# Patient Record
Sex: MALE | Race: WHITE | NOT HISPANIC OR LATINO | Employment: OTHER | URBAN - METROPOLITAN AREA
[De-identification: names, ages, dates, MRNs, and addresses within clinical notes are randomized per-mention and may not be internally consistent; named-entity substitution may affect disease eponyms.]

---

## 2023-11-30 ENCOUNTER — HOSPITAL ENCOUNTER (EMERGENCY)
Facility: HOSPITAL | Age: 63
Discharge: HOME | End: 2023-11-30
Payer: COMMERCIAL

## 2023-11-30 ENCOUNTER — APPOINTMENT (OUTPATIENT)
Dept: RADIOLOGY | Facility: HOSPITAL | Age: 63
End: 2023-11-30
Payer: COMMERCIAL

## 2023-11-30 VITALS
DIASTOLIC BLOOD PRESSURE: 81 MMHG | BODY MASS INDEX: 30.06 KG/M2 | WEIGHT: 210 LBS | HEIGHT: 70 IN | TEMPERATURE: 97.9 F | OXYGEN SATURATION: 95 % | HEART RATE: 88 BPM | SYSTOLIC BLOOD PRESSURE: 154 MMHG | RESPIRATION RATE: 16 BRPM

## 2023-11-30 DIAGNOSIS — S09.90XA CLOSED HEAD INJURY, INITIAL ENCOUNTER: ICD-10-CM

## 2023-11-30 DIAGNOSIS — T14.8XXA HEMATOMA: ICD-10-CM
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
## 2023-11-30 PROCEDURE — 72125 CT NECK SPINE W/O DYE: CPT | Performed by: RADIOLOGY

## 2023-11-30 PROCEDURE — 76377 3D RENDER W/INTRP POSTPROCES: CPT

## 2023-11-30 PROCEDURE — 70450 CT HEAD/BRAIN W/O DYE: CPT | Performed by: RADIOLOGY

## 2023-11-30 PROCEDURE — 70450 CT HEAD/BRAIN W/O DYE: CPT

## 2023-11-30 PROCEDURE — 70486 CT MAXILLOFACIAL W/O DYE: CPT | Performed by: RADIOLOGY

## 2023-11-30 PROCEDURE — 99285 EMERGENCY DEPT VISIT HI MDM: CPT

## 2023-11-30 PROCEDURE — 70486 CT MAXILLOFACIAL W/O DYE: CPT

## 2023-11-30 PROCEDURE — 76377 3D RENDER W/INTRP POSTPROCES: CPT | Performed by: RADIOLOGY

## 2023-11-30 PROCEDURE — 72125 CT NECK SPINE W/O DYE: CPT

## 2023-11-30 ASSESSMENT — PAIN SCALES - GENERAL: PAINLEVEL_OUTOF10: 10 - WORST POSSIBLE PAIN

## 2023-11-30 ASSESSMENT — LIFESTYLE VARIABLES
HAVE YOU EVER FELT YOU SHOULD CUT DOWN ON YOUR DRINKING: NO
EVER FELT BAD OR GUILTY ABOUT YOUR DRINKING: NO
REASON UNABLE TO ASSESS: NO
HAVE PEOPLE ANNOYED YOU BY CRITICIZING YOUR DRINKING: NO
EVER HAD A DRINK FIRST THING IN THE MORNING TO STEADY YOUR NERVES TO GET RID OF A HANGOVER: NO

## 2023-11-30 ASSESSMENT — COLUMBIA-SUICIDE SEVERITY RATING SCALE - C-SSRS
1. IN THE PAST MONTH, HAVE YOU WISHED YOU WERE DEAD OR WISHED YOU COULD GO TO SLEEP AND NOT WAKE UP?: NO
6. HAVE YOU EVER DONE ANYTHING, STARTED TO DO ANYTHING, OR PREPARED TO DO ANYTHING TO END YOUR LIFE?: NO
2. HAVE YOU ACTUALLY HAD ANY THOUGHTS OF KILLING YOURSELF?: NO

## 2023-11-30 ASSESSMENT — PAIN - FUNCTIONAL ASSESSMENT: PAIN_FUNCTIONAL_ASSESSMENT: 0-10

## 2023-11-30 ASSESSMENT — PAIN DESCRIPTION - LOCATION: LOCATION: HEAD

## 2023-11-30 ASSESSMENT — PAIN DESCRIPTION - PAIN TYPE: TYPE: ACUTE PAIN

## 2023-11-30 NOTE — ED PROVIDER NOTES
HPI   Chief Complaint   Patient presents with    Fall     Patient fell down while walking today, patient states he fell because he didn't have his cane. Patient has visible swelling to the left eye. Patient denies thinners.          History provided by:  Patient   used: No         63-year-old male presents with an accident a fall and has a goose egg above his left eyebrow today.  He is not sure if he lost consciousness.  Denies neck pain.  He fell because he did not have his cane and tripped.  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  He does not take blood thinners.  Denies BUE/BLE swelling, temp or sensation changes. denies dizziness, headache, vision changes, weakness, n/v, abd or back pain, SOB or CP    ROS negative unless otherwise stated in HPI                   [place] Coma Scale Score: 15                  Patient History   No past medical history on file.  No past surgical history on file.  No family history on file.  Social History     Tobacco Use    Smoking status: Not on file    Smokeless tobacco: Not on file   Substance Use Topics    Alcohol use: Not on file    Drug use: Not on file       Physical Exam   ED Triage Vitals [11/30/23 1304]   Temp Heart Rate Resp BP   36.6 °C (97.9 °F) 88 16 154/81      SpO2 Temp Source Heart Rate Source Patient Position   95 % Temporal -- --      BP Location FiO2 (%)     -- --       Physical Exam    General: alert, no distress, well nourished, talking in full and complete sentences  Skin: dry, intact, no rashes, no ecchymosis, no crepitus, no open wounds  Head: Hematoma above the left eyebrow, normocephalic  Eyes: EOMI, PERRLA, normal conjunctiva, no nystagmus, no raccoon eyes  Ears: Tms clear and intact, external ear normal, no discharge, no hemotympanum, negative walker sign  Throat: oropharynx clear, tonsils not enlarged, uvula midline, no stridor, no hot potato voice  Nose: nares patent  Mouth: mucous membranes moist  Neck: supple,  FROM without pain  CV: +S1/S1  Respiratory: non labored breathing, lungs CTA, no wheezing, no retractions  Abd: soft, NT, normal BS, no peritoneal signs  Spine: NT, no vertebral tenderness, no step offs  Extremities: FROM X4, strength +5/5, pulses intact, capillary refill intact  Neuro: A&Ox3  Psych: cooperative, appropriate mood    ED Course & MDM   Diagnoses as of 11/30/23 1541   Fall, initial encounter   Closed head injury, initial encounter   Hematoma   Labs Reviewed - No data to display   CT maxillofacial bones wo IV contrast   Final Result   Scalp soft tissue swelling overlies the left frontal calvarium and   extends over the medial left orbit. There is no sign of acute   fracture.        Postoperative changes in the maxillary sinuses along the medial walls   with bilateral turbinectomies.        Lobular mucoperiosteal thickening of the paranasal sinuses.        Bowing of the nasal septum to the right.        Postoperative changes are again noted in the right orbit.        Signed by: Be Mckeon 11/30/2023 2:18 PM   Dictation workstation:   [id number]      CT head wo IV contrast   Final Result   Mild age-related atrophy and small-vessel ischemic changes of the   cerebral white matter.        No CT evidence of acute intracranial hemorrhage or mass effect.        Large scalp hematoma overlies the left frontal calvarium and extends   over the medial left orbit. There is no underlying calvarial fracture.        Mucoperiosteal thickening of the paranasal sinuses.        Signed by: Be Mckeon 11/30/2023 2:15 PM   Dictation workstation:   [id number]      CT cervical spine wo IV contrast   Final Result   Mild multilevel cervical spondylosis with mild dextro convexity of   the cervical spine is straightening of cervical lordosis.        Mild multilevel bulging disc with hypertrophic facet arthrosis. There   is mild central canal narrowing at multiple levels as described   above. There is multilevel  bilateral facet arthrosis which is   greatest at C4-5 on the left and C3-4 on the right.        No sign of acute fracture or subluxation.        Signed by: Be Mckeon 11/30/2023 2:27 PM   Dictation workstation:   [id number]      CT 3D reconstruction    (Results Pending)         Medical Decision Making  No acute findings on final read of head, maxillofacial and cervical spine CTs.  Patient follow-up with family doctor.  Afebrile, not tachycardic, not tachypneic, not hypoxic, tolerating PO, non toxic appearing and ambulating at baseline at discharge. Hemodynamically intact. All questions answered. Patient in agreement with treatment.      Procedure  Procedures     Virgen Cohen PA-C  11/30/23 8678

## 2023-11-30 NOTE — ED TRIAGE NOTES
63-year-old male presents with an accidental fall today after he had an accidental fall because he tripped and hit his head on a steel pipe 1.5 hours ago.  He is not sure if he lost consciousness.  He does not take blood thinners.  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  He has a hematoma to his eyebrow area.  He denies neck pain.  Alert and oriented and talking in full and complete sentences.

## 2023-12-11 ENCOUNTER — APPOINTMENT (OUTPATIENT)
Dept: RADIOLOGY | Facility: HOSPITAL | Age: 63
End: 2023-12-11
Payer: COMMERCIAL

## 2023-12-11 ENCOUNTER — HOSPITAL ENCOUNTER (EMERGENCY)
Facility: HOSPITAL | Age: 63
Discharge: HOME | End: 2023-12-11
Attending: STUDENT IN AN ORGANIZED HEALTH CARE EDUCATION/TRAINING PROGRAM
Payer: COMMERCIAL

## 2023-12-11 VITALS
BODY MASS INDEX: 30.21 KG/M2 | TEMPERATURE: 98.1 F | HEART RATE: 73 BPM | DIASTOLIC BLOOD PRESSURE: 71 MMHG | HEIGHT: 70 IN | WEIGHT: 211 LBS | OXYGEN SATURATION: 97 % | RESPIRATION RATE: 18 BRPM | SYSTOLIC BLOOD PRESSURE: 118 MMHG

## 2023-12-11 DIAGNOSIS — R07.9 CHEST PAIN, UNSPECIFIED TYPE: Primary | ICD-10-CM

## 2023-12-11 DIAGNOSIS — I15.9 SECONDARY HYPERTENSION: ICD-10-CM

## 2023-12-11 LAB
ALBUMIN SERPL BCP-MCNC: 4.1 G/DL (ref 3.4–5)
ALP SERPL-CCNC: 80 U/L (ref 33–136)
ALT SERPL W P-5'-P-CCNC: 15 U/L (ref 10–52)
ANION GAP SERPL CALC-SCNC: 10 MMOL/L (ref 10–20)
APTT PPP: 29 SECONDS (ref 27–38)
AST SERPL W P-5'-P-CCNC: 16 U/L (ref 9–39)
BASOPHILS # BLD AUTO: 0.05 X10*3/UL (ref 0–0.1)
BASOPHILS NFR BLD AUTO: 1 %
BILIRUB SERPL-MCNC: 0.6 MG/DL (ref 0–1.2)
BNP SERPL-MCNC: 16 PG/ML (ref 0–99)
BUN SERPL-MCNC: 18 MG/DL (ref 6–23)
CALCIUM SERPL-MCNC: 9 MG/DL (ref 8.6–10.3)
CARDIAC TROPONIN I PNL SERPL HS: 4 NG/L (ref 0–20)
CARDIAC TROPONIN I PNL SERPL HS: 4 NG/L (ref 0–20)
CHLORIDE SERPL-SCNC: 106 MMOL/L (ref 98–107)
CO2 SERPL-SCNC: 28 MMOL/L (ref 21–32)
CREAT SERPL-MCNC: 0.79 MG/DL (ref 0.5–1.3)
EOSINOPHIL # BLD AUTO: 0.46 X10*3/UL (ref 0–0.7)
EOSINOPHIL NFR BLD AUTO: 9.1 %
ERYTHROCYTE [DISTWIDTH] IN BLOOD BY AUTOMATED COUNT: 12.8 % (ref 11.5–14.5)
GFR SERPL CREATININE-BSD FRML MDRD: >90 ML/MIN/1.73M*2
GLUCOSE SERPL-MCNC: 111 MG/DL (ref 74–99)
HCT VFR BLD AUTO: 43 % (ref 41–52)
HGB BLD-MCNC: 14.3 G/DL (ref 13.5–17.5)
IMM GRANULOCYTES # BLD AUTO: 0 X10*3/UL (ref 0–0.7)
IMM GRANULOCYTES NFR BLD AUTO: 0 % (ref 0–0.9)
INR PPP: 1 (ref 0.9–1.1)
LYMPHOCYTES # BLD AUTO: 2.31 X10*3/UL (ref 1.2–4.8)
LYMPHOCYTES NFR BLD AUTO: 45.8 %
MAGNESIUM SERPL-MCNC: 2 MG/DL (ref 1.6–2.4)
MCH RBC QN AUTO: 32.4 PG (ref 26–34)
MCHC RBC AUTO-ENTMCNC: 33.3 G/DL (ref 32–36)
MCV RBC AUTO: 98 FL (ref 80–100)
MONOCYTES # BLD AUTO: 0.74 X10*3/UL (ref 0.1–1)
MONOCYTES NFR BLD AUTO: 14.7 %
NEUTROPHILS # BLD AUTO: 1.48 X10*3/UL (ref 1.2–7.7)
NEUTROPHILS NFR BLD AUTO: 29.4 %
NRBC BLD-RTO: 0 /100 WBCS (ref 0–0)
PLATELET # BLD AUTO: 194 X10*3/UL (ref 150–450)
POTASSIUM SERPL-SCNC: 3.9 MMOL/L (ref 3.5–5.3)
PROT SERPL-MCNC: 6.5 G/DL (ref 6.4–8.2)
PROTHROMBIN TIME: 11 SECONDS (ref 9.8–12.8)
RBC # BLD AUTO: 4.41 X10*6/UL (ref 4.5–5.9)
SARS-COV-2 RNA RESP QL NAA+PROBE: NOT DETECTED
SODIUM SERPL-SCNC: 140 MMOL/L (ref 136–145)
WBC # BLD AUTO: 5 X10*3/UL (ref 4.4–11.3)

## 2023-12-11 PROCEDURE — 2500000001 HC RX 250 WO HCPCS SELF ADMINISTERED DRUGS (ALT 637 FOR MEDICARE OP)

## 2023-12-11 PROCEDURE — 83880 ASSAY OF NATRIURETIC PEPTIDE: CPT

## 2023-12-11 PROCEDURE — 36415 COLL VENOUS BLD VENIPUNCTURE: CPT

## 2023-12-11 PROCEDURE — 85610 PROTHROMBIN TIME: CPT

## 2023-12-11 PROCEDURE — 85025 COMPLETE CBC W/AUTO DIFF WBC: CPT

## 2023-12-11 PROCEDURE — 71045 X-RAY EXAM CHEST 1 VIEW: CPT | Mod: FOREIGN READ | Performed by: RADIOLOGY

## 2023-12-11 PROCEDURE — 99283 EMERGENCY DEPT VISIT LOW MDM: CPT | Mod: 25

## 2023-12-11 PROCEDURE — 84484 ASSAY OF TROPONIN QUANT: CPT

## 2023-12-11 PROCEDURE — 71045 X-RAY EXAM CHEST 1 VIEW: CPT | Mod: FY

## 2023-12-11 PROCEDURE — 83735 ASSAY OF MAGNESIUM: CPT

## 2023-12-11 PROCEDURE — 80053 COMPREHEN METABOLIC PANEL: CPT

## 2023-12-11 PROCEDURE — 87635 SARS-COV-2 COVID-19 AMP PRB: CPT

## 2023-12-11 PROCEDURE — 2500000004 HC RX 250 GENERAL PHARMACY W/ HCPCS (ALT 636 FOR OP/ED)

## 2023-12-11 PROCEDURE — 96360 HYDRATION IV INFUSION INIT: CPT

## 2023-12-11 RX ORDER — NITROGLYCERIN 0.4 MG/1
0.4 TABLET SUBLINGUAL ONCE
Status: COMPLETED | OUTPATIENT
Start: 2023-12-11 | End: 2023-12-11

## 2023-12-11 RX ORDER — NAPROXEN SODIUM 220 MG/1
324 TABLET, FILM COATED ORAL ONCE
Status: COMPLETED | OUTPATIENT
Start: 2023-12-11 | End: 2023-12-11

## 2023-12-11 RX ADMIN — NITROGLYCERIN 0.4 MG: 0.4 TABLET SUBLINGUAL at 21:01

## 2023-12-11 RX ADMIN — ASPIRIN 324 MG: 81 TABLET, CHEWABLE ORAL at 21:00

## 2023-12-11 RX ADMIN — SODIUM CHLORIDE 500 ML: 9 INJECTION, SOLUTION INTRAVENOUS at 21:13

## 2023-12-11 ASSESSMENT — PAIN DESCRIPTION - LOCATION
LOCATION: CHEST

## 2023-12-11 ASSESSMENT — LIFESTYLE VARIABLES
HAVE YOU EVER FELT YOU SHOULD CUT DOWN ON YOUR DRINKING: NO
EVER HAD A DRINK FIRST THING IN THE MORNING TO STEADY YOUR NERVES TO GET RID OF A HANGOVER: NO
EVER FELT BAD OR GUILTY ABOUT YOUR DRINKING: NO
REASON UNABLE TO ASSESS: NO
HAVE PEOPLE ANNOYED YOU BY CRITICIZING YOUR DRINKING: NO

## 2023-12-11 ASSESSMENT — PAIN DESCRIPTION - PROGRESSION
CLINICAL_PROGRESSION: NOT CHANGED
CLINICAL_PROGRESSION: GRADUALLY IMPROVING

## 2023-12-11 ASSESSMENT — PAIN DESCRIPTION - FREQUENCY
FREQUENCY: CONSTANT/CONTINUOUS
FREQUENCY: CONSTANT/CONTINUOUS

## 2023-12-11 ASSESSMENT — PAIN DESCRIPTION - DESCRIPTORS
DESCRIPTORS: PRESSURE;SHARP
DESCRIPTORS: SHARP;PRESSURE

## 2023-12-11 ASSESSMENT — COLUMBIA-SUICIDE SEVERITY RATING SCALE - C-SSRS
2. HAVE YOU ACTUALLY HAD ANY THOUGHTS OF KILLING YOURSELF?: NO
6. HAVE YOU EVER DONE ANYTHING, STARTED TO DO ANYTHING, OR PREPARED TO DO ANYTHING TO END YOUR LIFE?: NO
1. IN THE PAST MONTH, HAVE YOU WISHED YOU WERE DEAD OR WISHED YOU COULD GO TO SLEEP AND NOT WAKE UP?: NO

## 2023-12-11 ASSESSMENT — PAIN DESCRIPTION - PAIN TYPE
TYPE: ACUTE PAIN
TYPE: ACUTE PAIN

## 2023-12-11 ASSESSMENT — PAIN SCALES - GENERAL
PAINLEVEL_OUTOF10: 9
PAINLEVEL_OUTOF10: 3
PAINLEVEL_OUTOF10: 7
PAINLEVEL_OUTOF10: 9

## 2023-12-11 ASSESSMENT — PAIN - FUNCTIONAL ASSESSMENT
PAIN_FUNCTIONAL_ASSESSMENT: 0-10

## 2023-12-11 ASSESSMENT — PAIN DESCRIPTION - ONSET
ONSET: GRADUAL
ONSET: GRADUAL

## 2023-12-12 ENCOUNTER — HOSPITAL ENCOUNTER (OUTPATIENT)
Dept: CARDIOLOGY | Facility: HOSPITAL | Age: 63
Discharge: HOME | End: 2023-12-12
Payer: COMMERCIAL

## 2023-12-12 PROCEDURE — 93005 ELECTROCARDIOGRAM TRACING: CPT

## 2023-12-12 NOTE — ED PROVIDER NOTES
"HPI   Chief Complaint   Patient presents with    Hypertension       History provided by: Patient    Limitations to history: None    CC: Chest pain, hypertension    HPI: 63-year-old male with a history of epilepsy presents the emergency department to be evaluated for chest pain.  He says the chest pain started out 2 hours prior to arrival.  He characterized the pain as \"pressure \"and denies any tearing or shredding pain in his chest or back or radiation to his stomach.  States that is generally seen in his chest.  Denies pain radiation or anything makes pain better or worse including taking a big breath.  He denies taking anything for his pain prior to arrival.  He denies cough and hemoptysis.  Denies feeling short of breath.  He denies history of ACS, not sure when his last stress test was but he is never required stent placement or bypass.  Denies history of heart failure, has chronic swelling in his legs but is not on Lasix.  He says that the swelling in his legs is similar to previous and he wears compression stockings.  He denies history of blood clots and denies recent plane flights or surgeries.  Denies history of cancer.  Denies calf pain.  He denies recent illnesses or exposure to sick contacts.  States that he noticed his blood pressure was elevated when his pain started.  He does not believe he takes anything for his blood pressure.  He denies headache or vision changes.  Denies extremity weakness, numbness, or tingling.  He does have a history of COPD but has not required any breathing treatments over the last few months since he quit smoking due to problems with his left eye that he does follow-up with a specialist for.  Denies all other systemic symptoms.    ROS: Negative unless mentioned in HPI    Social Hx: Former smoker.  Denies alcohol and drug use.    Medical Hx: Medical history significant for epilepsy.  Allergy to gabapentin, Lyrica, green tea, Norco, and prednisone.  Immunizations are " up-to-date.    Surgical HX: Denies    Physical exam:    Constitutional: Patient is well-nourished and well-developed.  Sitting comfortably in the room and in no distress.  Oriented to person, place, time, and situation.    HEENT: Head is normocephalic, atraumatic. Patient's airway is patent.  Tympanic membranes are clear bilaterally.  Nasal mucosa clear.  Mouth with normal mucosa.  Throat is not erythematous and there are no oropharyngeal exudates, uvula is midline.  No obvious facial deformities.    Eyes: Clear bilaterally.  Pupils are equal round and reactive to light and accommodation.  Extraocular movements intact.      Cardiac: Regular rate, regular rhythm.  Heart sounds S1, S2.  No murmurs, rubs, or gallops.  PMI nondisplaced.  No JVD.    Respiratory: Regular respiratory rate and effort.  Breath sounds are clear and equal bilaterally, no adventitious lung sounds.  Patient is speaking in full sentences and is in no apparent respiratory distress. No use of accessory muscles.      Gastrointestinal: Abdomen is soft, nondistended, and nontender.  There are no obvious deformities.  No rebound tenderness or guarding.  Bowel sounds are normal active.    Genitourinary: No CVA or flank tenderness.    Musculoskeletal: No reproducible tenderness.  No peripheral edema or erythema.  No calf tenderness.  Negative Homans' sign.  No obvious skin or bony deformities.  Patient has equal range of motion in all extremities and no strength deficiencies.  No muscle or joint tenderness. No back or neck tenderness.  Capillary refill less than 3 seconds.  Strong peripheral pulses.  No sensory deficits.    Neurological: Patient is alert and oriented.  No focal deficits.  5/5 strength in all extremities.  Cranial nerves II through XII intact. GCS15.     Skin: Skin is normal color for race and is warm, dry, and intact.  No evidence of trauma.  No lesions, rashes, bruising, jaundice, or masses.    Psych: Appropriate mood and affect.  No  apparent risk to self or others.    Heme/lymph: No adenopathy, lymphadenopathy, or splenomegaly    Physical exam is otherwise negative unless stated above or in history of present illness.                          No data recorded                Patient History   No past medical history on file.  No past surgical history on file.  No family history on file.  Social History     Tobacco Use    Smoking status: Not on file    Smokeless tobacco: Not on file   Substance Use Topics    Alcohol use: Not on file    Drug use: Not on file       Physical Exam   ED Triage Vitals [12/11/23 2031]   Temp Heart Rate Resp BP   36.7 °C (98.1 °F) 85 20 174/83      SpO2 Temp Source Heart Rate Source Patient Position   95 % Temporal -- --      BP Location FiO2 (%)     Right arm --       Physical Exam    ED Course & MDM   Diagnoses as of 12/11/23 2236   Chest pain, unspecified type   Secondary hypertension     Patient updated on plan for lab testing, IV insertion, radiology imaging, and medications to be administered while in the ER (if indicated). Patient updated on expected wait times for testing and results. Patient provided my name and told to ask any staff member for questions or concerns if they should arise. Electronic medical record reviewed.     MDM    Upon initial assessment, patient was healthy non-toxic appearing and in no apparent distress.     Patient presented to the emergency department with the chief complaint of chest pressure with hypertension.breath sounds are clear and equal bilaterally, 95% on room air.  No muffled heart sounds, JVD, murmur.  No peripheral edema or erythema would suggest a DVT or right-sided heart failure.  On arrival to the emergency department, vital signs were within normal limits  We will give the patient oral aspirin and sublingual nitro for his chest pressure which should help with his blood pressure as well.  Will get basic blood work, EKG and troponin, chest x-ray, coagulation screen, BMP,  magnesium.    I have a low suspicion for PE or AAA based on the patient's history and physical exam.  Is not hypoxic or tachycardic.  He is no history of blood clots in his history and physical exam not consistent with blood clot at this time.  Wells criteria 0.  I staffed this patient with my attending, agreeable plan of care.    Patient's EKG was performed at 2044 and interpreted by me.  Normal sinus rhythm 80 bpm.  Normal axis.  There is no ST elevation or depression.  No prolonged QT.    Upon reevaluation, patient states his pain is greatly improved.  His blood pressure is also now within normal limits.  I updated him on his results.  His original repeat troponin within normal notes, heart score is 2.  He tested negative for COVID and the flu.  BNP is 16.  Magnesium is 2.  CMP shows hyperglycemia 111 thought signs of DKA.  CBC shows no leukocytosis or anemia.  Chest x-ray feels no cardiomegaly, pulmonary edema, pneumothorax, pneumonia, pleural effusion.  States he is feeling much better and he like to go home.  I do believe this is reasonable given his heart score as well as his reassuring lab and imaging results.  I will [facility], I do believe he would benefit from a cardiac consultation and potential stress test given that he has not had any similar test in several years.  I also recommend that he follow-up with his primary care provider to discuss his blood pressure.  Recommend that he take his blood pressure at home and keep a documented log of this.  Discussed supportive treatment.  All questions and concerns addressed.  Reasons to return to ER discussed.  Patient verbalized understanding and agreement with the treatment plan and they remained hemodynamically stable in the ER.      This note was dictated using a speech recognition program.  While an attempt was made at proof-reading to minimize errors, minor errors in transcription may be present    Medical Decision  Making      Procedure  Procedures     Clark Blair PA-C  12/11/23 0984

## 2024-03-28 ENCOUNTER — HOSPITAL ENCOUNTER (EMERGENCY)
Facility: HOSPITAL | Age: 64
Discharge: HOME | End: 2024-03-28
Payer: COMMERCIAL

## 2024-03-28 ENCOUNTER — APPOINTMENT (OUTPATIENT)
Dept: RADIOLOGY | Facility: HOSPITAL | Age: 64
End: 2024-03-28
Payer: COMMERCIAL

## 2024-03-28 VITALS
DIASTOLIC BLOOD PRESSURE: 65 MMHG | WEIGHT: 206 LBS | TEMPERATURE: 97.7 F | BODY MASS INDEX: 28.84 KG/M2 | OXYGEN SATURATION: 95 % | HEART RATE: 94 BPM | SYSTOLIC BLOOD PRESSURE: 140 MMHG | HEIGHT: 71 IN | RESPIRATION RATE: 18 BRPM

## 2024-03-28 DIAGNOSIS — S43.402A SPRAIN OF LEFT SHOULDER, UNSPECIFIED SHOULDER SPRAIN TYPE, INITIAL ENCOUNTER: ICD-10-CM
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
## 2024-03-28 PROCEDURE — 99284 EMERGENCY DEPT VISIT MOD MDM: CPT

## 2024-03-28 PROCEDURE — 73030 X-RAY EXAM OF SHOULDER: CPT | Mod: LEFT SIDE | Performed by: RADIOLOGY

## 2024-03-28 PROCEDURE — 73030 X-RAY EXAM OF SHOULDER: CPT | Mod: LT

## 2024-03-28 PROCEDURE — 73060 X-RAY EXAM OF HUMERUS: CPT | Mod: LEFT SIDE | Performed by: RADIOLOGY

## 2024-03-28 PROCEDURE — 73060 X-RAY EXAM OF HUMERUS: CPT | Mod: LT

## 2024-03-28 RX ORDER — LIDOCAINE 560 MG/1
1 PATCH PERCUTANEOUS; TOPICAL; TRANSDERMAL DAILY
Qty: 5 PATCH | Refills: 0 | Status: SHIPPED | OUTPATIENT
Start: 2024-03-28 | End: 2024-04-02

## 2024-03-28 RX ORDER — ACETAMINOPHEN 500 MG
500 TABLET ORAL EVERY 6 HOURS PRN
Qty: 28 TABLET | Refills: 0 | Status: SHIPPED | OUTPATIENT
Start: 2024-03-28 | End: 2024-04-04

## 2024-03-28 RX ORDER — ACETAMINOPHEN 325 MG/1
650 TABLET ORAL ONCE
Status: COMPLETED | OUTPATIENT
Start: 2024-03-28 | End: 2024-03-28

## 2024-03-28 RX ADMIN — ACETAMINOPHEN 650 MG: 325 TABLET ORAL at 15:19

## 2024-03-28 ASSESSMENT — PAIN - FUNCTIONAL ASSESSMENT
PAIN_FUNCTIONAL_ASSESSMENT: 0-10
PAIN_FUNCTIONAL_ASSESSMENT: 0-10

## 2024-03-28 ASSESSMENT — LIFESTYLE VARIABLES
EVER FELT BAD OR GUILTY ABOUT YOUR DRINKING: NO
HAVE PEOPLE ANNOYED YOU BY CRITICIZING YOUR DRINKING: NO
TOTAL SCORE: 0
HAVE YOU EVER FELT YOU SHOULD CUT DOWN ON YOUR DRINKING: NO
EVER HAD A DRINK FIRST THING IN THE MORNING TO STEADY YOUR NERVES TO GET RID OF A HANGOVER: NO

## 2024-03-28 ASSESSMENT — PAIN SCALES - GENERAL
PAINLEVEL_OUTOF10: 10 - WORST POSSIBLE PAIN
PAINLEVEL_OUTOF10: 10 - WORST POSSIBLE PAIN

## 2024-03-28 ASSESSMENT — COLUMBIA-SUICIDE SEVERITY RATING SCALE - C-SSRS
2. HAVE YOU ACTUALLY HAD ANY THOUGHTS OF KILLING YOURSELF?: NO
1. IN THE PAST MONTH, HAVE YOU WISHED YOU WERE DEAD OR WISHED YOU COULD GO TO SLEEP AND NOT WAKE UP?: NO
6. HAVE YOU EVER DONE ANYTHING, STARTED TO DO ANYTHING, OR PREPARED TO DO ANYTHING TO END YOUR LIFE?: NO

## 2024-03-28 ASSESSMENT — PAIN DESCRIPTION - PROGRESSION: CLINICAL_PROGRESSION: NOT CHANGED

## 2024-03-28 NOTE — ED PROVIDER NOTES
HPI   Chief Complaint   Patient presents with    Fall     Pt fell  today injured left shoulder       History provided by: Patient    Limitations to history: None    CC: Fall    HPI: 63-year-old male presents emergency department to be evaluated for fall and left shoulder injury.  Patient states that this occurred earlier this morning.  Patient is completely blind in his right eye and says that he has about 50% vision in his left eye so he primarily walks around with a cane.  States that he tripped over something in his home causing him to fall on his outstretched left arm.  He complains of pain in the top of his shoulder and down in his humerus.  He says that the pain is not bad when he is not moving his arm but he is having difficulty lifting his arm over his head.  Denies any numbness, tingling, weakness.  He denies hitting his head or losing consciousness.  Denies use of anticoagulants or history of intracranial hemorrhage.  Denies headache, neck pain, nausea vomiting, lightheadedness or dizziness.  He denies weakness and fatigue and denies falling frequently or syncopal episodes.  Denies chest pain and difficulty breathing.  He did take a muscle relaxer which did help somewhat with the pain.  Denies pain or injury elsewhere.  Denies all other systemic symptoms.    ROS: Negative unless mentioned in HPI    Social Hx: Denies tobacco, alcohol or drug use    Physical exam:    Constitutional: Patient is well-nourished and well-developed.  Sitting comfortably in the room and in no distress.  Oriented to person, place, time, and situation.    HEENT: Head is normocephalic, atraumatic. Patient's airway is patent.  Tympanic membranes are clear bilaterally.  Nasal mucosa clear.  Mouth with normal mucosa.  Throat is not erythematous and there are no oropharyngeal exudates, uvula is midline.  No obvious facial deformities.    Eyes: Clear bilaterally.  Pupils are equal round and reactive to light and accommodation.   Extraocular movements intact.      Cardiac: Regular rate, regular rhythm.  Heart sounds S1, S2.  No murmurs, rubs, or gallops.  PMI nondisplaced.  No JVD.    Respiratory: Regular respiratory rate and effort.  Breath sounds are clear and equal bilaterally, no adventitious lung sounds.  Patient is speaking in full sentences and is in no apparent respiratory distress. No use of accessory muscles.      Gastrointestinal: Abdomen is soft, nondistended, and nontender.  There are no obvious deformities.  No rebound tenderness or guarding.  Bowel sounds are normal active.    Genitourinary: No CVA or flank tenderness.    Musculoskeletal: Muscular tenderness over the superior and anterior aspect of the left shoulder.  Does have some discomfort in the proximal humerus.  Patient has limited ability to flex and abduct the shoulder.  No obvious skin or bony deformities.  No midline spinal tenderness.Capillary refill less than 3 seconds.  Strong peripheral pulses.  No sensory deficits.    Neurological: Patient is alert and oriented.  No focal deficits.  5/5 strength in all extremities.  Cranial nerves II through XII intact. GCS15.     Skin: Skin is normal color for race and is warm, dry, and intact.  No evidence of trauma.  No lesions, rashes, bruising, jaundice, or masses.    Psych: Appropriate mood and affect.  No apparent risk to self or others.    Heme/lymph: No adenopathy, lymphadenopathy, or splenomegaly    Physical exam is otherwise negative unless stated above or in history of present illness.    Patient updated on plan for lab testing, IV insertion, radiology imaging, and medications to be administered while in the ER (if indicated). Patient updated on expected wait times for testing and results. Patient provided my name and told to ask any staff member for questions or concerns if they should arise. Electronic medical record reviewed.     MDM    Upon initial assessment, patient was healthy non-toxic appearing and in no  apparent distress.     Patient presented to the emergency department with the chief complaint left shoulder injury. Muscular tenderness over the superior and anterior aspect of the left shoulder.  Does have some discomfort in the proximal humerus.  Patient has limited ability to flex and abduct the shoulder.  No obvious skin or bony deformities.  No midline spinal tenderness.Capillary refill less than 3 seconds.  Strong peripheral pulses.  No sensory deficits.  Head is atraumatic, no neurological deficits.  No tenderness or obvious injury elsewhere.  Examination of his heart and lungs is unremarkable.  On arrival to the emergency department, vital signs were within normal limits    Will give the patient p.o. Tylenol for his discomfort and get an x-ray of his shoulder and humerus.    Patient's x-rays reveal degenerative changes but no fracture or subluxation.  No obvious AC joint separation.  Patient does believe he will be able to ambulate without difficulty even with a shoulder sling so I will provide him with 1 in the ER.  Will discharge with lidocaine patches and Tylenol and I recommended that he [facility] with [facility] due to my concern for an internal injury including rotator strain/tear.  Discussed rest, ice, compression, and elevation.  All questions and concerns addressed.  Reasons to return to ER discussed.  Patient verbalized understanding and agreement with the treatment plan and they remained hemodynamically stable in the ER.  Discussed passive stretching in order to prevent adhesive capsulitis.    This note was dictated using a speech recognition program.  While an attempt was made at proof-reading to minimize errors, minor errors in transcription may be present                          [place] Coma Scale Score: 15                     Patient History   History reviewed. No pertinent past medical history.  History reviewed. No pertinent surgical history.  No family history on  file.  Social History     Tobacco Use    Smoking status: Not on file    Smokeless tobacco: Not on file   Substance Use Topics    Alcohol use: Not on file    Drug use: Not on file       Physical Exam   ED Triage Vitals [03/28/24 1437]   Temperature Heart Rate Respirations BP   36.5 °C (97.7 °F) 94 18 140/65      Pulse Ox Temp src Heart Rate Source Patient Position   95 % -- Monitor --      BP Location FiO2 (%)     -- --       Physical Exam    ED Course & MDM   Diagnoses as of 03/28/24 1618   Fall, initial encounter   Sprain of left shoulder, unspecified shoulder sprain type, initial encounter       Medical Decision Making      Procedure  Procedures     Clark Blair PA-C  03/28/24 1622

## 2024-07-03 ENCOUNTER — APPOINTMENT (OUTPATIENT)
Dept: RADIOLOGY | Facility: HOSPITAL | Age: 64
DRG: 090 | End: 2024-07-03
Payer: COMMERCIAL

## 2024-07-03 ENCOUNTER — HOSPITAL ENCOUNTER (INPATIENT)
Facility: HOSPITAL | Age: 64
Discharge: NURSING, REHAB OR LONG-TERM CARE | DRG: 090 | End: 2024-07-03
Attending: STUDENT IN AN ORGANIZED HEALTH CARE EDUCATION/TRAINING PROGRAM | Admitting: STUDENT IN AN ORGANIZED HEALTH CARE EDUCATION/TRAINING PROGRAM
Payer: COMMERCIAL

## 2024-07-03 DIAGNOSIS — R55 SYNCOPE, UNSPECIFIED SYNCOPE TYPE: ICD-10-CM

## 2024-07-03 DIAGNOSIS — R55 SYNCOPE AND COLLAPSE: ICD-10-CM

## 2024-07-03 DIAGNOSIS — S09.90XA CLOSED HEAD INJURY, INITIAL ENCOUNTER: ICD-10-CM
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
## 2024-07-03 LAB
BASOPHILS # BLD AUTO: 0.03 X10*3/UL (ref 0–0.1)
BASOPHILS NFR BLD AUTO: 0.7 %
EOSINOPHIL # BLD AUTO: 0.1 X10*3/UL (ref 0–0.7)
EOSINOPHIL NFR BLD AUTO: 2.4 %
ERYTHROCYTE [DISTWIDTH] IN BLOOD BY AUTOMATED COUNT: 13.3 % (ref 11.5–14.5)
HCT VFR BLD AUTO: 44.4 % (ref 41–52)
HGB BLD-MCNC: 15.3 G/DL (ref 13.5–17.5)
IMM GRANULOCYTES # BLD AUTO: 0.01 X10*3/UL (ref 0–0.7)
IMM GRANULOCYTES NFR BLD AUTO: 0.2 % (ref 0–0.9)
LYMPHOCYTES # BLD AUTO: 1.82 X10*3/UL (ref 1.2–4.8)
LYMPHOCYTES NFR BLD AUTO: 43.1 %
MCH RBC QN AUTO: 33.6 PG (ref 26–34)
MCHC RBC AUTO-ENTMCNC: 34.5 G/DL (ref 32–36)
MCV RBC AUTO: 97 FL (ref 80–100)
MONOCYTES # BLD AUTO: 0.77 X10*3/UL (ref 0.1–1)
MONOCYTES NFR BLD AUTO: 18.2 %
NEUTROPHILS # BLD AUTO: 1.49 X10*3/UL (ref 1.2–7.7)
NEUTROPHILS NFR BLD AUTO: 35.4 %
NRBC BLD-RTO: 0 /100 WBCS (ref 0–0)
PLATELET # BLD AUTO: 221 X10*3/UL (ref 150–450)
RBC # BLD AUTO: 4.56 X10*6/UL (ref 4.5–5.9)
WBC # BLD AUTO: 4.2 X10*3/UL (ref 4.4–11.3)

## 2024-07-03 PROCEDURE — 96374 THER/PROPH/DIAG INJ IV PUSH: CPT

## 2024-07-03 PROCEDURE — 2500000004 HC RX 250 GENERAL PHARMACY W/ HCPCS (ALT 636 FOR OP/ED): Performed by: STUDENT IN AN ORGANIZED HEALTH CARE EDUCATION/TRAINING PROGRAM

## 2024-07-03 PROCEDURE — 85025 COMPLETE CBC W/AUTO DIFF WBC: CPT | Performed by: STUDENT IN AN ORGANIZED HEALTH CARE EDUCATION/TRAINING PROGRAM

## 2024-07-03 PROCEDURE — 73590 X-RAY EXAM OF LOWER LEG: CPT | Mod: RIGHT SIDE | Performed by: RADIOLOGY

## 2024-07-03 PROCEDURE — 99285 EMERGENCY DEPT VISIT HI MDM: CPT

## 2024-07-03 PROCEDURE — 36415 COLL VENOUS BLD VENIPUNCTURE: CPT | Performed by: STUDENT IN AN ORGANIZED HEALTH CARE EDUCATION/TRAINING PROGRAM

## 2024-07-03 PROCEDURE — 73590 X-RAY EXAM OF LOWER LEG: CPT | Mod: RT

## 2024-07-03 PROCEDURE — 83735 ASSAY OF MAGNESIUM: CPT | Performed by: STUDENT IN AN ORGANIZED HEALTH CARE EDUCATION/TRAINING PROGRAM

## 2024-07-03 PROCEDURE — 71045 X-RAY EXAM CHEST 1 VIEW: CPT

## 2024-07-03 PROCEDURE — 80053 COMPREHEN METABOLIC PANEL: CPT | Performed by: STUDENT IN AN ORGANIZED HEALTH CARE EDUCATION/TRAINING PROGRAM

## 2024-07-03 PROCEDURE — 73610 X-RAY EXAM OF ANKLE: CPT | Mod: RT

## 2024-07-03 PROCEDURE — 71045 X-RAY EXAM CHEST 1 VIEW: CPT | Performed by: RADIOLOGY

## 2024-07-03 PROCEDURE — 84484 ASSAY OF TROPONIN QUANT: CPT | Performed by: STUDENT IN AN ORGANIZED HEALTH CARE EDUCATION/TRAINING PROGRAM

## 2024-07-03 PROCEDURE — 73610 X-RAY EXAM OF ANKLE: CPT | Mod: RIGHT SIDE | Performed by: RADIOLOGY

## 2024-07-03 RX ORDER — FENTANYL CITRATE 50 UG/ML
50 INJECTION, SOLUTION INTRAMUSCULAR; INTRAVENOUS ONCE
Status: COMPLETED | OUTPATIENT
Start: 2024-07-03 | End: 2024-07-03

## 2024-07-03 ASSESSMENT — PAIN SCALES - GENERAL: PAINLEVEL_OUTOF10: 10 - WORST POSSIBLE PAIN

## 2024-07-03 ASSESSMENT — PAIN - FUNCTIONAL ASSESSMENT: PAIN_FUNCTIONAL_ASSESSMENT: 0-10

## 2024-07-03 ASSESSMENT — LIFESTYLE VARIABLES
HAVE YOU EVER FELT YOU SHOULD CUT DOWN ON YOUR DRINKING: NO
TOTAL SCORE: 0
EVER HAD A DRINK FIRST THING IN THE MORNING TO STEADY YOUR NERVES TO GET RID OF A HANGOVER: NO
HAVE PEOPLE ANNOYED YOU BY CRITICIZING YOUR DRINKING: NO
EVER FELT BAD OR GUILTY ABOUT YOUR DRINKING: NO

## 2024-07-03 ASSESSMENT — PAIN DESCRIPTION - LOCATION: LOCATION: LEG

## 2024-07-03 ASSESSMENT — PAIN DESCRIPTION - DESCRIPTORS: DESCRIPTORS: DISCOMFORT;PRESSURE;SHOOTING

## 2024-07-03 ASSESSMENT — PAIN DESCRIPTION - ORIENTATION: ORIENTATION: RIGHT

## 2024-07-03 ASSESSMENT — COLUMBIA-SUICIDE SEVERITY RATING SCALE - C-SSRS
6. HAVE YOU EVER DONE ANYTHING, STARTED TO DO ANYTHING, OR PREPARED TO DO ANYTHING TO END YOUR LIFE?: NO
2. HAVE YOU ACTUALLY HAD ANY THOUGHTS OF KILLING YOURSELF?: NO
1. IN THE PAST MONTH, HAVE YOU WISHED YOU WERE DEAD OR WISHED YOU COULD GO TO SLEEP AND NOT WAKE UP?: NO

## 2024-07-04 ENCOUNTER — APPOINTMENT (OUTPATIENT)
Dept: RADIOLOGY | Facility: HOSPITAL | Age: 64
DRG: 090 | End: 2024-07-04
Payer: COMMERCIAL

## 2024-07-04 ENCOUNTER — APPOINTMENT (OUTPATIENT)
Dept: CARDIOLOGY | Facility: HOSPITAL | Age: 64
DRG: 090 | End: 2024-07-04
Payer: COMMERCIAL

## 2024-07-04 PROBLEM — R55 SYNCOPE AND COLLAPSE: Status: ACTIVE | Noted: 2024-07-04

## 2024-07-04 LAB
ALBUMIN SERPL BCP-MCNC: 4.3 G/DL (ref 3.4–5)
ALP SERPL-CCNC: 50 U/L (ref 33–136)
ALT SERPL W P-5'-P-CCNC: 19 U/L (ref 10–52)
ANION GAP SERPL CALC-SCNC: 13 MMOL/L (ref 10–20)
APPEARANCE UR: CLEAR
AST SERPL W P-5'-P-CCNC: 17 U/L (ref 9–39)
ATRIAL RATE: 80 BPM
ATRIAL RATE: 81 BPM
BILIRUB SERPL-MCNC: 1.1 MG/DL (ref 0–1.2)
BILIRUB UR STRIP.AUTO-MCNC: NEGATIVE MG/DL
BUN SERPL-MCNC: 17 MG/DL (ref 6–23)
CALCIUM SERPL-MCNC: 8.9 MG/DL (ref 8.6–10.3)
CARDIAC TROPONIN I PNL SERPL HS: 4 NG/L (ref 0–20)
CARDIAC TROPONIN I PNL SERPL HS: 5 NG/L (ref 0–20)
CHLORIDE SERPL-SCNC: 107 MMOL/L (ref 98–107)
CO2 SERPL-SCNC: 25 MMOL/L (ref 21–32)
COLOR UR: YELLOW
CREAT SERPL-MCNC: 0.89 MG/DL (ref 0.5–1.3)
EGFRCR SERPLBLD CKD-EPI 2021: >90 ML/MIN/1.73M*2
EST. AVERAGE GLUCOSE BLD GHB EST-MCNC: 111 MG/DL
GLUCOSE BLD MANUAL STRIP-MCNC: 131 MG/DL (ref 74–99)
GLUCOSE BLD MANUAL STRIP-MCNC: 150 MG/DL (ref 74–99)
GLUCOSE BLD MANUAL STRIP-MCNC: 96 MG/DL (ref 74–99)
GLUCOSE SERPL-MCNC: 98 MG/DL (ref 74–99)
GLUCOSE UR STRIP.AUTO-MCNC: NORMAL MG/DL
HBA1C MFR BLD: 5.5 %
HOLD SPECIMEN: NORMAL
HYALINE CASTS #/AREA URNS AUTO: ABNORMAL /LPF
KETONES UR STRIP.AUTO-MCNC: ABNORMAL MG/DL
LEUKOCYTE ESTERASE UR QL STRIP.AUTO: NEGATIVE
MAGNESIUM SERPL-MCNC: 2.05 MG/DL (ref 1.6–2.4)
MUCOUS THREADS #/AREA URNS AUTO: ABNORMAL /LPF
NITRITE UR QL STRIP.AUTO: NEGATIVE
P AXIS: 70 DEGREES
P AXIS: 83 DEGREES
P OFFSET: 173 MS
P OFFSET: 178 MS
P ONSET: 124 MS
P ONSET: 129 MS
PH UR STRIP.AUTO: 5.5 [PH]
POTASSIUM SERPL-SCNC: 3.8 MMOL/L (ref 3.5–5.3)
PR INTERVAL: 184 MS
PR INTERVAL: 192 MS
PROT SERPL-MCNC: 6.5 G/DL (ref 6.4–8.2)
PROT UR STRIP.AUTO-MCNC: ABNORMAL MG/DL
Q ONSET: 220 MS
Q ONSET: 221 MS
QRS COUNT: 13 BEATS
QRS COUNT: 14 BEATS
QRS DURATION: 100 MS
QRS DURATION: 112 MS
QT INTERVAL: 394 MS
QT INTERVAL: 398 MS
QTC CALCULATION(BAZETT): 454 MS
QTC CALCULATION(BAZETT): 462 MS
QTC FREDERICIA: 434 MS
QTC FREDERICIA: 439 MS
R AXIS: 90 DEGREES
R AXIS: 90 DEGREES
RBC # UR STRIP.AUTO: NEGATIVE /UL
RBC #/AREA URNS AUTO: ABNORMAL /HPF
SODIUM SERPL-SCNC: 141 MMOL/L (ref 136–145)
SP GR UR STRIP.AUTO: >1.05
T AXIS: 63 DEGREES
T AXIS: 64 DEGREES
T OFFSET: 417 MS
T OFFSET: 420 MS
TSH SERPL-ACNC: 1.02 MIU/L (ref 0.44–3.98)
UROBILINOGEN UR STRIP.AUTO-MCNC: NORMAL MG/DL
VENTRICULAR RATE: 80 BPM
VENTRICULAR RATE: 81 BPM
VIT B12 SERPL-MCNC: 599 PG/ML (ref 211–911)
WBC #/AREA URNS AUTO: ABNORMAL /HPF

## 2024-07-04 PROCEDURE — 36415 COLL VENOUS BLD VENIPUNCTURE: CPT | Performed by: STUDENT IN AN ORGANIZED HEALTH CARE EDUCATION/TRAINING PROGRAM

## 2024-07-04 PROCEDURE — 99222 1ST HOSP IP/OBS MODERATE 55: CPT | Performed by: STUDENT IN AN ORGANIZED HEALTH CARE EDUCATION/TRAINING PROGRAM

## 2024-07-04 PROCEDURE — 80175 DRUG SCREEN QUAN LAMOTRIGINE: CPT | Mod: ELYLAB | Performed by: STUDENT IN AN ORGANIZED HEALTH CARE EDUCATION/TRAINING PROGRAM

## 2024-07-04 PROCEDURE — 74177 CT ABD & PELVIS W/CONTRAST: CPT

## 2024-07-04 PROCEDURE — G0378 HOSPITAL OBSERVATION PER HR: HCPCS

## 2024-07-04 PROCEDURE — 2500000002 HC RX 250 W HCPCS SELF ADMINISTERED DRUGS (ALT 637 FOR MEDICARE OP, ALT 636 FOR OP/ED): Performed by: STUDENT IN AN ORGANIZED HEALTH CARE EDUCATION/TRAINING PROGRAM

## 2024-07-04 PROCEDURE — 76377 3D RENDER W/INTRP POSTPROCES: CPT

## 2024-07-04 PROCEDURE — 72125 CT NECK SPINE W/O DYE: CPT

## 2024-07-04 PROCEDURE — 84484 ASSAY OF TROPONIN QUANT: CPT | Performed by: STUDENT IN AN ORGANIZED HEALTH CARE EDUCATION/TRAINING PROGRAM

## 2024-07-04 PROCEDURE — 2500000004 HC RX 250 GENERAL PHARMACY W/ HCPCS (ALT 636 FOR OP/ED): Performed by: INTERNAL MEDICINE

## 2024-07-04 PROCEDURE — 93005 ELECTROCARDIOGRAM TRACING: CPT

## 2024-07-04 PROCEDURE — 99222 1ST HOSP IP/OBS MODERATE 55: CPT | Performed by: INTERNAL MEDICINE

## 2024-07-04 PROCEDURE — 81001 URINALYSIS AUTO W/SCOPE: CPT | Performed by: STUDENT IN AN ORGANIZED HEALTH CARE EDUCATION/TRAINING PROGRAM

## 2024-07-04 PROCEDURE — 2550000001 HC RX 255 CONTRASTS: Performed by: STUDENT IN AN ORGANIZED HEALTH CARE EDUCATION/TRAINING PROGRAM

## 2024-07-04 PROCEDURE — 74177 CT ABD & PELVIS W/CONTRAST: CPT | Performed by: RADIOLOGY

## 2024-07-04 PROCEDURE — 2500000001 HC RX 250 WO HCPCS SELF ADMINISTERED DRUGS (ALT 637 FOR MEDICARE OP): Performed by: INTERNAL MEDICINE

## 2024-07-04 PROCEDURE — 70450 CT HEAD/BRAIN W/O DYE: CPT | Performed by: RADIOLOGY

## 2024-07-04 PROCEDURE — 70486 CT MAXILLOFACIAL W/O DYE: CPT | Performed by: RADIOLOGY

## 2024-07-04 PROCEDURE — 71260 CT THORAX DX C+: CPT | Performed by: RADIOLOGY

## 2024-07-04 PROCEDURE — 84443 ASSAY THYROID STIM HORMONE: CPT | Performed by: STUDENT IN AN ORGANIZED HEALTH CARE EDUCATION/TRAINING PROGRAM

## 2024-07-04 PROCEDURE — 82947 ASSAY GLUCOSE BLOOD QUANT: CPT

## 2024-07-04 PROCEDURE — 99223 1ST HOSP IP/OBS HIGH 75: CPT | Performed by: INTERNAL MEDICINE

## 2024-07-04 PROCEDURE — 70450 CT HEAD/BRAIN W/O DYE: CPT

## 2024-07-04 PROCEDURE — 72125 CT NECK SPINE W/O DYE: CPT | Performed by: RADIOLOGY

## 2024-07-04 PROCEDURE — 2500000005 HC RX 250 GENERAL PHARMACY W/O HCPCS: Performed by: STUDENT IN AN ORGANIZED HEALTH CARE EDUCATION/TRAINING PROGRAM

## 2024-07-04 PROCEDURE — 82607 VITAMIN B-12: CPT | Performed by: STUDENT IN AN ORGANIZED HEALTH CARE EDUCATION/TRAINING PROGRAM

## 2024-07-04 PROCEDURE — 36415 COLL VENOUS BLD VENIPUNCTURE: CPT | Performed by: INTERNAL MEDICINE

## 2024-07-04 PROCEDURE — 83036 HEMOGLOBIN GLYCOSYLATED A1C: CPT | Mod: ELYLAB | Performed by: INTERNAL MEDICINE

## 2024-07-04 PROCEDURE — 2500000001 HC RX 250 WO HCPCS SELF ADMINISTERED DRUGS (ALT 637 FOR MEDICARE OP): Performed by: STUDENT IN AN ORGANIZED HEALTH CARE EDUCATION/TRAINING PROGRAM

## 2024-07-04 PROCEDURE — 70486 CT MAXILLOFACIAL W/O DYE: CPT

## 2024-07-04 PROCEDURE — 76377 3D RENDER W/INTRP POSTPROCES: CPT | Performed by: RADIOLOGY

## 2024-07-04 RX ORDER — CHOLECALCIFEROL (VITAMIN D3) 25 MCG
2000 TABLET ORAL
Status: DISCONTINUED | OUTPATIENT
Start: 2024-07-05 | End: 2024-07-08 | Stop reason: HOSPADM

## 2024-07-04 RX ORDER — LAMOTRIGINE 100 MG/1
400 TABLET ORAL EVERY MORNING
Status: DISCONTINUED | OUTPATIENT
Start: 2024-07-04 | End: 2024-07-08 | Stop reason: HOSPADM

## 2024-07-04 RX ORDER — TOCILIZUMAB 180 MG/ML
162 INJECTION, SOLUTION SUBCUTANEOUS
COMMUNITY
Start: 2024-04-22

## 2024-07-04 RX ORDER — LAMOTRIGINE 100 MG/1
600 TABLET ORAL EVERY EVENING
Status: DISCONTINUED | OUTPATIENT
Start: 2024-07-04 | End: 2024-07-08 | Stop reason: HOSPADM

## 2024-07-04 RX ORDER — TIOTROPIUM BROMIDE AND OLODATEROL 3.124; 2.736 UG/1; UG/1
2 SPRAY, METERED RESPIRATORY (INHALATION) DAILY
COMMUNITY

## 2024-07-04 RX ORDER — DORZOLAMIDE HYDROCHLORIDE AND TIMOLOL MALEATE 20; 5 MG/ML; MG/ML
1 SOLUTION/ DROPS OPHTHALMIC 2 TIMES DAILY
Status: DISCONTINUED | OUTPATIENT
Start: 2024-07-04 | End: 2024-07-08 | Stop reason: HOSPADM

## 2024-07-04 RX ORDER — ACETAMINOPHEN 160 MG/5ML
650 SOLUTION ORAL EVERY 4 HOURS PRN
Status: DISCONTINUED | OUTPATIENT
Start: 2024-07-04 | End: 2024-07-08 | Stop reason: HOSPADM

## 2024-07-04 RX ORDER — GUAIFENESIN/DEXTROMETHORPHAN 100-10MG/5
5 SYRUP ORAL EVERY 4 HOURS PRN
Status: DISCONTINUED | OUTPATIENT
Start: 2024-07-04 | End: 2024-07-08 | Stop reason: HOSPADM

## 2024-07-04 RX ORDER — ROSUVASTATIN CALCIUM 10 MG/1
5 TABLET, COATED ORAL NIGHTLY
Status: DISCONTINUED | OUTPATIENT
Start: 2024-07-04 | End: 2024-07-08 | Stop reason: HOSPADM

## 2024-07-04 RX ORDER — ALUMINUM HYDROXIDE, MAGNESIUM HYDROXIDE, AND SIMETHICONE 1200; 120; 1200 MG/30ML; MG/30ML; MG/30ML
30 SUSPENSION ORAL EVERY 6 HOURS PRN
Status: DISCONTINUED | OUTPATIENT
Start: 2024-07-04 | End: 2024-07-08 | Stop reason: HOSPADM

## 2024-07-04 RX ORDER — POTASSIUM CHLORIDE 750 MG/1
20 TABLET, EXTENDED RELEASE ORAL DAILY
COMMUNITY

## 2024-07-04 RX ORDER — FORMOTEROL FUMARATE DIHYDRATE 20 UG/2ML
20 SOLUTION RESPIRATORY (INHALATION)
Status: DISCONTINUED | OUTPATIENT
Start: 2024-07-04 | End: 2024-07-05

## 2024-07-04 RX ORDER — DOCUSATE SODIUM 100 MG/1
100 CAPSULE, LIQUID FILLED ORAL 2 TIMES DAILY
Status: DISCONTINUED | OUTPATIENT
Start: 2024-07-04 | End: 2024-07-08 | Stop reason: HOSPADM

## 2024-07-04 RX ORDER — LAMOTRIGINE 200 MG/1
3 TABLET ORAL EVERY EVENING
COMMUNITY

## 2024-07-04 RX ORDER — GUAIFENESIN 600 MG/1
600 TABLET, EXTENDED RELEASE ORAL EVERY 12 HOURS PRN
Status: DISCONTINUED | OUTPATIENT
Start: 2024-07-04 | End: 2024-07-08 | Stop reason: HOSPADM

## 2024-07-04 RX ORDER — METFORMIN HYDROCHLORIDE 500 MG/1
1 TABLET ORAL
COMMUNITY
Start: 2023-10-02

## 2024-07-04 RX ORDER — GLYCOPYRROLATE AND FORMOTEROL FUMARATE 9; 4.8 UG/1; UG/1
2 AEROSOL, METERED RESPIRATORY (INHALATION) 2 TIMES DAILY
COMMUNITY
Start: 2024-04-26

## 2024-07-04 RX ORDER — LATANOPROST 50 UG/ML
1 SOLUTION/ DROPS OPHTHALMIC DAILY
COMMUNITY

## 2024-07-04 RX ORDER — LAMOTRIGINE 200 MG/1
2 TABLET ORAL EVERY MORNING
COMMUNITY

## 2024-07-04 RX ORDER — ONDANSETRON 4 MG/1
4 TABLET, FILM COATED ORAL EVERY 8 HOURS PRN
Status: DISCONTINUED | OUTPATIENT
Start: 2024-07-04 | End: 2024-07-08 | Stop reason: HOSPADM

## 2024-07-04 RX ORDER — ALBUTEROL SULFATE 0.83 MG/ML
2.5 SOLUTION RESPIRATORY (INHALATION) 2 TIMES DAILY PRN
COMMUNITY
Start: 2024-01-22

## 2024-07-04 RX ORDER — DEXTROSE 50 % IN WATER (D50W) INTRAVENOUS SYRINGE
25
Status: DISCONTINUED | OUTPATIENT
Start: 2024-07-04 | End: 2024-07-08 | Stop reason: HOSPADM

## 2024-07-04 RX ORDER — ONDANSETRON HYDROCHLORIDE 2 MG/ML
4 INJECTION, SOLUTION INTRAVENOUS EVERY 8 HOURS PRN
Status: DISCONTINUED | OUTPATIENT
Start: 2024-07-04 | End: 2024-07-08 | Stop reason: HOSPADM

## 2024-07-04 RX ORDER — BRIMONIDINE TARTRATE 1.5 MG/ML
1 SOLUTION/ DROPS OPHTHALMIC 3 TIMES DAILY
Status: DISCONTINUED | OUTPATIENT
Start: 2024-07-04 | End: 2024-07-04

## 2024-07-04 RX ORDER — PRAMIPEXOLE DIHYDROCHLORIDE 0.25 MG/1
0.12 TABLET ORAL NIGHTLY
Status: DISCONTINUED | OUTPATIENT
Start: 2024-07-04 | End: 2024-07-08 | Stop reason: HOSPADM

## 2024-07-04 RX ORDER — LATANOPROST 50 UG/ML
1 SOLUTION/ DROPS OPHTHALMIC DAILY
Status: DISCONTINUED | OUTPATIENT
Start: 2024-07-04 | End: 2024-07-04

## 2024-07-04 RX ORDER — CALCIUM CARBONATE 200(500)MG
500 TABLET,CHEWABLE ORAL 4 TIMES DAILY PRN
Status: DISCONTINUED | OUTPATIENT
Start: 2024-07-04 | End: 2024-07-08 | Stop reason: HOSPADM

## 2024-07-04 RX ORDER — ROSUVASTATIN CALCIUM 5 MG/1
5 TABLET, COATED ORAL NIGHTLY
COMMUNITY
End: 2024-07-12 | Stop reason: SDUPTHER

## 2024-07-04 RX ORDER — PREDNISOLONE ACETATE 10 MG/ML
1 SUSPENSION/ DROPS OPHTHALMIC 2 TIMES DAILY
Status: DISCONTINUED | OUTPATIENT
Start: 2024-07-04 | End: 2024-07-08 | Stop reason: HOSPADM

## 2024-07-04 RX ORDER — DEXTROSE 50 % IN WATER (D50W) INTRAVENOUS SYRINGE
12.5
Status: DISCONTINUED | OUTPATIENT
Start: 2024-07-04 | End: 2024-07-08 | Stop reason: HOSPADM

## 2024-07-04 RX ORDER — INSULIN LISPRO 100 [IU]/ML
0-10 INJECTION, SOLUTION INTRAVENOUS; SUBCUTANEOUS
Status: DISCONTINUED | OUTPATIENT
Start: 2024-07-04 | End: 2024-07-08 | Stop reason: HOSPADM

## 2024-07-04 RX ORDER — CYCLOBENZAPRINE HCL 10 MG
10 TABLET ORAL 3 TIMES DAILY PRN
COMMUNITY

## 2024-07-04 RX ORDER — ALBUTEROL SULFATE 90 UG/1
2 AEROSOL, METERED RESPIRATORY (INHALATION) EVERY 6 HOURS PRN
COMMUNITY
Start: 2023-08-07

## 2024-07-04 RX ORDER — BRIMONIDINE TARTRATE 2 MG/ML
1 SOLUTION/ DROPS OPHTHALMIC 3 TIMES DAILY
COMMUNITY
Start: 2024-03-28

## 2024-07-04 RX ORDER — ACETAMINOPHEN 650 MG/1
650 SUPPOSITORY RECTAL EVERY 4 HOURS PRN
Status: DISCONTINUED | OUTPATIENT
Start: 2024-07-04 | End: 2024-07-08 | Stop reason: HOSPADM

## 2024-07-04 RX ORDER — ACETAMINOPHEN 325 MG/1
650 TABLET ORAL EVERY 4 HOURS PRN
Status: DISCONTINUED | OUTPATIENT
Start: 2024-07-04 | End: 2024-07-08 | Stop reason: HOSPADM

## 2024-07-04 RX ORDER — ALBUTEROL SULFATE 90 UG/1
2 AEROSOL, METERED RESPIRATORY (INHALATION) EVERY 6 HOURS PRN
Status: DISCONTINUED | OUTPATIENT
Start: 2024-07-04 | End: 2024-07-08 | Stop reason: HOSPADM

## 2024-07-04 RX ORDER — PRAMIPEXOLE DIHYDROCHLORIDE 0.12 MG/1
0.12 TABLET ORAL NIGHTLY
COMMUNITY

## 2024-07-04 RX ORDER — ASPIRIN 81 MG/1
81 TABLET ORAL
Status: DISCONTINUED | OUTPATIENT
Start: 2024-07-04 | End: 2024-07-08 | Stop reason: HOSPADM

## 2024-07-04 RX ORDER — SODIUM CHLORIDE 9 MG/ML
75 INJECTION, SOLUTION INTRAVENOUS CONTINUOUS
Status: DISCONTINUED | OUTPATIENT
Start: 2024-07-04 | End: 2024-07-08 | Stop reason: HOSPADM

## 2024-07-04 RX ORDER — ASPIRIN 81 MG/1
81 TABLET ORAL
COMMUNITY

## 2024-07-04 RX ORDER — BRIMONIDINE TARTRATE 2 MG/ML
1 SOLUTION/ DROPS OPHTHALMIC 3 TIMES DAILY
Status: DISCONTINUED | OUTPATIENT
Start: 2024-07-04 | End: 2024-07-08 | Stop reason: HOSPADM

## 2024-07-04 RX ORDER — LATANOPROST 50 UG/ML
1 SOLUTION/ DROPS OPHTHALMIC NIGHTLY
Status: DISCONTINUED | OUTPATIENT
Start: 2024-07-04 | End: 2024-07-08 | Stop reason: HOSPADM

## 2024-07-04 RX ORDER — ACETAMINOPHEN 500 MG
2000 TABLET ORAL
COMMUNITY

## 2024-07-04 RX ORDER — PREDNISOLONE ACETATE 10 MG/ML
1 SUSPENSION/ DROPS OPHTHALMIC 2 TIMES DAILY
COMMUNITY
Start: 2024-05-16

## 2024-07-04 RX ORDER — DORZOLAMIDE HYDROCHLORIDE AND TIMOLOL MALEATE 20; 5 MG/ML; MG/ML
1 SOLUTION/ DROPS OPHTHALMIC 2 TIMES DAILY
COMMUNITY

## 2024-07-04 ASSESSMENT — ENCOUNTER SYMPTOMS
CONSTITUTIONAL NEGATIVE: 1
ALLERGIC/IMMUNOLOGIC NEGATIVE: 1
MUSCULOSKELETAL NEGATIVE: 1
RESPIRATORY NEGATIVE: 1
HEMATOLOGIC/LYMPHATIC NEGATIVE: 1
ENDOCRINE NEGATIVE: 1
GASTROINTESTINAL NEGATIVE: 1
EYES NEGATIVE: 1
PSYCHIATRIC NEGATIVE: 1
NEUROLOGICAL NEGATIVE: 1

## 2024-07-04 ASSESSMENT — PAIN DESCRIPTION - ORIENTATION: ORIENTATION: RIGHT

## 2024-07-04 ASSESSMENT — PAIN DESCRIPTION - LOCATION: LOCATION: LEG

## 2024-07-04 ASSESSMENT — PAIN - FUNCTIONAL ASSESSMENT
PAIN_FUNCTIONAL_ASSESSMENT: 0-10
PAIN_FUNCTIONAL_ASSESSMENT: 0-10

## 2024-07-04 ASSESSMENT — PAIN SCALES - GENERAL
PAINLEVEL_OUTOF10: 6
PAINLEVEL_OUTOF10: 0 - NO PAIN

## 2024-07-04 ASSESSMENT — PAIN DESCRIPTION - DESCRIPTORS: DESCRIPTORS: DISCOMFORT;SHOOTING;THROBBING

## 2024-07-04 ASSESSMENT — PAIN DESCRIPTION - PROGRESSION: CLINICAL_PROGRESSION: GRADUALLY IMPROVING

## 2024-07-04 NOTE — H&P
History Of Present Illness  Be Luo is a 63 y.o. male with history of coronary artery disease, COPD, hypertension, and hyperlipidemia presenting with syncope.  He says he got up from a chair and all of a sudden experienced dizziness and started spinning.  He says he ended up on the floor.  He smashed face onto the floor and he says blood came out of his nose.  He believes he lost consciousness although he is not entirely clear whether the loss of consciousness preceded the fall or not.  He says his brother called EMS and both had to lift him up from the floor.  He denies having any chest pain, palpitations, shortness of breath.  He states he has a history of seizures but has been seizure-free for many years and he wonders if he just had a seizure.      Past Medical History  Diagnosis Date    Abnormal nuclear cardiac imaging test 12/12/2022    Chest heaviness 12/12/2022    COPD (chronic obstructive pulmonary disease) (Formerly [facility]) 2012     Smoker    Coronary artery disease involving native heart 11/12/2018    Depression      Diverticulitis 2012     Hospitalized 3/12 with f/u Dr. Rubio, Cscope 2008 polyp, and then repeat    Drug abuse (Formerly [facility])       States tried crack 9/18/17     Gout      HTN (hypertension)      Hyperlipidemia      Iritis of both eyes      Marijuana use      Migraine      ANN (obstructive sleep apnea)       ANN/CPAP since age 29's O2 nocturnal bleed-in    RA (rheumatoid arthritis) (Formerly [facility])       Dr. Richmond    Retinal detachment      Seizures (Formerly [facility])       Age 18, lamictal for past 5 yrs.    Seizures (Formerly [facility])      Strabismus, mechanical       Right eye     Traumatic brain injury (Formerly [facility])      Vitamin D deficiency        Past Surgical History  Procedure Laterality Date    ARTHRP KNE CONDYLE&PLATU MEDIAL&LAT COMPARTMENTS   right knee     Knee replacement, total    ARTHRP KNE CONDYLE&PLATU MEDIAL&LAT COMPARTMENTS Left      COLONOSCOPY FLX DX W/COLLJ SPEC WHEN PFRMD         Colonoscopy    EXCISION GANGLION WRIST  DORSAL/VOLAR PRIMARY        PALATOPHAYNGOPLASTY        PAST SURGICAL HISTORY OF         tumor from back and left leg     POST-CATARACT LASER SURGERY Left 2019     Yag Capsulotomy OS by Dr. Otero    REPAIR RETINAL DETACHMENT SCLERAL BUCKLING Right 10/2014     SB (Scleral Buckle) OD by Dr. Lopez    RPR RETINAL DTCHMNT INJECTION AIR/OTHER GAS   2012     Pneumatic Retinopexy RIGHT EYE - [facility]    STRABISMUS RECESSION/RESCJ 1 [facility]NTL [facility]         Strabismus surgery    VITRECTOMY MECHANICAL PARS PLANA Left 2017     25 gauge Pars plana vitrectomy, perfluron, endolaser, Air perfluron exchange, air fluid gas exchange 16% [id number] Left eye by Dr. Ruiz to treat complicated retinal detachment with giant retinal tear    XCAPSL CTRC RMVL INSJ IO LENS PROSTH W/O ECP        Cataract Extraction with PC IOL OU by ?Dr. Ann        Social History  Tobacco Use    Smoking status: Former       Packs/day: 2.00       Years: 50.00       Additional pack years: 0.00       Total pack years: 100.00       Types: Cigarettes       Start date:        Quit date: 2023       Years since quittin.5    Smokeless tobacco: Never    Tobacco comments:       Started smoking age 13. Average 2ppd. Quit in 2023 (updated 24)   Vaping Use    Vaping Use: Never used   Substance Use Topics    Alcohol use: Not Currently       Comment: 1 pint 2 times a week    Drug use: Yes       Frequency: 3.0 times per week       Types: Marijuana       Comment: Marijuana -- history of harder drug use SMOKES MARIJUANA EVERY DAY FOR THE PAST 8 MONTHS, states crack cocaine tried 16 approx and has not used sinced        Family History  Problem Relation Age of Onset    No Ocular Disease Father      Hypertension Father      Coronary Artery Disease Father           first stent early 40's    Detached Retina Mother      Hypertension Mother      other (carotid artery stenosis) Mother      Cancer Sister      Stroke Sister           TIA's     other (carotid artery stenosis) Sister           twin sister    other (carotid arter stenosis) Sister      Heart Brother           RHEUM FEVER        Allergies  Green tea, Gabapentin, Lyrica [pregabalin], Norco [hydrocodone-acetaminophen], Prednisone, Sulfasalazine, and Vioxx [rofecoxib]    Review of Systems   Constitutional: Negative.    HENT:          See HPI   Eyes: Negative.    Respiratory: Negative.     Cardiovascular:         See HPI   Gastrointestinal: Negative.    Endocrine: Negative.    Genitourinary: Negative.    Musculoskeletal: Negative.    Skin: Negative.    Allergic/Immunologic: Negative.    Neurological: Negative.    Hematological: Negative.    Psychiatric/Behavioral: Negative.          Physical Exam  Constitutional:       General: He is not in acute distress.     Appearance: He is not toxic-appearing or diaphoretic.   HENT:      Head:      Comments: Facial contusion and abrasions, left more than right     Mouth/Throat:      Mouth: Mucous membranes are moist.      Pharynx: Oropharynx is clear. No oropharyngeal exudate or posterior oropharyngeal erythema.   Eyes:      General:         Right eye: No discharge.         Left eye: No discharge.      Comments: Right eye blindness   Cardiovascular:      Rate and Rhythm: Normal rate and regular rhythm.      Heart sounds: No murmur heard.  Pulmonary:      Breath sounds: No wheezing, rhonchi or rales.   Musculoskeletal:      Cervical back: Neck supple.      Right lower leg: No edema.      Left lower leg: No edema.      Comments: LLE varicose veins   Lymphadenopathy:      Cervical: No cervical adenopathy.   Skin:     General: Skin is warm and dry.   Neurological:      General: No focal deficit present.      Mental Status: He is alert and oriented to person, place, and time.   Psychiatric:         Mood and Affect: Mood normal.         Behavior: Behavior normal.          Last Recorded Vitals  Blood pressure 138/83, pulse 80, temperature 36.9 °C (98.4 °F),  "temperature source Temporal, resp. rate 18, height 1.803 m (5' 11\"), weight 93.4 kg (206 lb), SpO2 94%.    Relevant Results   Latest Reference Range & Units 07/03/24 23:42 07/04/24 00:56   GLUCOSE 74 - 99 mg/dL 98    SODIUM 136 - 145 mmol/L 141    POTASSIUM 3.5 - 5.3 mmol/L 3.8    CHLORIDE 98 - 107 mmol/L 107    Bicarbonate 21 - 32 mmol/L 25    Anion Gap 10 - 20 mmol/L 13    Blood Urea Nitrogen 6 - 23 mg/dL 17    Creatinine 0.50 - 1.30 mg/dL 0.89    EGFR >60 mL/min/1.73m*2 >90    Calcium 8.6 - 10.3 mg/dL 8.9    Albumin 3.4 - 5.0 g/dL 4.3    Alkaline Phosphatase 33 - 136 U/L 50    ALT 10 - 52 U/L 19    AST 9 - 39 U/L 17    Bilirubin Total 0.0 - 1.2 mg/dL 1.1    Total Protein 6.4 - 8.2 g/dL 6.5    MAGNESIUM 1.60 - 2.40 mg/dL 2.05    Troponin I, High Sensitivity 0 - 20 ng/L 4 5   LEUKOCYTES (10*3/UL) IN BLOOD BY AUTOMATED COUNT, Indonesian 4.4 - 11.3 x10*3/uL 4.2 (L)    nRBC 0.0 - 0.0 /100 WBCs 0.0    ERYTHROCYTES (10*6/UL) IN BLOOD BY AUTOMATED COUNT, Indonesian 4.50 - 5.90 x10*6/uL 4.56    HEMOGLOBIN 13.5 - 17.5 g/dL 15.3    HEMATOCRIT 41.0 - 52.0 % 44.4    MCV 80 - 100 fL 97    MCH 26.0 - 34.0 pg 33.6    MCHC 32.0 - 36.0 g/dL 34.5    RED CELL DISTRIBUTION WIDTH 11.5 - 14.5 % 13.3    PLATELETS (10*3/UL) IN BLOOD AUTOMATED COUNT, Indonesian 150 - 450 x10*3/uL 221    NEUTROPHILS/100 LEUKOCYTES IN BLOOD BY AUTOMATED COUNT, Indonesian 40.0 - 80.0 % 35.4    Immature Granulocytes %, Automated 0.0 - 0.9 % 0.2    Lymphocytes % 13.0 - 44.0 % 43.1    Monocytes % 2.0 - 10.0 % 18.2    Eosinophils % 0.0 - 6.0 % 2.4    Basophils % 0.0 - 2.0 % 0.7    NEUTROPHILS (10*3/UL) IN BLOOD BY AUTOMATED COUNT, Indonesian 1.20 - 7.70 x10*3/uL 1.49    Immature Granulocytes Absolute, Automated 0.00 - 0.70 x10*3/uL 0.01    Lymphocytes Absolute 1.20 - 4.80 x10*3/uL 1.82    Monocytes Absolute 0.10 - 1.00 x10*3/uL 0.77    Eosinophils Absolute 0.00 - 0.70 x10*3/uL 0.10    Basophils Absolute 0.00 - 0.10 x10*3/uL 0.03    (L): Data is abnormally low    CT " HEAD/CERVICAL SPIN/CHEST/ABD/PELVIS:      IMPRESSION:  Left frontal scalp/supraorbital soft tissue swelling. No underlying  depressed calvarial fracture      No acute intracranial hemorrhage, mass effect or midline shift.      No acute facial bone fracture.      Cervical spondylosis without acute loss of vertebral body height or  traumatic malalignment.      No acute traumatic injury throughout the chest.      1 cm left lower lobe pulmonary nodule. Consider follow-up CT in 3  months, PET-CT or tissue sampling as per Fleischner criteria.      Sequela of granulomatous disease throughout the chest and abdomen.      No acute traumatic injury throughout the abdomen or pelvis.      Indeterminate 1.2 cm right adrenal nodule, which is stable compared  to prior imaging in 2017.      Colonic diverticula most pronounced in the sigmoid colon with mild  adjacent stranding. Findings may represent mild or early  diverticulitis.      Focal ectasia of the infrarenal abdominal aorta measures up to 3.8 cm  in maximum AP dimension, which is increased compared to prior imaging  2017 where it was 3.3 cm.     Assessment/Plan   Closed head injury with left frontal scalp/supraorbital hematoma  Ice/cold compresses  Neuro checks  Supportive care    Syncope  Telemetry  Check orthostatic vitals  Check 2D ECHO  Cardiology consulted.     Type II DM  Accu checks and will add ISS coverage  Check HgA1c      I spent 60 minutes in the professional and overall care of this patient.      Sadiq Stout MD

## 2024-07-04 NOTE — ED PROVIDER NOTES
HPI   Chief Complaint   Patient presents with    Fall     Fall today. -blood thinners, possibly blacked out. Hx of seizures. A+OX4. Face, neck, R leg pain.       Patient is a 63-year-old male presenting to the emergency department for complaints of a fall.  Patient states that he was getting up from his chair when he passed out falling on the ground hitting his face.  Patient does not take any blood thinners.  Patient states he has a history of seizures and is compliant with all of his medications.  No reported seizure activity.  No bowel or bladder incontinence.  No postictal period.  Patient is currently endorsing facial pain, neck pain, right lower leg pain.  Patient states that he had a normal day today but did not eat or drink much.  Patient denies any dizziness, lightheadedness, fevers, chills, vision changes, chest pain, difficulty breathing, productive cough, abdominal pain, nausea, vomiting, diarrhea.       History provided by:  Patient and medical records                      [place] Coma Scale Score: 15                     Patient History   History reviewed. No pertinent past medical history.  History reviewed. No pertinent surgical history.  No family history on file.  Social History     Tobacco Use    Smoking status: Unknown    Smokeless tobacco: Not on file   Substance Use Topics    Alcohol use: Defer    Drug use: Defer       Physical Exam   ED Triage Vitals   Temp Pulse Resp BP   -- -- -- --      SpO2 Temp src Heart Rate Source Patient Position   -- -- -- --      BP Location FiO2 (%)     -- --       Physical Exam  Vitals and nursing note reviewed.   Constitutional:       General: He is not in acute distress.     Appearance: Normal appearance. He is not ill-appearing, toxic-appearing or diaphoretic.   HENT:      Head: Normocephalic and atraumatic.      Nose: Nose normal.      Mouth/Throat:      Mouth: Mucous membranes are moist.      Pharynx: No oropharyngeal exudate or posterior oropharyngeal  erythema.   Eyes:      General: No scleral icterus.     Extraocular Movements: Extraocular movements intact.      Pupils: Pupils are equal, round, and reactive to light.   Cardiovascular:      Rate and Rhythm: Normal rate and regular rhythm.      Pulses: Normal pulses.      Heart sounds: Normal heart sounds. No murmur heard.     No friction rub. No gallop.   Pulmonary:      Effort: Pulmonary effort is normal. No respiratory distress.      Breath sounds: Normal breath sounds. No stridor. No wheezing, rhonchi or rales.   Chest:      Chest wall: No tenderness.   Abdominal:      General: Abdomen is flat. There is no distension.      Palpations: Abdomen is soft. There is no mass.      Tenderness: There is no abdominal tenderness. There is no guarding.      Hernia: No hernia is present.      Comments: Bruising noted to the bilateral flanks.   Musculoskeletal:         General: No swelling, tenderness, deformity or signs of injury. Normal range of motion.      Cervical back: Normal range of motion and neck supple. No rigidity.   Skin:     General: Skin is warm and dry.      Capillary Refill: Capillary refill takes less than 2 seconds.      Coloration: Skin is not jaundiced or pale.      Findings: No bruising, erythema, lesion or rash.   Neurological:      General: No focal deficit present.      Mental Status: He is alert and oriented to person, place, and time. Mental status is at baseline.   Psychiatric:         Mood and Affect: Mood normal.         Behavior: Behavior normal.         ED Course & MDM   Diagnoses as of 07/04/24 0541   Fall, initial encounter   Syncope, unspecified syncope type   Closed head injury, initial encounter       Medical Decision Making  Patient is 63-year-old male presenting to the emergency department for complaints of syncope and fall.  Patient is currently endorsing facial neck and right leg pain.  Patient denies any prodromal symptoms prior to getting up and passing out.  He states is never  happened to him before.  Patient does have nonactive bleeding bloody nose.  No nasal septal hematoma.  No hemotympanum.  Patient does have some bruising to his bilateral flanks and thus pan scan CTs were ordered to evaluate for traumatic injuries.  Cardiac evaluation was ordered due to the syncope.    Labs reviewed without concerning findings.  CT head negative for acute intracranial abnormalities.  CT C-spine negative for acute fractures or subluxations.  CT facial negative for traumatic injuries.  CT chest abdomen pelvis incidental findings of pulmonary nodule and right adrenal nodule but no traumatic injuries.  Patient does have some mild adjacent stranding to the sigmoid colon but the patient not having any abdominal pain nausea or vomiting.  Patient denies any diarrhea.  I did attempt to get the patient to ambulate and he was unable to stand due to dizziness and weakness and thus recommended admission.  Hospital service was contacted case discussed patient was accepted for admission.    Amount and/or Complexity of Data Reviewed  Labs: ordered. Decision-making details documented in ED Course.  Radiology: ordered. Decision-making details documented in ED Course.  ECG/medicine tests: independent interpretation performed.     Details: 0038 hrs.: Sinus rhythm with a ventricular rate of 80 bpm.  QRS interval 112 ms.  QTc 454 ms.  Normal axis.  No acute ischemic injury pattern appreciated.  0151 hrs.: Sinus rhythm with a ventricular rate of 81 bpm.  QRS interval 104 ms.  QTc 464 ms.  Rightward axis.  No acute ischemic injury pattern noted.  0231 hours: Sinus rhythm with a ventricular rate of 81 bpm.  QRS of 100 ms.  QTc 462 ms.  Rightward axis.  No acute ischemic injury pattern appreciated.      Labs Reviewed   CBC WITH AUTO DIFFERENTIAL - Abnormal       Result Value    WBC 4.2 (*)     nRBC 0.0      RBC 4.56      Hemoglobin 15.3      Hematocrit 44.4      MCV 97      MCH 33.6      MCHC 34.5      RDW 13.3      Platelets  221      Neutrophils % 35.4      Immature Granulocytes %, Automated 0.2      Lymphocytes % 43.1      Monocytes % 18.2      Eosinophils % 2.4      Basophils % 0.7      Neutrophils Absolute 1.49      Immature Granulocytes Absolute, Automated 0.01      Lymphocytes Absolute 1.82      Monocytes Absolute 0.77      Eosinophils Absolute 0.10      Basophils Absolute 0.03     COMPREHENSIVE METABOLIC PANEL - Normal    Glucose 98      Sodium 141      Potassium 3.8      Chloride 107      Bicarbonate 25      Anion Gap 13      Urea Nitrogen 17      Creatinine 0.89      eGFR >90      Calcium 8.9      Albumin 4.3      Alkaline Phosphatase 50      Total Protein 6.5      AST 17      Bilirubin, Total 1.1      ALT 19     MAGNESIUM - Normal    Magnesium 2.05     SERIAL TROPONIN-INITIAL - Normal    Troponin I, High Sensitivity 4      Narrative:     Less than 99th percentile of normal range cutoff-  Female and children under 18 years old <14 ng/L; Male <21 ng/L: Negative  Repeat testing should be performed if clinically indicated.     Female and children under 18 years old 14-50 ng/L; Male 21-50 ng/L:  Consistent with possible cardiac damage and possible increased clinical   risk. Serial measurements may help to assess extent of myocardial damage.     >50 ng/L: Consistent with cardiac damage, increased clinical risk and  myocardial infarction. Serial measurements may help assess extent of   myocardial damage.      NOTE: Children less than 1 year old may have higher baseline troponin   levels and results should be interpreted in conjunction with the overall   clinical context.     NOTE: Troponin I testing is performed using a different   testing methodology at [facility] than at other   [facility] hospitals. Direct result comparisons should only   be made within the same method.   SERIAL TROPONIN, 1 HOUR - Normal    Troponin I, High Sensitivity 5      Narrative:     Less than 99th percentile of normal range cutoff-  Female and  children under 18 years old <14 ng/L; Male <21 ng/L: Negative  Repeat testing should be performed if clinically indicated.     Female and children under 18 years old 14-50 ng/L; Male 21-50 ng/L:  Consistent with possible cardiac damage and possible increased clinical   risk. Serial measurements may help to assess extent of myocardial damage.     >50 ng/L: Consistent with cardiac damage, increased clinical risk and  myocardial infarction. Serial measurements may help assess extent of   myocardial damage.      NOTE: Children less than 1 year old may have higher baseline troponin   levels and results should be interpreted in conjunction with the overall   clinical context.     NOTE: Troponin I testing is performed using a different   testing methodology at [facility] than at other   [facility]. Direct result comparisons should only   be made within the same method.   TROPONIN SERIES- (INITIAL, 1 HR)    Narrative:     The following orders were created for panel order Troponin I Series, High Sensitivity (0, 1 HR).  Procedure                               Abnormality         Status                     ---------                               -----------         ------                     Troponin I, High Sensiti...[[id number]]  Normal              Final result               Troponin, High Sensitivi...[[id number]]  Normal              Final result                 Please view results for these tests on the individual orders.   URINALYSIS WITH REFLEX CULTURE AND MICROSCOPIC    Narrative:     The following orders were created for panel order Urinalysis with Reflex Culture and Microscopic.  Procedure                               Abnormality         Status                     ---------                               -----------         ------                     Urinalysis with Reflex C...[[id number]]                                                 Extra Urine Gray Tube[[id number]]                                                          Please view results for these tests on the individual orders.   URINALYSIS WITH REFLEX CULTURE AND MICROSCOPIC   EXTRA URINE GRAY TUBE     CT head wo IV contrast   Final Result   Left frontal scalp/supraorbital soft tissue swelling. No underlying   depressed calvarial fracture        No acute intracranial hemorrhage, mass effect or midline shift.        No acute facial bone fracture.        Cervical spondylosis without acute loss of vertebral body height or   traumatic malalignment.        No acute traumatic injury throughout the chest.        1 cm left lower lobe pulmonary nodule. Consider follow-up CT in 3   months, PET-CT or tissue sampling as per Fleischner criteria.        Sequela of granulomatous disease throughout the chest and abdomen.        No acute traumatic injury throughout the abdomen or pelvis.        Indeterminate 1.2 cm right adrenal nodule, which is stable compared   to prior imaging in 2017.        Colonic diverticula most pronounced in the sigmoid colon with mild   adjacent stranding. Findings may represent mild or early   diverticulitis.        Focal ectasia of the infrarenal abdominal aorta measures up to 3.8 cm   in maximum AP dimension, which is increased compared to prior imaging   2017 where it was 3.3 cm.        MACRO:   Critical Finding:  See findings. Notification was initiated on   7/4/2024 at 1:18 am by  Evan Finkelstein.  (**-YCF-**) Instructions:        Signed by: Evan Finkelstein 7/4/2024 1:25 AM   Dictation workstation:   [id number]      CT maxillofacial bones wo IV contrast   Final Result   Left frontal scalp/supraorbital soft tissue swelling. No underlying   depressed calvarial fracture        No acute intracranial hemorrhage, mass effect or midline shift.        No acute facial bone fracture.        Cervical spondylosis without acute loss of vertebral body height or   traumatic malalignment.        No acute traumatic injury throughout the chest.         1 cm left lower lobe pulmonary nodule. Consider follow-up CT in 3   months, PET-CT or tissue sampling as per Fleischner criteria.        Sequela of granulomatous disease throughout the chest and abdomen.        No acute traumatic injury throughout the abdomen or pelvis.        Indeterminate 1.2 cm right adrenal nodule, which is stable compared   to prior imaging in 2017.        Colonic diverticula most pronounced in the sigmoid colon with mild   adjacent stranding. Findings may represent mild or early   diverticulitis.        Focal ectasia of the infrarenal abdominal aorta measures up to 3.8 cm   in maximum AP dimension, which is increased compared to prior imaging   2017 where it was 3.3 cm.        MACRO:   Critical Finding:  See findings. Notification was initiated on   7/4/2024 at 1:18 am by  Evan Finkelstein.  (**-YCF-**) Instructions:        Signed by: Evan Finkelstein 7/4/2024 1:25 AM   Dictation workstation:   [id number]      CT cervical spine wo IV contrast   Final Result   Left frontal scalp/supraorbital soft tissue swelling. No underlying   depressed calvarial fracture        No acute intracranial hemorrhage, mass effect or midline shift.        No acute facial bone fracture.        Cervical spondylosis without acute loss of vertebral body height or   traumatic malalignment.        No acute traumatic injury throughout the chest.        1 cm left lower lobe pulmonary nodule. Consider follow-up CT in 3   months, PET-CT or tissue sampling as per Fleischner criteria.        Sequela of granulomatous disease throughout the chest and abdomen.        No acute traumatic injury throughout the abdomen or pelvis.        Indeterminate 1.2 cm right adrenal nodule, which is stable compared   to prior imaging in 2017.        Colonic diverticula most pronounced in the sigmoid colon with mild   adjacent stranding. Findings may represent mild or early   diverticulitis.        Focal ectasia of the infrarenal abdominal  aorta measures up to 3.8 cm   in maximum AP dimension, which is increased compared to prior imaging   2017 where it was 3.3 cm.        MACRO:   Critical Finding:  See findings. Notification was initiated on   7/4/2024 at 1:18 am by  Evan Finkelstein.  (**-YCF-**) Instructions:        Signed by: Evan Finkelstein 7/4/2024 1:25 AM   Dictation workstation:   [id number]      CT 3D reconstruction   Final Result   Left frontal scalp/supraorbital soft tissue swelling. No underlying   depressed calvarial fracture        No acute intracranial hemorrhage, mass effect or midline shift.        No acute facial bone fracture.        Cervical spondylosis without acute loss of vertebral body height or   traumatic malalignment.        No acute traumatic injury throughout the chest.        1 cm left lower lobe pulmonary nodule. Consider follow-up CT in 3   months, PET-CT or tissue sampling as per Fleischner criteria.        Sequela of granulomatous disease throughout the chest and abdomen.        No acute traumatic injury throughout the abdomen or pelvis.        Indeterminate 1.2 cm right adrenal nodule, which is stable compared   to prior imaging in 2017.        Colonic diverticula most pronounced in the sigmoid colon with mild   adjacent stranding. Findings may represent mild or early   diverticulitis.        Focal ectasia of the infrarenal abdominal aorta measures up to 3.8 cm   in maximum AP dimension, which is increased compared to prior imaging   2017 where it was 3.3 cm.        MACRO:   Critical Finding:  See findings. Notification was initiated on   7/4/2024 at 1:18 am by  Evan Finkelstein.  (**-YCF-**) Instructions:        Signed by: Evan Finkelstein 7/4/2024 1:25 AM   Dictation workstation:   [id number]      CT chest abdomen pelvis w IV contrast   Final Result   Left frontal scalp/supraorbital soft tissue swelling. No underlying   depressed calvarial fracture        No acute intracranial hemorrhage, mass effect or  midline shift.        No acute facial bone fracture.        Cervical spondylosis without acute loss of vertebral body height or   traumatic malalignment.        No acute traumatic injury throughout the chest.        1 cm left lower lobe pulmonary nodule. Consider follow-up CT in 3   months, PET-CT or tissue sampling as per Fleischner criteria.        Sequela of granulomatous disease throughout the chest and abdomen.        No acute traumatic injury throughout the abdomen or pelvis.        Indeterminate 1.2 cm right adrenal nodule, which is stable compared   to prior imaging in 2017.        Colonic diverticula most pronounced in the sigmoid colon with mild   adjacent stranding. Findings may represent mild or early   diverticulitis.        Focal ectasia of the infrarenal abdominal aorta measures up to 3.8 cm   in maximum AP dimension, which is increased compared to prior imaging   2017 where it was 3.3 cm.        MACRO:   Critical Finding:  See findings. Notification was initiated on   7/4/2024 at 1:18 am by  Evan Finkelstein.  (**-YCF-**) Instructions:        Signed by: Evan Finkelstein 7/4/2024 1:25 AM   Dictation workstation:   [id number]      XR chest 1 view   Final Result   1. No acute cardiopulmonary abnormality.             Signed by: Xavi Glover 7/4/2024 12:23 AM   Dictation workstation:   [id number]      XR tibia fibula right 2 views   Final Result   1. No acute osseous abnormality identified.                  Signed by: Xavi Glover 7/4/2024 12:25 AM   Dictation workstation:   [id number]      XR ankle right 3+ views   Final Result   1. No acute osseous abnormality identified.                  Signed by: Xavi Glover 7/4/2024 12:25 AM   Dictation workstation:   [id number]            Procedure  Procedures     Tru Goldsmith,   07/04/24 0539       Tru Goldsmith DO  07/04/24 0541

## 2024-07-04 NOTE — CONSULTS
Consults    History Of Present Illness  Be Luo is a 63 y.o. male presenting with syncope and dizziness in the setting of a history of epilepsy on lamotrigine.    Be has a remote history of seizures.  He has been on lamotrigine 400 mg in the morning and 600 mg at night.  His last seizure was about 30 years ago.  He recalls having sudden onset aura followed by loss of consciousness.    He also has a history of right monocular vision loss since childhood.  About 10 months ago he lost 30% of the vision in his left eye and was declared legally blind.  At that time he has had significant instability and repeated falls with head trauma.      Yesterday he went to stand up and was spinning around the room in circles until he fell onto the left side of his head hitting his forehead and the left side of his face.  He denies experiencing a typical aura.  He denies any loss of consciousness.  He is concerned that this represented a breakthrough seizure.    Since then he has had left-sided head pain and a sensation of dizziness and throbbing pain on the left side of his head when he would sit or stand.    He has reported this gait instability and recurrent falls for about 10 months where he has trouble identifying uneven surfaces especially due to his vision loss.  Each time he is hit at the side of his head and has had headaches afterwards.  He lives alone.  He has trouble walking in his neighborhood because he has to walk with an assistive device for his vision.    He has a no known history of hypertension, hyperlipidemia or diabetes.  He does have a family history of neuropathy in 2 of his sisters and his mother.  He is a former smoker 1.5 packs/day and quit 10 months ago when he lost vision in his left eye.  He does not drink alcohol or use illicit drugs.    Past Medical History  Past Medical History:   Diagnosis Date    Hypertension      Surgical History  History reviewed. No pertinent surgical  history.  Social History  Social History     Tobacco Use    Smoking status: Former     Current packs/day: 0.00     Types: Cigarettes     Quit date: 2023     Years since quittin.8   Substance Use Topics    Alcohol use: Not Currently    Drug use: Defer     Types: Marijuana     Allergies  Green tea, Gabapentin, Lyrica [pregabalin], Norco [hydrocodone-acetaminophen], Prednisone, Sulfasalazine, and Vioxx [rofecoxib]  Medications Prior to Admission   Medication Sig Dispense Refill Last Dose    Actemra ACTPen 162 mg/0.9 mL Inject 0.9 mL (162 mg) under the skin every 7 days.   Unknown at UNKNOWN    albuterol 2.5 mg /3 mL (0.083 %) nebulizer solution Inhale 3 mL (2.5 mg) 2 times a day as needed.   2024 at PM    albuterol 90 mcg/actuation inhaler Inhale 2 puffs every 6 hours if needed.   2024 at PM    aspirin 81 mg EC tablet Take 1 tablet (81 mg) by mouth once daily.   2024 at AM    Bevespi Aerosphere 9-4.8 mcg HFA aerosol inhaler Inhale 2 puffs twice a day.   2024 at AM    brimonidine (AlphaGAN P) 0.15 % ophthalmic solution Administer 1 drop into the left eye 3 times a day.   7/3/2024 at PM    cholecalciferol (Vitamin D-3) 50 mcg (2,000 unit) capsule Take 1 capsule (50 mcg) by mouth once daily with breakfast.   7/3/2024 at AM    cyclobenzaprine (Flexeril) 10 mg tablet Take 1 tablet (10 mg) by mouth 3 times a day as needed for muscle spasms.   7/3/2024 at PM    dorzolamide-timoloL (Cosopt) 22.3-6.8 mg/mL ophthalmic solution Administer 1 drop into the left eye 2 times a day.   7/3/2024 at AM    lamoTRIgine (LaMICtal) 200 mg tablet Take 3 tablets (600 mg) by mouth once daily in the evening. Take in addition to morning dose of 2 tablets for a total of 5 tablets daily   2024 at PM    latanoprost (Xalatan) 0.005 % ophthalmic solution Administer 1 drop into the left eye once daily.   7/3/2024 at AM    medical cannabis Take 1 each by mouth.   Past Week at UNKNOWN    metFORMIN (Glucophage) 500 mg tablet  Take 1 tablet (500 mg) by mouth once daily in the evening. Take with meals.   7/3/2024 at PM    prednisoLONE acetate (Pred-Forte) 1 % ophthalmic suspension Administer 1 drop into affected eye(s) twice a day.   7/3/2024 at AM    Stiolto Respimat 2.5-2.5 mcg/actuation mist inhaler Inhale 2 Inhalations once daily.   Past Week at PM    lamoTRIgine (LaMICtal) 200 mg tablet Take 2 tablets (400 mg) by mouth once daily in the morning. Take in addition to evening dose of 3 tablets for a total of 5 tablets daily   7/2/2024 at AM    multivitamin with minerals iron-free (multivitamin-iron-minerals-folic acid) Take 1 tablet by mouth once daily.   7/2/2024 at AM    potassium chloride CR 10 mEq ER tablet Take 2 tablets (20 mEq) by mouth once daily.   7/2/2024 at AM    pramipexole (Mirapex) 0.125 mg tablet Take 1 tablet (0.125 mg) by mouth once daily at bedtime.   7/2/2024 at PM    rosuvastatin (Crestor) 5 mg tablet Take 1 tablet (5 mg) by mouth once daily at bedtime.   7/2/2024 at PM       Review of Systems  Neurological Exam  Mental Status  Awake, alert and oriented to person, place and time. Speech is normal.    Cranial Nerves  CN III, IV, VI: Extraocular movements intact bilaterally.  CN V: Facial sensation is normal.  CN VII: Full and symmetric facial movement.  CN VIII: Hearing is normal.  CN IX, X: Palate elevates symmetrically  CN XI: Shoulder shrug strength is normal.  CN XII: Tongue midline without atrophy or fasciculations.  Slight disconjugate gaze, cannot track examiner due to his central vision loss but when asked to look to the left and look to the right his ocular versions are intact.    Motor   Strength is 5/5 throughout all four extremities.    Sensory  Light touch is normal in upper and lower extremities. Proprioception abnormality: Absent proprioception in the great toes.     Reflexes                                            Right                      Left  Patellar                                0              "            0  Right Plantar: mute  Left Plantar: mute    Coordination  Right: Finger-to-nose normal.Left: Finger-to-nose normal.    Gait    Assisted to a seated position for which he experienced throbbing headache and felt dizzy not lightheaded or room spinning, just dizzy and did not feel safe to stand and ambulate the patient by myself.    Physical Exam  Eyes:      Extraocular Movements: Extraocular movements intact.   Neurological:      Motor: Motor strength is normal.     Deep Tendon Reflexes:      Reflex Scores:       Patellar reflexes are 0 on the right side and 0 on the left side.  Psychiatric:         Speech: Speech normal.       Last Recorded Vitals  Blood pressure 153/89, pulse 92, temperature 37 °C (98.6 °F), temperature source Temporal, resp. rate 18, height 1.78 m (5' 10.08\"), weight 92.3 kg (203 lb 7.8 oz), SpO2 92%.    Relevant Results                [place] Coma Scale  Best Eye Response: Spontaneous  Best Verbal Response: Oriented  Best Motor Response: Follows commands  [place] Com[place] Scale Score: 15                 CT head without contrast 7/4/2024 done at 012 5 AM showed no acute intracranial findings     Assessment/Plan   Principal Problem:    Syncope and collapse  Neuropathy-appears to have large fiber neuropathy on exam with impaired proprioception which is contributing to his recurrent falls.  This is confounded by his vision loss.  The vision loss plus the neuropathy significantly impacts his ability to have stable balance.  Recommend checking B12, SPEP, hemoglobin A1c, SSA SSB.  Consider additional workup as an outpatient    Concussion from left-sided head injury.  This is the most likely etiology of his dizziness.  There is no lightheadedness or room spinning just a vague sense of dizziness with head pressure when he would sit up corresponds well with his recent head injury    Remote history of seizures.  No concern for breakthrough seizure based on history or initial blood work.  Recommend " continuing lamotrigine 400 mg in the morning and 600 mg at night    PT evaluation.  May need rehab as he lives alone    Okay to discharge when medically ready.  Follow-up with neurology within 1 to 2 months  Ana Luisa Schmid MD

## 2024-07-04 NOTE — CARE PLAN
Patient admitted with fall/syncope he does have a history of seizures on Lamictal.  His orthostatics were negative but he got dizzy when he was stood up, will order an EEG, carotid Dopplers, echo has been ordered, patient is on telemetry EKG showed sinus rhythm, cardiology saw the patient.  IV fluids have been ordered as well which we will continue.  Neurology has been consulted we will also order PT OT.

## 2024-07-04 NOTE — CONSULTS
Inpatient consult to Cardiology  Consult performed by: Gaston Fonseca MD  Consult ordered by: Sadiq Stout MD  Reason for consult: syncope      Cardiology Consult Note      Date:   7/4/2024  Patient name:  Be Luo  Date of admission:  7/3/2024 11:24 PM  MRN:   [id number]  YOB: 1960  Time of Consult:  11:47 AM    Consulting Cardiologist: Dr. Gaston Fonseca      Primary Cardiologist:   None         Admission Diagnosis:     Syncope and collapse      History of Present Illness:      Be Luo is a 63 y.o.  male patient who is being at the request of Dr. Sadiq Stout for inpatient consultation of  syncope . He was admitted on 7/3/2024.  Previous [facility] and  records have been reviewed in detail.      He has a history of mild coronary disease, COPD, obstructive sleep apnea, hypertension, hyperlipidemia, and tobacco abuse.  He quit smoking tobacco in November 2023.  Cardiac catheterization by Dr. Elías Fischer at [facility] on December 12, 2022 showed 40% stenosis of the LAD, normal circumflex, and less than 40% stenosis of the RCA.  Estimated LV ejection fraction was 55%.      He presented to the emergency department last evening after he passed out.  The patient states he was getting up from a chair when he noted onset of lightheadedness.  He states the room was spinning.  He fell to the floor and hit the left side of his face and head.  Some family members observed the fall and noted that he was unconscious for a few minutes.  He has a history of seizures but no seizure activity was witnessed.  No incontinence.  Patient states he regained consciousness and noticed pain over the left face and left arm.  He denies any associated chest pain, shortness of breath, diaphoresis, nausea, or vomiting.  He denies any prior history of near syncope or syncope.  He did not note any palpitations.    In the emergency department he was noted to have stable vital signs.  Oxygen  saturation 97%.  CBC and CMP normal.  High-sensitivity troponin level 4 and 5.  CT scan of the brain was negative for any acute findings.  He was noted to have left frontal scalp soft tissue swelling.  There was suggestion of possible diverticulitis.  There was a 3.8 cm infrarenal abdominal aortic aneurysm.  Chest x-ray did not show any active disease.    He was admitted to telemetry.  Patient states that this morning he had recurrent lightheadedness and vertigo when he stood up at the bedside.  He had difficulty balancing himself.  Orthostatic blood pressure and pulse were negative.  He has remained in normal sinus rhythm without any significant bradycardia or tachyarrhythmias.      Allergies:     Allergies   Allergen Reactions    Green Tea Hives and Shortness of breath    Gabapentin Agitation    Lyrica [Pregabalin] GI Upset    Norco [Hydrocodone-Acetaminophen] Agitation    Prednisone Other    Sulfasalazine GI Upset    Vioxx [Rofecoxib] Other     Retains water/swelling         Past Medical History:     Past Medical History:   Diagnosis Date    Hypertension        Past Surgical History:     History reviewed. No pertinent surgical history.      Family History:     No family history on file.      Social History:     Social History     Tobacco Use    Smoking status: Former     Current packs/day: 0.00     Types: Cigarettes     Quit date: 2023     Years since quittin.8   Substance Use Topics    Alcohol use: Not Currently    Drug use: Defer     Types: Marijuana       CURRENT HOSPITAL MEDICATIONS    docusate sodium, 100 mg, oral, BID  insulin lispro, 0-10 Units, subcutaneous, TID      sodium chloride 0.9%, 75 mL/hr, Last Rate: 75 mL/hr (24 1005)      Current Outpatient Medications   Medication Instructions    Actemra ACTPen 162 mg, subcutaneous, Every 7 days    albuterol 90 mcg/actuation inhaler 2 puffs, inhalation, Every 6 hours PRN    albuterol 2.5 mg, inhalation, 2 times daily PRN    aspirin 81 mg, oral,  "Daily RT    Bevespi Aerosphere 9-4.8 mcg HFA aerosol inhaler 2 puffs, inhalation, 2 times daily    brimonidine (AlphaGAN P) 0.15 % ophthalmic solution 1 drop, Left Eye, 3 times daily    cholecalciferol (VITAMIN D-3) 2,000 Units, oral, Daily with breakfast    cyclobenzaprine (FLEXERIL) 10 mg, oral, 3 times daily PRN    dorzolamide-timoloL (Cosopt) 22.3-6.8 mg/mL ophthalmic solution 1 drop, Left Eye, 2 times daily    lamoTRIgine (LaMICtal) 200 mg tablet 2 tablets, oral, Every morning, Take in addition to evening dose of 3 tablets for a total of 5 tablets daily    lamoTRIgine (LaMICtal) 200 mg tablet 3 tablets, oral, Every evening, Take in addition to morning dose of 2 tablets for a total of 5 tablets daily    latanoprost (Xalatan) 0.005 % ophthalmic solution 1 drop, Left Eye, Daily    medical cannabis 1 each, oral    metFORMIN (Glucophage) 500 mg tablet 1 tablet, oral, Daily with evening meal    multivitamin with minerals iron-free (multivitamin-iron-minerals-folic acid) 1 tablet, oral, Daily RT    potassium chloride CR 10 mEq ER tablet 20 mEq, oral, Daily    pramipexole (MIRAPEX) 0.125 mg, oral, Nightly    prednisoLONE acetate (Pred-Forte) 1 % ophthalmic suspension 1 drop, ophthalmic (eye), 2 times daily    rosuvastatin (CRESTOR) 5 mg, oral, Nightly    Stiolto Respimat 2.5-2.5 mcg/actuation mist inhaler 2 Inhalations, inhalation, Daily        Review of Systems:      12 point review of systems was obtained in detail and is negative other than that detailed above.      Vital Signs:     Vitals:    07/04/24 0741 07/04/24 0817 07/04/24 0821 07/04/24 0839   BP:  154/79 153/89    BP Location:  Right arm     Patient Position:  Lying Sitting    Pulse: 78 90 92    Resp: 17 18     Temp:  37 °C (98.6 °F)     TempSrc:  Temporal     SpO2: 95% 92%     Weight:   92.3 kg (203 lb 7.8 oz)    Height:    1.78 m (5' 10.08\")       Intake/Output Summary (Last 24 hours) at 7/4/2024 1147  Last data filed at 7/4/2024 0900  Gross per 24 hour "   Intake 240 ml   Output --   Net 240 ml       Wt Readings from Last 4 Encounters:   07/04/24 92.3 kg (203 lb 7.8 oz)   03/28/24 93.4 kg (206 lb)   12/11/23 95.7 kg (211 lb)   11/30/23 95.3 kg (210 lb)       Physical Examination:     GENERAL APPEARANCE: Well developed, well nourished, in no acute distress.  CHEST: Symmetric and non-tender.  INTEGUMENT: Skin warm and dry, without gross excoriationis or lesions.  HEENT: No gross abnormalities of conjunctiva, teeth, gums, oral mucosa.  Contusions and abrasions noted over the left side of his head.  NECK: Supple, no JVD, no bruit. Thyroid not palpable. Carotid upstrokes normal.  NEURO/PSHCY: Alert and oriented x3; appropriate behavior and responses and responses, grossly normal cerebellar function with normal balance and coordination  LUNGS: Clear to auscultation bilaterally; normal respiratory effort.  HEART: Rate and rhythm regular with no evident murmur; no gallop appreciated. There are no rubs, clicks or heaves. PMI nondisplaced.  ABDOMEN: Soft, nontender, no palpable hepatosplenomegaly, no mases, no bruits. Abdominal aorta not noted to be enlarged.  MUSCULOSKELETAL: Ambulatory with normal tandem gait.  EXTREMITIES: Warm with good color, no clubbing or cyanois. There is no edema noted.  Left lower extremity varicosities noted.  PERIPHERAL VASCULAR: Pulses present and equally palpable; 2+ throughout. No femoral bruits.      Lab:     CBC:   Lab Results   Component Value Date    WBC 4.2 (L) 07/03/2024    RBC 4.56 07/03/2024    HGB 15.3 07/03/2024    HCT 44.4 07/03/2024     07/03/2024        CMP:    Lab Results   Component Value Date     07/03/2024    K 3.8 07/03/2024     07/03/2024    CO2 25 07/03/2024    BUN 17 07/03/2024    CREATININE 0.89 07/03/2024    GLUCOSE 98 07/03/2024    CALCIUM 8.9 07/03/2024       BMP:  Lab Results   Component Value Date     07/03/2024    K 3.8 07/03/2024     07/03/2024    CO2 25 07/03/2024    BUN 17 07/03/2024     CREATININE 0.89 07/03/2024       CBC:  Lab Results   Component Value Date    WBC 4.2 (L) 07/03/2024    RBC 4.56 07/03/2024    HGB 15.3 07/03/2024    HCT 44.4 07/03/2024    MCV 97 07/03/2024    MCH 33.6 07/03/2024    MCHC 34.5 07/03/2024    RDW 13.3 07/03/2024     07/03/2024       Hepatic Function Panel:    Lab Results   Component Value Date    ALKPHOS 50 07/03/2024    ALT 19 07/03/2024    AST 17 07/03/2024    PROT 6.5 07/03/2024    BILITOT 1.1 07/03/2024       Magnesium:    Lab Results   Component Value Date    MG 2.05 07/03/2024       Cardiac Enzymes:    Lab Results   Component Value Date    TROPHS 5 07/04/2024    TROPHS 4 07/03/2024    TROPHS 4 12/11/2023       BNP:   Lab Results   Component Value Date    BNP 16 12/11/2023       HgBA1c:    Lab Results   Component Value Date    HGBA1C 5.4 09/17/2023       Diagnostic Studies:     ECG 12 lead  Result Date: 7/4/2024    Normal sinus rhythm Rightward axis Borderline ECG When compared with ECG of 11-DEC-2023 20:44, No significant change was found See ED provider note for full interpretation and clinical correlation      Radiology:     CT head wo IV contrast   Final Result   Left frontal scalp/supraorbital soft tissue swelling. No underlying   depressed calvarial fracture        No acute intracranial hemorrhage, mass effect or midline shift.        No acute facial bone fracture.        Cervical spondylosis without acute loss of vertebral body height or   traumatic malalignment.        No acute traumatic injury throughout the chest.        1 cm left lower lobe pulmonary nodule. Consider follow-up CT in 3   months, PET-CT or tissue sampling as per Fleischner criteria.        Sequela of granulomatous disease throughout the chest and abdomen.        No acute traumatic injury throughout the abdomen or pelvis.        Indeterminate 1.2 cm right adrenal nodule, which is stable compared   to prior imaging in 2017.        Colonic diverticula most pronounced in the sigmoid  colon with mild   adjacent stranding. Findings may represent mild or early   diverticulitis.        Focal ectasia of the infrarenal abdominal aorta measures up to 3.8 cm   in maximum AP dimension, which is increased compared to prior imaging   2017 where it was 3.3 cm.        MACRO:   Critical Finding:  See findings. Notification was initiated on   7/4/2024 at 1:18 am by  Evan Finkelstein.  (**-YCF-**) Instructions:        Signed by: Evan Finkelstein 7/4/2024 1:25 AM   Dictation workstation:   [id number]      CT maxillofacial bones wo IV contrast   Final Result   Left frontal scalp/supraorbital soft tissue swelling. No underlying   depressed calvarial fracture        No acute intracranial hemorrhage, mass effect or midline shift.        No acute facial bone fracture.        Cervical spondylosis without acute loss of vertebral body height or   traumatic malalignment.        No acute traumatic injury throughout the chest.        1 cm left lower lobe pulmonary nodule. Consider follow-up CT in 3   months, PET-CT or tissue sampling as per Fleischner criteria.        Sequela of granulomatous disease throughout the chest and abdomen.        No acute traumatic injury throughout the abdomen or pelvis.        Indeterminate 1.2 cm right adrenal nodule, which is stable compared   to prior imaging in 2017.        Colonic diverticula most pronounced in the sigmoid colon with mild   adjacent stranding. Findings may represent mild or early   diverticulitis.        Focal ectasia of the infrarenal abdominal aorta measures up to 3.8 cm   in maximum AP dimension, which is increased compared to prior imaging   2017 where it was 3.3 cm.        MACRO:   Critical Finding:  See findings. Notification was initiated on   7/4/2024 at 1:18 am by  Evan Finkelstein.  (**-YCF-**) Instructions:        Signed by: Evan Finkelstein 7/4/2024 1:25 AM   Dictation workstation:   [id number]      CT cervical spine wo IV contrast   Final Result   Left  frontal scalp/supraorbital soft tissue swelling. No underlying   depressed calvarial fracture        No acute intracranial hemorrhage, mass effect or midline shift.        No acute facial bone fracture.        Cervical spondylosis without acute loss of vertebral body height or   traumatic malalignment.        No acute traumatic injury throughout the chest.        1 cm left lower lobe pulmonary nodule. Consider follow-up CT in 3   months, PET-CT or tissue sampling as per Fleischner criteria.        Sequela of granulomatous disease throughout the chest and abdomen.        No acute traumatic injury throughout the abdomen or pelvis.        Indeterminate 1.2 cm right adrenal nodule, which is stable compared   to prior imaging in 2017.        Colonic diverticula most pronounced in the sigmoid colon with mild   adjacent stranding. Findings may represent mild or early   diverticulitis.        Focal ectasia of the infrarenal abdominal aorta measures up to 3.8 cm   in maximum AP dimension, which is increased compared to prior imaging   2017 where it was 3.3 cm.        MACRO:   Critical Finding:  See findings. Notification was initiated on   7/4/2024 at 1:18 am by  Evan Finkelstein.  (**-YCF-**) Instructions:        Signed by: Evan Finkelstein 7/4/2024 1:25 AM   Dictation workstation:   [id number]      CT 3D reconstruction   Final Result   Left frontal scalp/supraorbital soft tissue swelling. No underlying   depressed calvarial fracture        No acute intracranial hemorrhage, mass effect or midline shift.        No acute facial bone fracture.        Cervical spondylosis without acute loss of vertebral body height or   traumatic malalignment.        No acute traumatic injury throughout the chest.        1 cm left lower lobe pulmonary nodule. Consider follow-up CT in 3   months, PET-CT or tissue sampling as per Fleischner criteria.        Sequela of granulomatous disease throughout the chest and abdomen.        No acute  traumatic injury throughout the abdomen or pelvis.        Indeterminate 1.2 cm right adrenal nodule, which is stable compared   to prior imaging in 2017.        Colonic diverticula most pronounced in the sigmoid colon with mild   adjacent stranding. Findings may represent mild or early   diverticulitis.        Focal ectasia of the infrarenal abdominal aorta measures up to 3.8 cm   in maximum AP dimension, which is increased compared to prior imaging   2017 where it was 3.3 cm.        MACRO:   Critical Finding:  See findings. Notification was initiated on   7/4/2024 at 1:18 am by  Evan Finkelstein.  (**-YCF-**) Instructions:        Signed by: Evan Finkelstein 7/4/2024 1:25 AM   Dictation workstation:   [id number]      CT chest abdomen pelvis w IV contrast   Final Result   Left frontal scalp/supraorbital soft tissue swelling. No underlying   depressed calvarial fracture        No acute intracranial hemorrhage, mass effect or midline shift.        No acute facial bone fracture.        Cervical spondylosis without acute loss of vertebral body height or   traumatic malalignment.        No acute traumatic injury throughout the chest.        1 cm left lower lobe pulmonary nodule. Consider follow-up CT in 3   months, PET-CT or tissue sampling as per Fleischner criteria.        Sequela of granulomatous disease throughout the chest and abdomen.        No acute traumatic injury throughout the abdomen or pelvis.        Indeterminate 1.2 cm right adrenal nodule, which is stable compared   to prior imaging in 2017.        Colonic diverticula most pronounced in the sigmoid colon with mild   adjacent stranding. Findings may represent mild or early   diverticulitis.        Focal ectasia of the infrarenal abdominal aorta measures up to 3.8 cm   in maximum AP dimension, which is increased compared to prior imaging   2017 where it was 3.3 cm.        MACRO:   Critical Finding:  See findings. Notification was initiated on   7/4/2024 at  1:18 am by  Evan Finkelstein.  (**-YCF-**) Instructions:        Signed by: Evan Finkelstein 7/4/2024 1:25 AM   Dictation workstation:   [id number]      XR chest 1 view   Final Result   1. No acute cardiopulmonary abnormality.             Signed by: Xavi Glover 7/4/2024 12:23 AM   Dictation workstation:   [id number]      XR tibia fibula right 2 views   Final Result   1. No acute osseous abnormality identified.                  Signed by: Xavi Glover 7/4/2024 12:25 AM   Dictation workstation:   [id number]      XR ankle right 3+ views   Final Result   1. No acute osseous abnormality identified.                  Signed by: Xavi Glover 7/4/2024 12:25 AM   Dictation workstation:   [id number]      Transthoracic Echo (TTE) Complete    (Results Pending)       Problem List:     Patient Active Problem List   Diagnosis    Syncope and collapse       Assessment:     Problem List Items Addressed This Visit             ICD-10-CM    * (Principal) Syncope and collapse R55    Relevant Orders    Transthoracic Echo (TTE) Complete     Other Visit Diagnoses         Codes    Fall, initial encounter    -  Primary W19.XXXA    Syncope, unspecified syncope type     R55    Closed head injury, initial encounter     S09.90XA          Patient reports that he initially stood up and felt lightheaded and noted the room seem to be spinning.  He subsequently fell to the floor and hit his head.  He briefly was noted to be unconscious.  Suspect vertigo leading to a fall and and possible concussion after he hit his head.    Orthostatic blood pressure and pulse are normal.  Remains in normal sinus rhythm without any active cardiac dysrhythmias.  EKG shows normal sinus rhythm with rightward axis.  No significant ST changes.  High-sensitivity troponin levels are negative.  He does not have any anginal or CHF symptoms.  Echocardiogram is currently pending and will be reviewed.  No other new cardiac recommendations.      Thank you for the  opportunity to participate in the care of your patient.  Please do not hesitate to call if you have any questions.    Electronically signed by Gaston Fonseca MD, on 7/4/2024 at 11:47 AM

## 2024-07-05 ENCOUNTER — APPOINTMENT (OUTPATIENT)
Dept: CARDIOLOGY | Facility: HOSPITAL | Age: 64
DRG: 090 | End: 2024-07-05
Payer: COMMERCIAL

## 2024-07-05 ENCOUNTER — APPOINTMENT (OUTPATIENT)
Dept: RADIOLOGY | Facility: HOSPITAL | Age: 64
DRG: 090 | End: 2024-07-05
Payer: COMMERCIAL
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
## 2024-07-05 LAB
ANION GAP SERPL CALC-SCNC: 10 MMOL/L (ref 10–20)
AORTIC VALVE MEAN GRADIENT: 4 MMHG
AORTIC VALVE PEAK VELOCITY: 1.25 M/S
ATRIAL RATE: 81 BPM
AV PEAK GRADIENT: 6.3 MMHG
AVA (PEAK VEL): 2.46 CM2
AVA (VTI): 2.47 CM2
BUN SERPL-MCNC: 13 MG/DL (ref 6–23)
CALCIUM SERPL-MCNC: 8.3 MG/DL (ref 8.6–10.3)
CHLORIDE SERPL-SCNC: 109 MMOL/L (ref 98–107)
CO2 SERPL-SCNC: 27 MMOL/L (ref 21–32)
CREAT SERPL-MCNC: 0.76 MG/DL (ref 0.5–1.3)
EGFRCR SERPLBLD CKD-EPI 2021: >90 ML/MIN/1.73M*2
EJECTION FRACTION APICAL 4 CHAMBER: 48
EJECTION FRACTION: 48 %
ERYTHROCYTE [DISTWIDTH] IN BLOOD BY AUTOMATED COUNT: 13.4 % (ref 11.5–14.5)
GLUCOSE BLD MANUAL STRIP-MCNC: 107 MG/DL (ref 74–99)
GLUCOSE BLD MANUAL STRIP-MCNC: 112 MG/DL (ref 74–99)
GLUCOSE BLD MANUAL STRIP-MCNC: 133 MG/DL (ref 74–99)
GLUCOSE BLD MANUAL STRIP-MCNC: 96 MG/DL (ref 74–99)
GLUCOSE SERPL-MCNC: 89 MG/DL (ref 74–99)
HCT VFR BLD AUTO: 42.7 % (ref 41–52)
HGB BLD-MCNC: 14.4 G/DL (ref 13.5–17.5)
HOLD SPECIMEN: NORMAL
LAMOTRIGINE SERPL-MCNC: 15.4 UG/ML (ref 2.5–15)
LEFT ATRIUM VOLUME AREA LENGTH INDEX BSA: 16.9 ML/M2
LEFT VENTRICLE INTERNAL DIMENSION DIASTOLE: 5.19 CM (ref 3.5–6)
LEFT VENTRICULAR OUTFLOW TRACT DIAMETER: 2.02 CM
LV EJECTION FRACTION BIPLANE: 45 %
MCH RBC QN AUTO: 33.2 PG (ref 26–34)
MCHC RBC AUTO-ENTMCNC: 33.7 G/DL (ref 32–36)
MCV RBC AUTO: 98 FL (ref 80–100)
MITRAL VALVE E/A RATIO: 0.56
NRBC BLD-RTO: 0 /100 WBCS (ref 0–0)
P AXIS: 78 DEGREES
P OFFSET: 159 MS
P ONSET: 114 MS
PLATELET # BLD AUTO: 215 X10*3/UL (ref 150–450)
POTASSIUM SERPL-SCNC: 4 MMOL/L (ref 3.5–5.3)
PR INTERVAL: 180 MS
Q ONSET: 204 MS
QRS COUNT: 14 BEATS
QRS DURATION: 104 MS
QT INTERVAL: 400 MS
QTC CALCULATION(BAZETT): 464 MS
QTC FREDERICIA: 442 MS
R AXIS: 92 DEGREES
RBC # BLD AUTO: 4.34 X10*6/UL (ref 4.5–5.9)
RIGHT VENTRICLE FREE WALL PEAK S': 13.5 CM/S
RIGHT VENTRICLE PEAK SYSTOLIC PRESSURE: 12.1 MMHG
SODIUM SERPL-SCNC: 142 MMOL/L (ref 136–145)
T AXIS: 64 DEGREES
T OFFSET: 404 MS
TRICUSPID ANNULAR PLANE SYSTOLIC EXCURSION: 2.2 CM
VENTRICULAR RATE: 81 BPM
WBC # BLD AUTO: 4.3 X10*3/UL (ref 4.4–11.3)

## 2024-07-05 PROCEDURE — 93880 EXTRACRANIAL BILAT STUDY: CPT

## 2024-07-05 PROCEDURE — 2500000004 HC RX 250 GENERAL PHARMACY W/ HCPCS (ALT 636 FOR OP/ED): Performed by: INTERNAL MEDICINE

## 2024-07-05 PROCEDURE — 99236 HOSP IP/OBS SAME DATE HI 85: CPT | Performed by: INTERNAL MEDICINE

## 2024-07-05 PROCEDURE — 36415 COLL VENOUS BLD VENIPUNCTURE: CPT | Performed by: INTERNAL MEDICINE

## 2024-07-05 PROCEDURE — 85027 COMPLETE CBC AUTOMATED: CPT | Performed by: INTERNAL MEDICINE

## 2024-07-05 PROCEDURE — 97165 OT EVAL LOW COMPLEX 30 MIN: CPT | Mod: GO

## 2024-07-05 PROCEDURE — 2500000002 HC RX 250 W HCPCS SELF ADMINISTERED DRUGS (ALT 637 FOR MEDICARE OP, ALT 636 FOR OP/ED): Performed by: STUDENT IN AN ORGANIZED HEALTH CARE EDUCATION/TRAINING PROGRAM

## 2024-07-05 PROCEDURE — 2500000001 HC RX 250 WO HCPCS SELF ADMINISTERED DRUGS (ALT 637 FOR MEDICARE OP): Performed by: STUDENT IN AN ORGANIZED HEALTH CARE EDUCATION/TRAINING PROGRAM

## 2024-07-05 PROCEDURE — 1200000002 HC GENERAL ROOM WITH TELEMETRY DAILY

## 2024-07-05 PROCEDURE — 97161 PT EVAL LOW COMPLEX 20 MIN: CPT | Mod: GP | Performed by: PHYSICAL THERAPIST

## 2024-07-05 PROCEDURE — 80048 BASIC METABOLIC PNL TOTAL CA: CPT | Performed by: INTERNAL MEDICINE

## 2024-07-05 PROCEDURE — 99232 SBSQ HOSP IP/OBS MODERATE 35: CPT | Performed by: STUDENT IN AN ORGANIZED HEALTH CARE EDUCATION/TRAINING PROGRAM

## 2024-07-05 PROCEDURE — 82947 ASSAY GLUCOSE BLOOD QUANT: CPT

## 2024-07-05 PROCEDURE — 93306 TTE W/DOPPLER COMPLETE: CPT | Performed by: INTERNAL MEDICINE

## 2024-07-05 PROCEDURE — 2500000001 HC RX 250 WO HCPCS SELF ADMINISTERED DRUGS (ALT 637 FOR MEDICARE OP): Performed by: INTERNAL MEDICINE

## 2024-07-05 PROCEDURE — 93306 TTE W/DOPPLER COMPLETE: CPT

## 2024-07-05 PROCEDURE — 93880 EXTRACRANIAL BILAT STUDY: CPT | Performed by: RADIOLOGY

## 2024-07-05 RX ORDER — FORMOTEROL FUMARATE DIHYDRATE 20 UG/2ML
20 SOLUTION RESPIRATORY (INHALATION) EVERY 12 HOURS PRN
Status: DISCONTINUED | OUTPATIENT
Start: 2024-07-05 | End: 2024-07-08 | Stop reason: HOSPADM

## 2024-07-05 ASSESSMENT — COGNITIVE AND FUNCTIONAL STATUS - GENERAL
HELP NEEDED FOR BATHING: A LOT
WALKING IN HOSPITAL ROOM: TOTAL
DRESSING REGULAR UPPER BODY CLOTHING: A LITTLE
DRESSING REGULAR UPPER BODY CLOTHING: A LITTLE
STANDING UP FROM CHAIR USING ARMS: A LITTLE
MOVING TO AND FROM BED TO CHAIR: A LITTLE
STANDING UP FROM CHAIR USING ARMS: A LOT
MOVING FROM LYING ON BACK TO SITTING ON SIDE OF FLAT BED WITH BEDRAILS: A LITTLE
STANDING UP FROM CHAIR USING ARMS: A LITTLE
MOVING FROM LYING ON BACK TO SITTING ON SIDE OF FLAT BED WITH BEDRAILS: A LITTLE
DAILY ACTIVITIY SCORE: 20
PERSONAL GROOMING: A LITTLE
MOBILITY SCORE: 14
DRESSING REGULAR LOWER BODY CLOTHING: A LITTLE
CLIMB 3 TO 5 STEPS WITH RAILING: TOTAL
MOVING TO AND FROM BED TO CHAIR: A LITTLE
TURNING FROM BACK TO SIDE WHILE IN FLAT BAD: A LITTLE
HELP NEEDED FOR BATHING: A LOT
TOILETING: A LOT
HELP NEEDED FOR BATHING: A LITTLE
DAILY ACTIVITIY SCORE: 16
CLIMB 3 TO 5 STEPS WITH RAILING: TOTAL
PERSONAL GROOMING: A LITTLE
WALKING IN HOSPITAL ROOM: TOTAL
DRESSING REGULAR LOWER BODY CLOTHING: A LOT
TURNING FROM BACK TO SIDE WHILE IN FLAT BAD: A LITTLE
WALKING IN HOSPITAL ROOM: A LOT
TOILETING: A LOT
DRESSING REGULAR LOWER BODY CLOTHING: A LOT
DAILY ACTIVITIY SCORE: 16
MOVING FROM LYING ON BACK TO SITTING ON SIDE OF FLAT BED WITH BEDRAILS: A LITTLE
MOBILITY SCORE: 14
TURNING FROM BACK TO SIDE WHILE IN FLAT BAD: A LITTLE
MOVING TO AND FROM BED TO CHAIR: A LOT
CLIMB 3 TO 5 STEPS WITH RAILING: TOTAL
TOILETING: A LITTLE
DRESSING REGULAR UPPER BODY CLOTHING: A LITTLE
MOBILITY SCORE: 13

## 2024-07-05 ASSESSMENT — PAIN SCALES - GENERAL
PAINLEVEL_OUTOF10: 9
PAINLEVEL_OUTOF10: 9
PAINLEVEL_OUTOF10: 0 - NO PAIN
PAINLEVEL_OUTOF10: 9
PAINLEVEL_OUTOF10: 0 - NO PAIN
PAINLEVEL_OUTOF10: 10 - WORST POSSIBLE PAIN

## 2024-07-05 ASSESSMENT — ACTIVITIES OF DAILY LIVING (ADL)
LACK_OF_TRANSPORTATION: YES
BATHING_ASSISTANCE: MODERATE

## 2024-07-05 ASSESSMENT — PAIN DESCRIPTION - LOCATION
LOCATION: ABDOMEN
LOCATION: HEAD

## 2024-07-05 ASSESSMENT — PAIN - FUNCTIONAL ASSESSMENT
PAIN_FUNCTIONAL_ASSESSMENT: 0-10

## 2024-07-05 ASSESSMENT — PAIN SCALES - PAIN ASSESSMENT IN ADVANCED DEMENTIA (PAINAD): TOTALSCORE: MEDICATION (SEE MAR)

## 2024-07-05 ASSESSMENT — PAIN DESCRIPTION - DESCRIPTORS: DESCRIPTORS: SHARP;STABBING

## 2024-07-05 NOTE — PROGRESS NOTES
Carol thanks  [facility] Progress Note           Rounding Cardiologist:  Dr. Gaston Fonseca  Primary Cardiologist:  None     Date:  7/5/2024  Patient:  Be Luo  YOB: 1960  MRN:  [id number]   Admit Date:  7/3/2024      SUBJECTIVE:    Be Luo was seen and examined today at bedside.     States he had difficult getting up to the bathroom.  Complains of headache.    Very unsteady gait.  Neurology recommendations noted.     He denies any chest pain or shortness of breath.       Consult HPI 7/4/2024:  Be Luo is a 63 y.o.  male patient who is being at the request of Dr. Sadiq Stout for inpatient consultation of  syncope . He was admitted on 7/3/2024.  Previous [facility] and  records have been reviewed in detail.       He has a history of mild coronary disease, COPD, obstructive sleep apnea, hypertension, hyperlipidemia, and tobacco abuse.  He quit smoking tobacco in November 2023.  Cardiac catheterization by Dr. Elías Fischer at [facility] on December 12, 2022 showed 40% stenosis of the LAD, normal circumflex, and less than 40% stenosis of the RCA.  Estimated LV ejection fraction was 55%.       He presented to the emergency department last evening after he passed out.  The patient states he was getting up from a chair when he noted onset of lightheadedness.  He states the room was spinning.  He fell to the floor and hit the left side of his face and head.  Some family members observed the fall and noted that he was unconscious for a few minutes.  He has a history of seizures but no seizure activity was witnessed.  No incontinence.  Patient states he regained consciousness and noticed pain over the left face and left arm.  He denies any associated chest pain, shortness of breath, diaphoresis, nausea, or vomiting.  He denies any prior history of near syncope or syncope.  He did not note any palpitations.     In the emergency department he was noted to have stable vital signs.   Oxygen saturation 97%.  CBC and CMP normal.  High-sensitivity troponin level 4 and 5.  CT scan of the brain was negative for any acute findings.  He was noted to have left frontal scalp soft tissue swelling.  There was suggestion of possible diverticulitis.  There was a 3.8 cm infrarenal abdominal aortic aneurysm.  Chest x-ray did not show any active disease.     He was admitted to telemetry.  Patient states that this morning he had recurrent lightheadedness and vertigo when he stood up at the bedside.  He had difficulty balancing himself.  Orthostatic blood pressure and pulse were negative.  He has remained in normal sinus rhythm without any significant bradycardia or tachyarrhythmias.    VITALS:     Vitals:    07/04/24 1440 07/04/24 1929 07/04/24 2355 07/05/24 0700   BP: 126/61 (!) 121/91 114/69 120/73   BP Location: Right arm Left arm  Right arm   Patient Position: Lying Lying  Lying   Pulse: 88 91 87 81   Resp: 17   17   Temp: 36.5 °C (97.7 °F) 36.9 °C (98.4 °F) 36.7 °C (98.1 °F) 36.1 °C (97 °F)   TempSrc: Temporal Temporal  Temporal   SpO2: 94% 94% 93% 92%   Weight:       Height:           Intake/Output Summary (Last 24 hours) at 7/5/2024 1056  Last data filed at 7/5/2024 0800  Gross per 24 hour   Intake 2533.75 ml   Output 1270 ml   Net 1263.75 ml       Wt Readings from Last 4 Encounters:   07/04/24 92.3 kg (203 lb 7.8 oz)   03/28/24 93.4 kg (206 lb)   12/11/23 95.7 kg (211 lb)   11/30/23 95.3 kg (210 lb)       CURRENT HOSPITAL MEDICATIONS:   aspirin, 81 mg, oral, Daily  brimonidine, 1 drop, Left Eye, TID  cholecalciferol, 2,000 Units, oral, Daily with breakfast  docusate sodium, 100 mg, oral, BID  dorzolamide-timoloL, 1 drop, Left Eye, BID  insulin lispro, 0-10 Units, subcutaneous, TID  lamoTRIgine, 400 mg, oral, q AM  lamoTRIgine, 600 mg, oral, q PM  latanoprost, 1 drop, Left Eye, Nightly  pramipexole, 0.125 mg, oral, Nightly  prednisoLONE acetate, 1 drop, Left Eye, BID  rosuvastatin, 5 mg, oral,  Nightly  [START ON 7/6/2024] tiotropium, 2 puff, inhalation, Daily      sodium chloride 0.9%, 75 mL/hr, Last Rate: 75 mL/hr (07/05/24 0232)      Current Outpatient Medications   Medication Instructions    Actemra ACTPen 162 mg, subcutaneous, Every 7 days    albuterol 90 mcg/actuation inhaler 2 puffs, inhalation, Every 6 hours PRN    albuterol 2.5 mg, inhalation, 2 times daily PRN    aspirin 81 mg, oral, Daily RT    Bevespi Aerosphere 9-4.8 mcg HFA aerosol inhaler 2 puffs, inhalation, 2 times daily    brimonidine (AlphaGAN) 0.2 % ophthalmic solution 1 drop, Left Eye, 3 times daily    cholecalciferol (VITAMIN D-3) 2,000 Units, oral, Daily with breakfast    cyclobenzaprine (FLEXERIL) 10 mg, oral, 3 times daily PRN    dorzolamide-timoloL (Cosopt) 22.3-6.8 mg/mL ophthalmic solution 1 drop, Left Eye, 2 times daily    lamoTRIgine (LaMICtal) 200 mg tablet 2 tablets, oral, Every morning, Take in addition to evening dose of 3 tablets for a total of 5 tablets daily    lamoTRIgine (LaMICtal) 200 mg tablet 3 tablets, oral, Every evening, Take in addition to morning dose of 2 tablets for a total of 5 tablets daily    latanoprost (Xalatan) 0.005 % ophthalmic solution 1 drop, Left Eye, Daily    medical cannabis 1 each, oral    metFORMIN (Glucophage) 500 mg tablet 1 tablet, oral, Daily with evening meal    multivitamin with minerals iron-free (multivitamin-iron-minerals-folic acid) 1 tablet, oral, Daily RT    potassium chloride CR 10 mEq ER tablet 20 mEq, oral, Daily    pramipexole (MIRAPEX) 0.125 mg, oral, Nightly    prednisoLONE acetate (Pred-Forte) 1 % ophthalmic suspension 1 drop, ophthalmic (eye), 2 times daily    rosuvastatin (CRESTOR) 5 mg, oral, Nightly    Stiolto Respimat 2.5-2.5 mcg/actuation mist inhaler 2 Inhalations, inhalation, Daily        PHYSICAL EXAMINATION:   GENERAL:  Well developed, well nourished, in no acute distress.  CHEST:  Symmetric and nontender.  NEURO/PSYCH:  Alert and oriented times three with  approppriate behavior and responses.  NECK:  Supple, no JVD, no bruit.  LUNGS:  Clear to auscultation bilaterally, normal respiratory effort.  HEART:  Rate and rhythm regular with no evident murmur, no gallop appreciated.        There are no rubs, clicks or heaves.  EXTREMITIES:  Warm with good color, no clubbing or cyanosis.  There is no edema noted.  PERIPHERAL VASCULAR:  Pulses present and equally palpable; 2+ throughout.      LAB DATA:     CBC:   Results from last 7 days   Lab Units 07/05/24  0356   WBC AUTO x10*3/uL 4.3*   RBC AUTO x10*6/uL 4.34*   HEMOGLOBIN g/dL 14.4   HEMATOCRIT % 42.7   PLATELETS AUTO x10*3/uL 215        CMP:    Results from last 7 days   Lab Units 07/05/24  0356   SODIUM mmol/L 142   POTASSIUM mmol/L 4.0   CHLORIDE mmol/L 109*   CO2 mmol/L 27   BUN mg/dL 13   CREATININE mg/dL 0.76   GLUCOSE mg/dL 89   CALCIUM mg/dL 8.3*       Cardiac Enzymes:    Lab Results   Component Value Date    TROPHS 5 07/04/2024    TROPHS 4 07/03/2024    TROPHS 4 12/11/2023       Magnesium:    Lab Results   Component Value Date    MG 2.05 07/03/2024       TSH:    Lab Results   Component Value Date    TSH 1.02 07/04/2024       BNP:   Lab Results   Component Value Date    BNP 16 12/11/2023        HgBA1c:    Lab Results   Component Value Date    HGBA1C 5.5 07/04/2024       CBC:  Lab Results   Component Value Date    WBC 4.3 (L) 07/05/2024    WBC 4.2 (L) 07/03/2024    RBC 4.34 (L) 07/05/2024    RBC 4.56 07/03/2024    HGB 14.4 07/05/2024    HGB 15.3 07/03/2024    HCT 42.7 07/05/2024    HCT 44.4 07/03/2024    MCV 98 07/05/2024    MCV 97 07/03/2024    MCH 33.2 07/05/2024    MCH 33.6 07/03/2024    MCHC 33.7 07/05/2024    MCHC 34.5 07/03/2024    RDW 13.4 07/05/2024    RDW 13.3 07/03/2024     07/05/2024     07/03/2024       BMP:  Lab Results   Component Value Date     07/05/2024     07/03/2024    K 4.0 07/05/2024    K 3.8 07/03/2024     (H) 07/05/2024     07/03/2024    CO2 27 07/05/2024     CO2 25 07/03/2024    BUN 13 07/05/2024    BUN 17 07/03/2024    CREATININE 0.76 07/05/2024    CREATININE 0.89 07/03/2024       Hepatic Function Panel:    Lab Results   Component Value Date    ALKPHOS 50 07/03/2024    ALT 19 07/03/2024    AST 17 07/03/2024    PROT 6.5 07/03/2024    BILITOT 1.1 07/03/2024       DIAGNOSTIC TESTING:     ECG 12 lead    Result Date: 7/5/2024  Normal sinus rhythm Rightward axis Borderline ECG When compared with ECG of 04-JUL-2024 00:38, (unconfirmed) No significant change was found See ED provider note for full interpretation and clinical correlation    ECG 12 lead    Result Date: 7/4/2024  Normal sinus rhythm Rightward axis Borderline ECG When compared with ECG of 11-DEC-2023 20:44, No significant change was found See ED provider note for full interpretation and clinical correlation    ECG 12 lead    Result Date: 7/4/2024  Normal sinus rhythm Rightward axis Borderline ECG When compared with ECG of 04-JUL-2024 01:51, (unconfirmed) No significant change was found See ED provider note for full interpretation and clinical correlation    ECHO CONCLUSIONS 7/5/2024:   1. The left ventricular systolic function is mildly decreased, with a visually estimated ejection fraction of 45-50%.   2. There is global hypokinesis of the left ventricle with minor regional variations.   3. Spectral Doppler shows an impaired relaxation pattern of left ventricular diastolic filling.   4. There is normal right ventricular global systolic function.   5. Left atrial volume index 17.5 mL/m2.      Saline bubble contrast study is negative.   6. Trivial mitral regurgitation.   7. Trivial tricuspid regurgitation with estimated RVSP 12 mmHg.   8. No previous available for comparison.       RADIOLOGY:     Carotid duplex bilateral   Final Result   Utilizing the peak systolic velocities, the estimated degree of   stenosis within the internal carotid arteries is less than 50%   bilaterally.        MACRO:   None.              Signed by: Jose Gamboa 7/5/2024 10:46 AM   Dictation workstation:   [id number]      Transthoracic Echo (TTE) Complete         CT head wo IV contrast   Final Result   Left frontal scalp/supraorbital soft tissue swelling. No underlying   depressed calvarial fracture        No acute intracranial hemorrhage, mass effect or midline shift.        No acute facial bone fracture.        Cervical spondylosis without acute loss of vertebral body height or   traumatic malalignment.        No acute traumatic injury throughout the chest.        1 cm left lower lobe pulmonary nodule. Consider follow-up CT in 3   months, PET-CT or tissue sampling as per Fleischner criteria.        Sequela of granulomatous disease throughout the chest and abdomen.        No acute traumatic injury throughout the abdomen or pelvis.        Indeterminate 1.2 cm right adrenal nodule, which is stable compared   to prior imaging in 2017.        Colonic diverticula most pronounced in the sigmoid colon with mild   adjacent stranding. Findings may represent mild or early   diverticulitis.        Focal ectasia of the infrarenal abdominal aorta measures up to 3.8 cm   in maximum AP dimension, which is increased compared to prior imaging   2017 where it was 3.3 cm.        MACRO:   Critical Finding:  See findings. Notification was initiated on   7/4/2024 at 1:18 am by  Evan Finkelstein.  (**-YCF-**) Instructions:        Signed by: Evan Finkelstein 7/4/2024 1:25 AM   Dictation workstation:   [id number]      CT maxillofacial bones wo IV contrast   Final Result   Left frontal scalp/supraorbital soft tissue swelling. No underlying   depressed calvarial fracture        No acute intracranial hemorrhage, mass effect or midline shift.        No acute facial bone fracture.        Cervical spondylosis without acute loss of vertebral body height or   traumatic malalignment.        No acute traumatic injury throughout the chest.        1 cm left lower lobe pulmonary  nodule. Consider follow-up CT in 3   months, PET-CT or tissue sampling as per Fleischner criteria.        Sequela of granulomatous disease throughout the chest and abdomen.        No acute traumatic injury throughout the abdomen or pelvis.        Indeterminate 1.2 cm right adrenal nodule, which is stable compared   to prior imaging in 2017.        Colonic diverticula most pronounced in the sigmoid colon with mild   adjacent stranding. Findings may represent mild or early   diverticulitis.        Focal ectasia of the infrarenal abdominal aorta measures up to 3.8 cm   in maximum AP dimension, which is increased compared to prior imaging   2017 where it was 3.3 cm.        MACRO:   Critical Finding:  See findings. Notification was initiated on   7/4/2024 at 1:18 am by  Evan Finkelstein.  (**-YCF-**) Instructions:        Signed by: Evan Finkelstein 7/4/2024 1:25 AM   Dictation workstation:   [id number]      CT cervical spine wo IV contrast   Final Result   Left frontal scalp/supraorbital soft tissue swelling. No underlying   depressed calvarial fracture        No acute intracranial hemorrhage, mass effect or midline shift.        No acute facial bone fracture.        Cervical spondylosis without acute loss of vertebral body height or   traumatic malalignment.        No acute traumatic injury throughout the chest.        1 cm left lower lobe pulmonary nodule. Consider follow-up CT in 3   months, PET-CT or tissue sampling as per Fleischner criteria.        Sequela of granulomatous disease throughout the chest and abdomen.        No acute traumatic injury throughout the abdomen or pelvis.        Indeterminate 1.2 cm right adrenal nodule, which is stable compared   to prior imaging in 2017.        Colonic diverticula most pronounced in the sigmoid colon with mild   adjacent stranding. Findings may represent mild or early   diverticulitis.        Focal ectasia of the infrarenal abdominal aorta measures up to 3.8 cm   in  maximum AP dimension, which is increased compared to prior imaging   2017 where it was 3.3 cm.        MACRO:   Critical Finding:  See findings. Notification was initiated on   7/4/2024 at 1:18 am by  Evan Finkelstein.  (**-YCF-**) Instructions:        Signed by: Evan Finkelstein 7/4/2024 1:25 AM   Dictation workstation:   [id number]      CT 3D reconstruction   Final Result   Left frontal scalp/supraorbital soft tissue swelling. No underlying   depressed calvarial fracture        No acute intracranial hemorrhage, mass effect or midline shift.        No acute facial bone fracture.        Cervical spondylosis without acute loss of vertebral body height or   traumatic malalignment.        No acute traumatic injury throughout the chest.        1 cm left lower lobe pulmonary nodule. Consider follow-up CT in 3   months, PET-CT or tissue sampling as per Fleischner criteria.        Sequela of granulomatous disease throughout the chest and abdomen.        No acute traumatic injury throughout the abdomen or pelvis.        Indeterminate 1.2 cm right adrenal nodule, which is stable compared   to prior imaging in 2017.        Colonic diverticula most pronounced in the sigmoid colon with mild   adjacent stranding. Findings may represent mild or early   diverticulitis.        Focal ectasia of the infrarenal abdominal aorta measures up to 3.8 cm   in maximum AP dimension, which is increased compared to prior imaging   2017 where it was 3.3 cm.        MACRO:   Critical Finding:  See findings. Notification was initiated on   7/4/2024 at 1:18 am by  Evan Finkelstein.  (**-YCF-**) Instructions:        Signed by: Evan Finkelstein 7/4/2024 1:25 AM   Dictation workstation:   [id number]      CT chest abdomen pelvis w IV contrast   Final Result   Left frontal scalp/supraorbital soft tissue swelling. No underlying   depressed calvarial fracture        No acute intracranial hemorrhage, mass effect or midline shift.        No acute facial  bone fracture.        Cervical spondylosis without acute loss of vertebral body height or   traumatic malalignment.        No acute traumatic injury throughout the chest.        1 cm left lower lobe pulmonary nodule. Consider follow-up CT in 3   months, PET-CT or tissue sampling as per Fleischner criteria.        Sequela of granulomatous disease throughout the chest and abdomen.        No acute traumatic injury throughout the abdomen or pelvis.        Indeterminate 1.2 cm right adrenal nodule, which is stable compared   to prior imaging in 2017.        Colonic diverticula most pronounced in the sigmoid colon with mild   adjacent stranding. Findings may represent mild or early   diverticulitis.        Focal ectasia of the infrarenal abdominal aorta measures up to 3.8 cm   in maximum AP dimension, which is increased compared to prior imaging   2017 where it was 3.3 cm.        MACRO:   Critical Finding:  See findings. Notification was initiated on   7/4/2024 at 1:18 am by  Evan Finkelstein.  (**-YCF-**) Instructions:        Signed by: Evan Finkelstein 7/4/2024 1:25 AM   Dictation workstation:   [id number]      XR chest 1 view   Final Result   1. No acute cardiopulmonary abnormality.             Signed by: Xavi Glover 7/4/2024 12:23 AM   Dictation workstation:   [id number]      XR tibia fibula right 2 views   Final Result   1. No acute osseous abnormality identified.                  Signed by: Xavi Glover 7/4/2024 12:25 AM   Dictation workstation:   [id number]      XR ankle right 3+ views   Final Result   1. No acute osseous abnormality identified.                  Signed by: Xavi Glover 7/4/2024 12:25 AM   Dictation workstation:   [id number]          PROBLEM LIST     Patient Active Problem List   Diagnosis    Syncope and collapse    Fall, initial encounter       ASSESSMENT:      Fall in the setting of severe vertigo.  Orthostatic BP and pulse normal.  No dysrhythmias.  Concussion secondary to left sided  head injury.    Neuropathy.  Remote history of seizures.   History of mild ASHD noted on cath December 2022.  LVEF at that time was 55%.  COPD/ANN.  Hypertension and hyperlipidemia.  Prior tobacco abuse.   Mild LV dysfunction with LVEF 45-50%.  Suspect non-ischemic cardiomyopathy.    PLAN:     Okay from a cardiac standpoint to discharge home.  He will be scheduled for an outpatient Lexiscan myocardial perfusion study as well as 14-day Zio patch monitor.    Follow-up once those studies are completed.    Please do not hesitate to call with questions.  Electronically signed by Gaston Fonseca MD, on 7/5/2024 at 10:56 AM

## 2024-07-05 NOTE — PROGRESS NOTES
Physical Therapy    Physical Therapy Evaluation    Patient Name: Be Luo  MRN: [id number]  Today's Date: 7/5/2024   Time Calculation  Start Time: 0849  Stop Time: 0901  Time Calculation (min): 12 min  801/801-A    Assessment/Plan   PT Assessment  PT Assessment Results: Decreased strength, Decreased endurance, Impaired balance, Decreased mobility, Impaired judgement, Pain, Impaired vision  Rehab Prognosis: Good  Evaluation/Treatment Tolerance: Patient limited by fatigue, Patient limited by pain  Medical Staff Made Aware: Yes  Strengths: Living arrangement secure, Housing layout  Barriers to Participation: Comorbidities  End of Session Communication: Bedside nurse  Assessment Comment: Pt. is weak and deconditioned. Pt required A for transfers and minimal amb at this time. Pt. has multiple falls at home and is a high fall risk. Pt. also limits his actiivty due to pain. Recommend continued therapy at a moderate intenisty level.  End of Session Patient Position: Bed, 3 rail up, Alarm off, not on at start of session  IP OR SWING BED PT PLAN  Inpatient or Swing Bed: Inpatient  PT Plan  Treatment/Interventions: Bed mobility, Transfer training, Gait training, Balance training, Strengthening, Endurance training, Therapeutic exercise  PT Plan: Ongoing PT  PT Frequency: 3 times per week  PT Discharge Recommendations: Moderate intensity level of continued care  Equipment Recommended upon Discharge:  (TBD)  PT Recommended Transfer Status: Assist x1, Assistive device  Physical Therapy eval completed per MD requisition. P.T. recommendations as outlined above. Recommend D/C from acute care when medically appropriate as deemed by medical staff.    Subjective       General Visit Information:  General  Reason for Referral: impaired mobility  Referred By: Dr Mace (PT/OT 7/4)  Past Medical History Relevant to Rehab: includes: COPD, CPAP, ANN, HTN, HLD, CAD, DM, R TKA, RA, migraines, depression, diverticulitis, gout,  marijuana, TBI, s/o seizures, retinal detachment, L LE tumor removed, cardiac cath, impaired vision R eye since childhood  Family/Caregiver Present: No  Prior to Session Communication: Bedside nurse  Patient Position Received: Bed, 4 rail up, Alarm off, not on at start of session  Preferred Learning Style: auditory, verbal  General Comment: Pt. is a 64yo who presented to [facility] ED on 7/3/2024 following a fall, (+) LOC, hit face, (+) nose bleed.    Imaging from 7/3/2024:   Head CT (-) acute intracranial findings, (+) L frontal scalp soft tissue swelling, (-) fracture    Facial CT (-) fractures   C-spine CT (-) fracture, (+) spondylosis    Chest/Abdominal/Pelvic CT (+)LLL nodule, (+) granulomatosis disease, (-) fractures, (+) diverticulitis, (+) infrarenal abdominal aorta ectasia    CXR (-) acute findings   R tib/fib X-ray (-) fracture   R ankle (-) fracture    Dx: Fall, syncope, CHI    Home Living:  Home Living  Home Living Comments: Pt. lives alone in a 1st floor apt with 3 ZAFAR with HR. Tub shower without  a seat or grab bars.    Prior Level of Function:  Prior Function Per Pt/Caregiver Report  Prior Function Comments: Pt. amb withhis vision cane PTA. Pt was I with ADLs and IADLs PTA. Pt. reported between 10-15 falls in last 3 months. Pt. does not drive and takes the bus.    Precautions:  Precautions  Hearing/Visual Limitations: Blind in R eye  Medical Precautions:  (Activity order: No restrictions, orthostatics)  Precautions Comment: Per EMR: High fall risk         Objective     Pain:  Pain Assessment  Pain Assessment: 0-10  0-10 (Numeric) Pain Score: 9  Pain Type: Acute pain  Pain Location: Head  Pain Orientation: Right  Pain Interventions: Repositioned    Cognition:  Cognition  Overall Cognitive Status: Within Functional Limits    General Assessments:  General Observation  General Observation: Tele, Pt. can be self-limiting   Activity Tolerance  Endurance: Decreased tolerance for upright activites                  Dynamic Sitting Balance  Dynamic Sitting-Comments: Good static and dynamic sitting balance  Dynamic Standing Balance  Dynamic Standing-Comments: Fair static and dynamic standing balance    Functional Assessments:     Bed Mobility  Bed Mobility: Yes  Bed Mobility 1  Bed Mobility 1: Supine to sitting  Level of Assistance 1: Contact guard  Bed Mobility Comments 1: CGA for safety. Increased time and effort with excessive use of bedrail. Pt. refused to have bottom siderail put down and insisted on sitting on EOB in narrow area between bottom siderail and foot board.  Bed Mobility 2  Bed Mobility  2: Sitting to supine  Level of Assistance 2: Contact guard  Bed Mobility Comments 2: CGA for safety  Transfers  Transfer: Yes  Transfer 1  Technique 1: Sit to stand  Transfer Device 1:  (FWW)  Transfer Level of Assistance 1: Minimum assistance  Trials/Comments 1: A for lifting, transferring weight over feet, steadying. VC's for hand placement  Transfers 2  Technique 2: Stand to sit  Transfer Device 2:  (FWW)  Transfer Level of Assistance 2: Minimum assistance  Trials/Comments 2: A to control descent. VC's for hand placement.  Ambulation/Gait Training  Ambulation/Gait Training Performed: Yes  Ambulation/Gait Training 1  Surface 1: Level tile  Device 1: Rolling walker  Assistance 1: Minimum assistance  Quality of Gait 1: Narrow base of support (slow, unsteady, very guarded sidesteps to HOB.)  Comments/Distance (ft) 1: 5' sidestepping (Pt. refused to amb farther due to head pain); A for balance, safety, maneuvering FWW  Stairs  Stairs: No       Extremity/Trunk Assessments:        RLE   RLE :  (AROM WFL, strength 4-/5)  LLE   LLE :  (AROM WFL, strength 4-/5)    Outcome Measures:     [facility] Basic Mobility  Turning from your back to your side while in a flat bed without using bedrails: A little  Moving from lying on your back to sitting on the side of a flat bed without using bedrails: A little  Moving to and from bed to chair  (including a wheelchair): A little  Standing up from a chair using your arms (e.g. wheelchair or bedside chair): A little  To walk in hospital room: Total  Climbing 3-5 steps with railing: Total  Basic Mobility - Total Score: 14                                        Goals:  Encounter Problems       Encounter Problems (Active)       PT Problem       Pt. will transfer supine/sit with MOD I (Progressing)       Start:  07/05/24    Expected End:  07/19/24            Pt. will transfer sit/stand with FWW with CGA (Progressing)       Start:  07/05/24    Expected End:  07/19/24            Pt.will ambulate 40' with FWW with CGA (Progressing)       Start:  07/05/24    Expected End:  07/19/24            Pt. will amb up/down 3 steps with HR and cane with MIN A x 1 (Not Progressing)       Start:  07/05/24    Expected End:  07/19/24            Pt. will perform 2 x 15 B LE AROM exercises  (Not Progressing)       Start:  07/05/24    Expected End:  07/19/24                 Education Documentation  Mobility Training, taught by Ihsan Yu, PT at 7/5/2024 12:26 PM.  Learner: Patient  Readiness: Acceptance  Method: Explanation  Response: Verbalizes Understanding, Needs Reinforcement  Comment: Role of PT, transfers, amb, safety, PT POC

## 2024-07-05 NOTE — PROGRESS NOTES
"Be Luo is a 63 y.o. male on day 0 of admission presenting with Syncope and collapse.    Subjective   Patient seen and examined.  Continues to complain of headache and is still getting dizzy with getting up he describes dizziness as lightheadedness.  No fevers, chills or difficulty breathing.       Objective     Physical Exam  Constitutional:       General: He is not in acute distress.     Appearance: He is not toxic-appearing or diaphoretic.   HENT:      Head:      Comments: Facial contusion and abrasions, left more than right     Mouth/Throat:      Mouth: Mucous membranes are moist.      Pharynx: Oropharynx is clear. No oropharyngeal exudate or posterior oropharyngeal erythema.   Eyes:      General:         Right eye: No discharge.         Left eye: No discharge.      Comments: Right eye blindness   Cardiovascular:      Rate and Rhythm: Normal rate and regular rhythm.      Heart sounds: No murmur heard.  Pulmonary:      Breath sounds: No wheezing, rhonchi or rales.   Musculoskeletal:      Cervical back: Neck supple.      Right lower leg: No edema.      Left lower leg: No edema.      Comments: LLE varicose veins   Lymphadenopathy:      Cervical: No cervical adenopathy.   Skin:     General: Skin is warm and dry.   Neurological:      General: No focal deficit present.   Last Recorded Vitals  Blood pressure 120/73, pulse 81, temperature 36.1 °C (97 °F), temperature source Temporal, resp. rate 17, height 1.78 m (5' 10.08\"), weight 92.3 kg (203 lb 7.8 oz), SpO2 92%.  Intake/Output last 3 Shifts:  I/O last 3 completed shifts:  In: 2533.8 (27.5 mL/kg) [P.O.:1300; I.V.:1233.8 (13.4 mL/kg)]  Out: 1270 (13.8 mL/kg) [Urine:1270 (0.4 mL/kg/hr)]  Weight: 92.3 kg     Relevant Results           This patient currently has cardiac telemetry ordered; if you would like to modify or discontinue the telemetry order, click here to go to the orders activity to modify/discontinue the order.                 Assessment/Plan "   Principal Problem:    Syncope and collapse  Active Problems:    Fall, initial encounter    63-year-old male with past medical history of legal blindness, recurrent falls admitted with fall/syncope, he also has a history of seizures on Lamictal    Carotid Dopplers showed less than 50% stenosis on both sides  Echo reviewed as well no critical findings  Will need outpatient monitor which will be arranged by cardiology  Patient was seen by neurology and cleared for discharge, did not order any imaging and did not feel like he required EEG  His current symptoms are most likely secondary to concussion  Patient does report that he was dizzy even before the fall and has a history of recurrent falls this is nothing new  Workup including TSH B12 and hemoglobin A1c were within normal limits  Patient is absolutely refusing to go to rehab he understands the risk of falls and says that he has been falling and this is nothing new  States he is still significantly dizzy when he gets up I will keep him for 1 more day  Continue PT OT  Does have evidence of neuropathy  Continue IV fluids for another 24 hours  No signs of fluid overload will reassess dizziness tomorrow  Home meds resumed including eyedrops and Lamictal  Continue insulin sliding scale  He is on aspirin and statin and respiratory inhalers as well  DVT prophylaxis           Colin Mace MD

## 2024-07-05 NOTE — PROGRESS NOTES
Pt. Requesting assist with DPOA-HC.  Met with pt., assisted in completion of DPOA-HC, naming his sister neena as poa.  Original given to pt., copy placed on chart.    Update:  per tali-andrew pt. Will need snf, has spoke to pt. Who is agreeable and requesting 1. [facility] 2. [facility]  [facility].  Pt. With [employer] ins., will require ins. Precert.    Update:  [facility] has accepted pt.  Hospital ins. Team to obtain precert.

## 2024-07-05 NOTE — CARE PLAN
The patient's goals for the shift include      The clinical goals for the shift include patient will have decreased syncope throughout shift    Problem: Skin  Goal: Decreased wound size/increased tissue granulation at next dressing change  Outcome: Progressing  Flowsheets (Taken 7/5/2024 0234)  Decreased wound size/increased tissue granulation at next dressing change: Promote sleep for wound healing  Goal: Participates in plan/prevention/treatment measures  Outcome: Progressing  Flowsheets (Taken 7/5/2024 0234)  Participates in plan/prevention/treatment measures: Elevate heels  Goal: Prevent/manage excess moisture  Flowsheets (Taken 7/5/2024 0234)  Prevent/manage excess moisture: Moisturize dry skin  Goal: Prevent/minimize sheer/friction injuries  Flowsheets (Taken 7/5/2024 0234)  Prevent/minimize sheer/friction injuries:   Increase activity/out of bed for meals   HOB 30 degrees or less  Goal: Promote/optimize nutrition  Flowsheets (Taken 7/5/2024 0234)  Promote/optimize nutrition: Monitor/record intake including meals  Goal: Promote skin healing  Flowsheets (Taken 7/5/2024 0234)  Promote skin healing: Assess skin/pad under line(s)/device(s)

## 2024-07-05 NOTE — PROGRESS NOTES
Occupational Therapy    Evaluation    Patient Name: Be Luo  MRN: [id number]  Today's Date: 7/5/2024  Time Calculation  Start Time: 0850  Stop Time: 0900  Time Calculation (min): 10 min        Assessment:  OT Assessment: decreased balance, endurance and safety with transfers ,mobility, and ADLs  Barriers to Discharge: Decreased caregiver support  Evaluation/Treatment Tolerance: Patient limited by pain  End of Session Communication: Bedside nurse  End of Session Patient Position: Bed, 3 rail up, Alarm off, not on at start of session  OT Assessment Results: Decreased ADL status, Decreased endurance  Barriers to Discharge: Decreased caregiver support  Evaluation/Treatment Tolerance: Patient limited by pain  Plan:  Treatment Interventions: ADL retraining, Functional transfer training, Endurance training  OT Frequency: 2 times per week  OT Discharge Recommendations: Moderate intensity level of continued care  OT - OK to Discharge: Yes (when medically stable/cleared)      Subjective   Current Problem:  1. Fall, initial encounter        2. Syncope, unspecified syncope type  Carotid duplex bilateral    Carotid duplex bilateral      3. Closed head injury, initial encounter        4. Syncope and collapse  Transthoracic Echo (TTE) Complete    Transthoracic Echo (TTE) Complete        General:  General  Reason for Referral: ADL impairment  Referred By: Dr Mace (PT/OT 7/4)  Past Medical History Relevant to Rehab: includes: COPD, CPAP, ANN, HTN, HLD, CAD, DM, R TKA, RA, migraines, depression, diverticulitis, gout, marijuana, TBI, s/o seizures, retinal detachment, L LE tumor removed, cardiac cath, impaired vision R eye since childhood  Family/Caregiver Present: No  Prior to Session Communication: Bedside nurse  Patient Position Received: Bed, 4 rail up, Alarm off, not on at start of session  General Comment: Pt. is a 64yo who presented to [facility] ED on 7/3/2024 following a fall, (+) LOC, hit face, (+) nose bleed.  Imaging  from 7/3/2024: Head CT (-) acute intracranial findings, (+) L frontal scalp soft tissue swelling, (-) fracture  Facial CT (-) fracture C-spine CT (-) fracture, (+) spondylosis  Chest/Abdominal/Pelvic CT (+)LLL nodule, (+) granulomatosis disease, (-) fractures, (+) diverticulitis, (+) infrarenal abdominal aorta ectasia  CXR (-) acute findings  R tib/fib X-ray (-) fracture R ankle (-) fracture  Dx: Fall, syncope, CHI  Precautions:  Hearing/Visual Limitations: Blind in R eye since childhood  Medical Precautions: Fall precautions    Pain:  Pain Assessment  Pain Assessment: 0-10  0-10 (Numeric) Pain Score: 9  Pain Type: Acute pain  Pain Location: Head  Pain Orientation: Right  Pain Descriptors: Sharp, Stabbing    Objective   Cognition:  Overall Cognitive Status: Within Functional Limits    Home Living:  Home Living Comments: Pt. lives alone in apartment, 3 ZAFAR with HRs. single story apartment. tub shower, no DME.  Prior Function:  Prior Function Comments: Pt. uses handicapped cane due to R eye blindness. Independent ADLs, IADLS. 10-15 falls due to LOB. Does not drive; takes the "[employer] bus.    ADL:  Eating Assistance: Independent  Grooming Assistance: Stand by  Bathing Assistance: Moderate  UE Dressing Assistance: Stand by  LE Dressing Assistance: Moderate  Toileting Assistance with Device: Moderate  Activity Tolerance:  Endurance: Decreased tolerance for upright activites  Bed Mobility/Transfers: Bed Mobility  Bed Mobility: Yes  Bed Mobility 1  Bed Mobility 1: Supine to sitting  Level of Assistance 1: Contact guard  Bed Mobility Comments 1: CGA for safety; pt. requests side rail up to be able to use with UEs to pull himself upright.  Bed Mobility 2  Bed Mobility  2: Sitting to supine  Level of Assistance 2: Contact guard    Transfers  Transfer: Yes  Transfer 1  Technique 1: Sit to stand  Transfer Device 1: Walker  Transfer Level of Assistance 1: Minimum assistance  Trials/Comments 1: Assist to lift, steady, weight shift  and balance    Functional Mobility:  Functional Mobility  Functional Mobility Performed:  (side stepping with WW; Min A for safety and steadying)    Standing Balance:  Dynamic Standing Balance  Dynamic Standing-Comments: Fair     Strength:  Strength Comments: Laurie 5/5 MMT    Hand Function:  Gross Grasp: Functional  Extremities: RUE   RUE : Within Functional Limits and LUE   LUE: Within Functional Limits    Outcome Measures:[facility] Daily Activity  Putting on and taking off regular lower body clothing: A lot  Bathing (including washing, rinsing, drying): A lot  Putting on and taking off regular upper body clothing: A little  Toileting, which includes using toilet, bedpan or urinal: A lot  Taking care of personal grooming such as brushing teeth: A little  Eating Meals: None  Daily Activity - Total Score: 16      Education Documentation  ADL Training, taught by Anay Reyna OT at 7/5/2024 12:35 PM.  Learner: Patient  Readiness: Acceptance  Method: Explanation  Response: Needs Reinforcement      IP EDUCATION:  Education  Individual(s) Educated: Patient  Education Provided: Fall precautons, Risk and benefits of OT discussed with patient or other, POC discussed and agreed upon    Goals:  Encounter Problems       Encounter Problems (Active)       OT Goals       Mod I for all functional transfers   (Progressing)       Start:  07/05/24    Expected End:  07/19/24            Mod I for LB dressing  (Progressing)       Start:  07/05/24    Expected End:  07/19/24            Fair + dyn standing balance during ADLs/functional activities  (Progressing)       Start:  07/05/24    Expected End:  07/19/24            Mod I for toileting tasks and clothing mgmt  (Progressing)       Start:  07/05/24    Expected End:  07/19/24

## 2024-07-05 NOTE — PROGRESS NOTES
Pt. Requesting assist with DPOA-HC.  Met with pt., assisted in completion of DPOA-HC, naming his sister enena as poa.  Original given to pt., copy placed on chart.    Update:  [facility] Will need [facility], has spoke to pt. Who is agreeable and requesting [facility]. [facility] 2. [facility]  [facility].  Pt. With [employer] gutier[facility] ins., will require ins. Precert.    Update:  [facility] has accepted pt.  Hospital ins. Team to obtain precert.    Update:  per hospital ins. Team facility is to obtain this precert.

## 2024-07-06 LAB
GLUCOSE BLD MANUAL STRIP-MCNC: 112 MG/DL (ref 74–99)
GLUCOSE BLD MANUAL STRIP-MCNC: 131 MG/DL (ref 74–99)
GLUCOSE BLD MANUAL STRIP-MCNC: 142 MG/DL (ref 74–99)
GLUCOSE BLD MANUAL STRIP-MCNC: 92 MG/DL (ref 74–99)

## 2024-07-06 PROCEDURE — 99232 SBSQ HOSP IP/OBS MODERATE 35: CPT | Performed by: STUDENT IN AN ORGANIZED HEALTH CARE EDUCATION/TRAINING PROGRAM

## 2024-07-06 PROCEDURE — 2500000002 HC RX 250 W HCPCS SELF ADMINISTERED DRUGS (ALT 637 FOR MEDICARE OP, ALT 636 FOR OP/ED): Performed by: STUDENT IN AN ORGANIZED HEALTH CARE EDUCATION/TRAINING PROGRAM

## 2024-07-06 PROCEDURE — 1200000002 HC GENERAL ROOM WITH TELEMETRY DAILY

## 2024-07-06 PROCEDURE — 2500000001 HC RX 250 WO HCPCS SELF ADMINISTERED DRUGS (ALT 637 FOR MEDICARE OP): Performed by: STUDENT IN AN ORGANIZED HEALTH CARE EDUCATION/TRAINING PROGRAM

## 2024-07-06 PROCEDURE — 2500000001 HC RX 250 WO HCPCS SELF ADMINISTERED DRUGS (ALT 637 FOR MEDICARE OP): Performed by: INTERNAL MEDICINE

## 2024-07-06 PROCEDURE — 82947 ASSAY GLUCOSE BLOOD QUANT: CPT

## 2024-07-06 PROCEDURE — 2500000004 HC RX 250 GENERAL PHARMACY W/ HCPCS (ALT 636 FOR OP/ED): Performed by: INTERNAL MEDICINE

## 2024-07-06 ASSESSMENT — COGNITIVE AND FUNCTIONAL STATUS - GENERAL
MOVING FROM LYING ON BACK TO SITTING ON SIDE OF FLAT BED WITH BEDRAILS: A LITTLE
DRESSING REGULAR UPPER BODY CLOTHING: A LOT
WALKING IN HOSPITAL ROOM: TOTAL
MOBILITY SCORE: 13
TOILETING: A LOT
DRESSING REGULAR LOWER BODY CLOTHING: A LOT
PERSONAL GROOMING: A LITTLE
CLIMB 3 TO 5 STEPS WITH RAILING: TOTAL
DAILY ACTIVITIY SCORE: 15
TURNING FROM BACK TO SIDE WHILE IN FLAT BAD: A LITTLE
MOVING TO AND FROM BED TO CHAIR: A LITTLE
HELP NEEDED FOR BATHING: A LOT
STANDING UP FROM CHAIR USING ARMS: A LOT

## 2024-07-06 ASSESSMENT — PAIN SCALES - GENERAL: PAINLEVEL_OUTOF10: 0 - NO PAIN

## 2024-07-06 NOTE — DISCHARGE SUMMARY
Discharge Diagnosis  Syncope and collapse    Issues Requiring Follow-Up      Discharge Meds     Your medication list        ASK your doctor about these medications        Instructions Last Dose Given Next Dose Due   Actemra ACTPen 162 mg/0.9 mL  Generic drug: tocilizumab           albuterol 90 mcg/actuation inhaler           albuterol 2.5 mg /3 mL (0.083 %) nebulizer solution           aspirin 81 mg EC tablet           Bevespi Aerosphere 9-4.8 mcg HFA aerosol inhaler  Generic drug: glycopyrrolate-formoteroL           brimonidine 0.2 % ophthalmic solution  Commonly known as: AlphaGAN           cholecalciferol 50 mcg (2,000 unit) capsule  Commonly known as: Vitamin D-3           cyclobenzaprine 10 mg tablet  Commonly known as: Flexeril           dorzolamide-timoloL 22.3-6.8 mg/mL ophthalmic solution  Commonly known as: Cosopt           lamoTRIgine 200 mg tablet  Commonly known as: LaMICtal           lamoTRIgine 200 mg tablet  Commonly known as: LaMICtal           latanoprost 0.005 % ophthalmic solution  Commonly known as: Xalatan           medical cannabis           metFORMIN 500 mg tablet  Commonly known as: Glucophage           multivitamin with minerals iron-free  Commonly known as: Centrum Silver           potassium chloride CR 10 mEq ER tablet  Commonly known as: Klor-Con           pramipexole 0.125 mg tablet  Commonly known as: Mirapex           prednisoLONE acetate 1 % ophthalmic suspension  Commonly known as: Pred-Forte           rosuvastatin 5 mg tablet  Commonly known as: Crestor           Stiolto Respimat 2.5-2.5 mcg/actuation mist inhaler  Generic drug: tiotropium-olodateroL                    Test Results Pending At Discharge  Pending Labs       No current pending labs.            Hospital Course   63-year-old male with past medical history of legal blindness, recurrent falls admitted with fall, syncope, also has a history of seizures on Lamictal.  Workup including carotid Dopplers and echo were  unremarkable.  No acute arrhythmias on telemetry.  Patient was seen by neurology and cardiology and was cleared for discharge.  Some of her symptoms might be related to concussion, reports improvement in headache and dizziness.  Blood workup including TSH B12 and hemoglobin A1c were within normal limits.  Cardiology will arrange for outpatient cardiac monitoring.  Plan is to send him to acute rehab patient is agreeable.  Medically cleared.    Pertinent Physical Exam At Time of Discharge  Physical Exam  HENT:      Head:      Comments: Facial contusion and abrasions, left more than right     Mouth/Throat:      Mouth: Mucous membranes are moist.      Pharynx: Oropharynx is clear. No oropharyngeal exudate or posterior oropharyngeal erythema.   Eyes:      General:         Right eye: No discharge.         Left eye: No discharge.      Comments: Right eye blindness   Cardiovascular:      Rate and Rhythm: Normal rate and regular rhythm.      Heart sounds: No murmur heard.  Pulmonary:      Breath sounds: No wheezing, rhonchi or rales.   Musculoskeletal:      Cervical back: Neck supple.      Right lower leg: No edema.      Left lower leg: No edema.      Comments: LLE varicose veins   Lymphadenopathy:      Cervical: No cervical adenopathy.   Skin:     General: Skin is warm and dry.   Outpatient Follow-Up  No future appointments.      Colin Mace MD

## 2024-07-06 NOTE — PROGRESS NOTES
07/06/24 1219   Discharge Planning   Home or Post Acute Services Post acute facilities (Rehab/SNF/etc)   Type of Post Acute Facility Services Rehab;Skilled nursing   Patient expects to be discharged to: [facility]   Does the patient need discharge transport arranged? Yes   RoundTrip coordination needed? Yes     [facility] can accepted pending insurance precert which was started by facility on 7/5 and is currently pending.

## 2024-07-07 LAB
GLUCOSE BLD MANUAL STRIP-MCNC: 103 MG/DL (ref 74–99)
GLUCOSE BLD MANUAL STRIP-MCNC: 108 MG/DL (ref 74–99)
GLUCOSE BLD MANUAL STRIP-MCNC: 118 MG/DL (ref 74–99)
GLUCOSE BLD MANUAL STRIP-MCNC: 96 MG/DL (ref 74–99)

## 2024-07-07 PROCEDURE — 82947 ASSAY GLUCOSE BLOOD QUANT: CPT

## 2024-07-07 PROCEDURE — 1200000002 HC GENERAL ROOM WITH TELEMETRY DAILY

## 2024-07-07 PROCEDURE — 2500000001 HC RX 250 WO HCPCS SELF ADMINISTERED DRUGS (ALT 637 FOR MEDICARE OP): Performed by: STUDENT IN AN ORGANIZED HEALTH CARE EDUCATION/TRAINING PROGRAM

## 2024-07-07 PROCEDURE — 2500000001 HC RX 250 WO HCPCS SELF ADMINISTERED DRUGS (ALT 637 FOR MEDICARE OP): Performed by: INTERNAL MEDICINE

## 2024-07-07 PROCEDURE — 2500000002 HC RX 250 W HCPCS SELF ADMINISTERED DRUGS (ALT 637 FOR MEDICARE OP, ALT 636 FOR OP/ED): Performed by: STUDENT IN AN ORGANIZED HEALTH CARE EDUCATION/TRAINING PROGRAM

## 2024-07-07 PROCEDURE — 99231 SBSQ HOSP IP/OBS SF/LOW 25: CPT | Performed by: STUDENT IN AN ORGANIZED HEALTH CARE EDUCATION/TRAINING PROGRAM

## 2024-07-07 ASSESSMENT — COGNITIVE AND FUNCTIONAL STATUS - GENERAL
STANDING UP FROM CHAIR USING ARMS: A LOT
MOBILITY SCORE: 13
WALKING IN HOSPITAL ROOM: TOTAL
CLIMB 3 TO 5 STEPS WITH RAILING: TOTAL
DRESSING REGULAR LOWER BODY CLOTHING: A LOT
TOILETING: A LOT
TURNING FROM BACK TO SIDE WHILE IN FLAT BAD: A LITTLE
MOVING FROM LYING ON BACK TO SITTING ON SIDE OF FLAT BED WITH BEDRAILS: A LITTLE
HELP NEEDED FOR BATHING: A LOT
DAILY ACTIVITIY SCORE: 15
MOVING TO AND FROM BED TO CHAIR: A LITTLE
DRESSING REGULAR UPPER BODY CLOTHING: A LOT
PERSONAL GROOMING: A LITTLE

## 2024-07-07 ASSESSMENT — PAIN SCALES - GENERAL: PAINLEVEL_OUTOF10: 0 - NO PAIN

## 2024-07-07 ASSESSMENT — PAIN - FUNCTIONAL ASSESSMENT: PAIN_FUNCTIONAL_ASSESSMENT: 0-10

## 2024-07-07 NOTE — CARE PLAN
The patient's goals for the shift include      The clinical goals for the shift include patient will remain free from falls

## 2024-07-07 NOTE — PROGRESS NOTES
"Be Luo is a 63 y.o. male on day 2 of admission presenting with Syncope and collapse.    Subjective   Patient seen and examined continues to complain of headache and dizziness after sitting for.  No fevers or chills, no nausea or vomiting.  No trouble breathing       Objective     Physical Exam  ENT:      Head:      Comments: Facial contusion and abrasions, left more than right     Mouth/Throat:      Mouth: Mucous membranes are moist.      Pharynx: Oropharynx is clear. No oropharyngeal exudate or posterior oropharyngeal erythema.   Eyes:      General:         Right eye: No discharge.         Left eye: No discharge.      Comments: Right eye blindness   Cardiovascular:      Rate and Rhythm: Normal rate and regular rhythm.      Heart sounds: No murmur heard.  Pulmonary:      Breath sounds: No wheezing, rhonchi or rales.   Musculoskeletal:      Cervical back: Neck supple.      Right lower leg: No edema.      Left lower leg: No edema.      Comments: LLE varicose veins   Lymphadenopathy:      Cervical: No cervical adenopathy.   Last Recorded Vitals  Blood pressure 134/73, pulse 87, temperature 36.8 °C (98.2 °F), resp. rate 18, height 1.78 m (5' 10.08\"), weight 92.3 kg (203 lb 7.8 oz), SpO2 91%.  Intake/Output last 3 Shifts:  I/O last 3 completed shifts:  In: 100 (1.1 mL/kg) [P.O.:100]  Out: 945 (10.2 mL/kg) [Urine:945 (0.3 mL/kg/hr)]  Weight: 92.3 kg     Relevant Results           This patient currently has cardiac telemetry ordered; if you would like to modify or discontinue the telemetry order, click here to go to the orders activity to modify/discontinue the order.                 Assessment/Plan   Principal Problem:    Syncope and collapse  Active Problems:    Fall, initial encounter    63-year-old male with past medical history of blindness, recurrent falls admitted with fall, syncope    Patient did have a history of seizures.  Neurology did not believe he had a seizure episode or needed EEG  Workup has " been largely normal  Some of her symptoms are most likely secondary to postconcussion syndrome  Patient has had recurrent falls in the past and has issues with dizziness  He is medically cleared for discharge  Will be discharged to rehab facility once we have precertification  Continue current medical management as long as he is in the hospital  DVT prophylaxis               Colin Mace MD

## 2024-07-08 ENCOUNTER — TELEPHONE (OUTPATIENT)
Dept: CARDIOLOGY | Facility: CLINIC | Age: 64
End: 2024-07-08
Payer: COMMERCIAL

## 2024-07-08 VITALS
RESPIRATION RATE: 17 BRPM | HEIGHT: 70 IN | OXYGEN SATURATION: 94 % | HEART RATE: 99 BPM | SYSTOLIC BLOOD PRESSURE: 122 MMHG | BODY MASS INDEX: 29.13 KG/M2 | TEMPERATURE: 97.9 F | DIASTOLIC BLOOD PRESSURE: 80 MMHG | WEIGHT: 203.48 LBS

## 2024-07-08 VITALS
OXYGEN SATURATION: 92 % | TEMPERATURE: 98.8 F | BODY MASS INDEX: 29.13 KG/M2 | WEIGHT: 203.48 LBS | DIASTOLIC BLOOD PRESSURE: 81 MMHG | HEIGHT: 70 IN | HEART RATE: 90 BPM | RESPIRATION RATE: 18 BRPM | SYSTOLIC BLOOD PRESSURE: 118 MMHG

## 2024-07-08 DIAGNOSIS — R93.1 ABNORMAL ECHOCARDIOGRAM: ICD-10-CM

## 2024-07-08 DIAGNOSIS — I42.9 CARDIOMYOPATHY, UNSPECIFIED TYPE (MULTI): ICD-10-CM

## 2024-07-08 DIAGNOSIS — R07.2 PRECORDIAL PAIN: ICD-10-CM

## 2024-07-08 DIAGNOSIS — R55 SYNCOPE AND COLLAPSE: ICD-10-CM

## 2024-07-08 LAB
GLUCOSE BLD MANUAL STRIP-MCNC: 107 MG/DL (ref 74–99)
GLUCOSE BLD MANUAL STRIP-MCNC: 117 MG/DL (ref 74–99)
GLUCOSE BLD MANUAL STRIP-MCNC: 97 MG/DL (ref 74–99)

## 2024-07-08 PROCEDURE — 2500000001 HC RX 250 WO HCPCS SELF ADMINISTERED DRUGS (ALT 637 FOR MEDICARE OP): Performed by: INTERNAL MEDICINE

## 2024-07-08 PROCEDURE — 82947 ASSAY GLUCOSE BLOOD QUANT: CPT

## 2024-07-08 PROCEDURE — 2500000001 HC RX 250 WO HCPCS SELF ADMINISTERED DRUGS (ALT 637 FOR MEDICARE OP): Performed by: STUDENT IN AN ORGANIZED HEALTH CARE EDUCATION/TRAINING PROGRAM

## 2024-07-08 PROCEDURE — 97530 THERAPEUTIC ACTIVITIES: CPT | Mod: GO

## 2024-07-08 PROCEDURE — 97530 THERAPEUTIC ACTIVITIES: CPT | Mod: GP,CQ

## 2024-07-08 PROCEDURE — 2500000005 HC RX 250 GENERAL PHARMACY W/O HCPCS: Performed by: INTERNAL MEDICINE

## 2024-07-08 PROCEDURE — 99231 SBSQ HOSP IP/OBS SF/LOW 25: CPT | Performed by: STUDENT IN AN ORGANIZED HEALTH CARE EDUCATION/TRAINING PROGRAM

## 2024-07-08 ASSESSMENT — COGNITIVE AND FUNCTIONAL STATUS - GENERAL
TOILETING: A LITTLE
STANDING UP FROM CHAIR USING ARMS: A LITTLE
CLIMB 3 TO 5 STEPS WITH RAILING: TOTAL
HELP NEEDED FOR BATHING: A LITTLE
STANDING UP FROM CHAIR USING ARMS: A LITTLE
PERSONAL GROOMING: A LITTLE
TURNING FROM BACK TO SIDE WHILE IN FLAT BAD: A LITTLE
DAILY ACTIVITIY SCORE: 24
WALKING IN HOSPITAL ROOM: A LITTLE
DAILY ACTIVITIY SCORE: 20
MOVING TO AND FROM BED TO CHAIR: A LITTLE
MOVING FROM LYING ON BACK TO SITTING ON SIDE OF FLAT BED WITH BEDRAILS: A LITTLE
TURNING FROM BACK TO SIDE WHILE IN FLAT BAD: A LITTLE
CLIMB 3 TO 5 STEPS WITH RAILING: A LITTLE
MOBILITY SCORE: 16
MOVING TO AND FROM BED TO CHAIR: A LITTLE
MOBILITY SCORE: 19
DRESSING REGULAR LOWER BODY CLOTHING: A LITTLE
WALKING IN HOSPITAL ROOM: A LITTLE

## 2024-07-08 ASSESSMENT — PAIN SCALES - GENERAL
PAINLEVEL_OUTOF10: 8
PAINLEVEL_OUTOF10: 0 - NO PAIN
PAINLEVEL_OUTOF10: 4
PAINLEVEL_OUTOF10: 8
PAINLEVEL_OUTOF10: 0 - NO PAIN

## 2024-07-08 ASSESSMENT — PAIN - FUNCTIONAL ASSESSMENT
PAIN_FUNCTIONAL_ASSESSMENT: 0-10

## 2024-07-08 ASSESSMENT — PAIN DESCRIPTION - LOCATION: LOCATION: HEAD

## 2024-07-08 NOTE — PROGRESS NOTES
07/08/24 0850   Discharge Planning   Home or Post Acute Services Post acute facilities (Rehab/SNF/etc)   Type of Post Acute Facility Services Rehab;Skilled nursing   Patient expects to be discharged to: [facility]   Does the patient need discharge transport arranged? Yes   RoundTrip coordination needed? Yes   Has discharge transport been arranged? No     Per [facility], precert is still pending at current time. CT team will continue to monitor case progression for DC planning.

## 2024-07-08 NOTE — PROGRESS NOTES
"Be Luo is a 63 y.o. male on day 3 of admission presenting with Syncope and collapse.    Subjective   Patient seen and examined reports improvement in dizziness and headache.  Has been walking to the bathroom.  No chest pain, no shortness of breath.  Objective     Physical Exam  ENT:      Head:      Comments: Facial contusion and abrasions, left more than right     Mouth/Throat:      Mouth: Mucous membranes are moist.      Pharynx: Oropharynx is clear. No oropharyngeal exudate or posterior oropharyngeal erythema.   Eyes:      General:         Right eye: No discharge.         Left eye: No discharge.      Comments: Right eye blindness   Cardiovascular:      Rate and Rhythm: Normal rate and regular rhythm.      Heart sounds: No murmur heard.  Pulmonary:      Breath sounds: No wheezing, rhonchi or rales.   Musculoskeletal:      Cervical back: Neck supple.      Right lower leg: No edema.      Left lower leg: No edema.      Comments: LLE varicose veins   Lymphadenopathy:      Cervical: No cervical adenopathy.   Last Recorded Vitals  Blood pressure 124/75, pulse 99, temperature 36.4 °C (97.5 °F), temperature source Temporal, resp. rate 17, height 1.78 m (5' 10.08\"), weight 92.3 kg (203 lb 7.8 oz), SpO2 94%.  Intake/Output last 3 Shifts:  I/O last 3 completed shifts:  In: 320 (3.5 mL/kg) [P.O.:320]  Out: 600 (6.5 mL/kg) [Urine:600 (0.2 mL/kg/hr)]  Weight: 92.3 kg     Relevant Results           This patient currently has cardiac telemetry ordered; if you would like to modify or discontinue the telemetry order, click here to go to the orders activity to modify/discontinue the order.                 Assessment/Plan   Principal Problem:    Syncope and collapse  Active Problems:    Fall, initial encounter    63-year-old male with past medical history of blindness, recurrent falls admitted with fall, syncope    Patient did have a history of seizures.  Neurology did not believe he had a seizure episode or needed " EEG  Workup has been largely normal  Some of her symptoms are most likely secondary to postconcussion syndrome  Patient has had recurrent falls in the past and has issues with dizziness  He is medically cleared for discharge  Will be discharged to rehab facility once we have precertification  Continue current medical management as long as he is in the hospital  DVT prophylaxis               Colin Mace MD

## 2024-07-08 NOTE — PROGRESS NOTES
Pt. Now states he wants to go to [facility] and not [facility].  Explained ins. Precert process and that [facility] has already submitted for precert.  He states he has had several family members there and prefers [facility], he is agreeable to [facility] if they are unable to accept.  Referral sent to [facility].    Update:  [facility] able to accept.  Pt/ot evals now in and requesting they start ins. Precert.  [facility] notified to pull their precert request.     Update:  per [facility] they have received ins. Precert.  Pt. Will discharge this date to [facility] at 5:30 via ambulance.  Spoke with pt. Who is aware and in agreement to transfer.

## 2024-07-08 NOTE — PROGRESS NOTES
Occupational Therapy    OT Treatment    Patient Name: Be Luo  MRN: [id number]  Today's Date: 7/8/2024  Time Calculation  Start Time: 0942  Stop Time: 1000  Time Calculation (min): 18 min        Assessment:  OT Assessment: Pt. progressing in transfers and functional mobility in comparison to OT evaluatoin date. Pt. demonstrates SBA for transfers and mobility around room for ADL particpaiton. Pt. requires safety cues and SBA with walker use to avoid LOB and to scan enviornment.  Prognosis: Good  Evaluation/Treatment Tolerance: Patient tolerated treatment well  Medical Staff Made Aware: Yes  End of Session Communication: Bedside nurse  End of Session Patient Position: Bed, 3 rail up, Alarm off, not on at start of session  OT Assessment Results: Decreased ADL status, Decreased endurance  Prognosis: Good  Evaluation/Treatment Tolerance: Patient tolerated treatment well  Medical Staff Made Aware: Yes  Plan:  Treatment Interventions: ADL retraining, Functional transfer training, Endurance training  OT Frequency: 2 times per week  OT Discharge Recommendations: Moderate intensity level of continued care  OT - OK to Discharge: Yes      Subjective   Previous Visit Info:  OT Last Visit  OT Received On: 07/08/24  General:  General  Reason for Referral: ADl impairment  Referred By: Dr Mace (PT/OT 7/4)  Past Medical History Relevant to Rehab: includes: COPD, CPAP, ANN, HTN, HLD, CAD, DM, R TKA, RA, migraines, depression, diverticulitis, gout, marijuana, TBI, s/o seizures, retinal detachment, L LE tumor removed, cardiac cath, impaired vision R eye since childhood  Family/Caregiver Present: No  Prior to Session Communication: Bedside nurse  Patient Position Received:  (seated EOB with RN)  General Comment: Pt. admitted d/t fall and syncope. Pt. is blind in R eye. States he is feeling much better today, but is still not 100%. As session continued, pt. then begins to c/o of light headedness, upset stomach. Rn notified at  end of session that pt. was nauseas.  Precautions:  Medical Precautions: Fall precautions  Vital Signs:  Vital Signs  BP: (!) 156/91  Pain:  Pain Assessment  Pain Assessment: 0-10  0-10 (Numeric) Pain Score: 0 - No pain    Objective    Cognition:  Cognition  Overall Cognitive Status: Within Functional Limits  Orientation Level: Oriented X4    Activities of Daily Living: LE Dressing  LE Dressing:  (pt. able to don B socks while seated in recliner; uses crossover method. No difficulties reaching b feet to don or doff socks. SBA in stance for clohting mgmt/pants over hips.)    Bed Mobility/Transfers: Bed Mobility 1  Bed Mobility 1: Sitting to supine  Level of Assistance 1: Independent    Transfer 1  Technique 1: Sit to stand, Stand to sit  Transfer Device 1: Walker (grab bar)  Trials/Comments 1: SBA from EOB and recliner. VErbal cues for hand placement and technique with walker for safety.    Toilet Transfers  Toilet Transfers Comments: Addressed toilet transfer pt. completed with SBA and WW use. VC to use grab bar when standing/sitting to avoid pulling up or down on walker. Good safety awareness noted. SBA overall for safety    Functional Mobility:  Functional Mobility  Functional Mobility Performed:  (room distances to simulate ADL performance/participaiton. Pt. use of WW; SBA for safety and VCs to turn head to compensate for R eye blindess. pt. able to navigate walker throughout room and bathroom with no LOB)    Standing Balance:  Dynamic Standing Balance  Dynamic Standing-Comments: Fair to Fair + with WW. Pt. wihtout LOB during session, but reports that he tends to lean to the right or backwards which causes his falls at home.      Outcome Measures:[facility] Daily Activity  Putting on and taking off regular lower body clothing: A little  Bathing (including washing, rinsing, drying): A little  Putting on and taking off regular upper body clothing: None  Toileting, which includes using toilet, bedpan or urinal: A  little  Taking care of personal grooming such as brushing teeth: A little  Eating Meals: None  Daily Activity - Total Score: 20      Education Documentation  ADL Training, taught by Anay Reyna OT at 7/8/2024 11:42 AM.  Learner: Patient  Readiness: Acceptance  Method: Explanation  Response: Verbalizes Understanding      IP EDUCATION:  Education  Individual(s) Educated: Patient  Education Comment: visual scanning, walker safety, technique for safe transfers    Goals:  Encounter Problems       Encounter Problems (Active)       OT Goals       Mod I for all functional transfers   (Progressing)       Start:  07/05/24    Expected End:  07/19/24            Mod I for LB dressing  (Progressing)       Start:  07/05/24    Expected End:  07/19/24            Fair + dyn standing balance during ADLs/functional activities  (Progressing)       Start:  07/05/24    Expected End:  07/19/24            Mod I for toileting tasks and clothing mgmt  (Progressing)       Start:  07/05/24    Expected End:  07/19/24

## 2024-07-08 NOTE — TELEPHONE ENCOUNTER
----- Message from Aracelis Saunders sent at 7/8/2024  8:20 AM EDT -----  Please pend order for MPL & 14 day Zio and send back to scheduling so patient can be scheduled. Thank you!  ----- Message -----  From: Gaston Fonseca MD  Sent: 7/6/2024  10:31 AM EDT  To: Do Alexis[facility] Card1 Clerical    Patient was seen in consultation this week at [facility] for evaluation of cardiac status in the setting of syncope.  He was noted to have an abnormal echo with underlying cardiomyopathy.  I would like to have him scheduled for a Lexiscan myocardial perfusion study in the near future.  He is unable to ambulate on a treadmill secondary to unsteady gait.  I would also like to have him scheduled for a 14-day Zio patch monitor for evaluation of syncope.  Follow-up with me once the studies are completed.  Thanks.

## 2024-07-08 NOTE — TELEPHONE ENCOUNTER
Pended orders and sent to provider for signature.     Please call to schedule testing and follow up with Dr. Raymundo MD

## 2024-07-08 NOTE — PROGRESS NOTES
"Physical Therapy    Physical Therapy Treatment    Patient Name: Be Luo  MRN: [id number]  Today's Date: 7/8/2024  Time Calculation  Start Time: 1033  Stop Time: 1101  Time Calculation (min): 28 min     801/801-A    Assessment/Plan   End of Session Communication: Bedside nurse      End of Session Patient Position:  (Patient reclined in chair after treatment.  He reported that his head was worse in reclined position, but declined to have his legs lowered. RN notified. No chair alarm in (nursing aware). Call light and tray in reach)    PT Plan  Inpatient/Swing Bed or Outpatient: Inpatient  Treatment/Interventions: Bed mobility, Transfer training, Gait training, Balance training, Strengthening, Endurance training, Therapeutic exercise  PT Plan: Ongoing PT  PT Frequency: 3 times per week  PT Discharge Recommendations: Moderate intensity level of continued care  Equipment Recommended upon Discharge:  (TBD) PT Recommended Transfer Status: Assist x1, Assistive device    General Visit Information:   PT  Visit  PT Received On: 07/08/24    General  Prior to Session Communication:  (Cleared by RN for PT)  Patient Position Received:  (Patient presents in bed, agreeable to PT.  He reports almost passing out earlier today when going into the bathroom.)    General Comment:  (Patient admitted with fall, syncope and CHI)    General Observations:   General Observation:  (tele)    Subjective \"The neurologist told me I have a small concussion\"    Precautions:  Precautions  Hearing/Visual Limitations:  (blind in right eye; limited vision left eye)  Medical Precautions: Fall precautions    Vital Signs:  Vital Signs  BP:  (supine 156/86 mmHg   sit 145/89 mmHg   stand 150/86 mmHg)      Pain:  Pain Assessment  Pain Assessment: 0-10  0-10 (Numeric) Pain Score: 8  Pain Location:  (bilat LE and head)  Pain Interventions:  (previously medicated for pain per nursing)    Cognition:  Cognition  Overall Cognitive Status:  (pleasant and " "cooperative)  Insight: Severe (Patient reports he's had over 200 falls this year but he continues to \"stand on the porch rail\" in order to put up lights)      Balance:   Static Standing Balance  Static Standing-Level of Assistance:  (Fair static stand with ww support)    Dynamic Standing Balance  Dynamic Standing-Balance:  (Fair- dynamic stand with ww)    Activity Tolerance:  Activity Tolerance  Endurance:  (Patient's activity limited by dizziness/neausa)      Therapeutic Exercise  Therapeutic Exercise Performed:  (seated LE ther-ex: AP, LAQ, seated marching, hip add (pillow squeeze) x15)        Bed Mobility  Bed Mobility:  (supine-->sit: SBAx1  HOB raised 35 degrees)    Ambulation/Gait Training  Ambulation/Gait Training Performed:  (Patient amb 20' with ww and min x1. He reports dizziness/nausea/pressure in belly with amb. NBOS, heavy reliance on ww for support. Decreased step height bilat. One short standing rest break needed.)    Transfers  Transfer:  (sit-->stand: CGAx1 Two stands performed (EOB; chair).  Verbal and tactile cuing given for hand placement. Dizziness reported with standing that did not resolve with increased time.  stand-->sit: CGA x1 with instructions for hand placement)          Outcome Measures:  [facility] Basic Mobility  Turning from your back to your side while in a flat bed without using bedrails: A little  Moving from lying on your back to sitting on the side of a flat bed without using bedrails: A little  Moving to and from bed to chair (including a wheelchair): A little  Standing up from a chair using your arms (e.g. wheelchair or bedside chair): A little  To walk in hospital room: A little  Climbing 3-5 steps with railing: Total  Basic Mobility - Total Score: 16    Education Documentation  Mobility Training, taught by Ida Lyons PTA at 7/8/2024 11:28 AM.  Learner: Patient  Readiness: Acceptance  Method: Explanation, Demonstration  Response: Needs Reinforcement    Education " Comments  Individual(s) Educated: Patient  Education Provided:  (safety reinforced throughout treatment)    Encounter Problems       Encounter Problems (Active)       PT Problem       Pt. will transfer supine/sit with MOD I (Progressing)       Start:  07/05/24    Expected End:  07/19/24            Pt. will transfer sit/stand with FWW with CGA (Progressing)       Start:  07/05/24    Expected End:  07/19/24            Pt.will ambulate 40' with FWW with CGA (Progressing)       Start:  07/05/24    Expected End:  07/19/24            Pt. will amb up/down 3 steps with HR and cane with MIN A x 1 (Not Progressing)       Start:  07/05/24    Expected End:  07/19/24            Pt. will perform 2 x 15 B LE AROM exercises  (Progressing)       Start:  07/05/24    Expected End:  07/19/24

## 2024-07-09 ENCOUNTER — NURSING HOME VISIT (OUTPATIENT)
Dept: POST ACUTE CARE | Facility: EXTERNAL LOCATION | Age: 64
End: 2024-07-09
Payer: COMMERCIAL

## 2024-07-09 DIAGNOSIS — G40.909 SEIZURE DISORDER (MULTI): ICD-10-CM

## 2024-07-09 DIAGNOSIS — R55 SYNCOPE AND COLLAPSE: Primary | ICD-10-CM

## 2024-07-09 DIAGNOSIS — S06.0X0D CONCUSSION WITHOUT LOSS OF CONSCIOUSNESS, SUBSEQUENT ENCOUNTER: ICD-10-CM

## 2024-07-09 DIAGNOSIS — R53.81 PHYSICAL DECONDITIONING: ICD-10-CM

## 2024-07-09 DIAGNOSIS — J44.9 CHRONIC OBSTRUCTIVE PULMONARY DISEASE, UNSPECIFIED COPD TYPE (MULTI): ICD-10-CM

## 2024-07-09 DIAGNOSIS — S09.90XD CLOSED HEAD INJURY, SUBSEQUENT ENCOUNTER: ICD-10-CM
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
## 2024-07-09 PROCEDURE — 99310 SBSQ NF CARE HIGH MDM 45: CPT | Performed by: NURSE PRACTITIONER

## 2024-07-09 NOTE — LETTER
"Patient: Be Luo  : 1960    Encounter Date: 2024    Name: Be Luo  YOB: 1960    INITIAL NURSE PRACTITIONER FOLLOW UP VISIT: SNF, Syncope and Collapse    HPI   The patient is a 63 yr old male who was brought into the ER on 7/3/24 for c/o fall. Pt was getting up from chair when he passed out and fell of floor hitting his face. He has a h/o seizures and compliant with meds. No reported seizure activity. The pt was hospitalized for closed head injury w/left frontal scalp and supraorbital hematoma. Neuro followed and did not feel this was breakthrough seizure based on history or blood work. Cardiology followed and Echo done mild LV dysfunction 45 - 50%. Previous Echo LVEF 55%. Pt to f/up for Lexiscan myocardial perfusion and 14 day Zio Patch.   The patient is awake in his room. He appears comfortable and in no acute distress. He expresses concern that he was not given an EEG and felt he should of had his seizures checked. I explained that per Neurology consultation that they did not feel that was necessary and felt his poor eyesight to be a hindrance.   The patient has a PMH of CAD, COPD, HTN, HLD, Seizures, Retinal detachment, strabismus mechanical right eye, right eye blindness, RA, ANN, CPAP, Gout, h/o drug abuse, daily marijuana use, depression, diverticulitis, and migraine.   Nursing asked for dose for \"gummies\" as report to staff said pt takes CBD gummies twice a day. The patient states he does not take gummies but rather smokes marijuana daily. The facility does not allow marijuana to be smoked.   Patient denies any fever, chills, headache, vertigo, chest pain, palpitation, shortness of breath, cough, wheezes, abdominal pain, nausea/vomiting, urinary symptoms or constipation/diarrhea.     REVIEW OF SYSTEMS:  Review of systems are negative except where noted in HPI.    /79   Pulse 93   Temp 36.1 °C (97 °F)   Resp 16   Ht 1.778 m (5' 10\")   Wt 90.9 kg (200 lb " 6.4 oz)   SpO2 96% Comment: RA  BMI 28.75 kg/m²      Physical Exam  Constitutional:       Appearance: Normal appearance. He is normal weight.   HENT:      Head: Normocephalic.      Right Ear: External ear normal.      Left Ear: External ear normal.      Nose: Nose normal.      Mouth/Throat:      Mouth: Mucous membranes are moist.   Eyes:      Extraocular Movements: Extraocular movements intact.      Conjunctiva/sclera: Conjunctivae normal.      Pupils: Pupils are equal, round, and reactive to light.      Comments: Ecchymosis to left eye and right eye   Cardiovascular:      Rate and Rhythm: Normal rate and regular rhythm.      Pulses: Normal pulses.      Heart sounds: Normal heart sounds.   Pulmonary:      Effort: Pulmonary effort is normal.      Breath sounds: Normal breath sounds.   Abdominal:      General: Bowel sounds are normal. There is no distension.      Palpations: Abdomen is soft.      Tenderness: There is no abdominal tenderness.   Genitourinary:     Comments: Not examined  Musculoskeletal:         General: Normal range of motion.      Cervical back: Normal range of motion and neck supple.      Comments: Generalized weakness   Skin:     General: Skin is warm and dry.      Capillary Refill: Capillary refill takes less than 2 seconds.   Neurological:      General: No focal deficit present.      Mental Status: He is alert and oriented to person, place, and time. Mental status is at baseline.   Psychiatric:         Mood and Affect: Mood normal.         Behavior: Behavior normal.         Thought Content: Thought content normal.         Judgment: Judgment normal.          ADVANCED CARE PLANNING: Discussion done with the patient and family if available regarding code status, diagnosis, and the prognosis of the patient.     CODE STATUS: Full code    DISCHARGE PLANNING:  Patient will be discharge home when goals are met.   Discharge planner to communicate ongoing updates regarding discharge plan.     RECENT  HOSPITALIZATION DATES:   7/4 - 7/8/24  All laboratories, diagnostic tests, progress notes, consultation notes, and discharge notes reviewed from recent hospitalization.     Admission on 07/03/2024, Discharged on 07/08/2024   Component Date Value Ref Range Status   • WBC 07/03/2024 4.2 (L)  4.4 - 11.3 x10*3/uL Final   • nRBC 07/03/2024 0.0  0.0 - 0.0 /100 WBCs Final   • RBC 07/03/2024 4.56  4.50 - 5.90 x10*6/uL Final   • Hemoglobin 07/03/2024 15.3  13.5 - 17.5 g/dL Final   • Hematocrit 07/03/2024 44.4  41.0 - 52.0 % Final   • MCV 07/03/2024 97  80 - 100 fL Final   • MCH 07/03/2024 33.6  26.0 - 34.0 pg Final   • MCHC 07/03/2024 34.5  32.0 - 36.0 g/dL Final   • RDW 07/03/2024 13.3  11.5 - 14.5 % Final   • Platelets 07/03/2024 221  150 - 450 x10*3/uL Final   • Neutrophils % 07/03/2024 35.4  40.0 - 80.0 % Final   • Immature Granulocytes %, Automated 07/03/2024 0.2  0.0 - 0.9 % Final    Immature Granulocyte Count (IG) includes promyelocytes, myelocytes and metamyelocytes but does not include bands. Percent differential counts (%) should be interpreted in the context of the absolute cell counts (cells/UL).   • Lymphocytes % 07/03/2024 43.1  13.0 - 44.0 % Final   • Monocytes % 07/03/2024 18.2  2.0 - 10.0 % Final   • Eosinophils % 07/03/2024 2.4  0.0 - 6.0 % Final   • Basophils % 07/03/2024 0.7  0.0 - 2.0 % Final   • Neutrophils Absolute 07/03/2024 1.49  1.20 - 7.70 x10*3/uL Final    Percent differential counts (%) should be interpreted in the context of the absolute cell counts (cells/uL).   • Immature Granulocytes Absolute, Au* 07/03/2024 0.01  0.00 - 0.70 x10*3/uL Final   • Lymphocytes Absolute 07/03/2024 1.82  1.20 - 4.80 x10*3/uL Final   • Monocytes Absolute 07/03/2024 0.77  0.10 - 1.00 x10*3/uL Final   • Eosinophils Absolute 07/03/2024 0.10  0.00 - 0.70 x10*3/uL Final   • Basophils Absolute 07/03/2024 0.03  0.00 - 0.10 x10*3/uL Final   • Glucose 07/03/2024 98  74 - 99 mg/dL Final   • Sodium 07/03/2024 141  136 - 145  mmol/L Final   • Potassium 07/03/2024 3.8  3.5 - 5.3 mmol/L Final   • Chloride 07/03/2024 107  98 - 107 mmol/L Final   • Bicarbonate 07/03/2024 25  21 - 32 mmol/L Final   • Anion Gap 07/03/2024 13  10 - 20 mmol/L Final   • Urea Nitrogen 07/03/2024 17  6 - 23 mg/dL Final   • Creatinine 07/03/2024 0.89  0.50 - 1.30 mg/dL Final   • eGFR 07/03/2024 >90  >60 mL/min/1.73m*2 Final    Calculations of estimated GFR are performed using the 2021 CKD-EPI Study Refit equation without the race variable for the IDMS-Traceable creatinine methods.  https://jasn.asnjournals.org/content/early/2021/09/22/ASN.3583542356   • Calcium 07/03/2024 8.9  8.6 - 10.3 mg/dL Final   • Albumin 07/03/2024 4.3  3.4 - 5.0 g/dL Final   • Alkaline Phosphatase 07/03/2024 50  33 - 136 U/L Final   • Total Protein 07/03/2024 6.5  6.4 - 8.2 g/dL Final   • AST 07/03/2024 17  9 - 39 U/L Final   • Bilirubin, Total 07/03/2024 1.1  0.0 - 1.2 mg/dL Final   • ALT 07/03/2024 19  10 - 52 U/L Final    Patients treated with Sulfasalazine may generate falsely decreased results for ALT.   • Magnesium 07/03/2024 2.05  1.60 - 2.40 mg/dL Final   • Ventricular Rate 07/04/2024 80  BPM Final   • Atrial Rate 07/04/2024 80  BPM Final   • W[place] Interval 07/04/2024 192  ms Final   • QRS Duration 07/04/2024 112  ms Final   • QT Interval 07/04/2024 394  ms Final   • QTC Calculation(Bazett) 07/04/2024 454  ms Final   • P Axis 07/04/2024 70  degrees Final   • R Axis 07/04/2024 90  degrees Final   • T Axis 07/04/2024 63  degrees Final   • QRS Count 07/04/2024 13  beats Final   • Q Onset 07/04/2024 220  ms Final   • P Onset 07/04/2024 124  ms Final   • P Offset 07/04/2024 173  ms Final   • T Offset 07/04/2024 417  ms Final   • QTC Fredericia 07/04/2024 434  ms Final   • Troponin I, High Sensitivity 07/03/2024 4  0 - 20 ng/L Final   • Color, Urine 07/04/2024 Yellow  Light-Yellow, Yellow, Dark-Yellow Final   • Appearance, Urine 07/04/2024 Clear  Clear Final   • Specific Gravity, Urine  07/04/2024 >1.050 (N)  1.005 - 1.035 Final    Specific gravity of >1.050 may be falsely elevated due to interferences with measurement. If clinically indicated, repeat testing with an alternative method is available by contacting the laboratory within 24 hours.   • pH, Urine 07/04/2024 5.5  5.0, 5.5, 6.0, 6.5, 7.0, 7.5, 8.0 Final   • Protein, Urine 07/04/2024 20 (TRACE)  NEGATIVE, 10 (TRACE), 20 (TRACE) mg/dL Final   • Glucose, Urine 07/04/2024 Normal  Normal mg/dL Final   • Blood, Urine 07/04/2024 NEGATIVE  NEGATIVE Final   • Ketones, Urine 07/04/2024 60 (2+) (A)  NEGATIVE mg/dL Final   • Bilirubin, Urine 07/04/2024 NEGATIVE  NEGATIVE Final   • Urobilinogen, Urine 07/04/2024 Normal  Normal mg/dL Final   • Nitrite, Urine 07/04/2024 NEGATIVE  NEGATIVE Final   • Leukocyte Esterase, Urine 07/04/2024 NEGATIVE  NEGATIVE Final   • Extra Tube 07/04/2024 Hold for add-ons.   Final    Auto resulted.   • Troponin I, High Sensitivity 07/04/2024 5  0 - 20 ng/L Final   • Ventricular Rate 07/04/2024 81  BPM Final   • Atrial Rate 07/04/2024 81  BPM Final   • HI Interval 07/04/2024 184  ms Final   • QRS Duration 07/04/2024 100  ms Final   • QT Interval 07/04/2024 398  ms Final   • QTC Calculation(Bazett) 07/04/2024 462  ms Final   • P Axis 07/04/2024 83  degrees Final   • R Axis 07/04/2024 90  degrees Final   • T Axis 07/04/2024 64  degrees Final   • QRS Count 07/04/2024 14  beats Final   • Q Onset 07/04/2024 221  ms Final   • P Onset 07/04/2024 129  ms Final   • P Offset 07/04/2024 178  ms Final   • T Offset 07/04/2024 420  ms Final   • QTC Fredericia 07/04/2024 439  ms Final   • Hemoglobin A1C 07/04/2024 5.5  see below % Final   • Estimated Average Glucose 07/04/2024 111  Not Established mg/dL Final   • AV mn grad 07/05/2024 4.0  mmHg Final   • AV pk margarita 07/05/2024 1.25  m/s Final   • LV Biplane EF 07/05/2024 45  % Final   • LVOT diam 07/05/2024 2.02  cm Final   • MV E/A ratio 07/05/2024 0.56   Final   • Tricuspid annular plane  systolic e* 07/05/2024 2.2  cm Final   • LA vol index A/L 07/05/2024 16.9  ml/m2 Final   • LV EF 07/05/2024 48  % Final   • RV free wall pk S' 07/05/2024 13.50  cm/s Final   • RVSP 07/05/2024 12.1  mmHg Final   • LVIDd 07/05/2024 5.19  cm Final   • Aortic Valve Area by Continuity of* 07/05/2024 2.46  cm2 Final   • AV pk grad 07/05/2024 6.3  mmHg Final   • Aortic Valve Area by Continuity of* 07/05/2024 2.47  cm2 Final   • LV A4C EF 07/05/2024 48.0   Final   • Extra Tube 07/04/2024 Hold for add-ons.   Final    Auto resulted.   • Extra Tube 07/04/2024 Hold for add-ons.   Final    Auto resulted.   • Extra Tube 07/04/2024 Hold for add-ons.   Final    Auto resulted.   • POCT Glucose 07/04/2024 131 (H)  74 - 99 mg/dL Final   • Lamotrigine  07/04/2024 15.4 (H)  2.5 - 15.0 ug/mL Final   • Ventricular Rate 07/04/2024 81  BPM Final   • Atrial Rate 07/04/2024 81  BPM Final   • WI Interval 07/04/2024 180  ms Final   • QRS Duration 07/04/2024 104  ms Final   • QT Interval 07/04/2024 400  ms Final   • QTC Calculation(Bazett) 07/04/2024 464  ms Final   • P Axis 07/04/2024 78  degrees Final   • R Axis 07/04/2024 92  degrees Final   • T Axis 07/04/2024 64  degrees Final   • QRS Count 07/04/2024 14  beats Final   • Q Onset 07/04/2024 204  ms Final   • P Onset 07/04/2024 114  ms Final   • P Offset 07/04/2024 159  ms Final   • T Offset 07/04/2024 404  ms Final   • QTC Fredericia 07/04/2024 442  ms Final   • WBC, Urine 07/04/2024 1-5  1-5, NONE /HPF Final   • RBC, Urine 07/04/2024 1-2  NONE, 1-2, 3-5 /HPF Final   • Mucus, Urine 07/04/2024 3+  Reference range not established. /LPF Final   • Hyaline Casts, Urine 07/04/2024 1+ (A)  NONE /LPF Final   • POCT Glucose 07/04/2024 96  74 - 99 mg/dL Final   • Thyroid Stimulating Hormone 07/04/2024 1.02  0.44 - 3.98 mIU/L Final   • Vitamin B12 07/04/2024 599  211 - 911 pg/mL Final   • Glucose 07/05/2024 89  74 - 99 mg/dL Final   • Sodium 07/05/2024 142  136 - 145 mmol/L Final   • Potassium  07/05/2024 4.0  3.5 - 5.3 mmol/L Final   • Chloride 07/05/2024 109 (H)  98 - 107 mmol/L Final   • Bicarbonate 07/05/2024 27  21 - 32 mmol/L Final   • Anion Gap 07/05/2024 10  10 - 20 mmol/L Final   • Urea Nitrogen 07/05/2024 13  6 - 23 mg/dL Final   • Creatinine 07/05/2024 0.76  0.50 - 1.30 mg/dL Final   • eGFR 07/05/2024 >90  >60 mL/min/1.73m*2 Final    Calculations of estimated GFR are performed using the 2021 CKD-EPI Study Refit equation without the race variable for the IDMS-Traceable creatinine methods.  https://jasn.asnjournals.org/content/early/2021/09/22/ASN.4652855024   • Calcium 07/05/2024 8.3 (L)  8.6 - 10.3 mg/dL Final   • WBC 07/05/2024 4.3 (L)  4.4 - 11.3 x10*3/uL Final   • nRBC 07/05/2024 0.0  0.0 - 0.0 /100 WBCs Final   • RBC 07/05/2024 4.34 (L)  4.50 - 5.90 x10*6/uL Final   • Hemoglobin 07/05/2024 14.4  13.5 - 17.5 g/dL Final   • Hematocrit 07/05/2024 42.7  41.0 - 52.0 % Final   • MCV 07/05/2024 98  80 - 100 fL Final   • MCH 07/05/2024 33.2  26.0 - 34.0 pg Final   • MCHC 07/05/2024 33.7  32.0 - 36.0 g/dL Final   • RDW 07/05/2024 13.4  11.5 - 14.5 % Final   • Platelets 07/05/2024 215  150 - 450 x10*3/uL Final   • POCT Glucose 07/04/2024 150 (H)  74 - 99 mg/dL Final   • POCT Glucose 07/05/2024 96  74 - 99 mg/dL Final   • POCT Glucose 07/05/2024 133 (H)  74 - 99 mg/dL Final   • POCT Glucose 07/05/2024 107 (H)  74 - 99 mg/dL Final   • POCT Glucose 07/05/2024 112 (H)  74 - 99 mg/dL Final   • POCT Glucose 07/06/2024 142 (H)  74 - 99 mg/dL Final   • POCT Glucose 07/06/2024 112 (H)  74 - 99 mg/dL Final   • POCT Glucose 07/06/2024 92  74 - 99 mg/dL Final   • POCT Glucose 07/06/2024 131 (H)  74 - 99 mg/dL Final   • POCT Glucose 07/07/2024 96  74 - 99 mg/dL Final   • POCT Glucose 07/07/2024 118 (H)  74 - 99 mg/dL Final    RN/MD NOTIFIED   • POCT Glucose 07/07/2024 103 (H)  74 - 99 mg/dL Final   • POCT Glucose 07/07/2024 108 (H)  74 - 99 mg/dL Final   • POCT Glucose 07/08/2024 97  74 - 99 mg/dL Final   • POCT  Glucose 07/08/2024 117 (H)  74 - 99 mg/dL Final   • POCT Glucose 07/08/2024 107 (H)  74 - 99 mg/dL Final       LABORATORIES OR DIAGNOSTIC TESTS DONE/REVIEWED IN FACILITY:  Pending 7/10/24    Allergies   Allergen Reactions   • Green Tea Hives and Shortness of breath   • Gabapentin Agitation   • Lyrica [Pregabalin] GI Upset   • Norco [Hydrocodone-Acetaminophen] Agitation   • Prednisone Other   • Sulfasalazine GI Upset   • Vioxx [Rofecoxib] Other     Retains water/swelling       MEDICATION LIST:  Actemra  Albuterol inhaler  Albuterol nebs  ASA  Bevespi 9-4.8 mcg  Brimonidine  Cholecalciferol  Flexeril  Cosopt  Lamotrigine   Latanoprost  Metformin  Medical cannabis  MTV  KCL  Pramipexole  Crestor  Stiolto    MEDICATIONS REVIEWED AT THE FACILITY:  Please see Nursing facility Medication EMR for full updated list.    Assessment/Plan   Problem List Items Addressed This Visit       Syncope and collapse - Primary     Other Visit Diagnoses       Fall, subsequent encounter        Seizure disorder (Multi)        Closed head injury, subsequent encounter        Concussion without loss of consciousness, subsequent encounter        Physical deconditioning        Chronic obstructive pulmonary disease, unspecified COPD type (Multi)                Skin Integrity:  Nursing to monitor skin integrity as patient is at risk for pressure injuries.  Turn and reposition Q 2 hours or more.  Air mattress and when up in chair cushion reducing device.  Dietician to evaluate and recommend.  Nutritional supplement to be implemented if needed.  Please monitor skin integrity and other pressure areas.    PLAN:   Recent nursing evaluation and notes were reviewed.   #Fall, syncope/collapse, closed head injury, concussion: Carotid dopplers showed less than 50% stenosis on both sides, Echo reviewed, pt to f/up cardiology. Neurology did not feel EEG was needed, Vit B12 599, TSH 1.02, A1c 5.5, UA negative.   #Seizures: cont current Lamotrigine. F/up Neuro at  [facility] outpatient. Lamotrigine level is 15.4  #COPD: monitor resp status, cont home inhalers and nebulizer. Pt is noncompliant with inhalers at home - stating he never does his inhalers only his nebulizer.   #Blindness: cont eye gtts  #Weakness and physical deconditioning: PT/OT/ST to maximize strength, function, and endurance all while maintaining safety.     Any decline or change in condition needs to be communicated with the physician or myself.    Discussion with nursing staff regarding ongoing care and management.  Communication regarding patients status, overall condition, changes with plan (medications or treatments), and any questions from family completed if present.  If family not available, would communicate in person or via phone if needed.   We will continue with the plan and medications noted above.    We will continue to follow the patient here at the facility.    *Please note that nursing facility, outside laboratory agency, and  [facility] do not interface.     Completion of the note was done through Dragon voice recognition technology and may include   unintended or grammatical errors which may not have been recognized when finalizing the note.     Time: I spent 45 minutes or greater with the patient. Greater than 50% of this time was spent in counseling and or coordination of care. The time includes prep time of reviewing vital signs, report from direct nursing staff and or therapists, hospital documentation, reviewing labs, radiographs, diagnostic tests and or consultations, time directly spent with the patient interviewing, examining, and education regarding diagnosis, treatments, and medications, as well as documentation in the electronic medical record, and reviewing the plan of care and any new orders with the patient, nursing staff and other staff directly related to the patients care.       BEN Akbar       Electronically Signed By: BEN Akbar   7/15/24  3:36 PM

## 2024-07-12 ENCOUNTER — NURSING HOME VISIT (OUTPATIENT)
Dept: POST ACUTE CARE | Facility: EXTERNAL LOCATION | Age: 64
End: 2024-07-12
Payer: COMMERCIAL

## 2024-07-12 DIAGNOSIS — J44.9 CHRONIC OBSTRUCTIVE PULMONARY DISEASE, UNSPECIFIED COPD TYPE (MULTI): ICD-10-CM
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
## 2024-07-12 DIAGNOSIS — R55 SYNCOPE AND COLLAPSE: Primary | ICD-10-CM

## 2024-07-12 DIAGNOSIS — G40.909 SEIZURE DISORDER (MULTI): ICD-10-CM

## 2024-07-12 LAB
ATRIAL RATE: 80 BPM
ATRIAL RATE: 81 BPM
ATRIAL RATE: 81 BPM
P AXIS: 70 DEGREES
P AXIS: 78 DEGREES
P AXIS: 83 DEGREES
P OFFSET: 159 MS
P OFFSET: 173 MS
P OFFSET: 178 MS
P ONSET: 114 MS
P ONSET: 124 MS
P ONSET: 129 MS
PR INTERVAL: 180 MS
PR INTERVAL: 184 MS
PR INTERVAL: 192 MS
Q ONSET: 204 MS
Q ONSET: 220 MS
Q ONSET: 221 MS
QRS COUNT: 13 BEATS
QRS COUNT: 14 BEATS
QRS COUNT: 14 BEATS
QRS DURATION: 100 MS
QRS DURATION: 104 MS
QRS DURATION: 112 MS
QT INTERVAL: 394 MS
QT INTERVAL: 398 MS
QT INTERVAL: 400 MS
QTC CALCULATION(BAZETT): 454 MS
QTC CALCULATION(BAZETT): 462 MS
QTC CALCULATION(BAZETT): 464 MS
QTC FREDERICIA: 434 MS
QTC FREDERICIA: 439 MS
QTC FREDERICIA: 442 MS
R AXIS: 90 DEGREES
R AXIS: 90 DEGREES
R AXIS: 92 DEGREES
T AXIS: 63 DEGREES
T AXIS: 64 DEGREES
T AXIS: 64 DEGREES
T OFFSET: 404 MS
T OFFSET: 417 MS
T OFFSET: 420 MS
VENTRICULAR RATE: 80 BPM
VENTRICULAR RATE: 81 BPM
VENTRICULAR RATE: 81 BPM

## 2024-07-12 PROCEDURE — 99316 NF DSCHRG MGMT 30 MIN+: CPT | Performed by: NURSE PRACTITIONER

## 2024-07-12 RX ORDER — ATORVASTATIN CALCIUM 10 MG/1
10 TABLET, FILM COATED ORAL DAILY
COMMUNITY

## 2024-07-12 NOTE — LETTER
Patient: Be Luo  : 1960    Encounter Date: 2024    Name: Be Luo  YOB: 1960    DISCHARGE VISIT: SNF, Syncope and Collapse     SUBJECTIVE:  The patient is up in his room. He is doing well and discharging home later today.   Patient denies any fever, chills, headache, vertigo, chest pain, palpitation, shortness of breath, cough, wheezes, abdominal pain, nausea/vomiting, urinary symptoms or constipation/diarrhea.   Discussion with Be Luo and family/friend representative if present regarding discharge - follow up with PCP (Dr. Martinez - Dr. Phillips is out of office) and other speciality physicians (Cardiology , Neurologist at [facility] (appt to be made), seen Dr Watson - Optometrist 24 as instructed or as scheduled, compliance with home medications, and safety within the home.   Nursing to review discharge instructions prior to discharging home.     REVIEW OF SYSTEMS:   All review of systems are negative unless otherwise stated above under subjective.    LABS REVIEWED AT FACILITY:  7/10/24  CMP-COMPREHENSIVE METABOLIC PNL NILE  GLUCOSE 87 mg/dL  GLUCOSE, FASTING 65-99 mg/dL  GLUCOSE, NON-FASTING  mg/dL  SODIUM 143 136-145 mEq/L  POTASSIUM 3.8 3.5-5.3 mEq/L  CHLORIDE 103  mEq/L  CARBON DIOXIDE (CO2) 29 21-33 mEq/L  BUN (UREA NITROGEN) 21 7-25 mg/dL  CREATININE 0.9 0.6-1.2 mg/dL  BUN/CREATININE RATIO 23~ H 6-22  GFR-AFRICAN AMERICAN 103 >60 mL/min/1.73 m2  GFR-NON-AFRICAN AMERICAN 85 >60 mL/min/1.73 m2   Stage of CKD eGFR (mL/min/1.73 square meters)   Stage 1 >/= 90 or > 90   Stage 2 60 - 89   Stage 3 30 - 59   Stage 4 15 - 29   Stage 5 </= 14 or < 15  ** GFR is reliable for adults 17 to 69 years with stable kidney   function.  CALCIUM 9.2 8.4-10.2 mg/dL  PROTEIN, TOTAL 6.2 6.0-8.3 g/dL  ALBUMIN 4.2 3.5-5.5 g/dL  A/G RATIO 2.1 1.0-2.3  ALKALINE PHOS 63  IU/L  AST (SGOT) 15 4-40 IU/L  ALT (SGPT) 18 4-55 IU/L  BILIRUBIN, TOTAL 0.9 0.2-1.2 mg/dL  CBC  W/O DIFF NILE  WBC 7.6 4.5-10.8 K/cmm  RBC 4.74 4.00-6.60 M/cmm  HEMOGLOBIN 15.9 14.0-18.0 g/dL  HEMATOCRIT 47.2 42.0-54.0 %  MCV 99.6 80.0-100.0 fL  MCH 33.5 26.0-35.0 pg  MCHC 33.7 31.0-36.5 g/dL  RDW 14.3 11.0-16.0 %  PLATELET 243 150-450 K/cmm  MPV 8.1 6.5-12.0 fL    Allergies   Allergen Reactions   • Green Tea Hives and Shortness of breath   • Gabapentin Agitation   • Lyrica [Pregabalin] GI Upset   • Norco [Hydrocodone-Acetaminophen] Agitation   • Prednisone Other   • Sulfasalazine GI Upset   • Vioxx [Rofecoxib] Other     Retains water/swelling       Medication list for home:  Actemra  Albuterol inhaler  Albuterol nebs  ASA  Bevespi 9-4.8 mcg  Brimonidine  Cholecalciferol  Flexeril  Cosopt  Lamotrigine   Latanoprost  Metformin  Medical cannabis  MTV  KCL  Pramipexole  Crestor  Stiolto    OARRS Report: If indicated, I have personally reviewed the OARRS report for Be Luo and I have considered the risks of abuse, dependence, addiction, and diversion.    Living will related issues reviewed-Code status: Full code    OBJECTIVE:  /89   Pulse 100   Temp 36.6 °C (97.9 °F)   Resp 18   Physical Exam  Constitutional:       Appearance: Normal appearance. He is normal weight.   HENT:      Head: Normocephalic.      Right Ear: External ear normal.      Left Ear: External ear normal.      Nose: Nose normal.      Mouth/Throat:      Mouth: Mucous membranes are moist.   Eyes:      Extraocular Movements: Extraocular movements intact.      Conjunctiva/sclera: Conjunctivae normal.      Pupils: Pupils are equal, round, and reactive to light.      Comments: Ecchymosis to left eye and right eye   Cardiovascular:      Rate and Rhythm: Normal rate and regular rhythm.      Pulses: Normal pulses.      Heart sounds: Normal heart sounds.   Pulmonary:      Effort: Pulmonary effort is normal.      Breath sounds: Normal breath sounds.   Abdominal:      General: Bowel sounds are normal. There is no distension.      Palpations:  Abdomen is soft.      Tenderness: There is no abdominal tenderness.   Genitourinary:     Comments: Not examined  Musculoskeletal:         General: Normal range of motion.      Cervical back: Normal range of motion and neck supple.      Comments: Generalized weakness   Skin:     General: Skin is warm and dry.      Capillary Refill: Capillary refill takes less than 2 seconds.   Neurological:      General: No focal deficit present.      Mental Status: He is alert and oriented to person, place, and time. Mental status is at baseline.   Psychiatric:         Mood and Affect: Mood normal.         Behavior: Behavior normal.         Thought Content: Thought content normal.         Judgment: Judgment normal.        Assessment/Plan  Problem List Items Addressed This Visit       Syncope and collapse - Primary    Fall    Chronic obstructive pulmonary disease (Multi)    Seizure disorder (Multi)       Skin integrity:  Nursing to monitor skin integrity as patient is at risk for pressure injuries.  Turn and reposition Q 2 hours or more.  Air mattress and when up in chair cushion reducing device.  Dietician to evaluate and recommend.  Nutritional supplement to be implemented if needed.  Please monitor skin integrity and other pressure areas.    PLAN:  Pt has been seen for discharge visit.  The patient has been here at facility for PT/OT/ST to maximize strength, function, endurance and safety.  Recent therapy notes reviewed.  The patient is participating in therapy.   eB Luo is making progress to the best of his/her ability.   Please see PT/OT/ST notes in the facility for detailed information regarding progression of patients progress.   Recent nursing evaluation and notes were reviewed.   Medication list reviewed here at the facility.   Overall, patient is stable despite his/her chronic conditions and ok for discharge home.   #Fall, syncope/collapse, closed head injury, concussion: Carotid dopplers showed less than 50%  stenosis on both sides, Echo reviewed, pt to f/up cardiology. Neurology did not feel EEG was needed, Vit B12 599, TSH 1.02, A1c 5.5, UA negative.   #Seizures: cont current Lamotrigine. F/up Neuro at [facility] outpatient. Lamotrigine level is 15.4  #COPD: monitor resp status, cont home inhalers and nebulizer. Pt is noncompliant with inhalers at home - stating he never does his inhalers only his nebulizer.   #Blindness: cont eye gtts    Any decline or change in condition needs to be communicated with the physician or myself.  Discussion with nursing staff regarding ongoing care, management, and discharge planning.   Communication regarding patients status, overall condition, changes with plan (medications or treatments), and any questions from family completed if present.  If family not available, would communicate in person or via phone if needed.   We will continue with the plan and medications noted above until discharged home.    We will continue to follow the patient here at the facility until discharged home.     Discharge planning:   Patient to be discharged home today  Patient to follow up with PCP in 1-2 weeks after discharge from facility.   Patient to follow up with any Speciality physicians as directed after discharge.  If desired and in agreement, [facility] to follow after discharge from the facility for continued PT/OT and Nursing.    *Please note that nursing facility, outside laboratory agency, and  [facility] do not interface.     Completion of the note was done through Dragon voice recognition technology and may include   unintended or grammatical errors which may not have been recognized when finalizing the note.     Time: I spent 31 minutes or greater with the patient reviewing discharge information which include compliance of medications, follow up appointments with PCP and or Speciality physicians. Greater than 50% of this time was spent in counseling and or coordination of care.       Nilsa Schumacher,  JEFF-CNP        Electronically Signed By: BEN Akbar   7/15/24  3:41 PM

## 2024-07-12 NOTE — PROGRESS NOTES
Name: Be Luo  YOB: 1960    DISCHARGE VISIT: SNF, Syncope and Collapse     SUBJECTIVE:  The patient is up in his room. He is doing well and discharging home later today.   Patient denies any fever, chills, headache, vertigo, chest pain, palpitation, shortness of breath, cough, wheezes, abdominal pain, nausea/vomiting, urinary symptoms or constipation/diarrhea.   Discussion with Be Luo and family/friend representative if present regarding discharge - follow up with PCP (Dr. Martinez - Dr. Phillips is out of office) and other speciality physicians (Cardiology 7/16, Neurologist at [facility] (appt to be made), seen Dr Watson - Optometrist 7/11/24 as instructed or as scheduled, compliance with home medications, and safety within the home.   Nursing to review discharge instructions prior to discharging home.     REVIEW OF SYSTEMS:   All review of systems are negative unless otherwise stated above under subjective.    LABS REVIEWED AT FACILITY:  7/10/24  CMP-COMPREHENSIVE METABOLIC PNL NILE  GLUCOSE 87 mg/dL  GLUCOSE, FASTING 65-99 mg/dL  GLUCOSE, NON-FASTING  mg/dL  SODIUM 143 136-145 mEq/L  POTASSIUM 3.8 3.5-5.3 mEq/L  CHLORIDE 103  mEq/L  CARBON DIOXIDE (CO2) 29 21-33 mEq/L  BUN (UREA NITROGEN) 21 7-25 mg/dL  CREATININE 0.9 0.6-1.2 mg/dL  BUN/CREATININE RATIO 23~ H 6-22  GFR-AFRICAN AMERICAN 103 >60 mL/min/1.73 m2  GFR-NON-AFRICAN AMERICAN 85 >60 mL/min/1.73 m2   Stage of CKD eGFR (mL/min/1.73 square meters)   Stage 1 >/= 90 or > 90   Stage 2 60 - 89   Stage 3 30 - 59   Stage 4 15 - 29   Stage 5 </= 14 or < 15  ** GFR is reliable for adults 17 to 69 years with stable kidney   function.  CALCIUM 9.2 8.4-10.2 mg/dL  PROTEIN, TOTAL 6.2 6.0-8.3 g/dL  ALBUMIN 4.2 3.5-5.5 g/dL  A/G RATIO 2.1 1.0-2.3  ALKALINE PHOS 63  IU/L  AST (SGOT) 15 4-40 IU/L  ALT (SGPT) 18 4-55 IU/L  BILIRUBIN, TOTAL 0.9 0.2-1.2 mg/dL  CBC W/O DIFF NILE  WBC 7.6 4.5-10.8 K/cmm  RBC 4.74 4.00-6.60  M/cmm  HEMOGLOBIN 15.9 14.0-18.0 g/dL  HEMATOCRIT 47.2 42.0-54.0 %  MCV 99.6 80.0-100.0 fL  MCH 33.5 26.0-35.0 pg  MCHC 33.7 31.0-36.5 g/dL  RDW 14.3 11.0-16.0 %  PLATELET 243 150-450 K/cmm  MPV 8.1 6.5-12.0 fL    Allergies   Allergen Reactions    Green Tea Hives and Shortness of breath    Gabapentin Agitation    Lyrica [Pregabalin] GI Upset    Norco [Hydrocodone-Acetaminophen] Agitation    Prednisone Other    Sulfasalazine GI Upset    Vioxx [Rofecoxib] Other     Retains water/swelling       Medication list for home:  Actemra  Albuterol inhaler  Albuterol nebs  ASA  Bevespi 9-4.8 mcg  Brimonidine  Cholecalciferol  Flexeril  Cosopt  Lamotrigine   Latanoprost  Metformin  Medical cannabis  MTV  KCL  Pramipexole  Crestor  Stiolto    OARRS Report: If indicated, I have personally reviewed the OARRS report for Be Luo and I have considered the risks of abuse, dependence, addiction, and diversion.    Living will related issues reviewed-Code status: Full code    OBJECTIVE:  /89   Pulse 100   Temp 36.6 °C (97.9 °F)   Resp 18   Physical Exam  Constitutional:       Appearance: Normal appearance. He is normal weight.   HENT:      Head: Normocephalic.      Right Ear: External ear normal.      Left Ear: External ear normal.      Nose: Nose normal.      Mouth/Throat:      Mouth: Mucous membranes are moist.   Eyes:      Extraocular Movements: Extraocular movements intact.      Conjunctiva/sclera: Conjunctivae normal.      Pupils: Pupils are equal, round, and reactive to light.      Comments: Ecchymosis to left eye and right eye   Cardiovascular:      Rate and Rhythm: Normal rate and regular rhythm.      Pulses: Normal pulses.      Heart sounds: Normal heart sounds.   Pulmonary:      Effort: Pulmonary effort is normal.      Breath sounds: Normal breath sounds.   Abdominal:      General: Bowel sounds are normal. There is no distension.      Palpations: Abdomen is soft.      Tenderness: There is no abdominal  tenderness.   Genitourinary:     Comments: Not examined  Musculoskeletal:         General: Normal range of motion.      Cervical back: Normal range of motion and neck supple.      Comments: Generalized weakness   Skin:     General: Skin is warm and dry.      Capillary Refill: Capillary refill takes less than 2 seconds.   Neurological:      General: No focal deficit present.      Mental Status: He is alert and oriented to person, place, and time. Mental status is at baseline.   Psychiatric:         Mood and Affect: Mood normal.         Behavior: Behavior normal.         Thought Content: Thought content normal.         Judgment: Judgment normal.        Assessment/Plan   Problem List Items Addressed This Visit       Syncope and collapse - Primary    Fall    Chronic obstructive pulmonary disease (Multi)    Seizure disorder (Multi)       Skin integrity:  Nursing to monitor skin integrity as patient is at risk for pressure injuries.  Turn and reposition Q 2 hours or more.  Air mattress and when up in chair cushion reducing device.  Dietician to evaluate and recommend.  Nutritional supplement to be implemented if needed.  Please monitor skin integrity and other pressure areas.    PLAN:  Pt has been seen for discharge visit.  The patient has been here at facility for PT/OT/ST to maximize strength, function, endurance and safety.  Recent therapy notes reviewed.  The patient is participating in therapy.   Be TIMMONS KristopherStephanie is making progress to the best of his/her ability.   Please see PT/OT/ST notes in the facility for detailed information regarding progression of patients progress.   Recent nursing evaluation and notes were reviewed.   Medication list reviewed here at the facility.   Overall, patient is stable despite his/her chronic conditions and ok for discharge home.   #Fall, syncope/collapse, closed head injury, concussion: Carotid dopplers showed less than 50% stenosis on both sides, Echo reviewed, pt to f/up  cardiology. Neurology did not feel EEG was needed, Vit B12 599, TSH 1.02, A1c 5.5, UA negative.   #Seizures: cont current Lamotrigine. F/up Neuro at [facility] outpatient. Lamotrigine level is 15.4  #COPD: monitor resp status, cont home inhalers and nebulizer. Pt is noncompliant with inhalers at home - stating he never does his inhalers only his nebulizer.   #Blindness: cont eye gtts    Any decline or change in condition needs to be communicated with the physician or myself.  Discussion with nursing staff regarding ongoing care, management, and discharge planning.   Communication regarding patients status, overall condition, changes with plan (medications or treatments), and any questions from family completed if present.  If family not available, would communicate in person or via phone if needed.   We will continue with the plan and medications noted above until discharged home.    We will continue to follow the patient here at the facility until discharged home.     Discharge planning:   Patient to be discharged home today  Patient to follow up with PCP in 1-2 weeks after discharge from facility.   Patient to follow up with any Speciality physicians as directed after discharge.  If desired and in agreement, [facility] to follow after discharge from the facility for continued PT/OT and Nursing.    *Please note that nursing facility, outside laboratory agency, and  [facility] do not interface.     Completion of the note was done through Dragon voice recognition technology and may include   unintended or grammatical errors which may not have been recognized when finalizing the note.     Time: I spent 31 minutes or greater with the patient reviewing discharge information which include compliance of medications, follow up appointments with PCP and or Speciality physicians. Greater than 50% of this time was spent in counseling and or coordination of care.       Nilsa Schumacher, APRN-CNP

## 2024-07-12 NOTE — PROGRESS NOTES
"Name: Be Luo  YOB: 1960    INITIAL NURSE PRACTITIONER FOLLOW UP VISIT: SNF, Syncope and Collapse    HPI   The patient is a 63 yr old male who was brought into the ER on 7/3/24 for c/o fall. Pt was getting up from chair when he passed out and fell of floor hitting his face. He has a h/o seizures and compliant with meds. No reported seizure activity. The pt was hospitalized for closed head injury w/left frontal scalp and supraorbital hematoma. Neuro followed and did not feel this was breakthrough seizure based on history or blood work. Cardiology followed and Echo done mild LV dysfunction 45 - 50%. Previous Echo LVEF 55%. Pt to f/up for Lexiscan myocardial perfusion and 14 day Zio Patch.   The patient is awake in his room. He appears comfortable and in no acute distress. He expresses concern that he was not given an EEG and felt he should of had his seizures checked. I explained that per Neurology consultation that they did not feel that was necessary and felt his poor eyesight to be a hindrance.   The patient has a PMH of CAD, COPD, HTN, HLD, Seizures, Retinal detachment, strabismus mechanical right eye, right eye blindness, RA, ANN, CPAP, Gout, h/o drug abuse, daily marijuana use, depression, diverticulitis, and migraine.   Nursing asked for dose for \"gummies\" as report to staff said pt takes CBD gummies twice a day. The patient states he does not take gummies but rather smokes marijuana daily. The facility does not allow marijuana to be smoked.   Patient denies any fever, chills, headache, vertigo, chest pain, palpitation, shortness of breath, cough, wheezes, abdominal pain, nausea/vomiting, urinary symptoms or constipation/diarrhea.     REVIEW OF SYSTEMS:  Review of systems are negative except where noted in HPI.    /79   Pulse 93   Temp 36.1 °C (97 °F)   Resp 16   Ht 1.778 m (5' 10\")   Wt 90.9 kg (200 lb 6.4 oz)   SpO2 96% Comment: RA  BMI 28.75 kg/m²      Physical " Exam  Constitutional:       Appearance: Normal appearance. He is normal weight.   HENT:      Head: Normocephalic.      Right Ear: External ear normal.      Left Ear: External ear normal.      Nose: Nose normal.      Mouth/Throat:      Mouth: Mucous membranes are moist.   Eyes:      Extraocular Movements: Extraocular movements intact.      Conjunctiva/sclera: Conjunctivae normal.      Pupils: Pupils are equal, round, and reactive to light.      Comments: Ecchymosis to left eye and right eye   Cardiovascular:      Rate and Rhythm: Normal rate and regular rhythm.      Pulses: Normal pulses.      Heart sounds: Normal heart sounds.   Pulmonary:      Effort: Pulmonary effort is normal.      Breath sounds: Normal breath sounds.   Abdominal:      General: Bowel sounds are normal. There is no distension.      Palpations: Abdomen is soft.      Tenderness: There is no abdominal tenderness.   Genitourinary:     Comments: Not examined  Musculoskeletal:         General: Normal range of motion.      Cervical back: Normal range of motion and neck supple.      Comments: Generalized weakness   Skin:     General: Skin is warm and dry.      Capillary Refill: Capillary refill takes less than 2 seconds.   Neurological:      General: No focal deficit present.      Mental Status: He is alert and oriented to person, place, and time. Mental status is at baseline.   Psychiatric:         Mood and Affect: Mood normal.         Behavior: Behavior normal.         Thought Content: Thought content normal.         Judgment: Judgment normal.          ADVANCED CARE PLANNING: Discussion done with the patient and family if available regarding code status, diagnosis, and the prognosis of the patient.     CODE STATUS: Full code    DISCHARGE PLANNING:  Patient will be discharge home when goals are met.   Discharge planner to communicate ongoing updates regarding discharge plan.     RECENT HOSPITALIZATION DATES:   7/4 - 7/8/24  All laboratories, diagnostic  tests, progress notes, consultation notes, and discharge notes reviewed from recent hospitalization.     Admission on 07/03/2024, Discharged on 07/08/2024   Component Date Value Ref Range Status    WBC 07/03/2024 4.2 (L)  4.4 - 11.3 x10*3/uL Final    nRBC 07/03/2024 0.0  0.0 - 0.0 /100 WBCs Final    RBC 07/03/2024 4.56  4.50 - 5.90 x10*6/uL Final    Hemoglobin 07/03/2024 15.3  13.5 - 17.5 g/dL Final    Hematocrit 07/03/2024 44.4  41.0 - 52.0 % Final    MCV 07/03/2024 97  80 - 100 fL Final    MCH 07/03/2024 33.6  26.0 - 34.0 pg Final    MCHC 07/03/2024 34.5  32.0 - 36.0 g/dL Final    RDW 07/03/2024 13.3  11.5 - 14.5 % Final    Platelets 07/03/2024 221  150 - 450 x10*3/uL Final    Neutrophils % 07/03/2024 35.4  40.0 - 80.0 % Final    Immature Granulocytes %, Automated 07/03/2024 0.2  0.0 - 0.9 % Final    Immature Granulocyte Count (IG) includes promyelocytes, myelocytes and metamyelocytes but does not include bands. Percent differential counts (%) should be interpreted in the context of the absolute cell counts (cells/UL).    Lymphocytes % 07/03/2024 43.1  13.0 - 44.0 % Final    Monocytes % 07/03/2024 18.2  2.0 - 10.0 % Final    Eosinophils % 07/03/2024 2.4  0.0 - 6.0 % Final    Basophils % 07/03/2024 0.7  0.0 - 2.0 % Final    Neutrophils Absolute 07/03/2024 1.49  1.20 - 7.70 x10*3/uL Final    Percent differential counts (%) should be interpreted in the context of the absolute cell counts (cells/uL).    Immature Granulocytes Absolute, Au* 07/03/2024 0.01  0.00 - 0.70 x10*3/uL Final    Lymphocytes Absolute 07/03/2024 1.82  1.20 - 4.80 x10*3/uL Final    Monocytes Absolute 07/03/2024 0.77  0.10 - 1.00 x10*3/uL Final    Eosinophils Absolute 07/03/2024 0.10  0.00 - 0.70 x10*3/uL Final    Basophils Absolute 07/03/2024 0.03  0.00 - 0.10 x10*3/uL Final    Glucose 07/03/2024 98  74 - 99 mg/dL Final    Sodium 07/03/2024 141  136 - 145 mmol/L Final    Potassium 07/03/2024 3.8  3.5 - 5.3 mmol/L Final    Chloride 07/03/2024 107   98 - 107 mmol/L Final    Bicarbonate 07/03/2024 25  21 - 32 mmol/L Final    Anion Gap 07/03/2024 13  10 - 20 mmol/L Final    Urea Nitrogen 07/03/2024 17  6 - 23 mg/dL Final    Creatinine 07/03/2024 0.89  0.50 - 1.30 mg/dL Final    eGFR 07/03/2024 >90  >60 mL/min/1.73m*2 Final    Calculations of estimated GFR are performed using the 2021 CKD-EPI Study Refit equation without the race variable for the IDMS-Traceable creatinine methods.  https://jasn.asnjournals.org/content/early/2021/09/22/ASN.5932980770    Calcium 07/03/2024 8.9  8.6 - 10.3 mg/dL Final    Albumin 07/03/2024 4.3  3.4 - 5.0 g/dL Final    Alkaline Phosphatase 07/03/2024 50  33 - 136 U/L Final    Total Protein 07/03/2024 6.5  6.4 - 8.2 g/dL Final    AST 07/03/2024 17  9 - 39 U/L Final    Bilirubin, Total 07/03/2024 1.1  0.0 - 1.2 mg/dL Final    ALT 07/03/2024 19  10 - 52 U/L Final    Patients treated with Sulfasalazine may generate falsely decreased results for ALT.    Magnesium 07/03/2024 2.05  1.60 - 2.40 mg/dL Final    Ventricular Rate 07/04/2024 80  BPM Final    Atrial Rate 07/04/2024 80  BPM Final    IA Interval 07/04/2024 192  ms Final    QRS Duration 07/04/2024 112  ms Final    QT Interval 07/04/2024 394  ms Final    QTC Calculation(Bazett) 07/04/2024 454  ms Final    P Axis 07/04/2024 70  degrees Final    R Axis 07/04/2024 90  degrees Final    T [place] 07/04/2024 63  degrees Final    QRS Count 07/04/2024 13  beats Final    Q Onset 07/04/2024 220  ms Final    P Onset 07/04/2024 124  ms Final    P Offset 07/04/2024 173  ms Final    T Offset 07/04/2024 417  ms Final    QTC Fredericia 07/04/2024 434  ms Final    Troponin I, High Sensitivity 07/03/2024 4  0 - 20 ng/L Final    Color, Urine 07/04/2024 Yellow  Light-Yellow, Yellow, Dark-Yellow Final    Appearance, Urine 07/04/2024 Clear  Clear Final    Specific Gravity, Urine 07/04/2024 >1.050 (N)  1.005 - 1.035 Final    Specific gravity of >1.050 may be falsely elevated due to interferences with  measurement. If clinically indicated, repeat testing with an alternative method is available by contacting the laboratory within 24 hours.    pH, Urine 07/04/2024 5.5  5.0, 5.5, 6.0, 6.5, 7.0, 7.5, 8.0 Final    Protein, Urine 07/04/2024 20 (TRACE)  NEGATIVE, 10 (TRACE), 20 (TRACE) mg/dL Final    Glucose, Urine 07/04/2024 Normal  Normal mg/dL Final    Blood, Urine 07/04/2024 NEGATIVE  NEGATIVE Final    Ketones, Urine 07/04/2024 60 (2+) (A)  NEGATIVE mg/dL Final    Bilirubin, Urine 07/04/2024 NEGATIVE  NEGATIVE Final    Urobilinogen, Urine 07/04/2024 Normal  Normal mg/dL Final    Nitrite, Urine 07/04/2024 NEGATIVE  NEGATIVE Final    Leukocyte Esterase, Urine 07/04/2024 NEGATIVE  NEGATIVE Final    Extra Tube 07/04/2024 Hold for add-ons.   Final    Auto resulted.    Troponin I, High Sensitivity 07/04/2024 5  0 - 20 ng/L Final    Ventricular Rate 07/04/2024 81  BPM Final    Atrial Rate 07/04/2024 81  BPM Final    CA Interval 07/04/2024 184  ms Final    QRS Duration 07/04/2024 100  ms Final    QT Interval 07/04/2024 398  ms Final    QTC Calculation(Bazett) 07/04/2024 462  ms Final    P Axis 07/04/2024 83  degrees Final    R Axis 07/04/2024 90  degrees Final    T [place] 07/04/2024 64  degrees Final    QRS Count 07/04/2024 14  beats Final    Q Onset 07/04/2024 221  ms Final    P Onset 07/04/2024 129  ms Final    P Offset 07/04/2024 178  ms Final    T Offset 07/04/2024 420  ms Final    QTC Fredericia 07/04/2024 439  ms Final    Hemoglobin A1C 07/04/2024 5.5  see below % Final    Estimated Average Glucose 07/04/2024 111  Not Established mg/dL Final    AV mn grad 07/05/2024 4.0  mmHg Final    AV pk margarita 07/05/2024 1.25  m/s Final    LV Biplane EF 07/05/2024 45  % Final    LVOT diam 07/05/2024 2.02  cm Final    MV E/A ratio 07/05/2024 0.56   Final    Tricuspid annular plane systolic e* 07/05/2024 2.2  cm Final    LA vol index A/L 07/05/2024 16.9  ml/m2 Final    LV EF 07/05/2024 48  % Final    RV free wall pk S' 07/05/2024 13.50   cm/s Final    RVSP 07/05/2024 12.1  mmHg Final    LVIDd 07/05/2024 5.19  cm Final    Aortic Valve Area by Continuity of* 07/05/2024 2.46  cm2 Final    AV pk grad 07/05/2024 6.3  mmHg Final    Aortic Valve Area by Continuity of* 07/05/2024 2.47  cm2 Final    LV A4C EF 07/05/2024 48.0   Final    Extra Tube 07/04/2024 Hold for add-ons.   Final    Auto resulted.    Extra Tube 07/04/2024 Hold for add-ons.   Final    Auto resulted.    Extra Tube 07/04/2024 Hold for add-ons.   Final    Auto resulted.    POCT Glucose 07/04/2024 131 (H)  74 - 99 mg/dL Final    Lamotrigine  07/04/2024 15.4 (H)  2.5 - 15.0 ug/mL Final    Ventricular Rate 07/04/2024 81  BPM Final    Atrial Rate 07/04/2024 81  BPM Final    [place] Interval 07/04/2024 180  ms Final    QRS Duration 07/04/2024 104  ms Final    QT Interval 07/04/2024 400  ms Final    QTC Calculation(Bazett) 07/04/2024 464  ms Final    P Axis 07/04/2024 78  degrees Final    R Axis 07/04/2024 92  degrees Final    T [place] 07/04/2024 64  degrees Final    QRS Count 07/04/2024 14  beats Final    Q Onset 07/04/2024 204  ms Final    P Onset 07/04/2024 114  ms Final    P Offset 07/04/2024 159  ms Final    T Offset 07/04/2024 404  ms Final    QTC Fredericia 07/04/2024 442  ms Final    WBC, Urine 07/04/2024 1-5  1-5, NONE /HPF Final    RBC, Urine 07/04/2024 1-2  NONE, 1-2, 3-5 /HPF Final    Mucus, Urine 07/04/2024 3+  Reference range not established. /LPF Final    Hyaline Casts, Urine 07/04/2024 1+ (A)  NONE /LPF Final    POCT Glucose 07/04/2024 96  74 - 99 mg/dL Final    Thyroid Stimulating Hormone 07/04/2024 1.02  0.44 - 3.98 mIU/L Final    Vitamin B12 07/04/2024 599  211 - 911 pg/mL Final    Glucose 07/05/2024 89  74 - 99 mg/dL Final    Sodium 07/05/2024 142  136 - 145 mmol/L Final    Potassium 07/05/2024 4.0  3.5 - 5.3 mmol/L Final    Chloride 07/05/2024 109 (H)  98 - 107 mmol/L Final    Bicarbonate 07/05/2024 27  21 - 32 mmol/L Final    Anion Gap 07/05/2024 10  10 - 20 mmol/L Final    Urea  Nitrogen 07/05/2024 13  6 - 23 mg/dL Final    Creatinine 07/05/2024 0.76  0.50 - 1.30 mg/dL Final    eGFR 07/05/2024 >90  >60 mL/min/1.73m*2 Final    Calculations of estimated GFR are performed using the 2021 CKD-EPI Study Refit equation without the race variable for the IDMS-Traceable creatinine methods.  https://jasn.asnjournals.org/content/early/2021/09/22/ASN.0081551741    Calcium 07/05/2024 8.3 (L)  8.6 - 10.3 mg/dL Final    WBC 07/05/2024 4.3 (L)  4.4 - 11.3 x10*3/uL Final    nRBC 07/05/2024 0.0  0.0 - 0.0 /100 WBCs Final    RBC 07/05/2024 4.34 (L)  4.50 - 5.90 x10*6/uL Final    Hemoglobin 07/05/2024 14.4  13.5 - 17.5 g/dL Final    Hematocrit 07/05/2024 42.7  41.0 - 52.0 % Final    MCV 07/05/2024 98  80 - 100 fL Final    MCH 07/05/2024 33.2  26.0 - 34.0 pg Final    MCHC 07/05/2024 33.7  32.0 - 36.0 g/dL Final    RDW 07/05/2024 13.4  11.5 - 14.5 % Final    Platelets 07/05/2024 215  150 - 450 x10*3/uL Final    POCT Glucose 07/04/2024 150 (H)  74 - 99 mg/dL Final    POCT Glucose 07/05/2024 96  74 - 99 mg/dL Final    POCT Glucose 07/05/2024 133 (H)  74 - 99 mg/dL Final    POCT Glucose 07/05/2024 107 (H)  74 - 99 mg/dL Final    POCT Glucose 07/05/2024 112 (H)  74 - 99 mg/dL Final    POCT Glucose 07/06/2024 142 (H)  74 - 99 mg/dL Final    POCT Glucose 07/06/2024 112 (H)  74 - 99 mg/dL Final    POCT Glucose 07/06/2024 92  74 - 99 mg/dL Final    POCT Glucose 07/06/2024 131 (H)  74 - 99 mg/dL Final    POCT Glucose 07/07/2024 96  74 - 99 mg/dL Final    POCT Glucose 07/07/2024 118 (H)  74 - 99 mg/dL Final    RN/MD NOTIFIED    POCT Glucose 07/07/2024 103 (H)  74 - 99 mg/dL Final    POCT Glucose 07/07/2024 108 (H)  74 - 99 mg/dL Final    POCT Glucose 07/08/2024 97  74 - 99 mg/dL Final    POCT Glucose 07/08/2024 117 (H)  74 - 99 mg/dL Final    POCT Glucose 07/08/2024 107 (H)  74 - 99 mg/dL Final       LABORATORIES OR DIAGNOSTIC TESTS DONE/REVIEWED IN FACILITY:  Pending 7/10/24    Allergies   Allergen Reactions    Green Tea  Hives and Shortness of breath    Gabapentin Agitation    Lyrica [Pregabalin] GI Upset    Norco [Hydrocodone-Acetaminophen] Agitation    Prednisone Other    Sulfasalazine GI Upset    Vioxx [Rofecoxib] Other     Retains water/swelling       MEDICATION LIST:  Actemra  Albuterol inhaler  Albuterol nebs  ASA  Bevespi 9-4.8 mcg  Brimonidine  Cholecalciferol  Flexeril  Cosopt  Lamotrigine   Latanoprost  Metformin  Medical cannabis  MTV  KCL  Pramipexole  Crestor  Stiolto    MEDICATIONS REVIEWED AT THE FACILITY:  Please see Nursing facility Medication EMR for full updated list.    Assessment/Plan    Problem List Items Addressed This Visit       Syncope and collapse - Primary     Other Visit Diagnoses       Fall, subsequent encounter        Seizure disorder (Multi)        Closed head injury, subsequent encounter        Concussion without loss of consciousness, subsequent encounter        Physical deconditioning        Chronic obstructive pulmonary disease, unspecified COPD type (Multi)                Skin Integrity:  Nursing to monitor skin integrity as patient is at risk for pressure injuries.  Turn and reposition Q 2 hours or more.  Air mattress and when up in chair cushion reducing device.  Dietician to evaluate and recommend.  Nutritional supplement to be implemented if needed.  Please monitor skin integrity and other pressure areas.    PLAN:   Recent nursing evaluation and notes were reviewed.   #Fall, syncope/collapse, closed head injury, concussion: Carotid dopplers showed less than 50% stenosis on both sides, Echo reviewed, pt to f/up cardiology. Neurology did not feel EEG was needed, Vit B12 599, TSH 1.02, A1c 5.5, UA negative.   #Seizures: cont current Lamotrigine. F/up Neuro at [facility] outpatient. Lamotrigine level is 15.4  #COPD: monitor resp status, cont home inhalers and nebulizer. Pt is noncompliant with inhalers at home - stating he never does his inhalers only his nebulizer.   #Blindness: cont eye  gtts  #Weakness and physical deconditioning: PT/OT/ST to maximize strength, function, and endurance all while maintaining safety.     Any decline or change in condition needs to be communicated with the physician or myself.    Discussion with nursing staff regarding ongoing care and management.  Communication regarding patients status, overall condition, changes with plan (medications or treatments), and any questions from family completed if present.  If family not available, would communicate in person or via phone if needed.   We will continue with the plan and medications noted above.    We will continue to follow the patient here at the facility.    *Please note that nursing facility, outside laboratory agency, and  [facility] do not interface.     Completion of the note was done through Dragon voice recognition technology and may include   unintended or grammatical errors which may not have been recognized when finalizing the note.     Time: I spent 45 minutes or greater with the patient. Greater than 50% of this time was spent in counseling and or coordination of care. The time includes prep time of reviewing vital signs, report from direct nursing staff and or therapists, hospital documentation, reviewing labs, radiographs, diagnostic tests and or consultations, time directly spent with the patient interviewing, examining, and education regarding diagnosis, treatments, and medications, as well as documentation in the electronic medical record, and reviewing the plan of care and any new orders with the patient, nursing staff and other staff directly related to the patients care.       Nilsa Schumacher, APRN-CNP

## 2024-07-15 VITALS
HEART RATE: 93 BPM | WEIGHT: 200.4 LBS | BODY MASS INDEX: 28.69 KG/M2 | TEMPERATURE: 97 F | RESPIRATION RATE: 16 BRPM | HEIGHT: 70 IN | DIASTOLIC BLOOD PRESSURE: 79 MMHG | OXYGEN SATURATION: 96 % | SYSTOLIC BLOOD PRESSURE: 124 MMHG

## 2024-07-15 VITALS
TEMPERATURE: 97.9 F | HEART RATE: 100 BPM | SYSTOLIC BLOOD PRESSURE: 146 MMHG | DIASTOLIC BLOOD PRESSURE: 89 MMHG | RESPIRATION RATE: 18 BRPM

## 2024-07-15 PROBLEM — G40.909 SEIZURE DISORDER (MULTI): Status: ACTIVE | Noted: 2024-07-15

## 2024-07-15 PROBLEM — J44.9 CHRONIC OBSTRUCTIVE PULMONARY DISEASE (MULTI): Status: ACTIVE | Noted: 2024-07-15

## 2024-07-16 ENCOUNTER — TELEPHONE (OUTPATIENT)
Dept: CARDIOLOGY | Facility: CLINIC | Age: 64
End: 2024-07-16

## 2024-07-16 ENCOUNTER — APPOINTMENT (OUTPATIENT)
Dept: CARDIOLOGY | Facility: CLINIC | Age: 64
End: 2024-07-16
Payer: COMMERCIAL

## 2024-07-16 NOTE — TELEPHONE ENCOUNTER
14 day zio patch [id number] is approved Auth# [id number] valid 7/9--10/9/24 per [facility] portal

## 2024-08-01 ENCOUNTER — APPOINTMENT (OUTPATIENT)
Dept: RADIOLOGY | Facility: HOSPITAL | Age: 64
End: 2024-08-01
Payer: COMMERCIAL

## 2024-08-01 ENCOUNTER — APPOINTMENT (OUTPATIENT)
Dept: CARDIOLOGY | Facility: HOSPITAL | Age: 64
End: 2024-08-01
Payer: COMMERCIAL

## 2024-08-13 ENCOUNTER — APPOINTMENT (OUTPATIENT)
Dept: CARDIOLOGY | Facility: CLINIC | Age: 64
End: 2024-08-13
Payer: COMMERCIAL

## 2024-08-13 ENCOUNTER — APPOINTMENT (OUTPATIENT)
Dept: RADIOLOGY | Facility: HOSPITAL | Age: 64
End: 2024-08-13
Payer: COMMERCIAL

## 2024-08-13 ENCOUNTER — TELEPHONE (OUTPATIENT)
Dept: NEUROLOGY | Facility: CLINIC | Age: 64
End: 2024-08-13

## 2024-08-13 ENCOUNTER — APPOINTMENT (OUTPATIENT)
Dept: CARDIOLOGY | Facility: HOSPITAL | Age: 64
End: 2024-08-13
Payer: COMMERCIAL

## 2024-08-13 NOTE — TELEPHONE ENCOUNTER
Pt was seen by you in the hospital on 7/4/24. He has a history of seizures and has been experiencing dizziness and feeling like the room is spinning. He is requesting an order for a EEG and would like to be seen within 1-2months here in our office. Do you recommend ordering the EEG? Also, can you take a look at your schedule to see where we can fit him in? Thank you!

## 2024-08-15 NOTE — PROGRESS NOTES
"Subjective   Be Luo is a 64 y.o.   [place] [facility] [place]mid  HPI  PMH of seizures, CAD, HTN, HLD, right eye blindness is here being seen s/p hospitalization for fall at home when he passed out and hit his head. Hx of epilepsy on Lamotrigine 400mg am and 600mg HS. He recalls having sudden onset aura followed by loss of consciousness. He was evaluated by Dr. Schmid in the hospital who felt he had neuropathy confounded by vision loss which impacts his balance and stability. B12 599 SPEP pending HgbA1c 5.5 SSA SSB-both pending. His dizziness continued in the setting of concussion syndrome from fall. It was not thought to be a breakthrough seizure at this time-no adjustments to AEDs were made. He was sent to acute rehab and has followed up with ophthalmology oupt.   Today, he is doing ok. Dizziness comes and goes. He endorses falling 100-200 times a year since BEFORE his eye sight went. He states it mostly happens with a sitting to standing position and he gets dizzy and he just \"drops\". He states Meclizine does not work. Denies LOC with falls. Denies weakness, numbness/tingling. He states sometimes the falls are mechanical where he trips over things due to his vision loss. No reports of shaking or seizure-like from bystanders. He also has a hx of seizure. He has seen a F Neurologist, but his PCP manages his seizure medications. His typical seizures present as: visual aura, LOC, whole body shaking, with postictal periods lasting up to 25 hours where he \"sleeps a lot\". He has not had a seizure in over 15+ years. He occasionally forgets to take his Lamictal, but will take an extra dose to \"make up for it\". His Lamictal level was supra therapeutic on hospital admission. He is insistent on getting an rEEG, although he does not believe it was a seizure. Pt. Is to have a full cardiac work up in September including holter monitor to further evaluate syncope.   Review of systems are negative unless otherwise " specified in HPI.   Objective   Neurological Exam  Mental Status  Awake, alert and oriented to person, place and time. Recent and remote memory are intact. Speech is normal. Language is fluent with no aphasia. Attention and concentration are normal.    Cranial Nerves  CN II: Left visual acuity: Counts fingers. Left normal visual field.  CN III, IV, VI: Extraocular movements intact bilaterally. No nystagmus.   Right pupil: 3 mm. Round. Reactive to light. Reactive to accommodation.   Left pupil: 3 mm. Round. Reactive to light. Reactive to accommodation.  CN V:  Right: Facial sensation is normal.  Left: Facial sensation is normal on the left.  CN VII:  Right: There is no facial weakness.  Left: There is no facial weakness.  CN VIII:  Right: Hearing is normal.  Left: Hearing is normal.  CN IX, X:  Right: Palate is normal.  Left: Palate is normal.  CN XI:  Right: Trapezius strength is normal.  Left: Trapezius strength is normal.  CN XII: Tongue midline without atrophy or fasciculations.    Motor  Normal muscle bulk throughout. Normal muscle tone. Strength is 5/5 throughout all four extremities.    Sensory  Light touch is normal in upper and lower extremities.     Reflexes  Deep tendon reflexes are 2+ and symmetric in all four extremities.    Coordination  Right: Finger-to-nose normal.Left: Finger-to-nose normal.    Gait  Casual gait is normal including stance, stride, and arm swing.    Physical Exam  HENT:      Right Ear: Hearing normal.      Left Ear: Hearing normal.   Eyes:      Extraocular Movements: EOM normal. No nystagmus.   Neurological:      Motor: Motor strength is normal.     Deep Tendon Reflexes: Reflexes are normal and symmetric.   Psychiatric:         Speech: Speech normal.         Behavior: Behavior is agitated.           Assessment/Plan   SPEP, Anti-SSA/SSB ordered and pending. rEEG ordered. Encouraged following up with cardiology for further syncope work up. He can follow up with me in 3 months.      Discussed taking seizure medication as prescribed and not missing doses. Patient aware to alert office of any new seizure activity. Discussed if patient has a breakthrough seizure, they should use their abortive treatment. They should not drive a car or operate heavy machinery for 6 months until they are seizure free and cleared by a provider.?

## 2024-08-19 ENCOUNTER — APPOINTMENT (OUTPATIENT)
Dept: NEUROLOGY | Facility: CLINIC | Age: 64
End: 2024-08-19
Payer: COMMERCIAL

## 2024-08-19 VITALS
BODY MASS INDEX: 31.09 KG/M2 | SYSTOLIC BLOOD PRESSURE: 122 MMHG | DIASTOLIC BLOOD PRESSURE: 78 MMHG | WEIGHT: 217.2 LBS | HEART RATE: 97 BPM | HEIGHT: 70 IN

## 2024-08-19 DIAGNOSIS — G62.9 NEUROPATHY: ICD-10-CM

## 2024-08-19 DIAGNOSIS — R55 SYNCOPE AND COLLAPSE: ICD-10-CM

## 2024-08-19 DIAGNOSIS — G40.909 SEIZURE DISORDER (MULTI): Primary | ICD-10-CM

## 2024-08-19 PROCEDURE — 1159F MED LIST DOCD IN RCRD: CPT

## 2024-08-19 PROCEDURE — 1157F ADVNC CARE PLAN IN RCRD: CPT

## 2024-08-19 PROCEDURE — 3008F BODY MASS INDEX DOCD: CPT

## 2024-08-19 PROCEDURE — 99215 OFFICE O/P EST HI 40 MIN: CPT

## 2024-08-19 RX ORDER — ACETAZOLAMIDE 250 MG/1
250 TABLET ORAL 2 TIMES DAILY
COMMUNITY
Start: 2024-08-13

## 2024-08-19 RX ORDER — ONDANSETRON 4 MG/1
4 TABLET, FILM COATED ORAL EVERY 8 HOURS PRN
COMMUNITY
Start: 2024-06-21

## 2024-08-19 ASSESSMENT — PATIENT HEALTH QUESTIONNAIRE - PHQ9
6. FEELING BAD ABOUT YOURSELF - OR THAT YOU ARE A FAILURE OR HAVE LET YOURSELF OR YOUR FAMILY DOWN: NEARLY EVERY DAY
10. IF YOU CHECKED OFF ANY PROBLEMS, HOW DIFFICULT HAVE THESE PROBLEMS MADE IT FOR YOU TO DO YOUR WORK, TAKE CARE OF THINGS AT HOME, OR GET ALONG WITH OTHER PEOPLE: VERY DIFFICULT
1. LITTLE INTEREST OR PLEASURE IN DOING THINGS: NOT AT ALL
4. FEELING TIRED OR HAVING LITTLE ENERGY: NEARLY EVERY DAY
8. MOVING OR SPEAKING SO SLOWLY THAT OTHER PEOPLE COULD HAVE NOTICED. OR THE OPPOSITE, BEING SO FIGETY OR RESTLESS THAT YOU HAVE BEEN MOVING AROUND A LOT MORE THAN USUAL: NOT AT ALL
3. TROUBLE FALLING OR STAYING ASLEEP: NEARLY EVERY DAY
2. FEELING DOWN, DEPRESSED OR HOPELESS: NEARLY EVERY DAY
SUM OF ALL RESPONSES TO PHQ QUESTIONS 1-9: 16
5. POOR APPETITE OR OVEREATING: NEARLY EVERY DAY
SUM OF ALL RESPONSES TO PHQ9 QUESTIONS 1 & 2: 3
7. TROUBLE CONCENTRATING ON THINGS, SUCH AS READING THE NEWSPAPER OR WATCHING TELEVISION: SEVERAL DAYS
9. THOUGHTS THAT YOU WOULD BE BETTER OFF DEAD, OR OF HURTING YOURSELF: NOT AT ALL

## 2024-08-19 ASSESSMENT — VISUAL ACUITY: METHOD_CF: 1

## 2024-08-22 ENCOUNTER — HOSPITAL ENCOUNTER (OUTPATIENT)
Dept: CARDIOLOGY | Facility: HOSPITAL | Age: 64
Discharge: HOME | End: 2024-08-22
Payer: COMMERCIAL

## 2024-08-22 ENCOUNTER — HOSPITAL ENCOUNTER (OUTPATIENT)
Dept: RADIOLOGY | Facility: HOSPITAL | Age: 64
Discharge: HOME | End: 2024-08-22
Payer: COMMERCIAL

## 2024-08-22 ENCOUNTER — APPOINTMENT (OUTPATIENT)
Dept: CARDIOLOGY | Facility: CLINIC | Age: 64
End: 2024-08-22
Payer: COMMERCIAL

## 2024-08-22 DIAGNOSIS — I42.9 CARDIOMYOPATHY, UNSPECIFIED TYPE (MULTI): ICD-10-CM

## 2024-08-22 DIAGNOSIS — R93.1 ABNORMAL ECHOCARDIOGRAM: ICD-10-CM

## 2024-08-22 DIAGNOSIS — R07.2 PRECORDIAL PAIN: ICD-10-CM

## 2024-08-22 DIAGNOSIS — R55 SYNCOPE AND COLLAPSE: ICD-10-CM

## 2024-08-22 PROCEDURE — 2500000004 HC RX 250 GENERAL PHARMACY W/ HCPCS (ALT 636 FOR OP/ED): Performed by: INTERNAL MEDICINE

## 2024-08-22 PROCEDURE — 3430000001 HC RX 343 DIAGNOSTIC RADIOPHARMACEUTICALS: Performed by: INTERNAL MEDICINE

## 2024-08-22 PROCEDURE — 93246 EXT ECG>7D<15D RECORDING: CPT | Performed by: INTERNAL MEDICINE

## 2024-08-22 PROCEDURE — A9502 TC99M TETROFOSMIN: HCPCS | Performed by: INTERNAL MEDICINE

## 2024-08-22 PROCEDURE — 78452 HT MUSCLE IMAGE SPECT MULT: CPT

## 2024-08-22 PROCEDURE — 93017 CV STRESS TEST TRACING ONLY: CPT

## 2024-08-22 PROCEDURE — 93248 EXT ECG>7D<15D REV&INTERPJ: CPT | Performed by: INTERNAL MEDICINE

## 2024-08-22 RX ORDER — REGADENOSON 0.08 MG/ML
0.4 INJECTION, SOLUTION INTRAVENOUS ONCE
Status: COMPLETED | OUTPATIENT
Start: 2024-08-22 | End: 2024-08-22

## 2024-08-22 ASSESSMENT — ENCOUNTER SYMPTOMS
DEPRESSION: 1
LOSS OF SENSATION IN FEET: 1
OCCASIONAL FEELINGS OF UNSTEADINESS: 1

## 2024-08-23 ENCOUNTER — HOSPITAL ENCOUNTER (OUTPATIENT)
Dept: NEUROLOGY | Facility: HOSPITAL | Age: 64
Discharge: HOME | End: 2024-08-23
Payer: COMMERCIAL

## 2024-08-23 DIAGNOSIS — G40.909 SEIZURE DISORDER (MULTI): ICD-10-CM

## 2024-08-23 PROCEDURE — 95819 EEG AWAKE AND ASLEEP: CPT

## 2024-08-26 ENCOUNTER — TELEPHONE (OUTPATIENT)
Dept: CARDIOLOGY | Facility: CLINIC | Age: 64
End: 2024-08-26
Payer: COMMERCIAL

## 2024-08-26 NOTE — TELEPHONE ENCOUNTER
Patient called back and I scheduled an appt for pt to come in for testing results. However he had to order a new zio patch through the company because his came off. Per RN Pau MARIE Stated she will call in the results for this patient concerning his zio patch.

## 2024-08-26 NOTE — TELEPHONE ENCOUNTER
----- Message from Gaston Fonseca sent at 8/23/2024  4:57 PM EDT -----  Patient was recently seen in the hospital.  He was scheduled for studies including an echocardiogram, stress test, and Zio patch monitor.  He was supposed to follow-up with me once the studies were completed.  I do not see a follow-up scheduled with me as of yet.  Please have him added on to be seen in the near future.  The stress test shows a mild abnormality and I would like to discuss this in detail with him.  Thanks.

## 2024-08-27 ENCOUNTER — TELEPHONE (OUTPATIENT)
Dept: CARDIOLOGY | Facility: CLINIC | Age: 64
End: 2024-08-27
Payer: COMMERCIAL

## 2024-08-27 NOTE — TELEPHONE ENCOUNTER
----- Message from Ashlie SLATER sent at 8/26/2024  9:37 AM EDT -----  I left patient a voicemail message to call the office back to schedule a follow up appointment with Dr. Fonseca after his ziopatch monitor results come back.  ----- Message -----  From: Ange Adair RN  Sent: 8/26/2024   8:51 AM EDT  To: Do Alexis[facility] Card1 Clerical    Please schedule patient to follow up after testing with Dr. Gaston Fonseca. Thank you.  ----- Message -----  From: Gaston Fonseca MD  Sent: 8/23/2024   4:57 PM EDT  To: Ange Adair RN    Patient was recently seen in the hospital.  He was scheduled for studies including an echocardiogram, stress test, and Zio patch monitor.  He was supposed to follow-up with me once the studies were completed.  I do not see a follow-up scheduled with me as of yet.  Please have him added on to be seen in the near future.  The stress test shows a mild abnormality and I would like to discuss this in detail with him.  Thanks.

## 2024-08-30 ENCOUNTER — HOSPITAL ENCOUNTER (EMERGENCY)
Facility: HOSPITAL | Age: 64
Discharge: HOME | End: 2024-08-30
Payer: COMMERCIAL

## 2024-08-30 ENCOUNTER — APPOINTMENT (OUTPATIENT)
Dept: RADIOLOGY | Facility: HOSPITAL | Age: 64
End: 2024-08-30
Payer: COMMERCIAL

## 2024-08-30 ENCOUNTER — APPOINTMENT (OUTPATIENT)
Dept: CARDIOLOGY | Facility: HOSPITAL | Age: 64
End: 2024-08-30
Payer: COMMERCIAL

## 2024-08-30 VITALS
BODY MASS INDEX: 28.92 KG/M2 | TEMPERATURE: 98.2 F | DIASTOLIC BLOOD PRESSURE: 79 MMHG | OXYGEN SATURATION: 98 % | RESPIRATION RATE: 18 BRPM | HEIGHT: 70 IN | WEIGHT: 202 LBS | SYSTOLIC BLOOD PRESSURE: 151 MMHG | HEART RATE: 74 BPM

## 2024-08-30 DIAGNOSIS — R42 DIZZINESS: ICD-10-CM

## 2024-08-30 DIAGNOSIS — R11.2 NAUSEA AND VOMITING, UNSPECIFIED VOMITING TYPE: Primary | ICD-10-CM

## 2024-08-30 LAB
ALBUMIN SERPL BCP-MCNC: 4.5 G/DL (ref 3.4–5)
ALP SERPL-CCNC: 66 U/L (ref 33–136)
ALT SERPL W P-5'-P-CCNC: 21 U/L (ref 10–52)
ANION GAP SERPL CALC-SCNC: 12 MMOL/L (ref 10–20)
APPEARANCE UR: CLEAR
AST SERPL W P-5'-P-CCNC: 18 U/L (ref 9–39)
ATRIAL RATE: 68 BPM
BASOPHILS # BLD AUTO: 0.04 X10*3/UL (ref 0–0.1)
BASOPHILS NFR BLD AUTO: 0.6 %
BILIRUB SERPL-MCNC: 1 MG/DL (ref 0–1.2)
BILIRUB UR STRIP.AUTO-MCNC: NEGATIVE MG/DL
BUN SERPL-MCNC: 12 MG/DL (ref 6–23)
CALCIUM SERPL-MCNC: 9.1 MG/DL (ref 8.6–10.3)
CARDIAC TROPONIN I PNL SERPL HS: 4 NG/L (ref 0–20)
CHLORIDE SERPL-SCNC: 109 MMOL/L (ref 98–107)
CO2 SERPL-SCNC: 22 MMOL/L (ref 21–32)
COLOR UR: YELLOW
CREAT SERPL-MCNC: 0.96 MG/DL (ref 0.5–1.3)
EGFRCR SERPLBLD CKD-EPI 2021: 88 ML/MIN/1.73M*2
EOSINOPHIL # BLD AUTO: 0.12 X10*3/UL (ref 0–0.7)
EOSINOPHIL NFR BLD AUTO: 1.8 %
ERYTHROCYTE [DISTWIDTH] IN BLOOD BY AUTOMATED COUNT: 12.9 % (ref 11.5–14.5)
GLUCOSE SERPL-MCNC: 109 MG/DL (ref 74–99)
GLUCOSE UR STRIP.AUTO-MCNC: NORMAL MG/DL
HCT VFR BLD AUTO: 45 % (ref 41–52)
HGB BLD-MCNC: 15.4 G/DL (ref 13.5–17.5)
IMM GRANULOCYTES # BLD AUTO: 0.03 X10*3/UL (ref 0–0.7)
IMM GRANULOCYTES NFR BLD AUTO: 0.5 % (ref 0–0.9)
KETONES UR STRIP.AUTO-MCNC: ABNORMAL MG/DL
LACTATE SERPL-SCNC: 0.8 MMOL/L (ref 0.4–2)
LEUKOCYTE ESTERASE UR QL STRIP.AUTO: NEGATIVE
LIPASE SERPL-CCNC: 12 U/L (ref 9–82)
LYMPHOCYTES # BLD AUTO: 1.19 X10*3/UL (ref 1.2–4.8)
LYMPHOCYTES NFR BLD AUTO: 17.9 %
MCH RBC QN AUTO: 33.5 PG (ref 26–34)
MCHC RBC AUTO-ENTMCNC: 34.2 G/DL (ref 32–36)
MCV RBC AUTO: 98 FL (ref 80–100)
MONOCYTES # BLD AUTO: 0.59 X10*3/UL (ref 0.1–1)
MONOCYTES NFR BLD AUTO: 8.9 %
MUCOUS THREADS #/AREA URNS AUTO: NORMAL /LPF
NEUTROPHILS # BLD AUTO: 4.68 X10*3/UL (ref 1.2–7.7)
NEUTROPHILS NFR BLD AUTO: 70.3 %
NITRITE UR QL STRIP.AUTO: NEGATIVE
NRBC BLD-RTO: 0 /100 WBCS (ref 0–0)
P AXIS: 56 DEGREES
P OFFSET: 194 MS
P ONSET: 129 MS
PH UR STRIP.AUTO: 5.5 [PH]
PLATELET # BLD AUTO: 194 X10*3/UL (ref 150–450)
POTASSIUM SERPL-SCNC: 3.3 MMOL/L (ref 3.5–5.3)
PR INTERVAL: 184 MS
PROT SERPL-MCNC: 6.6 G/DL (ref 6.4–8.2)
PROT UR STRIP.AUTO-MCNC: ABNORMAL MG/DL
Q ONSET: 221 MS
QRS COUNT: 11 BEATS
QRS DURATION: 110 MS
QT INTERVAL: 428 MS
QTC CALCULATION(BAZETT): 455 MS
QTC FREDERICIA: 446 MS
R AXIS: 82 DEGREES
RBC # BLD AUTO: 4.6 X10*6/UL (ref 4.5–5.9)
RBC # UR STRIP.AUTO: NEGATIVE /UL
RBC #/AREA URNS AUTO: NORMAL /HPF
SARS-COV-2 RNA RESP QL NAA+PROBE: NOT DETECTED
SODIUM SERPL-SCNC: 140 MMOL/L (ref 136–145)
SP GR UR STRIP.AUTO: >1.05
SQUAMOUS #/AREA URNS AUTO: NORMAL /HPF
T AXIS: 60 DEGREES
T OFFSET: 435 MS
UROBILINOGEN UR STRIP.AUTO-MCNC: NORMAL MG/DL
VENTRICULAR RATE: 68 BPM
WBC # BLD AUTO: 6.7 X10*3/UL (ref 4.4–11.3)
WBC #/AREA URNS AUTO: NORMAL /HPF

## 2024-08-30 PROCEDURE — 83690 ASSAY OF LIPASE: CPT | Performed by: REGISTERED NURSE

## 2024-08-30 PROCEDURE — 96374 THER/PROPH/DIAG INJ IV PUSH: CPT

## 2024-08-30 PROCEDURE — 96361 HYDRATE IV INFUSION ADD-ON: CPT

## 2024-08-30 PROCEDURE — 84484 ASSAY OF TROPONIN QUANT: CPT | Performed by: REGISTERED NURSE

## 2024-08-30 PROCEDURE — 93005 ELECTROCARDIOGRAM TRACING: CPT

## 2024-08-30 PROCEDURE — 83605 ASSAY OF LACTIC ACID: CPT | Performed by: REGISTERED NURSE

## 2024-08-30 PROCEDURE — 80053 COMPREHEN METABOLIC PANEL: CPT | Performed by: REGISTERED NURSE

## 2024-08-30 PROCEDURE — 96375 TX/PRO/DX INJ NEW DRUG ADDON: CPT

## 2024-08-30 PROCEDURE — 2500000004 HC RX 250 GENERAL PHARMACY W/ HCPCS (ALT 636 FOR OP/ED): Performed by: REGISTERED NURSE

## 2024-08-30 PROCEDURE — 74177 CT ABD & PELVIS W/CONTRAST: CPT | Performed by: RADIOLOGY

## 2024-08-30 PROCEDURE — 81001 URINALYSIS AUTO W/SCOPE: CPT | Performed by: REGISTERED NURSE

## 2024-08-30 PROCEDURE — 87635 SARS-COV-2 COVID-19 AMP PRB: CPT | Performed by: REGISTERED NURSE

## 2024-08-30 PROCEDURE — 2550000001 HC RX 255 CONTRASTS: Performed by: REGISTERED NURSE

## 2024-08-30 PROCEDURE — 85025 COMPLETE CBC W/AUTO DIFF WBC: CPT | Performed by: REGISTERED NURSE

## 2024-08-30 PROCEDURE — 99285 EMERGENCY DEPT VISIT HI MDM: CPT | Mod: 25

## 2024-08-30 PROCEDURE — 74177 CT ABD & PELVIS W/CONTRAST: CPT

## 2024-08-30 RX ORDER — MORPHINE SULFATE 4 MG/ML
4 INJECTION, SOLUTION INTRAMUSCULAR; INTRAVENOUS ONCE
Status: COMPLETED | OUTPATIENT
Start: 2024-08-30 | End: 2024-08-30

## 2024-08-30 RX ORDER — ONDANSETRON 4 MG/1
4 TABLET, ORALLY DISINTEGRATING ORAL ONCE
Status: DISCONTINUED | OUTPATIENT
Start: 2024-08-30 | End: 2024-08-30 | Stop reason: HOSPADM

## 2024-08-30 RX ORDER — ONDANSETRON HYDROCHLORIDE 2 MG/ML
4 INJECTION, SOLUTION INTRAVENOUS ONCE
Status: COMPLETED | OUTPATIENT
Start: 2024-08-30 | End: 2024-08-30

## 2024-08-30 RX ORDER — MECLIZINE HCL 12.5 MG 12.5 MG/1
12.5 TABLET ORAL 3 TIMES DAILY PRN
Qty: 10 TABLET | Refills: 0 | Status: SHIPPED | OUTPATIENT
Start: 2024-08-30 | End: 2024-09-09

## 2024-08-30 ASSESSMENT — LIFESTYLE VARIABLES
EVER FELT BAD OR GUILTY ABOUT YOUR DRINKING: NO
HAVE YOU EVER FELT YOU SHOULD CUT DOWN ON YOUR DRINKING: NO
EVER HAD A DRINK FIRST THING IN THE MORNING TO STEADY YOUR NERVES TO GET RID OF A HANGOVER: NO
HAVE PEOPLE ANNOYED YOU BY CRITICIZING YOUR DRINKING: NO
TOTAL SCORE: 0

## 2024-08-30 ASSESSMENT — PAIN DESCRIPTION - LOCATION
LOCATION_2: SHOULDER
LOCATION: SHOULDER
LOCATION: ABDOMEN
LOCATION: SHOULDER

## 2024-08-30 ASSESSMENT — PAIN SCALES - GENERAL
PAINLEVEL_OUTOF10: 7
PAINLEVEL_OUTOF10: 7
PAINLEVEL_OUTOF10: 8
PAINLEVEL_OUTOF10: 8

## 2024-08-30 ASSESSMENT — PAIN DESCRIPTION - PAIN TYPE
TYPE: ACUTE PAIN
TYPE: ACUTE PAIN

## 2024-08-30 ASSESSMENT — PAIN DESCRIPTION - ORIENTATION
ORIENTATION: RIGHT
ORIENTATION_2: LEFT
ORIENTATION: RIGHT

## 2024-08-30 ASSESSMENT — PAIN - FUNCTIONAL ASSESSMENT
PAIN_FUNCTIONAL_ASSESSMENT: 0-10
PAIN_FUNCTIONAL_ASSESSMENT: 0-10

## 2024-08-30 NOTE — ED PROVIDER NOTES
HPI   Chief Complaint   Patient presents with    Vomiting     Pt states 4 days ago that he started vomiting, he states he thinks he has a stomach bug.     64-year-old male presents emergency department today for evaluation of vomiting.  Patient tells me for 4 days he has been experiencing nausea and vomiting.  Patient tells me he started to have decreased urine and has not had bowel movements.  Patient denies any fever or chills.  Patient tells me he is concerned that he does have a stomach bug.  Patient tells me this morning prior to vomiting he became profusely sweaty.  Patient denies any chest pain shortness of breath.  Patient currently reports his abdominal pain is generalized and rates an 8/10 on the pain scale.  Patient has no other complaints on arrival to the ED.      History provided by:  Patient   used: No            Patient History   Past Medical History:   Diagnosis Date    Blind one eye     Hypertension      History reviewed. No pertinent surgical history.  No family history on file.  Social History     Tobacco Use    Smoking status: Former     Current packs/day: 0.00     Types: Cigarettes     Quit date: 2023     Years since quittin.9    Smokeless tobacco: Not on file   Vaping Use    Vaping status: Never Used   Substance Use Topics    Alcohol use: Not Currently    Drug use: Yes     Types: Marijuana       Physical Exam   ED Triage Vitals [24 1152]   Temperature Heart Rate Respirations BP   36.8 °C (98.2 °F) 75 16 141/70      Pulse Ox Temp Source Heart Rate Source Patient Position   97 % Temporal Monitor --      BP Location FiO2 (%)     Left arm --       Physical Exam  Vitals and nursing note reviewed.   Constitutional:       Appearance: Normal appearance.   HENT:      Head: Normocephalic and atraumatic.   Cardiovascular:      Rate and Rhythm: Normal rate and regular rhythm.      Pulses: Normal pulses.      Heart sounds: Normal heart sounds.   Pulmonary:      Effort:  Pulmonary effort is normal.      Breath sounds: Normal breath sounds.   Abdominal:      General: Abdomen is flat.      Palpations: Abdomen is soft.      Tenderness: There is generalized abdominal tenderness.   Musculoskeletal:         General: Normal range of motion.   Skin:     General: Skin is warm and dry.      Capillary Refill: Capillary refill takes less than 2 seconds.   Neurological:      General: No focal deficit present.      Mental Status: He is alert and oriented to person, place, and time.   Psychiatric:         Mood and Affect: Mood normal.         Behavior: Behavior normal.           ED Course & MDM   Diagnoses as of 08/30/24 1812   Nausea and vomiting, unspecified vomiting type   Dizziness                 No data recorded     [place] Coma Scale Score: 15 (08/30/24 1157 : Charity Tipton RN)                           Medical Decision Making    Patient seen examined emergency department; patient is healthy nontoxic in appearance and appearing acute distress.  Patient's abdomen is obese.  Patient is experience generalized pain with palpation patient does not localize pain anywhere.  Patient's lung sounds are clear bilaterally without any adventitious noise.  Heart regular rate and rhythm without any murmur appreciated.  Will treat patient's symptoms with IV fluids IV Zofran and IV morphine.  Will order CT of the abdomen pelvis to evaluate for obstruction, perforation or colitis contributing to patient's symptoms.  There are basic labs, lactate and a lipase given the patient has been vomiting.  Given patient's age and history we will order chest x-ray, EKG and troponin to rule out acute ACS as a contributing factor to patient's symptoms.      EKG at 12:24 with ventricular rate of 68, as interpreted by me, shows a normal rate and rhythm, normal axis, normal intervals. And normal ST and T wave pattern with no evidence of acute ischemia or other acute findings    I Did review patient's chart and he was seen  in the emergency department at [place] on 8/28/2024.  At that time patient was complaining of headache and dizziness as well as well as visual disturbances.  Patient had a full neuroexam there including a CT of the head as well as a CT angio of the head and neck.  I did review the results of patient's CT angio of head and neck as well as patient's head CT.  There was no evidence of acute intercranial process no intracranial extracranial arterial large vessel occlusion or stenosis.    Patient's high sensitive troponin is negative.  Lactate is 0.8.  Lipase is 12, COVID swab is negative.  CMP significant for hyponatremia and potassium of 3.3 remainder of patient's CMP is unremarkable, CBC is unremarkable.  CT of the abdomen pelvis does show diverticulosis but is without evidence of diverticulitis.  There is some distention of the urinary bladder noted by the radiologist.  Urinalysis is negative for leukocytes and nitrites    Upon reevaluation patient tells me he is feeling better but when he went to CAT scan he did become dizzy again.  I did orthostatic vitals while is in the room with the patient.  These were negative.  I did offer patient admission and he tells me he would like to think about it.    Patient called me into the room.  At this time he would rather be discharged home.  We discussed symptomatic care and the importance of avoiding dehydration. Discussed importance of regular and adequate fluid intake.  Is planning on prescribing patient Zofran although he tells me this does not work for him and he has a different prescription for nausea that was written by his PCP waiting at the pharmacy for him.  I will discharge patient home with prescription for Antivert to aid in his dizziness.  Advised to contact PCP for follow-up in 3 to 5 days if not improving. Recommended to return to the ED if any dizziness or concerns for dehydration, severe or worsening abdominal pain, blood in stool, fever, or any other new or  worsening symptoms.    Labs Reviewed   CBC WITH AUTO DIFFERENTIAL - Abnormal       Result Value    WBC 6.7      nRBC 0.0      RBC 4.60      Hemoglobin 15.4      Hematocrit 45.0      MCV 98      MCH 33.5      MCHC 34.2      RDW 12.9      Platelets 194      Neutrophils % 70.3      Immature Granulocytes %, Automated 0.5      Lymphocytes % 17.9      Monocytes % 8.9      Eosinophils % 1.8      Basophils % 0.6      Neutrophils Absolute 4.68      Immature Granulocytes Absolute, Automated 0.03      Lymphocytes Absolute 1.19 (*)     Monocytes Absolute 0.59      Eosinophils Absolute 0.12      Basophils Absolute 0.04     COMPREHENSIVE METABOLIC PANEL - Abnormal    Glucose 109 (*)     Sodium 140      Potassium 3.3 (*)     Chloride 109 (*)     Bicarbonate 22      Anion Gap 12      Urea Nitrogen 12      Creatinine 0.96      eGFR 88      Calcium 9.1      Albumin 4.5      Alkaline Phosphatase 66      Total Protein 6.6      AST 18      Bilirubin, Total 1.0      ALT 21     URINALYSIS WITH REFLEX CULTURE AND MICROSCOPIC - Abnormal    Color, Urine Yellow      Appearance, Urine Clear      Specific Gravity, Urine >1.050 (*)     pH, Urine 5.5      Protein, Urine 10 (TRACE)      Glucose, Urine Normal      Blood, Urine NEGATIVE      Ketones, Urine 20 (1+) (*)     Bilirubin, Urine NEGATIVE      Urobilinogen, Urine Normal      Nitrite, Urine NEGATIVE      Leukocyte Esterase, Urine NEGATIVE     LIPASE - Normal    Lipase 12      Narrative:     Venipuncture immediately after or during the administration of Metamizole may lead to falsely low results. Testing should be performed immediately prior to Metamizole dosing.   TROPONIN I, HIGH SENSITIVITY - Normal    Troponin I, High Sensitivity 4      Narrative:     Less than 99th percentile of normal range cutoff-  Female and children under 18 years old <14 ng/L; Male <21 ng/L: Negative  Repeat testing should be performed if clinically indicated.     Female and children under 18 years old 14-50 ng/L;  Male 21-50 ng/L:  Consistent with possible cardiac damage and possible increased clinical   risk. Serial measurements may help to assess extent of myocardial damage.     >50 ng/L: Consistent with cardiac damage, increased clinical risk and  myocardial infarction. Serial measurements may help assess extent of   myocardial damage.      NOTE: Children less than 1 year old may have higher baseline troponin   levels and results should be interpreted in conjunction with the overall   clinical context.     NOTE: Troponin I testing is performed using a different   testing methodology at [facility] than at other   [facility]. Direct result comparisons should only   be made within the same method.   LACTATE - Normal    Lactate 0.8      Narrative:     Venipuncture immediately after or during the administration of Metamizole may lead to falsely low results. Testing should be performed immediately  prior to Metamizole dosing.   SARS-COV-2 PCR - Normal    Coronavirus 2019, PCR Not Detected      Narrative:     This assay has received FDA Emergency Use Authorization (EUA) and is only authorized for the duration of time that circumstances exist to justify the authorization of the emergency use of in vitro diagnostic tests for the detection of SARS-CoV-2 virus and/or diagnosis of COVID-19 infection under section 564(b)(1) of the Act, 21 U.S.C. 360bbb-3(b)(1). This assay is an in vitro diagnostic nucleic acid amplification test for the qualitative detection of SARS-CoV-2 from nasopharyngeal specimens and has been validated for use at [facility]. Negative results do not preclude COVID-19 infections and should not be used as the sole basis for diagnosis, treatment, or other management decisions.     URINALYSIS WITH REFLEX CULTURE AND MICROSCOPIC    Narrative:     The following orders were created for panel order Urinalysis with Reflex Culture and Microscopic.  Procedure                                Abnormality         Status                     ---------                               -----------         ------                     Urinalysis with Reflex C...[[id number]]  Abnormal            Final result               Extra Urine Gray Tube[[id number]]                            In process                   Please view results for these tests on the individual orders.   EXTRA URINE GRAY TUBE   URINALYSIS MICROSCOPIC WITH REFLEX CULTURE    WBC, Urine 1-5      RBC, Urine 3-5      Squamous Epithelial Cells, Urine 1-9 (SPARSE)      Mucus, Urine 3+         CT abdomen pelvis w IV contrast   Final Result   Colonic diverticulosis without acute diverticulitis.        Normal appendix. No bowel obstruction.        Fusiform aneurysmal dilation of the infrarenal aorta measuring 4 cm   in diameter as well as mild aneurysmal dilation of the right common   iliac artery measuring 2.2 cm in diameter.        Moderately distended urinary bladder with diffuse intermediate   attenuation could represent residual excreted contrast material from   a prior exam in the appropriate clinical setting. Blood product and   debris not fully excluded.                  MACRO:   None.        Signed by: Claudio Griffith 8/30/2024 2:32 PM   Dictation workstation:   [id number]          Procedure  Procedures     Shanthi Neely, JEFF-GI  08/30/24 4418

## 2024-08-31 LAB — HOLD SPECIMEN: NORMAL

## 2024-09-05 ENCOUNTER — APPOINTMENT (OUTPATIENT)
Dept: CARDIOLOGY | Facility: CLINIC | Age: 64
End: 2024-09-05
Payer: COMMERCIAL

## 2024-09-10 ENCOUNTER — HOSPITAL ENCOUNTER (OUTPATIENT)
Facility: HOSPITAL | Age: 64
Setting detail: OUTPATIENT SURGERY
End: 2024-09-10
Attending: INTERNAL MEDICINE | Admitting: INTERNAL MEDICINE
Payer: COMMERCIAL

## 2024-09-10 ENCOUNTER — APPOINTMENT (OUTPATIENT)
Dept: CARDIOLOGY | Facility: CLINIC | Age: 64
End: 2024-09-10
Payer: COMMERCIAL

## 2024-09-10 ENCOUNTER — LAB (OUTPATIENT)
Dept: LAB | Facility: LAB | Age: 64
End: 2024-09-10
Payer: COMMERCIAL

## 2024-09-10 VITALS
SYSTOLIC BLOOD PRESSURE: 148 MMHG | BODY MASS INDEX: 29.06 KG/M2 | HEART RATE: 86 BPM | DIASTOLIC BLOOD PRESSURE: 80 MMHG | HEIGHT: 70 IN | WEIGHT: 203 LBS

## 2024-09-10 DIAGNOSIS — R55 SYNCOPE AND COLLAPSE: ICD-10-CM

## 2024-09-10 DIAGNOSIS — R94.39 ABNORMAL MYOCARDIAL PERFUSION STUDY: ICD-10-CM

## 2024-09-10 DIAGNOSIS — I25.5 CARDIOMYOPATHY, ISCHEMIC: Primary | ICD-10-CM

## 2024-09-10 DIAGNOSIS — G62.9 NEUROPATHY: ICD-10-CM

## 2024-09-10 DIAGNOSIS — I25.5 CARDIOMYOPATHY, ISCHEMIC: ICD-10-CM

## 2024-09-10 DIAGNOSIS — J44.9 CHRONIC OBSTRUCTIVE PULMONARY DISEASE, UNSPECIFIED COPD TYPE (MULTI): ICD-10-CM

## 2024-09-10 LAB
ANION GAP SERPL CALC-SCNC: 11 MMOL/L (ref 10–20)
BUN SERPL-MCNC: 12 MG/DL (ref 6–23)
CALCIUM SERPL-MCNC: 9.5 MG/DL (ref 8.6–10.3)
CHLORIDE SERPL-SCNC: 109 MMOL/L (ref 98–107)
CO2 SERPL-SCNC: 24 MMOL/L (ref 21–32)
CREAT SERPL-MCNC: 0.91 MG/DL (ref 0.5–1.3)
EGFRCR SERPLBLD CKD-EPI 2021: >90 ML/MIN/1.73M*2
ERYTHROCYTE [DISTWIDTH] IN BLOOD BY AUTOMATED COUNT: 12.4 % (ref 11.5–14.5)
GLUCOSE SERPL-MCNC: 102 MG/DL (ref 74–99)
HCT VFR BLD AUTO: 48.5 % (ref 41–52)
HGB BLD-MCNC: 15.8 G/DL (ref 13.5–17.5)
INR PPP: 1 (ref 0.9–1.1)
MCH RBC QN AUTO: 33.3 PG (ref 26–34)
MCHC RBC AUTO-ENTMCNC: 32.6 G/DL (ref 32–36)
MCV RBC AUTO: 102 FL (ref 80–100)
NRBC BLD-RTO: 0 /100 WBCS (ref 0–0)
PLATELET # BLD AUTO: 236 X10*3/UL (ref 150–450)
POTASSIUM SERPL-SCNC: 4.3 MMOL/L (ref 3.5–5.3)
PROTHROMBIN TIME: 11 SECONDS (ref 9.8–12.8)
RBC # BLD AUTO: 4.74 X10*6/UL (ref 4.5–5.9)
SODIUM SERPL-SCNC: 140 MMOL/L (ref 136–145)
WBC # BLD AUTO: 7.2 X10*3/UL (ref 4.4–11.3)

## 2024-09-10 PROCEDURE — 86334 IMMUNOFIX E-PHORESIS SERUM: CPT

## 2024-09-10 PROCEDURE — 99215 OFFICE O/P EST HI 40 MIN: CPT | Performed by: INTERNAL MEDICINE

## 2024-09-10 PROCEDURE — 85027 COMPLETE CBC AUTOMATED: CPT

## 2024-09-10 PROCEDURE — 86235 NUCLEAR ANTIGEN ANTIBODY: CPT

## 2024-09-10 PROCEDURE — 1159F MED LIST DOCD IN RCRD: CPT | Performed by: INTERNAL MEDICINE

## 2024-09-10 PROCEDURE — 36415 COLL VENOUS BLD VENIPUNCTURE: CPT

## 2024-09-10 PROCEDURE — 84155 ASSAY OF PROTEIN SERUM: CPT

## 2024-09-10 PROCEDURE — 86320 SERUM IMMUNOELECTROPHORESIS: CPT

## 2024-09-10 PROCEDURE — 84165 PROTEIN E-PHORESIS SERUM: CPT

## 2024-09-10 PROCEDURE — 3008F BODY MASS INDEX DOCD: CPT | Performed by: INTERNAL MEDICINE

## 2024-09-10 PROCEDURE — 80048 BASIC METABOLIC PNL TOTAL CA: CPT

## 2024-09-10 PROCEDURE — 1157F ADVNC CARE PLAN IN RCRD: CPT | Performed by: INTERNAL MEDICINE

## 2024-09-10 PROCEDURE — 85610 PROTHROMBIN TIME: CPT

## 2024-09-10 RX ORDER — VALSARTAN 40 MG/1
40 TABLET ORAL DAILY
Qty: 90 TABLET | Refills: 3 | Status: ON HOLD | OUTPATIENT
Start: 2024-09-10 | End: 2025-09-10

## 2024-09-10 NOTE — PATIENT INSTRUCTIONS
Continue same medications/treatment.  Patient educated on proper medication use.  Patient educated on risk factor modification.  Please bring any lab results from other providers/physicians to your next appointment.    Please bring all medicines, vitamins, and herbal supplements with you when you come to the office.    Prescriptions will not be filled unless you are compliant with your follow up appointments or have a follow up appointment scheduled as per instruction of your physician. Refills should be requested at the time of your visit.    Follow up in 8 weeks  Left heart cath to be done with Dr. Cori Sullivan M.D.    Labs to be drawn prior to cath    I, OTTO NERI RN, AM SCRIBING FOR AND IN THE PRESENCE OF DR. MARYBETH COATES M.D., FACC

## 2024-09-10 NOTE — PROGRESS NOTES
Patient:  Be Luo  YOB: 1960  MRN: [id number]       HPI:       Be Luo is a 64 y.o. male who returns today for cardiac follow-up.  He was seen in consultation at [facility] July for, 2024 after presenting with syncope.  He has a history of mild coronary disease, COPD, obstructive sleep apnea, hypertension, hyperlipidemia, and tobacco abuse.  He quit smoking tobacco in November 2023.  Cardiac catheterization by Dr. Elías Fischer at [facility] on December 12, 2022 showed 40% stenosis of the LAD, normal circumflex, and less than 40% stenosis of the RCA.  Estimated LV ejection fraction was 55%.       He presented to the emergency department on July 3, 2024 after he passed out.  The patient states he was getting up from a chair when he noted onset of lightheadedness.  He states the room was spinning.  He fell to the floor and hit the left side of his face and head.  Some family members observed the fall and noted that he was unconscious for a few minutes.  He has a history of seizures but no seizure activity was witnessed.  No incontinence.  Patient states he regained consciousness and noticed pain over the left face and left arm.  He denies any associated chest pain, shortness of breath, diaphoresis, nausea, or vomiting.  He denies any prior history of near syncope or syncope.  He did not note any palpitations.     In the emergency department he was noted to have stable vital signs.  Oxygen saturation 97%.  CBC and CMP normal.  High-sensitivity troponin level 4 and 5.  CT scan of the brain was negative for any acute findings.  He was noted to have left frontal scalp soft tissue swelling.  There was suggestion of possible diverticulitis.  There was a 3.8 cm infrarenal abdominal aortic aneurysm.  Chest x-ray did not show any active disease.  He was admitted to telemetry.  The following morning he had recurrent lightheadedness and vertigo when he stood up at the bedside.  He had difficulty  balancing himself.  Orthostatic blood pressure and pulse were negative.  He remained in normal sinus rhythm without any significant bradycardia or tachyarrhythmias.  Suspect vertigo leading to a fall and and possible concussion after he hit his head.  He remained in normal sinus rhythm without any active cardiac dysrhythmias.  EKG showed normal sinus rhythm with rightward axis.  No significant ST changes.  High-sensitivity troponin levels were negative.  He does not have any anginal or CHF symptoms.     A Lexiscan myocardial perfusion study August 22, 2024 showed  a small to moderate-sized area of inferior wall myocardial ischemia.  No evidence infarction.  Calculated LV ejection fraction 43%.  Carotid ultrasound July 5, 2024 showed less than 50% stenosis bilaterally.  Echocardiogram showed estimated LV ejection fraction 45 to 50%.  Bubble study was negative.  Trivial MR and TR.  A Holter monitor August 22, 2024 showed normal sinus rhythm with average heart rate 89 bpm, minimum heart rate 67 beats minute and maximal heart rate 142 beats per minute.  Occasional PACs and PVCs.  No significant pauses.     He had a recent syncope and has evidence of underlying ischemic cardiomyopathy.  Perfusion study shows some inferior myocardial ischemia.  We discussed various diagnostic and treatment options.  His preference is to proceed with cardiac catheterization to exclude progressive ischemic heart disease.  Angioplasty and stenting if deemed necessary.  Risks of benefits were discussed in detail.  He understands these risks and is agreeable to proceed.      Objective:     Vitals:    09/10/24 1038   BP: 168/90   Pulse: 86       Wt Readings from Last 4 Encounters:   09/10/24 92.1 kg (203 lb)   08/30/24 91.6 kg (202 lb)   08/19/24 98.5 kg (217 lb 3.2 oz)   07/09/24 90.9 kg (200 lb 6.4 oz)       Allergies:     Allergies   Allergen Reactions    Green Tea Hives and Shortness of breath    Gabapentin Agitation    Lyrica [Pregabalin]  GI Upset    Norco [Hydrocodone-Acetaminophen] Agitation    Prednisone Other    Sulfasalazine GI Upset    Vioxx [Rofecoxib] Other     Retains water/swelling        Medications:     Current Outpatient Medications   Medication Instructions    acetaZOLAMIDE (DIAMOX) 250 mg, oral, 2 times daily    Actemra ACTPen 162 mg, subcutaneous, Every 7 days    albuterol 90 mcg/actuation inhaler 2 puffs, inhalation, Every 6 hours PRN    albuterol 2.5 mg, inhalation, 2 times daily PRN    aspirin 81 mg, oral, Daily RT    atorvastatin (LIPITOR) 10 mg, oral, Daily    Bevespi Aerosphere 9-4.8 mcg HFA aerosol inhaler 2 puffs, inhalation, 2 times daily    brimonidine (AlphaGAN) 0.2 % ophthalmic solution 1 drop, Left Eye, 3 times daily    cholecalciferol (VITAMIN D-3) 2,000 Units, oral, Daily with breakfast    cyclobenzaprine (FLEXERIL) 10 mg, oral, 3 times daily PRN    dorzolamide-timoloL (Cosopt) 22.3-6.8 mg/mL ophthalmic solution 1 drop, Left Eye, 2 times daily    lamoTRIgine (LaMICtal) 200 mg tablet 2 tablets, oral, Every morning, Take in addition to evening dose of 3 tablets for a total of 5 tablets daily    lamoTRIgine (LaMICtal) 200 mg tablet 3 tablets, oral, Every evening, Take in addition to morning dose of 2 tablets for a total of 5 tablets daily    latanoprost (Xalatan) 0.005 % ophthalmic solution 1 drop, Left Eye, Daily    medical cannabis 1 each, oral    metFORMIN (Glucophage) 500 mg tablet 1 tablet, oral, Daily with evening meal    multivitamin with minerals iron-free (multivitamin-iron-minerals-folic acid) 1 tablet, oral, Daily RT    ondansetron (ZOFRAN) 4 mg, oral, Every 8 hours PRN    potassium chloride CR 10 mEq ER tablet 20 mEq, oral, Daily    pramipexole (MIRAPEX) 0.125 mg, oral, Nightly    prednisoLONE acetate (Pred-Forte) 1 % ophthalmic suspension 1 drop, ophthalmic (eye), 2 times daily    Stiolto Respimat 2.5-2.5 mcg/actuation mist inhaler 2 Inhalations, inhalation, Daily       Physical Examination:   GENERAL:  Well  developed, well nourished, in no acute distress.  CHEST:  Symmetric and nontender.  NEURO/PSYCH:  Alert and oriented times three with approppriate behavior and responses.  NECK:  Supple, no JVD, no bruit.  LUNGS:  Clear to auscultation bilaterally, normal respiratory effort.  HEART:  Rate and rhythm regular with no evident murmur, no gallop appreciated.        There are no rubs, clicks or heaves.  EXTREMITIES:  Warm with good color, no clubbing or cyanosis.  There is no edema noted.  PERIPHERAL VASCULAR:  Pulses present and equally palpable; 2+ throughout.      Lab:     CBC:   Lab Results   Component Value Date    WBC 6.7 08/30/2024    RBC 4.60 08/30/2024    HGB 15.4 08/30/2024    HCT 45.0 08/30/2024     08/30/2024        CMP:    Lab Results   Component Value Date     08/30/2024    K 3.3 (L) 08/30/2024     (H) 08/30/2024    CO2 22 08/30/2024    BUN 12 08/30/2024    CREATININE 0.96 08/30/2024    GLUCOSE 109 (H) 08/30/2024    CALCIUM 9.1 08/30/2024       BMP:  Lab Results   Component Value Date     08/30/2024     07/05/2024     07/03/2024    K 3.3 (L) 08/30/2024    K 4.0 07/05/2024    K 3.8 07/03/2024     (H) 08/30/2024     (H) 07/05/2024     07/03/2024    CO2 22 08/30/2024    CO2 27 07/05/2024    CO2 25 07/03/2024    BUN 12 08/30/2024    BUN 13 07/05/2024    BUN 17 07/03/2024    CREATININE 0.96 08/30/2024    CREATININE 0.76 07/05/2024    CREATININE 0.89 07/03/2024       CBC:  Lab Results   Component Value Date    WBC 6.7 08/30/2024    WBC 4.3 (L) 07/05/2024    WBC 4.2 (L) 07/03/2024    RBC 4.60 08/30/2024    RBC 4.34 (L) 07/05/2024    RBC 4.56 07/03/2024    HGB 15.4 08/30/2024    HGB 14.4 07/05/2024    HGB 15.3 07/03/2024    HCT 45.0 08/30/2024    HCT 42.7 07/05/2024    HCT 44.4 07/03/2024    MCV 98 08/30/2024    MCV 98 07/05/2024    MCV 97 07/03/2024    MCH 33.5 08/30/2024    MCH 33.2 07/05/2024    MCH 33.6 07/03/2024    MCHC 34.2 08/30/2024    MCHC 33.7  07/05/2024    MCHC 34.5 07/03/2024    RDW 12.9 08/30/2024    RDW 13.4 07/05/2024    RDW 13.3 07/03/2024     08/30/2024     07/05/2024     07/03/2024       Hepatic Function Panel:    Lab Results   Component Value Date    ALKPHOS 66 08/30/2024    ALT 21 08/30/2024    AST 18 08/30/2024    PROT 6.6 08/30/2024    BILITOT 1.0 08/30/2024       HgBA1c:    Lab Results   Component Value Date    HGBA1C 5.5 07/04/2024       Magnesium:    Lab Results   Component Value Date    MG 2.05 07/03/2024       TSH:    Lab Results   Component Value Date    TSH 1.02 07/04/2024       BNP:   Lab Results   Component Value Date    BNP 16 12/11/2023        PT/INR:    Lab Results   Component Value Date    PROTIME 11.0 12/11/2023    INR 1.0 12/11/2023       Cardiac Enzymes:    Lab Results   Component Value Date    TROPHS 4 08/30/2024    TROPHS 5 07/04/2024    TROPHS 4 07/03/2024         Diagnostic Studies:     ECG 12 Lead  Result Date: 8/30/2024    Normal sinus rhythm Normal ECG When compared with ECG of 04-JUL-2024 02:31, No significant change was found See ED provider note for full interpretation and clinical correlation Confirmed by Ania Reilly ([id number]) on 8/30/2024 1:48:52 PM      Problem List:     Patient Active Problem List   Diagnosis    Syncope and collapse    Fall    Chronic obstructive pulmonary disease (Multi)    Seizure disorder (Multi)       Asessment:     Problem List Items Addressed This Visit             ICD-10-CM    Syncope and collapse R55    Relevant Orders    Case Request Cath Lab: Left Heart Cath (Completed)    Basic Metabolic Panel (Completed)    Protime-INR (Completed)    CBC (Completed)    Chronic obstructive pulmonary disease (Multi) J44.9    Abnormal myocardial perfusion study R94.39    Relevant Orders    Case Request Cath Lab: Left Heart Cath (Completed)    Basic Metabolic Panel (Completed)    Protime-INR (Completed)    CBC (Completed)    Cardiomyopathy, ischemic - Primary I25.5    Relevant  Medications    valsartan (Diovan) 40 mg tablet    Other Relevant Orders    Case Request Cath Lab: Left Heart Cath (Completed)    Basic Metabolic Panel (Completed)    Protime-INR (Completed)    CBC (Completed)

## 2024-09-11 LAB
ENA SS-A AB SER IA-ACNC: <0.2 AI
ENA SS-B AB SER IA-ACNC: <0.2 AI
PROT SERPL-MCNC: 7 G/DL (ref 6.4–8.2)

## 2024-09-12 ENCOUNTER — TELEPHONE (OUTPATIENT)
Dept: CARDIOLOGY | Facility: CLINIC | Age: 64
End: 2024-09-12
Payer: COMMERCIAL

## 2024-09-12 NOTE — TELEPHONE ENCOUNTER
Patient called to adv that when he gets his heart cath that he can't get anything done in his legs due to history in his chart. And stated he can get it done in his arms. Wanted me to advise Dr. Sullivan

## 2024-09-13 NOTE — TELEPHONE ENCOUNTER
I reviewed chart. I do not see any prior history of PAD or previous interventions ect   Unclear of message below from .     Called patient- states NO, he has not had prior arterial work done in his legs that would prevent coronary angiography procedure. He does FALL OFTEN- states he falls 10+ times per week and is concerned if access is to be Right groin this would increase his falls.     I advised patient that I will forward to Dr. Cori Sullivan MD [facility] as FYI. Typically, he tries to use Right wrist access if appropriate.

## 2024-09-14 LAB
ALBUMIN: 4.7 G/DL (ref 3.4–5)
ALPHA 1 GLOBULIN: 0.2 G/DL (ref 0.2–0.6)
ALPHA 2 GLOBULIN: 0.6 G/DL (ref 0.4–1.1)
BETA GLOBULIN: 0.7 G/DL (ref 0.5–1.2)
GAMMA GLOBULIN: 0.8 G/DL (ref 0.5–1.4)
IMMUNOFIXATION COMMENT: NORMAL
PATH REVIEW - SERUM IMMUNOFIXATION: NORMAL
PATH REVIEW-SERUM PROTEIN ELECTROPHORESIS: NORMAL
PROTEIN ELECTROPHORESIS COMMENT: NORMAL

## 2024-09-15 ENCOUNTER — APPOINTMENT (OUTPATIENT)
Dept: CARDIOLOGY | Facility: HOSPITAL | Age: 64
End: 2024-09-15
Payer: COMMERCIAL

## 2024-09-15 ENCOUNTER — HOSPITAL ENCOUNTER (OUTPATIENT)
Facility: HOSPITAL | Age: 64
Setting detail: OBSERVATION
End: 2024-09-15
Attending: EMERGENCY MEDICINE | Admitting: STUDENT IN AN ORGANIZED HEALTH CARE EDUCATION/TRAINING PROGRAM
Payer: COMMERCIAL

## 2024-09-15 VITALS
WEIGHT: 210 LBS | RESPIRATION RATE: 19 BRPM | DIASTOLIC BLOOD PRESSURE: 60 MMHG | TEMPERATURE: 97 F | HEIGHT: 70 IN | OXYGEN SATURATION: 96 % | HEART RATE: 77 BPM | SYSTOLIC BLOOD PRESSURE: 118 MMHG | BODY MASS INDEX: 30.06 KG/M2

## 2024-09-15 DIAGNOSIS — I20.9 ANGINA PECTORIS, UNSPECIFIED: ICD-10-CM

## 2024-09-15 DIAGNOSIS — I25.5 CARDIOMYOPATHY, ISCHEMIC: ICD-10-CM

## 2024-09-15 DIAGNOSIS — R55 SYNCOPE AND COLLAPSE: Primary | ICD-10-CM

## 2024-09-15 LAB
ALBUMIN SERPL BCP-MCNC: 4.2 G/DL (ref 3.4–5)
ALP SERPL-CCNC: 64 U/L (ref 33–136)
ALT SERPL W P-5'-P-CCNC: 18 U/L (ref 10–52)
ANION GAP SERPL CALC-SCNC: 9 MMOL/L (ref 10–20)
APPEARANCE UR: ABNORMAL
AST SERPL W P-5'-P-CCNC: 15 U/L (ref 9–39)
ATRIAL RATE: 79 BPM
BASOPHILS # BLD AUTO: 0.04 X10*3/UL (ref 0–0.1)
BASOPHILS NFR BLD AUTO: 0.8 %
BILIRUB DIRECT SERPL-MCNC: 0.2 MG/DL (ref 0–0.3)
BILIRUB SERPL-MCNC: 0.8 MG/DL (ref 0–1.2)
BILIRUB UR STRIP.AUTO-MCNC: NEGATIVE MG/DL
BUN SERPL-MCNC: 10 MG/DL (ref 6–23)
CALCIUM SERPL-MCNC: 8.4 MG/DL (ref 8.6–10.3)
CARDIAC TROPONIN I PNL SERPL HS: 4 NG/L (ref 0–20)
CHLORIDE SERPL-SCNC: 112 MMOL/L (ref 98–107)
CO2 SERPL-SCNC: 25 MMOL/L (ref 21–32)
COLOR UR: ABNORMAL
CREAT SERPL-MCNC: 0.88 MG/DL (ref 0.5–1.3)
EGFRCR SERPLBLD CKD-EPI 2021: >90 ML/MIN/1.73M*2
EOSINOPHIL # BLD AUTO: 0.29 X10*3/UL (ref 0–0.7)
EOSINOPHIL NFR BLD AUTO: 5.5 %
ERYTHROCYTE [DISTWIDTH] IN BLOOD BY AUTOMATED COUNT: 12.1 % (ref 11.5–14.5)
GLUCOSE SERPL-MCNC: 96 MG/DL (ref 74–99)
GLUCOSE UR STRIP.AUTO-MCNC: NORMAL MG/DL
HCT VFR BLD AUTO: 44.8 % (ref 41–52)
HGB BLD-MCNC: 14.9 G/DL (ref 13.5–17.5)
IMM GRANULOCYTES # BLD AUTO: 0.01 X10*3/UL (ref 0–0.7)
IMM GRANULOCYTES NFR BLD AUTO: 0.2 % (ref 0–0.9)
KETONES UR STRIP.AUTO-MCNC: NEGATIVE MG/DL
LACTATE SERPL-SCNC: 1.1 MMOL/L (ref 0.4–2)
LEUKOCYTE ESTERASE UR QL STRIP.AUTO: NEGATIVE
LYMPHOCYTES # BLD AUTO: 1.25 X10*3/UL (ref 1.2–4.8)
LYMPHOCYTES NFR BLD AUTO: 23.5 %
MAGNESIUM SERPL-MCNC: 1.84 MG/DL (ref 1.6–2.4)
MCH RBC QN AUTO: 33.6 PG (ref 26–34)
MCHC RBC AUTO-ENTMCNC: 33.3 G/DL (ref 32–36)
MCV RBC AUTO: 101 FL (ref 80–100)
MONOCYTES # BLD AUTO: 0.48 X10*3/UL (ref 0.1–1)
MONOCYTES NFR BLD AUTO: 9 %
NEUTROPHILS # BLD AUTO: 3.25 X10*3/UL (ref 1.2–7.7)
NEUTROPHILS NFR BLD AUTO: 61 %
NITRITE UR QL STRIP.AUTO: NEGATIVE
NRBC BLD-RTO: 0 /100 WBCS (ref 0–0)
P AXIS: 84 DEGREES
P OFFSET: 165 MS
P ONSET: 107 MS
PH UR STRIP.AUTO: 7.5 [PH]
PLATELET # BLD AUTO: 192 X10*3/UL (ref 150–450)
POTASSIUM SERPL-SCNC: 3.6 MMOL/L (ref 3.5–5.3)
PR INTERVAL: 222 MS
PROT SERPL-MCNC: 6.3 G/DL (ref 6.4–8.2)
PROT UR STRIP.AUTO-MCNC: NEGATIVE MG/DL
Q ONSET: 218 MS
QRS COUNT: 13 BEATS
QRS DURATION: 104 MS
QT INTERVAL: 400 MS
QTC CALCULATION(BAZETT): 458 MS
QTC FREDERICIA: 438 MS
R AXIS: 88 DEGREES
RBC # BLD AUTO: 4.44 X10*6/UL (ref 4.5–5.9)
RBC # UR STRIP.AUTO: NEGATIVE /UL
SODIUM SERPL-SCNC: 142 MMOL/L (ref 136–145)
SP GR UR STRIP.AUTO: 1.01
T AXIS: 69 DEGREES
T OFFSET: 418 MS
UROBILINOGEN UR STRIP.AUTO-MCNC: NORMAL MG/DL
VENTRICULAR RATE: 79 BPM
WBC # BLD AUTO: 5.3 X10*3/UL (ref 4.4–11.3)

## 2024-09-15 PROCEDURE — 93005 ELECTROCARDIOGRAM TRACING: CPT

## 2024-09-15 PROCEDURE — 84484 ASSAY OF TROPONIN QUANT: CPT | Performed by: EMERGENCY MEDICINE

## 2024-09-15 PROCEDURE — G0378 HOSPITAL OBSERVATION PER HR: HCPCS

## 2024-09-15 PROCEDURE — 82248 BILIRUBIN DIRECT: CPT | Performed by: EMERGENCY MEDICINE

## 2024-09-15 PROCEDURE — 83735 ASSAY OF MAGNESIUM: CPT

## 2024-09-15 PROCEDURE — 99285 EMERGENCY DEPT VISIT HI MDM: CPT

## 2024-09-15 PROCEDURE — 81003 URINALYSIS AUTO W/O SCOPE: CPT | Performed by: EMERGENCY MEDICINE

## 2024-09-15 PROCEDURE — 96374 THER/PROPH/DIAG INJ IV PUSH: CPT

## 2024-09-15 PROCEDURE — 80053 COMPREHEN METABOLIC PANEL: CPT | Performed by: EMERGENCY MEDICINE

## 2024-09-15 PROCEDURE — 85025 COMPLETE CBC W/AUTO DIFF WBC: CPT | Performed by: EMERGENCY MEDICINE

## 2024-09-15 PROCEDURE — 99223 1ST HOSP IP/OBS HIGH 75: CPT

## 2024-09-15 PROCEDURE — 83605 ASSAY OF LACTIC ACID: CPT | Performed by: EMERGENCY MEDICINE

## 2024-09-15 PROCEDURE — 2500000004 HC RX 250 GENERAL PHARMACY W/ HCPCS (ALT 636 FOR OP/ED): Performed by: EMERGENCY MEDICINE

## 2024-09-15 PROCEDURE — 96361 HYDRATE IV INFUSION ADD-ON: CPT

## 2024-09-15 PROCEDURE — 36415 COLL VENOUS BLD VENIPUNCTURE: CPT | Performed by: EMERGENCY MEDICINE

## 2024-09-15 RX ORDER — ONDANSETRON HYDROCHLORIDE 2 MG/ML
4 INJECTION, SOLUTION INTRAVENOUS ONCE
Status: COMPLETED | OUTPATIENT
Start: 2024-09-15 | End: 2024-09-15

## 2024-09-15 RX ORDER — ONDANSETRON HYDROCHLORIDE 2 MG/ML
4 INJECTION, SOLUTION INTRAVENOUS EVERY 8 HOURS PRN
Status: DISCONTINUED | OUTPATIENT
Start: 2024-09-15 | End: 2024-09-16 | Stop reason: HOSPADM

## 2024-09-15 RX ORDER — ROSUVASTATIN CALCIUM 5 MG/1
5 TABLET, COATED ORAL DAILY
COMMUNITY

## 2024-09-15 RX ORDER — ACETAMINOPHEN 160 MG/5ML
650 SOLUTION ORAL EVERY 4 HOURS PRN
Status: DISCONTINUED | OUTPATIENT
Start: 2024-09-15 | End: 2024-09-16 | Stop reason: HOSPADM

## 2024-09-15 RX ORDER — ACETAMINOPHEN 650 MG/1
650 SUPPOSITORY RECTAL EVERY 4 HOURS PRN
Status: DISCONTINUED | OUTPATIENT
Start: 2024-09-15 | End: 2024-09-16 | Stop reason: HOSPADM

## 2024-09-15 RX ORDER — ACETAMINOPHEN 325 MG/1
650 TABLET ORAL EVERY 4 HOURS PRN
Status: DISCONTINUED | OUTPATIENT
Start: 2024-09-15 | End: 2024-09-16 | Stop reason: HOSPADM

## 2024-09-15 RX ORDER — ENOXAPARIN SODIUM 100 MG/ML
40 INJECTION SUBCUTANEOUS EVERY 24 HOURS
Status: DISCONTINUED | OUTPATIENT
Start: 2024-09-15 | End: 2024-09-16 | Stop reason: HOSPADM

## 2024-09-15 RX ORDER — POLYETHYLENE GLYCOL 3350 17 G/17G
17 POWDER, FOR SOLUTION ORAL DAILY
Status: DISCONTINUED | OUTPATIENT
Start: 2024-09-15 | End: 2024-09-16 | Stop reason: HOSPADM

## 2024-09-15 RX ORDER — ONDANSETRON 4 MG/1
4 TABLET, FILM COATED ORAL EVERY 8 HOURS PRN
Status: DISCONTINUED | OUTPATIENT
Start: 2024-09-15 | End: 2024-09-16

## 2024-09-15 RX ORDER — MONTELUKAST SODIUM 10 MG/1
10 TABLET ORAL NIGHTLY
COMMUNITY

## 2024-09-15 RX ADMIN — ONDANSETRON 4 MG: 2 INJECTION INTRAMUSCULAR; INTRAVENOUS at 10:42

## 2024-09-15 RX ADMIN — SODIUM CHLORIDE 1000 ML: 9 INJECTION, SOLUTION INTRAVENOUS at 10:42

## 2024-09-15 SDOH — SOCIAL STABILITY: SOCIAL INSECURITY: ABUSE: ADULT

## 2024-09-15 SDOH — SOCIAL STABILITY: SOCIAL INSECURITY: HAVE YOU HAD ANY THOUGHTS OF HARMING ANYONE ELSE?: NO

## 2024-09-15 SDOH — SOCIAL STABILITY: SOCIAL INSECURITY: ARE YOU OR HAVE YOU BEEN THREATENED OR ABUSED PHYSICALLY, EMOTIONALLY, OR SEXUALLY BY ANYONE?: NO

## 2024-09-15 SDOH — SOCIAL STABILITY: SOCIAL INSECURITY: ARE THERE ANY APPARENT SIGNS OF INJURIES/BEHAVIORS THAT COULD BE RELATED TO ABUSE/NEGLECT?: NO

## 2024-09-15 SDOH — SOCIAL STABILITY: SOCIAL INSECURITY: WERE YOU ABLE TO COMPLETE ALL THE BEHAVIORAL HEALTH SCREENINGS?: YES

## 2024-09-15 SDOH — SOCIAL STABILITY: SOCIAL INSECURITY: DO YOU FEEL ANYONE HAS EXPLOITED OR TAKEN ADVANTAGE OF YOU FINANCIALLY OR OF YOUR PERSONAL PROPERTY?: NO

## 2024-09-15 SDOH — SOCIAL STABILITY: SOCIAL INSECURITY: DO YOU FEEL UNSAFE GOING BACK TO THE PLACE WHERE YOU ARE LIVING?: NO

## 2024-09-15 SDOH — SOCIAL STABILITY: SOCIAL INSECURITY: HAVE YOU HAD THOUGHTS OF HARMING ANYONE ELSE?: NO

## 2024-09-15 SDOH — SOCIAL STABILITY: SOCIAL INSECURITY: DOES ANYONE TRY TO KEEP YOU FROM HAVING/CONTACTING OTHER FRIENDS OR DOING THINGS OUTSIDE YOUR HOME?: NO

## 2024-09-15 SDOH — SOCIAL STABILITY: SOCIAL INSECURITY: HAS ANYONE EVER THREATENED TO HURT YOUR FAMILY OR YOUR PETS?: NO

## 2024-09-15 ASSESSMENT — COGNITIVE AND FUNCTIONAL STATUS - GENERAL
PATIENT BASELINE BEDBOUND: NO
WALKING IN HOSPITAL ROOM: A LITTLE
CLIMB 3 TO 5 STEPS WITH RAILING: A LITTLE
DAILY ACTIVITIY SCORE: 24
MOBILITY SCORE: 22

## 2024-09-15 ASSESSMENT — ACTIVITIES OF DAILY LIVING (ADL)
BATHING: INDEPENDENT
PATIENT'S MEMORY ADEQUATE TO SAFELY COMPLETE DAILY ACTIVITIES?: YES
GROOMING: INDEPENDENT
HEARING - LEFT EAR: FUNCTIONAL
WALKS IN HOME: INDEPENDENT
LACK_OF_TRANSPORTATION: NO
DRESSING YOURSELF: INDEPENDENT
HEARING - RIGHT EAR: FUNCTIONAL
ADEQUATE_TO_COMPLETE_ADL: YES
TOILETING: INDEPENDENT
JUDGMENT_ADEQUATE_SAFELY_COMPLETE_DAILY_ACTIVITIES: YES
FEEDING YOURSELF: INDEPENDENT

## 2024-09-15 ASSESSMENT — LIFESTYLE VARIABLES
HAVE PEOPLE ANNOYED YOU BY CRITICIZING YOUR DRINKING: NO
HOW MANY STANDARD DRINKS CONTAINING ALCOHOL DO YOU HAVE ON A TYPICAL DAY: PATIENT DOES NOT DRINK
HOW OFTEN DO YOU HAVE 6 OR MORE DRINKS ON ONE OCCASION: NEVER
EVER FELT BAD OR GUILTY ABOUT YOUR DRINKING: NO
AUDIT-C TOTAL SCORE: 0
HAVE YOU EVER FELT YOU SHOULD CUT DOWN ON YOUR DRINKING: NO
AUDIT-C TOTAL SCORE: 0
TOTAL SCORE: 0
SKIP TO QUESTIONS 9-10: 1
EVER HAD A DRINK FIRST THING IN THE MORNING TO STEADY YOUR NERVES TO GET RID OF A HANGOVER: NO
HOW OFTEN DO YOU HAVE A DRINK CONTAINING ALCOHOL: NEVER

## 2024-09-15 ASSESSMENT — PAIN SCALES - GENERAL
PAINLEVEL_OUTOF10: 0 - NO PAIN
PAINLEVEL_OUTOF10: 0 - NO PAIN

## 2024-09-15 ASSESSMENT — PAIN - FUNCTIONAL ASSESSMENT: PAIN_FUNCTIONAL_ASSESSMENT: 0-10

## 2024-09-15 ASSESSMENT — PATIENT HEALTH QUESTIONNAIRE - PHQ9
1. LITTLE INTEREST OR PLEASURE IN DOING THINGS: SEVERAL DAYS
SUM OF ALL RESPONSES TO PHQ9 QUESTIONS 1 & 2: 2
2. FEELING DOWN, DEPRESSED OR HOPELESS: SEVERAL DAYS

## 2024-09-15 NOTE — H&P
History Of Present Illness  Be Luo is a 64 y.o. male who is legally blind with history of COPD, seizures, HTN and syncope who is presenting with fall and vomiting.  Patient lives alone states he sat up on the side of his bed vomiting and then fell out of his bed.  Patient did not hit his head.  Patient states he has been falling for 35 years but has worsened over the past 6 months.  Patient has been going through a full cardiac workup and has a scheduled cardiac catheterization on Wednesday with Dr. Sullivan.  Patient has history of tobacco abuse in which he quit smoking cigarettes November 2023.  Patient does smoke medical marijuana daily.  Patient denies shortness of breath or chest pain, remains on room air.  Patient denies abdominal pain, nausea, vomiting or diarrhea.  Patient denies being around anyone ill.  Denies fever or chills.  BMP and CBC unremarkable.  UA negative.  Patient will be admitted for further evaluation and treatment     Past Medical History  He has a past medical history of Blind one eye and Hypertension.    Surgical History  He has no past surgical history on file.     Social History  He reports that he quit smoking about 12 months ago. His smoking use included cigarettes. He does not have any smokeless tobacco history on file. He reports that he does not currently use alcohol. He reports current drug use. Drug: Marijuana.    Family History  No family history on file.     Allergies  Green tea, Gabapentin, Lyrica [pregabalin], Norco [hydrocodone-acetaminophen], Prednisone, Sulfasalazine, and Vioxx [rofecoxib]    Review of Systems   Review of systems: 10 system were reviewed and were negative except what was mentioned in history of present illness    Physical Exam  Constitutional:       Appearance: He is obese.   HENT:      Head: Normocephalic.      Mouth/Throat:      Mouth: Mucous membranes are moist.   Eyes:      Pupils: Pupils are equal, round, and reactive to light.    Cardiovascular:      Rate and Rhythm: Normal rate and regular rhythm.      Heart sounds: Normal heart sounds, S1 normal and S2 normal.   Pulmonary:      Effort: Pulmonary effort is normal.      Breath sounds: Wheezing present.      Comments: Expiratory wheeze  Abdominal:      General: Bowel sounds are normal.      Palpations: Abdomen is soft.   Musculoskeletal:         General: Normal range of motion.      Cervical back: Neck supple.   Skin:     General: Skin is warm.   Neurological:      Mental Status: He is alert and oriented to person, place, and time.   Psychiatric:         Mood and Affect: Mood normal.         Behavior: Behavior normal.          Last Recorded Vitals  /84 (Patient Position: Standing)   Pulse 73   Temp 36.6 °C (97.9 °F) (Temporal)   Resp 18   Wt 95.3 kg (210 lb)   SpO2 99%     Relevant Results  CBC:   Results from last 7 days   Lab Units 09/15/24  1027 09/10/24  1134   WBC AUTO x10*3/uL 5.3 7.2   RBC AUTO x10*6/uL 4.44* 4.74   HEMOGLOBIN g/dL 14.9 15.8   HEMATOCRIT % 44.8 48.5   MCV fL 101* 102*   MCH pg 33.6 33.3   MCHC g/dL 33.3 32.6   RDW % 12.1 12.4   PLATELETS AUTO x10*3/uL 192 236     CMP:    Results from last 7 days   Lab Units 09/15/24  1027 09/10/24  1134   SODIUM mmol/L 142 140   POTASSIUM mmol/L 3.6 4.3   CHLORIDE mmol/L 112* 109*   CO2 mmol/L 25 24   BUN mg/dL 10 12   CREATININE mg/dL 0.88 0.91   GLUCOSE mg/dL 96 102*   PROTEIN TOTAL g/dL 6.3* 7.0   CALCIUM mg/dL 8.4* 9.5   BILIRUBIN TOTAL mg/dL 0.8  --    ALK PHOS U/L 64  --    AST U/L 15  --    ALT U/L 18  --      BMP:    Results from last 7 days   Lab Units 09/15/24  1027 09/10/24  1134   SODIUM mmol/L 142 140   POTASSIUM mmol/L 3.6 4.3   CHLORIDE mmol/L 112* 109*   CO2 mmol/L 25 24   BUN mg/dL 10 12   CREATININE mg/dL 0.88 0.91   CALCIUM mg/dL 8.4* 9.5   GLUCOSE mg/dL 96 102*     Magnesium:    Troponin:    Results from last 7 days   Lab Units 09/15/24  1027   TROPHS ng/L 4     BNP:     Lipid Panel:    Imagining  ECG  12 lead  Sinus rhythm with 1st degree AV block  Otherwise normal ECG  When compared with ECG of 30-AUG-2024 12:24,  NV interval has increased  See ED provider note for full interpretation and clinical correlation  Confirmed by Ania Reilly ([id number]) on 9/15/2024 1:20:06 PM       Assessment/Plan      Syncope and collapse  Blind in right eye  Ischemic cardiomyopathy   COPD  ANN  HTN/HLD  Tobacco abuse  Marijuana use  Obesity    -EKG interpreted by ER sinus rhythm first degree AV block  -Echo 8/28 shows LVEF 45 to 50%  -Scheduled cardiac cath on Wednesday with Dr. Sullivan  -Darin August 22 showed small to moderate-sized inferior wall MI  -Carotid ultrasound July 5 showed less than 50% stenosis bilaterally  -Bubble study was negative  -Holter monitor showed NSR  -Resume home meds  -PT/OT evaluation  -TCC for discharge planning      DVTp: Lovenox subcu    PLAN: Home    JEFF Dennis-CNP    Plan of care was discussed extensively with patient.  Patient verbalized understanding through teach back method.  All question and concerns addressed upon examination.    Of note, this documentation is completed using the Dragon Dictation system (voice recognition software). There may be spelling and/or grammatical errors that were not corrected prior to final submission.

## 2024-09-15 NOTE — ED PROVIDER NOTES
HPI   Chief Complaint   Patient presents with    Syncope    Nausea    Vomiting    Fall       Patient is a 64-year-old male who is legally blind COPD history of seizures hypertension and syncope was last admitted in July for syncope and since then had a few through the emergency department for vomiting comes in today by EMS with chief complaint of vomiting and passing out, history patient states been passing out for a long time but getting worse in the last 3 months and he just passes out without warning not a spinning sensation and is not a seizure.              Patient History   Past Medical History:   Diagnosis Date    Blind one eye     Hypertension      No past surgical history on file.  No family history on file.  Social History     Tobacco Use    Smoking status: Former     Current packs/day: 0.00     Types: Cigarettes     Quit date: 2023     Years since quittin.0    Smokeless tobacco: Not on file   Vaping Use    Vaping status: Never Used   Substance Use Topics    Alcohol use: Not Currently    Drug use: Yes     Types: Marijuana       Physical Exam   ED Triage Vitals   Temp Pulse Resp BP   -- -- -- --      SpO2 Temp src Heart Rate Source Patient Position   -- -- -- --      BP Location FiO2 (%)     -- --       Physical Exam  Vitals and nursing note reviewed.   HENT:      Head: Normocephalic.   Cardiovascular:      Rate and Rhythm: Normal rate and regular rhythm.   Pulmonary:      Effort: Pulmonary effort is normal.      Breath sounds: Normal breath sounds.   Abdominal:      Palpations: Abdomen is soft.   Skin:     General: Skin is warm.   Neurological:      General: No focal deficit present.      Mental Status: He is alert and oriented to person, place, and time.           ED Course & MDM   Diagnoses as of 09/15/24 1330   Syncope and collapse                 No data recorded     [place] Coma Scale Score: 15 (09/15/24 1057 : Prince ARABELLA Stein RN)                           Medical Decision Making  EKG  interpreted by me shows normal sinus rhythm first-degree AV block normal QRS heart rate of 79 normal ST segments  My differential diagnosis  Arrhythmia acute electrolyte imbalance acute dehydration  Reviewed the patient's cardiology notes where he had an echo done heart catheterization done at [place] and then an outpatient stress test done that was unremarkable and now scheduled to get a heart catheterization September 18  I ordered cardiac workup on the patient including CBC chemistries cardiac labs did consider but did not order any CT scan of the brain because patient did not hit his head neurologically intact otherwise.    I reviewed old records and the last time he was seen for passing out was in July and he was admitted to the hospital, was seen by cardiology and at that time he was discharged to home for outpatient stress test.  Patient did have outpatient stress test that was abnormal and is scheduled to get outpatient heart catheterization.  At this time because of patient's persistent symptoms of syncope I talked to the hospitalist team and they agreed to admit the patient with cardiology consultation from with the discussion to have with them about the need to do a heart catheterization earlier and if that is negative patient may be candidate for a loop recorder are Holter monitor.  Labs Reviewed  CBC WITH AUTO DIFFERENTIAL - Abnormal     WBC                           5.3                    nRBC                          0.0                    RBC                           4.44 (*)               Hemoglobin                    14.9                   Hematocrit                    44.8                   MCV                           101 (*)                MCH                           33.6                   MCHC                          33.3                   RDW                           12.1                   Platelets                     192                    Neutrophils %                 61.0                    Immature Granulocytes %, Automated   0.2                    Lymphocytes %                 23.5                   Monocytes %                   9.0                    Eosinophils %                 5.5                    Basophils %                   0.8                    Neutrophils Absolute          3.25                   Immature Granulocytes Absolute, Au*   0.01                   Lymphocytes Absolute          1.25                   Monocytes Absolute            0.48                   Eosinophils Absolute          0.29                   Basophils Absolute            0.04                BASIC METABOLIC PANEL - Abnormal     Glucose                       96                     Sodium                        142                    Potassium                     3.6                    Chloride                      112 (*)                Bicarbonate                   25                     Anion Gap                     9 (*)                  Urea Nitrogen                 10                     Creatinine                    0.88                   eGFR                          >90                    Calcium                       8.4 (*)             HEPATIC FUNCTION PANEL - Abnormal     Albumin                       4.2                    Bilirubin, Total              0.8                    Bilirubin, Direct             0.2                    Alkaline Phosphatase          64                     ALT                           18                     AST                           15                     Total Protein                 6.3 (*)             URINALYSIS WITH REFLEX CULTURE AND MICROSCOPIC - Abnormal     Color, Urine                                         Appearance, Urine             Turbid (*)               Specific Gravity, Urine       1.013                  pH, Urine                     7.5                    Protein, Urine                NEGATIVE                Glucose, Urine                Normal                  Blood, Urine                  NEGATIVE                Ketones, Urine                NEGATIVE                Bilirubin, Urine              NEGATIVE                Urobilinogen, Urine           Normal                 Nitrite, Urine                NEGATIVE                Leukocyte Esterase, Urine     NEGATIVE             LACTATE - Normal     Lactate                       1.1                      Narrative: Venipuncture immediately after or during the administration of Metamizole may lead to falsely low results. Testing should be performed immediately                prior to Metamizole dosing.  TROPONIN I, HIGH SENSITIVITY - Normal     Troponin I, High Sensitivity   4                        Narrative: Less than 99th percentile of normal range cutoff-                Female and children under 18 years old <14 ng/L; Male <21 ng/L: Negative                Repeat testing should be performed if clinically indicated.                                 Female and children under 18 years old 14-50 ng/L; Male 21-50 ng/L:                Consistent with possible cardiac damage and possible increased clinical                 risk. Serial measurements may help to assess extent of myocardial damage.                                 >50 ng/L: Consistent with cardiac damage, increased clinical risk and                myocardial infarction. Serial measurements may help assess extent of                 myocardial damage.                                  NOTE: Children less than 1 year old may have higher baseline troponin                 levels and results should be interpreted in conjunction with the overall                 clinical context.                                 NOTE: Troponin I testing is performed using a different                 testing methodology at [facility] than at other                 [facility]. Direct result comparisons should only                 be made within the same method.  URINALYSIS  WITH REFLEX CULTURE AND MICROSCOPIC       Narrative: The following orders were created for panel order Urinalysis with Reflex Culture and Microscopic.                Procedure                               Abnormality         Status                                   ---------                               -----------         ------                                   Urinalysis with Reflex C...[[id number]]  Abnormal            Final result                             Extra Urine Gray Tube[[id number]]                            In process                                               Please view results for these tests on the individual orders.  EXTRA URINE GRAY TUBE     No orders to display           Procedure  Procedures     Valdez Zapata MD  09/15/24 8060

## 2024-09-16 ENCOUNTER — TELEPHONE (OUTPATIENT)
Dept: CARDIOLOGY | Facility: CLINIC | Age: 64
End: 2024-09-16

## 2024-09-16 VITALS
OXYGEN SATURATION: 93 % | BODY MASS INDEX: 30.06 KG/M2 | WEIGHT: 210 LBS | SYSTOLIC BLOOD PRESSURE: 149 MMHG | RESPIRATION RATE: 16 BRPM | HEART RATE: 80 BPM | HEIGHT: 70 IN | DIASTOLIC BLOOD PRESSURE: 75 MMHG | TEMPERATURE: 97.9 F

## 2024-09-16 VITALS
OXYGEN SATURATION: 96 % | RESPIRATION RATE: 16 BRPM | DIASTOLIC BLOOD PRESSURE: 69 MMHG | TEMPERATURE: 97.9 F | SYSTOLIC BLOOD PRESSURE: 128 MMHG | HEART RATE: 73 BPM

## 2024-09-16 LAB
ANION GAP SERPL CALC-SCNC: 11 MMOL/L (ref 10–20)
BUN SERPL-MCNC: 9 MG/DL (ref 6–23)
CALCIUM SERPL-MCNC: 8.9 MG/DL (ref 8.6–10.3)
CHLORIDE SERPL-SCNC: 112 MMOL/L (ref 98–107)
CO2 SERPL-SCNC: 24 MMOL/L (ref 21–32)
CREAT SERPL-MCNC: 0.96 MG/DL (ref 0.5–1.3)
EGFRCR SERPLBLD CKD-EPI 2021: 88 ML/MIN/1.73M*2
ERYTHROCYTE [DISTWIDTH] IN BLOOD BY AUTOMATED COUNT: 12.4 % (ref 11.5–14.5)
GLUCOSE SERPL-MCNC: 92 MG/DL (ref 74–99)
HCT VFR BLD AUTO: 46.8 % (ref 41–52)
HGB BLD-MCNC: 15.4 G/DL (ref 13.5–17.5)
HOLD SPECIMEN: NORMAL
HOLD SPECIMEN: NORMAL
MAGNESIUM SERPL-MCNC: 1.99 MG/DL (ref 1.6–2.4)
MCH RBC QN AUTO: 33.3 PG (ref 26–34)
MCHC RBC AUTO-ENTMCNC: 32.9 G/DL (ref 32–36)
MCV RBC AUTO: 101 FL (ref 80–100)
NRBC BLD-RTO: 0 /100 WBCS (ref 0–0)
PLATELET # BLD AUTO: 203 X10*3/UL (ref 150–450)
POTASSIUM SERPL-SCNC: 4.1 MMOL/L (ref 3.5–5.3)
RBC # BLD AUTO: 4.63 X10*6/UL (ref 4.5–5.9)
SODIUM SERPL-SCNC: 143 MMOL/L (ref 136–145)
WBC # BLD AUTO: 5.1 X10*3/UL (ref 4.4–11.3)

## 2024-09-16 PROCEDURE — 85027 COMPLETE CBC AUTOMATED: CPT

## 2024-09-16 PROCEDURE — 2500000005 HC RX 250 GENERAL PHARMACY W/O HCPCS: Performed by: INTERNAL MEDICINE

## 2024-09-16 PROCEDURE — C1887 CATHETER, GUIDING: HCPCS | Performed by: INTERNAL MEDICINE

## 2024-09-16 PROCEDURE — 99233 SBSQ HOSP IP/OBS HIGH 50: CPT | Performed by: FAMILY MEDICINE

## 2024-09-16 PROCEDURE — 83735 ASSAY OF MAGNESIUM: CPT

## 2024-09-16 PROCEDURE — 2550000001 HC RX 255 CONTRASTS: Performed by: INTERNAL MEDICINE

## 2024-09-16 PROCEDURE — 96361 HYDRATE IV INFUSION ADD-ON: CPT | Mod: 59

## 2024-09-16 PROCEDURE — G0378 HOSPITAL OBSERVATION PER HR: HCPCS

## 2024-09-16 PROCEDURE — 93458 L HRT ARTERY/VENTRICLE ANGIO: CPT | Performed by: INTERNAL MEDICINE

## 2024-09-16 PROCEDURE — 99152 MOD SED SAME PHYS/QHP 5/>YRS: CPT | Performed by: INTERNAL MEDICINE

## 2024-09-16 PROCEDURE — 7100000009 HC PHASE TWO TIME - INITIAL BASE CHARGE: Performed by: INTERNAL MEDICINE

## 2024-09-16 PROCEDURE — 2500000004 HC RX 250 GENERAL PHARMACY W/ HCPCS (ALT 636 FOR OP/ED): Performed by: INTERNAL MEDICINE

## 2024-09-16 PROCEDURE — 97161 PT EVAL LOW COMPLEX 20 MIN: CPT | Mod: GP | Performed by: PHYSICAL THERAPIST

## 2024-09-16 PROCEDURE — 80048 BASIC METABOLIC PNL TOTAL CA: CPT

## 2024-09-16 PROCEDURE — 2500000001 HC RX 250 WO HCPCS SELF ADMINISTERED DRUGS (ALT 637 FOR MEDICARE OP): Performed by: NURSE PRACTITIONER

## 2024-09-16 PROCEDURE — 7100000010 HC PHASE TWO TIME - EACH INCREMENTAL 1 MINUTE: Performed by: INTERNAL MEDICINE

## 2024-09-16 PROCEDURE — C1894 INTRO/SHEATH, NON-LASER: HCPCS | Performed by: INTERNAL MEDICINE

## 2024-09-16 PROCEDURE — 99024 POSTOP FOLLOW-UP VISIT: CPT | Performed by: NURSE PRACTITIONER

## 2024-09-16 PROCEDURE — 2500000004 HC RX 250 GENERAL PHARMACY W/ HCPCS (ALT 636 FOR OP/ED): Performed by: NURSE PRACTITIONER

## 2024-09-16 PROCEDURE — 97165 OT EVAL LOW COMPLEX 30 MIN: CPT | Mod: GO

## 2024-09-16 PROCEDURE — 2720000007 HC OR 272 NO HCPCS: Performed by: INTERNAL MEDICINE

## 2024-09-16 PROCEDURE — 7100000001 HC RECOVERY ROOM TIME - INITIAL BASE CHARGE: Performed by: INTERNAL MEDICINE

## 2024-09-16 PROCEDURE — 36415 COLL VENOUS BLD VENIPUNCTURE: CPT

## 2024-09-16 PROCEDURE — 99222 1ST HOSP IP/OBS MODERATE 55: CPT | Performed by: INTERNAL MEDICINE

## 2024-09-16 PROCEDURE — 7100000002 HC RECOVERY ROOM TIME - EACH INCREMENTAL 1 MINUTE: Performed by: INTERNAL MEDICINE

## 2024-09-16 PROCEDURE — 96373 THER/PROPH/DIAG INJ IA: CPT | Performed by: INTERNAL MEDICINE

## 2024-09-16 RX ORDER — ALBUTEROL SULFATE 90 UG/1
2 INHALANT RESPIRATORY (INHALATION) EVERY 6 HOURS PRN
Status: DISCONTINUED | OUTPATIENT
Start: 2024-09-16 | End: 2024-09-16 | Stop reason: HOSPADM

## 2024-09-16 RX ORDER — SODIUM CHLORIDE 9 MG/ML
100 INJECTION, SOLUTION INTRAVENOUS CONTINUOUS
Status: ACTIVE | OUTPATIENT
Start: 2024-09-16 | End: 2024-09-16

## 2024-09-16 RX ORDER — VALSARTAN 80 MG/1
40 TABLET ORAL DAILY
Status: DISCONTINUED | OUTPATIENT
Start: 2024-09-16 | End: 2024-09-16 | Stop reason: HOSPADM

## 2024-09-16 RX ORDER — MONTELUKAST SODIUM 10 MG/1
10 TABLET ORAL NIGHTLY
Status: DISCONTINUED | OUTPATIENT
Start: 2024-09-16 | End: 2024-09-16 | Stop reason: HOSPADM

## 2024-09-16 RX ORDER — ALBUTEROL SULFATE 0.83 MG/ML
2.5 SOLUTION RESPIRATORY (INHALATION) EVERY 6 HOURS PRN
Status: DISCONTINUED | OUTPATIENT
Start: 2024-09-16 | End: 2024-09-16 | Stop reason: HOSPADM

## 2024-09-16 RX ORDER — DORZOLAMIDE HYDROCHLORIDE AND TIMOLOL MALEATE 20; 5 MG/ML; MG/ML
1 SOLUTION/ DROPS OPHTHALMIC 2 TIMES DAILY
Status: DISCONTINUED | OUTPATIENT
Start: 2024-09-16 | End: 2024-09-16 | Stop reason: HOSPADM

## 2024-09-16 RX ORDER — NITROGLYCERIN 40 MG/100ML
INJECTION INTRAVENOUS AS NEEDED
Status: DISCONTINUED | OUTPATIENT
Start: 2024-09-16 | End: 2024-09-16 | Stop reason: HOSPADM

## 2024-09-16 RX ORDER — LAMOTRIGINE 100 MG/1
600 TABLET ORAL 2 TIMES DAILY
Status: DISCONTINUED | OUTPATIENT
Start: 2024-09-16 | End: 2024-09-16 | Stop reason: HOSPADM

## 2024-09-16 RX ORDER — FENTANYL CITRATE 50 UG/ML
INJECTION, SOLUTION INTRAMUSCULAR; INTRAVENOUS AS NEEDED
Status: DISCONTINUED | OUTPATIENT
Start: 2024-09-16 | End: 2024-09-16 | Stop reason: HOSPADM

## 2024-09-16 RX ORDER — ACETAZOLAMIDE 250 MG/1
250 TABLET ORAL 3 TIMES DAILY
Status: DISCONTINUED | OUTPATIENT
Start: 2024-09-16 | End: 2024-09-16 | Stop reason: HOSPADM

## 2024-09-16 RX ORDER — BRIMONIDINE TARTRATE 2 MG/ML
1 SOLUTION/ DROPS OPHTHALMIC 3 TIMES DAILY
Status: DISCONTINUED | OUTPATIENT
Start: 2024-09-16 | End: 2024-09-16 | Stop reason: HOSPADM

## 2024-09-16 RX ORDER — ONDANSETRON 4 MG/1
4 TABLET, FILM COATED ORAL EVERY 8 HOURS PRN
Status: DISCONTINUED | OUTPATIENT
Start: 2024-09-16 | End: 2024-09-16 | Stop reason: HOSPADM

## 2024-09-16 RX ORDER — ROSUVASTATIN CALCIUM 10 MG/1
5 TABLET, COATED ORAL NIGHTLY
Status: DISCONTINUED | OUTPATIENT
Start: 2024-09-16 | End: 2024-09-16 | Stop reason: HOSPADM

## 2024-09-16 RX ORDER — SODIUM CHLORIDE 9 MG/ML
100 INJECTION, SOLUTION INTRAVENOUS CONTINUOUS
Status: DISCONTINUED | OUTPATIENT
Start: 2024-09-16 | End: 2024-09-16 | Stop reason: HOSPADM

## 2024-09-16 RX ORDER — CYCLOBENZAPRINE HCL 10 MG
10 TABLET ORAL 3 TIMES DAILY PRN
Status: DISCONTINUED | OUTPATIENT
Start: 2024-09-16 | End: 2024-09-16 | Stop reason: HOSPADM

## 2024-09-16 RX ORDER — CHOLECALCIFEROL (VITAMIN D3) 25 MCG
2000 TABLET ORAL
Status: DISCONTINUED | OUTPATIENT
Start: 2024-09-17 | End: 2024-09-16 | Stop reason: HOSPADM

## 2024-09-16 RX ORDER — ASPIRIN 81 MG/1
81 TABLET ORAL
Status: DISCONTINUED | OUTPATIENT
Start: 2024-09-16 | End: 2024-09-16 | Stop reason: HOSPADM

## 2024-09-16 RX ORDER — PRAMIPEXOLE DIHYDROCHLORIDE 0.25 MG/1
0.12 TABLET ORAL NIGHTLY
Status: DISCONTINUED | OUTPATIENT
Start: 2024-09-16 | End: 2024-09-16 | Stop reason: HOSPADM

## 2024-09-16 RX ORDER — LATANOPROST 50 UG/ML
1 SOLUTION/ DROPS OPHTHALMIC NIGHTLY
Status: DISCONTINUED | OUTPATIENT
Start: 2024-09-16 | End: 2024-09-16 | Stop reason: HOSPADM

## 2024-09-16 RX ORDER — METFORMIN HYDROCHLORIDE 500 MG/1
500 TABLET ORAL
Status: DISCONTINUED | OUTPATIENT
Start: 2024-09-16 | End: 2024-09-16 | Stop reason: HOSPADM

## 2024-09-16 RX ORDER — ASPIRIN 325 MG
325 TABLET ORAL ONCE
Status: COMPLETED | OUTPATIENT
Start: 2024-09-16 | End: 2024-09-16

## 2024-09-16 RX ORDER — POTASSIUM CHLORIDE 750 MG/1
10 TABLET, FILM COATED, EXTENDED RELEASE ORAL DAILY
Status: DISCONTINUED | OUTPATIENT
Start: 2024-09-16 | End: 2024-09-16 | Stop reason: HOSPADM

## 2024-09-16 RX ORDER — HEPARIN SODIUM 1000 [USP'U]/ML
INJECTION, SOLUTION INTRAVENOUS; SUBCUTANEOUS AS NEEDED
Status: DISCONTINUED | OUTPATIENT
Start: 2024-09-16 | End: 2024-09-16 | Stop reason: HOSPADM

## 2024-09-16 RX ORDER — MIDAZOLAM HYDROCHLORIDE 1 MG/ML
INJECTION INTRAMUSCULAR; INTRAVENOUS AS NEEDED
Status: DISCONTINUED | OUTPATIENT
Start: 2024-09-16 | End: 2024-09-16 | Stop reason: HOSPADM

## 2024-09-16 RX ADMIN — ASPIRIN 325 MG: 325 TABLET ORAL at 09:49

## 2024-09-16 RX ADMIN — SODIUM CHLORIDE 100 ML/HR: 9 INJECTION, SOLUTION INTRAVENOUS at 09:49

## 2024-09-16 RX ADMIN — ACETAMINOPHEN 650 MG: 325 TABLET ORAL at 14:40

## 2024-09-16 RX ADMIN — SODIUM CHLORIDE 100 ML/HR: 9 INJECTION, SOLUTION INTRAVENOUS at 14:31

## 2024-09-16 ASSESSMENT — PAIN - FUNCTIONAL ASSESSMENT: PAIN_FUNCTIONAL_ASSESSMENT: 0-10

## 2024-09-16 ASSESSMENT — PAIN SCALES - GENERAL
PAINLEVEL_OUTOF10: 0 - NO PAIN

## 2024-09-16 ASSESSMENT — COGNITIVE AND FUNCTIONAL STATUS - GENERAL
MOVING FROM LYING ON BACK TO SITTING ON SIDE OF FLAT BED WITH BEDRAILS: A LITTLE
WALKING IN HOSPITAL ROOM: A LOT
EATING MEALS: A LITTLE
MOBILITY SCORE: 22
MOBILITY SCORE: 15
DRESSING REGULAR LOWER BODY CLOTHING: A LOT
DAILY ACTIVITIY SCORE: 15
WALKING IN HOSPITAL ROOM: A LITTLE
MOVING TO AND FROM BED TO CHAIR: A LOT
HELP NEEDED FOR BATHING: A LOT
TOILETING: A LOT
PERSONAL GROOMING: A LITTLE
TURNING FROM BACK TO SIDE WHILE IN FLAT BAD: A LITTLE
DAILY ACTIVITIY SCORE: 24
CLIMB 3 TO 5 STEPS WITH RAILING: A LOT
STANDING UP FROM CHAIR USING ARMS: A LITTLE
DRESSING REGULAR UPPER BODY CLOTHING: A LITTLE
CLIMB 3 TO 5 STEPS WITH RAILING: A LITTLE

## 2024-09-16 ASSESSMENT — ACTIVITIES OF DAILY LIVING (ADL): BATHING_ASSISTANCE: MINIMAL

## 2024-09-16 NOTE — POST-PROCEDURE NOTE
Physician Transition of Care Summary  Invasive Cardiovascular Lab    Procedure Date: 9/16/2024  Attending:    * Cori Sullivan - Primary  Resident/Fellow/Other Assistant: Surgeons and Role:  * No surgeons found with a matching role *    Indications:   Pre-op Diagnosis      * Syncope and collapse [R55]     * Cardiomyopathy, ischemic [I25.5]    Post-procedure diagnosis:   Post-op Diagnosis     * Syncope and collapse [R55]     * Cardiomyopathy, ischemic [I25.5]    Procedure(s):   Left Heart Cath, With LV  61386 - IA CATH PLMT L HRT & ARTS W/NJX & ANGIO IMG S&I        Procedure Findings:   Mild coronary disease, normal left ventricular function    Description of the Procedure:   Left heart catheterization coronary angiography left ventricular    Complications:   None    Stents/Implants:   Implants       No implant documentation for this case.            Anticoagulation/Antiplatelet Plan:   Intravenous heparin    Estimated Blood Loss:   * No values recorded between 9/16/2024 11:56 AM and 9/16/2024 12:23 PM *    Anesthesia: Moderate Sedation Anesthesia Staff: No anesthesia staff entered.    Any Specimen(s) Removed:   No specimens collected during this procedure.    Disposition:   Medical therapy      Electronically signed by: Cori Sullivan MD, 9/16/2024 12:23 PM

## 2024-09-16 NOTE — DISCHARGE INSTRUCTIONS
CARDIAC CATHETERIZATION DISCHARGE INSTRUCTIONS     FOR SUDDEN AND SEVERE CHEST PAIN, SHORTNESS OF BREATH, EXCESSIVE BLEEDING, SIGNS OF STROKE, OR CHANGES IN MENTAL STATUS YOU SHOULD CALL 911 IMMEDIATELY.     If your provider has prescribed aspirin and/or clopidogrel (Plavix), or prasugrel (Effient), or ticagrelor (Brilinta), DO NOT STOP THESE MEDICATIONS for any reason without talking to your cardiologist first. If any of these were prescribed, you must take them every day without missing a single dose. If you are getting low on these medications, contact your provider immediately for a refill.     FOR NEXT 24 HOURS    - Upon discharge, you should return home and rest for the remainder of the day and evening. You do not have to stay on bed rest but should not be very active.  It is recommended a responsible adult be with you for the first 24 hours after the procedure.    - No driving for 24 hours after procedure. Please arrange for someone to drive you home from the hospital today.     - Do not operate machinery or use power tools for 24 hours after your procedure.     - Do not make any legal decisions for 24 hours after your procedure.     - Do not drink alcoholic beverages for 24 hours after your procedure.    WOUND CARE    *FOR RADIAL (WRIST) ACCESS*  ·      No lifting more than 5 pounds or excessive use of the wrist for 24 hours - for example, treat your wrist as if it is sprained.  ·      Do not engage in vigorous activities (tennis, golf, bowling, weights) for at least 48 hours after the procedure.  ·      Do not submerge the wrist for 5 days after the procedure.  ·      You should expect mild tingling in your hand and tenderness at the puncture site for up to 3 days.    - The transparent dressing should be removed from the site 24 hours after the procedure.  Dressing can be removed 9/17/24 any time after 12:30 pm     - You may shower the day after your procedure. Wash the site gently with soap and  water. Rinse well and pat dry. Keep the area clean and dry. You may apply a Band-Aid to the site. Avoid lotions, ointments, or powders until fully healed.     - It is normal to notice a small bruise around the puncture site and/or a small grape sized or smaller lump. Any large bruising or large lump warrants a call to the office.     - If bleeding should occur, lay down and apply pressure to the affected area for 10 minutes.  If the bleeding stops notify your physician.  If there is a large amount of bleeding or spurting of blood CALL 911 immediately.  DO NOT drive yourself to the hospital.    - You may experience some tenderness, bruising or minimal inflammation.  If you have any concerns or if any of these symptoms become excessive, contact your cardiologist or go to the emergency room.     OTHER INSTRUCTIONS  - You may take acetaminophen (Tylenol) as directed for discomfort.  If pain is not relieved with acetaminophen (Tylenol), contact your doctor.    - If you notice or experience any of the following, you should notify your doctor or seek medical attention  Chest pain or discomfort  Change in mental status or weakness in extremities.  Dizziness, light headedness, or feeling faint.  Change in the site where the procedure was performed, such as bleeding or an increased area of bruising or swelling.  Tingling, numbness, pain, or coolness in the leg/arm beyond the site where the procedure was performed.  Signs of infection (i.e. shaking chills, temperature > 100 degrees Fahrenheit, warmth, redness) in the leg/arm area where the procedure was performed.  Changes in urination   Bloody or black stools  Vomiting blood  Severe nose bleeds  Any excessive bleeding    - If you DO NOT have an appointment with your cardiologist within 2-4 weeks following your procedure, please contact their office.

## 2024-09-16 NOTE — PROGRESS NOTES
Nilda Luo is a 64 y.o. male on day 0 of admission presenting with Syncope and collapse.      Subjective   Denies chest pain        Objective     Last Recorded Vitals  /59   Pulse 71   Temp 36.3 °C (97.3 °F) (Temporal)   Resp 18   Wt 95.3 kg (210 lb)   SpO2 97%   Intake/Output last 3 Shifts:    Intake/Output Summary (Last 24 hours) at 9/16/2024 1519  Last data filed at 9/16/2024 1225  Gross per 24 hour   Intake --   Output 0 ml   Net 0 ml       Admission Weight  Weight: 95.3 kg (210 lb) (09/15/24 1056)    Daily Weight  09/15/24 : 95.3 kg (210 lb)    Image Results  Cardiac Catheterization Procedure     [facility], Cath Lab         [address] [place] [address]    Cardiovascular Catheterization Report    Patient Name:      NILDA LUO   Performing Physician:  Chino Sullivan MD  Study Date:        9/16/2024           Verifying Physician:   Chino Sullivan MD  MRN/PID:           [id number]            Cardiologist/Co-Scrub:  Accession#:        [id number]        Ordering Provider:     [id number] MOISES BOWERS  Date of Birth/Age: 1960 / 64      Cardiologist:                     years  Gender:            M                   Fellow:  Encounter#:        [id number]          Surgeon:       Study:            Coronary Arteriogram  Additional Study: Left Heart Cath  Additional Study: Left Ventriculogram       Indications:  NILDA LUO is a 64 year old male who presents with hypertension, chronic pulmonary disease, former smoker and a chest pain assessment of typical angina. Stable angina.     Procedure Description:  After infiltration with 2% Lidocaine, the right radial artery was cannulated with a modified Seldinger technique. Subsequently a 5/6 slender sheath was placed in the right  radial artery. Multiple injections of contrast were made into the left and right coronary arteries with angiograms recorded in multiple projections. Retrograde left heart catheterizion was accomplished with a 5 Fr. pigtail catheter. A single plane left ventriculogram was recorded in the 30 degree BRADFORD projection. The contrast dose was 20 ml injected at 10 ml/sec. The catheter was then withdrawn across the aortic valve under continuous pressure monitoring and removed.     Coronary Angiography:  The coronary circulation is right dominant.     Left Main Coronary Artery:  The left main coronary artery is free of atherosclerotic disease.     Left Anterior Descending Coronary Artery Distribution:  The Left Anterior Descending artery is a large vessel. This artery is free of atherosclerotic disease.     Circumflex Coronary Artery Distribution:  The Left Circumflex artery is a large vessel. Is free of atherosclerotic disease.     Right Coronary Artery Distribution:    The Right Coronary Artery is a large vessel. This artery is free of atherosclerotic disease.       Left Ventriculography:  The estimated left ventricular ejection fraction is normal at 55%.     Hemo Personnel:  +---------------+---------+  Name           Duty       +---------------+---------+  Cori Sullivan MDPROC MD 1  +---------------+---------+       Hemodynamic Pressures:     +----+---------------+----------+-------------+--------------+-------+---------+  Site   Date Time   Phase NameSystolic mmHgDiastolic mmHgED mmHgMean mmHg  +----+---------------+----------+-------------+--------------+-------+---------+    LV      9/16/2024  AIR REST           97             6      9                   12:18:51 PM                                                       +----+---------------+----------+-------------+--------------+-------+---------+   LVp      9/16/2024  AIR REST           84            13     16                    12:19:20 PM                                                       +----+---------------+----------+-------------+--------------+-------+---------+   AOp      9/16/2024  AIR REST           96            68              82          12:19:27 PM                                                       +----+---------------+----------+-------------+--------------+-------+---------+   AOp      9/16/2024  AIR REST           93            67              80          12:19:29 PM                                                       +----+---------------+----------+-------------+--------------+-------+---------+       Cardiac Cath Post Procedure Notes:  Post Procedure Diagnosis: Angiographically normal coronary arteries.  Blood Loss:               Estimated blood loss during the procedure was 2 mls.  Specimens Removed:        Number of specimen(s) removed: none.    ____________________________________________________________________________________  CONCLUSIONS:   1. Left Main Coronary Artery: This artery is normal.   2. Left Anterior Descending Artery: This artery is normal.   3. Circumflex Coronary Artery: normal.   4. Right Coronary Artery: is normal.   5. The Left Ventricular Ejection Fraction is 55%.    ICD 10 Codes:  Angina pectoris, unspecified-I20.9     CPT Codes:  Left Heart Cath (visualization of coronaries) and LV-20032; Moderate Sedation Services 1st additional 15 minutes patient >5 years-29418; Moderate Sedation Services 2nd additional 15 minutes patient >5 years-81789     [id number] Cori Sullivan MD  Performing Physician  Electronically signed by [id number] Cori Sullivan MD on 9/16/2024 at 12:34:02 PM         ** Final **      Physical Exam  Constitutional:       Appearance: He is obese.   HENT:      Head: Normocephalic.      Mouth/Throat:      Mouth: Mucous membranes are moist.   Eyes:      Pupils: Pupils are equal, round, and reactive to light.   Cardiovascular:      Rate and Rhythm: Normal  rate and regular rhythm.      Heart sounds: Normal heart sounds, S1 normal and S2 normal.   Pulmonary:      Effort: Pulmonary effort is normal.      Breath sounds: Wheezing present.      Comments: Expiratory wheeze  Abdominal:      General: Bowel sounds are normal.      Palpations: Abdomen is soft.   Musculoskeletal:         General: Normal range of motion.      Cervical back: Neck supple.   Skin:     General: Skin is warm.   Neurological:      Mental Status: He is alert and oriented to person, place, and time.   Psychiatric:         Mood and Affect: Mood normal.         Behavior: Behavior normal.     Assessment/Plan    Syncope and collapse  Blind in right eye  Ischemic cardiomyopathy   COPD  ANN  HTN/HLD  Tobacco abuse  Marijuana use  nausea and vomiting with presumed cannabis hyperemesis syndrome  Obesity     -LHC revealed mild coronary artery disease and a normal LVEF. Medical management is advised.   -Carotid ultrasound July 5 showed less than 50% stenosis bilaterally  -Bubble study was negative  -Holter monitor showed NSR  -Resume home meds  -PT/OT evaluation, moderate intensity, precert is initiated   -TCC for discharge planning  - Zofran PRN     DVTp: Lovenox subcu          Dulce Maria Cruz MD

## 2024-09-16 NOTE — CONSULTS
Inpatient consult to Cardiology  Consult performed by: JEFF Acosta-CNP  Consult ordered by: JEFF Dennis-CNP  Reason for consult: Nausea vomiting, patient is scheduled for cardiac catheterization this week      Cardiology Consult Note      Date:   9/16/2024  Patient name:  Be Luo  Date of admission:  9/15/2024  9:40 AM  MRN:   [id number]  YOB: 1960  Time of Consult:  9:22 AM    Consulting Cardiologist: Dr. Gaston Castañeda, JEFF, CNP  Primary Cardiologist:  Dr. Gaston Fonseca    Referring Provider: Dr cruz      Admission Diagnosis:     Syncope and collapse      History of Present Illness:      Be Luo is a 64 y.o.  male patient who is being at the request of Dr. Cruz for inpatient consultation of  N/V, scheduled for cardiac cath this week . He was admitted on 9/15/2024.  Previous [facility] and [facility] records have been reviewed in detail.      Patient with history of mild CAD, COPD, 3.8 cm infrarenal abdominal aortic aneurysm, obstructive sleep apnea, hypertension, hyperlipidemia, former tobacco use, quitting in November 2023, syncope, seizure disorder, legally blind, carotid artery stenosis with less than 50% bilateral stenosis from carotid ultrasound dated July 5, 2024, ischemic cardiomyopathy, rheumatoid arthritis (follows with [facility] rheumatology) who presented to [facility] emergency department on 9/15/2024 with chief complaint of nausea and vomiting.  He states that he has been having problems with nausea and vomiting for approximately 2 weeks and that there are some days where he vomits multiple times a day.  States that his vomiting episodes are associated with diaphoresis and feeling very warm. He denies any GI history of peptic ulcer disease, GI bleed.  He has not had any diarrhea, does have occasional constipation. He does smoke marijuana, states that he uses this for his glaucoma.  He denies chest pain  or pressure, palpitations.  He was recently evaluated in the office by Dr. Fonseca on 9/10/2024 and noted to have abnormal Lexiscan that was performed 2024 that showed a small to moderate-sized area of inferior wall myocardial ischemia and calculated LV ejection fraction 43% and was recommended to have cardiac catheterization for further evaluation which is scheduled for later this week.  Patient is admitted to medical service and cardiology is consulted as patient was already scheduled for heart catheterization.  His troponin on admission was negative.  EKG showed sinus rhythm with first-degree AV block.  Telemetry sinus rhythm in the 70s.  Magnesium 1.97, creatinine 0.88, GFR greater than 90.  At time of cardiology consult he denied chest pain or chest pressure.  States that he continues to feel nauseated and has already vomited this morning.    Allergies:     Allergies   Allergen Reactions    Green Tea Hives and Shortness of breath    Gabapentin Agitation    Lyrica [Pregabalin] GI Upset    Norco [Hydrocodone-Acetaminophen] Agitation    Prednisone Other    Sulfasalazine GI Upset    Vioxx [Rofecoxib] Other     Retains water/swelling         Past Medical History:     Past Medical History:   Diagnosis Date    Blind one eye     Hypertension        Past Surgical History:     No past surgical history on file.    Family History:     No family history on file.    Social History:     Social History     Tobacco Use    Smoking status: Former     Current packs/day: 0.00     Types: Cigarettes     Quit date: 2023     Years since quittin.0   Vaping Use    Vaping status: Never Used   Substance Use Topics    Alcohol use: Not Currently    Drug use: Yes     Types: Marijuana       CURRENT INPATIENT MEDICATIONS    enoxaparin, 40 mg, subcutaneous, q24h  polyethylene glycol, 17 g, oral, Daily      sodium chloride 0.9%, 100 mL/hr      Current Outpatient Medications   Medication Instructions    acetaZOLAMIDE (DIAMOX)  250 mg, oral, 2 times daily    Actemra ACTPen 162 mg, subcutaneous, Every 7 days    albuterol 90 mcg/actuation inhaler 2 puffs, inhalation, Every 6 hours PRN    albuterol 2.5 mg, inhalation, 2 times daily PRN    aspirin 81 mg, oral, Daily RT    Bevespi Aerosphere 9-4.8 mcg HFA aerosol inhaler 2 puffs, inhalation, 2 times daily    brimonidine (AlphaGAN) 0.2 % ophthalmic solution 1 drop, Left Eye, 3 times daily    CALCIFEDIOL ORAL 50 mcg, oral, Daily    cholecalciferol (VITAMIN D-3) 2,000 Units, oral, Daily with breakfast    cyclobenzaprine (FLEXERIL) 10 mg, oral, 3 times daily PRN    dorzolamide-timoloL (Cosopt) 22.3-6.8 mg/mL ophthalmic solution 1 drop, Left Eye, 2 times daily    lamoTRIgine (LaMICtal) 200 mg tablet 3 tablets, oral, 2 times daily    latanoprost (Xalatan) 0.005 % ophthalmic solution 1 drop, Left Eye, Nightly    medical cannabis 1 each, oral    metFORMIN (Glucophage) 500 mg tablet 1 tablet, oral, Daily with evening meal    montelukast (SINGULAIR) 10 mg, oral, Nightly    multivitamin with minerals iron-free (multivitamin-iron-minerals-folic acid) 1 tablet, oral, Daily RT    ondansetron (ZOFRAN) 4 mg, oral, Every 8 hours PRN    potassium chloride CR 10 mEq ER tablet 10 mEq, oral, Daily    pramipexole (MIRAPEX) 0.125 mg, oral, Nightly    prednisoLONE acetate (Pred-Forte) 1 % ophthalmic suspension 1 drop, ophthalmic (eye), Daily    rosuvastatin (CRESTOR) 5 mg, oral, Daily    Stiolto Respimat 2.5-2.5 mcg/actuation mist inhaler 2 Inhalations, inhalation, Daily    valsartan (DIOVAN) 40 mg, oral, Daily        Review of Systems:      12 point review of systems was obtained in detail and is negative other than that detailed above.    Vital Signs:     Vitals:    09/15/24 1315 09/15/24 1930 09/15/24 2345 09/16/24 0741   BP: 154/84 127/71 118/60 107/59   BP Location:       Patient Position: Standing Sitting     Pulse:  69 77 69   Resp:  19  16   Temp:  36.3 °C (97.3 °F) 36.1 °C (97 °F) 36.3 °C (97.3 °F)   TempSrc:     Temporal   SpO2: 99% 95% 96% 93%   Weight:       Height:         No intake or output data in the 24 hours ending 09/16/24 0922    Wt Readings from Last 4 Encounters:   09/15/24 95.3 kg (210 lb)   09/10/24 92.1 kg (203 lb)   08/30/24 91.6 kg (202 lb)   08/19/24 98.5 kg (217 lb 3.2 oz)       Physical Examination:     GENERAL APPEARANCE: Overweight male in no acute distress.  He is pleasant and cooperative,  complains of nausea  CHEST: Symmetric and non-tender.  INTEGUMENT: Skin warm and dry, without gross excoriationis or lesions.  HEENT: No gross abnormalities of conjunctiva, teeth, gums, oral mucosa  NECK: Supple, no JVD, no bruit. Thyroid not palpable. Carotid upstrokes normal.  NEURO/PSHCY: Alert and oriented x3; appropriate behavior and responses. Moves all extremities equally.  LUNGS: Clear to auscultation bilaterally; normal respiratory effort.  HEART: Rate and rhythm regular with no evident murmur; no gallop appreciated. There are no rubs, clicks or heaves. PMI nondisplaced.  ABDOMEN: Obese, soft, nontender, no palpable hepatosplenomegaly, no mases, no bruits, positive bowel sounds.   MUSCULOSKELETAL: Normal range of motion.  EXTREMITIES: Warm with good color, no clubbing or cyanois. There is no edema noted.  PERIPHERAL VASCULAR: 2+ radial and posterior tibial pulse bilateral.     Lab:     CBC:   Results from last 7 days   Lab Units 09/16/24  0624 09/15/24  1027 09/10/24  1134   WBC AUTO x10*3/uL 5.1 5.3 7.2   RBC AUTO x10*6/uL 4.63 4.44* 4.74   HEMOGLOBIN g/dL 15.4 14.9 15.8   HEMATOCRIT % 46.8 44.8 48.5   MCV fL 101* 101* 102*   MCH pg 33.3 33.6 33.3   MCHC g/dL 32.9 33.3 32.6   RDW % 12.4 12.1 12.4   PLATELETS AUTO x10*3/uL 203 192 236     CMP:    Results from last 7 days   Lab Units 09/16/24  0624 09/15/24  1027 09/10/24  1134   SODIUM mmol/L 143 142 140   POTASSIUM mmol/L 4.1 3.6 4.3   CHLORIDE mmol/L 112* 112* 109*   CO2 mmol/L 24 25 24   BUN mg/dL 9 10 12   CREATININE mg/dL 0.96 0.88 0.91   GLUCOSE  mg/dL 92 96 102*   PROTEIN TOTAL g/dL  --  6.3* 7.0   CALCIUM mg/dL 8.9 8.4* 9.5   BILIRUBIN TOTAL mg/dL  --  0.8  --    ALK PHOS U/L  --  64  --    AST U/L  --  15  --    ALT U/L  --  18  --      BMP:    Results from last 7 days   Lab Units 09/16/24  0624 09/15/24  1027 09/10/24  1134   SODIUM mmol/L 143 142 140   POTASSIUM mmol/L 4.1 3.6 4.3   CHLORIDE mmol/L 112* 112* 109*   CO2 mmol/L 24 25 24   BUN mg/dL 9 10 12   CREATININE mg/dL 0.96 0.88 0.91   CALCIUM mg/dL 8.9 8.4* 9.5   GLUCOSE mg/dL 92 96 102*     Magnesium:  Results from last 7 days   Lab Units 09/16/24  0624 09/15/24  1027   MAGNESIUM mg/dL 1.99 1.84     Troponin:    Results from last 7 days   Lab Units 09/15/24  1027   TROPHS ng/L 4     BNP:     Lipid Panel:         Diagnostic Studies:     ECG 12 lead  Result Date: 9/15/2024    Sinus rhythm with 1st degree AV block Otherwise normal ECG When compared with ECG of 30-AUG-2024 12:24, IL interval has increased See ED provider note for full interpretation and clinical correlation Confirmed by Ania Reilly ([id number]) on 9/15/2024 1:20:06 PM      Radiology:     Cardiac Catheterization Procedure    (Results Pending)       Problem List:     Patient Active Problem List   Diagnosis    Syncope and collapse    Fall    Chronic obstructive pulmonary disease (Multi)    Seizure disorder (Multi)    Abnormal myocardial perfusion study    Cardiomyopathy, ischemic       Assessment:     Nausea and vomiting  History of syncope  Coronary artery disease, mild per cardiac catheterization December 2022 with 40% stenosis of LAD, normal circumflex, less than 40% stenosis of right coronary artery and estimated LV ejection fraction 55%.  Abnormal Lexiscan August 2024 with small to moderate inferior wall ischemia, calculated ejection fraction 43%  Ischemic cardiomyopathy  Chronic obstructive pulmonary disease  Seizure disorder  COPD  Legally blind  Carotid artery stenosis   Former tobacco use, quit approximately 1 year ago.   Positive current marijuana use      Plan:     Continue telemetry  Continue current outpatient cardiac medications  Proceed with cardiac catheterization today with further cardiology recommendation pending procedure outcome  Consider GI evaluation for nausea and vomiting    Yennifer Castañeda CNP  [facility]    Of note, this documentation is completed using the Dragon Dictation system (voice recognition software). There may be spelling and/or grammatical errors that were not corrected prior to final submission.    Electronically signed by BEN Acosta, on 9/16/2024 at 9:22 AM     I have personally interviewed and examined the patient.   I have personally and independently reviewed labs and diagnostic testing.  I have personally verified the elements of the history and physical listed above and changes, if any, are noted.   I have personally reviewed the assessment and plan as documented by DAKOTA Huang CNP and concur.    In summary, MR. Be Luo has a history of mild coronary disease, COPD, obstructive sleep apnea, hypertension, hyperlipidemia, and tobacco abuse.  He quit smoking tobacco in November 2023.  Cardiac cath by Dr. Elías Fischer at [facility] on December 12, 2022 showed 40% stenosis of the LAD, normal circumflex, and less than 40% stenosis of the RCA.  Estimated LV ejection fraction was 55%.       He presented to the emergency department on July 3, 2024 after he passed out.  He was getting up from a chair when he noted onset of lightheadedness.  He noted that the room was spinning.  He fell to the floor and hit the left side of his face and head.  Some family members observed the fall and noted he was unconscious for a few minutes.  He has a history of seizures but no seizure activity was witnessed.  No incontinence.  No palpitations, chest pain, or shortness of breath.       He was noted to have stable vital signs.  Oxygen saturation  97%.  CBC and CMP normal.  High-sensitivity troponin level 4 and 5.  CT scan of the brain was negative for any acute findings.  There was a 3.8 cm infrarenal abdominal aortic aneurysm.  Chest x-ray did not show any active disease.  He was admitted to telemetry.  The following morning he had recurrent lightheadedness and vertigo when he stood up at the bedside.  He had difficulty balancing himself.  Orthostatic blood pressure and pulse were negative.  He remained in normal sinus rhythm without any significant bradycardia or tachyarrhythmias.  Suspect vertigo leading to a fall and and possible concussion after he hit his head.  He remained in normal sinus rhythm without any active cardiac dysrhythmias.  EKG showed normal sinus rhythm with rightward axis.  No significant ST changes.  High-sensitivity troponin levels were negative.       A Lexiscan myocardial perfusion study August 22, 2024 showed  a small to moderate-sized area of inferior wall myocardial ischemia.  No evidence of infarction.  Calculated LV ejection fraction 43%.  Carotid ultrasound July 5, 2024 showed less than 50% stenosis bilaterally.  Echocardiogram showed an estimated LV ejection fraction of 45 to 50%.  Bubble study was negative.  Trivial MR and TR.  A Holter monitor August 22, 2024 showed normal sinus rhythm with average heart rate 89 bpm, minimum heart rate 67 beats minute and maximal heart rate 142 beats per minute.  Occasional PACs and PVCs.  No significant pauses.      In light of syncope and underlying ischemic cardiomyopathy he was scheduled to undergo cardiac cath later this week.  He presented to the emergency department yesterday with complaints of intermittent nausea and vomiting over the past 2 weeks.  He states the episodes are associated with significant diaphoresis.  No chest pain.  He does note some shortness of breath.  In the emergency department he was noted to have stable vital signs.  Oxygen saturation 98%.  CBC and CMP normal  with exception of chloride 112.  Negative high-sensitivity troponin.  EKG shows normal sinus rhythm with first-degree AV block, otherwise normal.  He currently is resting comfortably.  He denies chest pain or shortness of breath.  Cardiac rhythm is regular.  Lungs are clear.  No significant peripheral edema.  The patient states his strong preference to undergo cardiac cath while he is here at the hospital.  Tentatively scheduled later today.  Further recommendations pending this result.

## 2024-09-16 NOTE — NURSING NOTE
AVS printed and reviewed with patient. All belonging sent with patient. New medication discussed with patient.med

## 2024-09-16 NOTE — PROGRESS NOTES
Physical Therapy    Physical Therapy Evaluation    Patient Name: Be Luo  MRN: [id number]  Department: [facility]  Room: [facility]/ELY Card *  Today's Date: 9/16/2024   Time Calculation  Start Time: 1014  Stop Time: 1033  Time Calculation (min): 19 min    Assessment/Plan   PT Assessment  PT Assessment Results: Decreased strength, Decreased endurance, Impaired balance, Decreased mobility, Impaired judgement, Decreased safety awareness  Rehab Prognosis: Good  Barriers to Discharge: Visual impairment, limited support  Evaluation/Treatment Tolerance: Patient tolerated treatment well  Assessment Comment: Paient will benefit from additional PT to increase strengthm mobilty, balance. Suggest strategies to improve home safety  End of Session Patient Position: Bed, 3 rail up, Alarm on  IP OR SWING BED PT PLAN  Inpatient or Swing Bed: Inpatient  PT Plan  Treatment/Interventions: Transfer training, Gait training, Balance training, Strengthening, Therapeutic exercise, Therapeutic activity  PT Frequency: 3 times per week  PT Discharge Recommendations:  (Contninued PT at [facility] intenstiy at moderate frequency in 24/7 setting)  Equipment Recommended upon Discharge:  (TBD)  PT Recommended Transfer Status: Assist x1  PT - OK to Discharge: (once deemed medically appropriate with contineud PT in 24/7 setting)      Subjective   General Visit Information:  General  Reason for Referral: Impaired mobiity  Referred By: PT/OT 9/15/24 Cayla ANGELO  Past Medical History Relevant to Rehab: COPD, HTN, Seizure, Blind left eye (legally blind), Right eye 70%, ICM.  Co-Treatment: OT  Co-Treatment Reason: to maximize patient/staff safety  Patient Position Received: Bed, 3 rail up, Alarm on  General Comment: To ED 9/15/24 for vomitig and pasing out. Had abnormal stress test and scheduled for PCI in future. Hoping to move up. Reports falling 35 years. Also reports eye sight deteriorated last year.  Home Living:  Home Living  Home Living  Comments: Resides alone (spouse passed away past year) 1 story apartment in home 4 steps to enter building with handrail, steps down to laundry level with handrail. Tub shower no seat no grab bar. Owns visual impairment cane.  Prior Level of Function:  Prior Function Per Pt/Caregiver Report  Prior Function Comments: Per patient report independent in mobity, ADLs and IADLS with visual impairment cane. Describes multiple falls, related to legs giving out. Family transfport  Precautions:  Precautions  Hearing/Visual Limitations: Blind left eye, Right eye  70%, poor peripheral vision. Describes as tunnel vision.  Medical Precautions: Fall precautions      Objective   Pain:  Pain Assessment  0-10 (Numeric) Pain Score: 0 - No pain  Cognition:  Cognition  Overall Cognitive Status: Within Functional Limits  Orientation Level: Oriented X4    General Assessments:  General Observation  General Observation: Patient lying in bed. Agreeable to PT/OT.     Activity Tolerance  Endurance:  (Fair)    Static Sitting Balance  Static Sitting- Good  Dynamic Sitting Balance  Dynamic Sitting-Fair +    Static Standing Balance  Static Standing-: Fair  Dynamic Standing Balance  Dynamic Standing- Fair -  Functional Assessments:  Bed Mobility  Bed Mobility:  (supine <> sit independent)    Transfers  Transfer:  (Sit <> stand at bed level CGA, patient adopts WBOS, Takes time to acclimate to standing posture)    Ambulation/Gait Training  Ambulation/Gait Training Performed:  (Patient ambulated without assistive device (cane not here) + 1 moderate assist, tends to reach and grab onto foot of bed to steady self 10' Sits prematuresly. Poor eccentric control)  Extremity/Trunk Assessments:  RUE   RUE : Within Functional Limits  LUE   LUE:  (w)  RLE   RLE :  (AROM HIP/knee/ankle WFL MMT 4/5 grossly)  LLE   LLE :  (AROM HIP/knee/ankle WFL MMT 4/5 grossly)  Outcome Measures:  [facility] Basic Mobility  Turning from your back to your side while in a flat bed  without using bedrails: A little  Moving from lying on your back to sitting on the side of a flat bed without using bedrails: A little  Moving to and from bed to chair (including a wheelchair): A lot  Standing up from a chair using your arms (e.g. wheelchair or bedside chair): A little  To walk in hospital room: A lot  Climbing 3-5 steps with railing: A lot  Basic Mobility - Total Score: 15    Encounter Problems       Encounter Problems (Active)       PT Problem       Transfers sit <> stand with ww v sight cane supervised (Progressing)       Start:  09/16/24    Expected End:  09/30/24            Patient to ambulate with ww v sight cane 30' supervised  (Progressing)       Start:  09/16/24    Expected End:  09/30/24            Patient to demonstrate 5 standing march with single UE support supervised  (Not Progressing)       Start:  09/16/24    Expected End:  09/30/24            Patient to perrform8 minutes of therapeutic exercise  (Not Progressing)       Start:  09/16/24    Expected End:  09/30/24               Pain - Adult              Education Documentation  Mobility Training, taught by Luzma Baird PT at 9/16/2024  1:08 PM.  Learner: Patient  Readiness: Acceptance  Method: Explanation, Demonstration  Response: Needs Reinforcement

## 2024-09-16 NOTE — PROGRESS NOTES
Pt confirms demo's. Reports multiple falls and emesis. Refuses placement or HHC. Pt has 70% vision- he reports to me. He goes hunting w/ his brother. Siblings and friends transport. Uses ins. transport for medical appts. Self-Manages meds. Denies barrier to setting up medications or administering. Has ins coverage for meds, no barriers. Has a sister or neighbor who will assist if needed. Pt reports multiple hospitalizations for falls and emesis at [facility] and  without known etiology. RN and  updated on disposition. PD today.

## 2024-09-16 NOTE — NURSING NOTE
Patient returning to 9S.  Report called to Nerissa ENGLAND and patient placed in Patient Movement for transport back to floor.

## 2024-09-16 NOTE — H&P (VIEW-ONLY)
Inpatient consult to Cardiology  Consult performed by: JEFF Acosta-CNP  Consult ordered by: JEFF Dennis-CNP  Reason for consult: Nausea vomiting, patient is scheduled for cardiac catheterization this week      Cardiology Consult Note      Date:   9/16/2024  Patient name:  Be Luo  Date of admission:  9/15/2024  9:40 AM  MRN:   [id number]  YOB: 1960  Time of Consult:  9:22 AM    Consulting Cardiologist: Dr. Gaston Castañeda, JEFF, CNP  Primary Cardiologist:  Dr. Gaston Fonseca    Referring Provider: Dr cruz      Admission Diagnosis:     Syncope and collapse      History of Present Illness:      Be Luo is a 64 y.o.  male patient who is being at the request of Dr. Cruz for inpatient consultation of  N/V, scheduled for cardiac cath this week . He was admitted on 9/15/2024.  Previous [facility] and [facility] records have been reviewed in detail.      Patient with history of mild CAD, COPD, 3.8 cm infrarenal abdominal aortic aneurysm, obstructive sleep apnea, hypertension, hyperlipidemia, former tobacco use, quitting in November 2023, syncope, seizure disorder, legally blind, carotid artery stenosis with less than 50% bilateral stenosis from carotid ultrasound dated July 5, 2024, ischemic cardiomyopathy, rheumatoid arthritis (follows with [facility] rheumatology) who presented to [facility] emergency department on 9/15/2024 with chief complaint of nausea and vomiting.  He states that he has been having problems with nausea and vomiting for approximately 2 weeks and that there are some days where he vomits multiple times a day.  States that his vomiting episodes are associated with diaphoresis and feeling very warm. He denies any GI history of peptic ulcer disease, GI bleed.  He has not had any diarrhea, does have occasional constipation. He does smoke marijuana, states that he uses this for his glaucoma.  He denies chest pain  or pressure, palpitations.  He was recently evaluated in the office by Dr. Fonseca on 9/10/2024 and noted to have abnormal Lexiscan that was performed 2024 that showed a small to moderate-sized area of inferior wall myocardial ischemia and calculated LV ejection fraction 43% and was recommended to have cardiac catheterization for further evaluation which is scheduled for later this week.  Patient is admitted to medical service and cardiology is consulted as patient was already scheduled for heart catheterization.  His troponin on admission was negative.  EKG showed sinus rhythm with first-degree AV block.  Telemetry sinus rhythm in the 70s.  Magnesium 1.97, creatinine 0.88, GFR greater than 90.  At time of cardiology consult he denied chest pain or chest pressure.  States that he continues to feel nauseated and has already vomited this morning.    Allergies:     Allergies   Allergen Reactions    Green Tea Hives and Shortness of breath    Gabapentin Agitation    Lyrica [Pregabalin] GI Upset    Norco [Hydrocodone-Acetaminophen] Agitation    Prednisone Other    Sulfasalazine GI Upset    Vioxx [Rofecoxib] Other     Retains water/swelling         Past Medical History:     Past Medical History:   Diagnosis Date    Blind one eye     Hypertension        Past Surgical History:     No past surgical history on file.    Family History:     No family history on file.    Social History:     Social History     Tobacco Use    Smoking status: Former     Current packs/day: 0.00     Types: Cigarettes     Quit date: 2023     Years since quittin.0   Vaping Use    Vaping status: Never Used   Substance Use Topics    Alcohol use: Not Currently    Drug use: Yes     Types: Marijuana       CURRENT INPATIENT MEDICATIONS    enoxaparin, 40 mg, subcutaneous, q24h  polyethylene glycol, 17 g, oral, Daily      sodium chloride 0.9%, 100 mL/hr      Current Outpatient Medications   Medication Instructions    acetaZOLAMIDE (DIAMOX)  250 mg, oral, 2 times daily    Actemra ACTPen 162 mg, subcutaneous, Every 7 days    albuterol 90 mcg/actuation inhaler 2 puffs, inhalation, Every 6 hours PRN    albuterol 2.5 mg, inhalation, 2 times daily PRN    aspirin 81 mg, oral, Daily RT    Bevespi Aerosphere 9-4.8 mcg HFA aerosol inhaler 2 puffs, inhalation, 2 times daily    brimonidine (AlphaGAN) 0.2 % ophthalmic solution 1 drop, Left Eye, 3 times daily    CALCIFEDIOL ORAL 50 mcg, oral, Daily    cholecalciferol (VITAMIN D-3) 2,000 Units, oral, Daily with breakfast    cyclobenzaprine (FLEXERIL) 10 mg, oral, 3 times daily PRN    dorzolamide-timoloL (Cosopt) 22.3-6.8 mg/mL ophthalmic solution 1 drop, Left Eye, 2 times daily    lamoTRIgine (LaMICtal) 200 mg tablet 3 tablets, oral, 2 times daily    latanoprost (Xalatan) 0.005 % ophthalmic solution 1 drop, Left Eye, Nightly    medical cannabis 1 each, oral    metFORMIN (Glucophage) 500 mg tablet 1 tablet, oral, Daily with evening meal    montelukast (SINGULAIR) 10 mg, oral, Nightly    multivitamin with minerals iron-free (multivitamin-iron-minerals-folic acid) 1 tablet, oral, Daily RT    ondansetron (ZOFRAN) 4 mg, oral, Every 8 hours PRN    potassium chloride CR 10 mEq ER tablet 10 mEq, oral, Daily    pramipexole (MIRAPEX) 0.125 mg, oral, Nightly    prednisoLONE acetate (Pred-Forte) 1 % ophthalmic suspension 1 drop, ophthalmic (eye), Daily    rosuvastatin (CRESTOR) 5 mg, oral, Daily    Stiolto Respimat 2.5-2.5 mcg/actuation mist inhaler 2 Inhalations, inhalation, Daily    valsartan (DIOVAN) 40 mg, oral, Daily        Review of Systems:      12 point review of systems was obtained in detail and is negative other than that detailed above.    Vital Signs:     Vitals:    09/15/24 1315 09/15/24 1930 09/15/24 2345 09/16/24 0741   BP: 154/84 127/71 118/60 107/59   BP Location:       Patient Position: Standing Sitting     Pulse:  69 77 69   Resp:  19  16   Temp:  36.3 °C (97.3 °F) 36.1 °C (97 °F) 36.3 °C (97.3 °F)   TempSrc:     Temporal   SpO2: 99% 95% 96% 93%   Weight:       Height:         No intake or output data in the 24 hours ending 09/16/24 0922    Wt Readings from Last 4 Encounters:   09/15/24 95.3 kg (210 lb)   09/10/24 92.1 kg (203 lb)   08/30/24 91.6 kg (202 lb)   08/19/24 98.5 kg (217 lb 3.2 oz)       Physical Examination:     GENERAL APPEARANCE: Overweight male in no acute distress.  He is pleasant and cooperative,  complains of nausea  CHEST: Symmetric and non-tender.  INTEGUMENT: Skin warm and dry, without gross excoriationis or lesions.  HEENT: No gross abnormalities of conjunctiva, teeth, gums, oral mucosa  NECK: Supple, no JVD, no bruit. Thyroid not palpable. Carotid upstrokes normal.  NEURO/PSHCY: Alert and oriented x3; appropriate behavior and responses. Moves all extremities equally.  LUNGS: Clear to auscultation bilaterally; normal respiratory effort.  HEART: Rate and rhythm regular with no evident murmur; no gallop appreciated. There are no rubs, clicks or heaves. PMI nondisplaced.  ABDOMEN: Obese, soft, nontender, no palpable hepatosplenomegaly, no mases, no bruits, positive bowel sounds.   MUSCULOSKELETAL: Normal range of motion.  EXTREMITIES: Warm with good color, no clubbing or cyanois. There is no edema noted.  PERIPHERAL VASCULAR: 2+ radial and posterior tibial pulse bilateral.     Lab:     CBC:   Results from last 7 days   Lab Units 09/16/24  0624 09/15/24  1027 09/10/24  1134   WBC AUTO x10*3/uL 5.1 5.3 7.2   RBC AUTO x10*6/uL 4.63 4.44* 4.74   HEMOGLOBIN g/dL 15.4 14.9 15.8   HEMATOCRIT % 46.8 44.8 48.5   MCV fL 101* 101* 102*   MCH pg 33.3 33.6 33.3   MCHC g/dL 32.9 33.3 32.6   RDW % 12.4 12.1 12.4   PLATELETS AUTO x10*3/uL 203 192 236     CMP:    Results from last 7 days   Lab Units 09/16/24  0624 09/15/24  1027 09/10/24  1134   SODIUM mmol/L 143 142 140   POTASSIUM mmol/L 4.1 3.6 4.3   CHLORIDE mmol/L 112* 112* 109*   CO2 mmol/L 24 25 24   BUN mg/dL 9 10 12   CREATININE mg/dL 0.96 0.88 0.91   GLUCOSE  mg/dL 92 96 102*   PROTEIN TOTAL g/dL  --  6.3* 7.0   CALCIUM mg/dL 8.9 8.4* 9.5   BILIRUBIN TOTAL mg/dL  --  0.8  --    ALK PHOS U/L  --  64  --    AST U/L  --  15  --    ALT U/L  --  18  --      BMP:    Results from last 7 days   Lab Units 09/16/24  0624 09/15/24  1027 09/10/24  1134   SODIUM mmol/L 143 142 140   POTASSIUM mmol/L 4.1 3.6 4.3   CHLORIDE mmol/L 112* 112* 109*   CO2 mmol/L 24 25 24   BUN mg/dL 9 10 12   CREATININE mg/dL 0.96 0.88 0.91   CALCIUM mg/dL 8.9 8.4* 9.5   GLUCOSE mg/dL 92 96 102*     Magnesium:  Results from last 7 days   Lab Units 09/16/24  0624 09/15/24  1027   MAGNESIUM mg/dL 1.99 1.84     Troponin:    Results from last 7 days   Lab Units 09/15/24  1027   TROPHS ng/L 4     BNP:     Lipid Panel:         Diagnostic Studies:   @No results found for this or any previous visit.    ECG 12 lead    Result Date: 9/15/2024  Sinus rhythm with 1st degree AV block Otherwise normal ECG When compared with ECG of 30-AUG-2024 12:24, IN interval has increased See ED provider note for full interpretation and clinical correlation Confirmed by Ania Reilly ([id number]) on 9/15/2024 1:20:06 PM      No echocardiogram results found for the past 14 days    Radiology:     Cardiac Catheterization Procedure    (Results Pending)       Problem List:     Patient Active Problem List   Diagnosis    Syncope and collapse    Fall    Chronic obstructive pulmonary disease (Multi)    Seizure disorder (Multi)    Abnormal myocardial perfusion study    Cardiomyopathy, ischemic       Assessment:     Nausea and vomiting  History of syncope  Coronary artery disease, mild per cardiac catheterization December 2022 with 40% stenosis of LAD, normal circumflex, less than 40% stenosis of right coronary artery and estimated LV ejection fraction 55%.  Abnormal Lexiscan August 2024 with small to moderate inferior wall ischemia, calculated ejection fraction 43%  Ischemic cardiomyopathy  Chronic obstructive pulmonary disease  Seizure  disorder  COPD  Legally blind  Carotid artery stenosis   Former tobacco use, quit approximately 1 year ago.  Positive current marijuana use      Plan:     Continue telemetry  Continue current outpatient cardiac medications  Proceed with cardiac catheterization today with further cardiology recommendation pending procedure outcome  Consider GI evaluation for nausea and vomiting    Yennifer Castañeda CNP  [facility]      Of note, this documentation is completed using the Dragon Dictation system (voice recognition software). There may be spelling and/or grammatical errors that were not corrected prior to final submission.      Electronically signed by BEN Acosta, on 9/16/2024 at 9:22 AM

## 2024-09-16 NOTE — NURSING NOTE
AVS and cath packet reviewed with patient and verbalized understanding. All belongings sent with patient.

## 2024-09-16 NOTE — TELEPHONE ENCOUNTER
----- Message from Gaston Fonseca sent at 9/12/2024  3:04 PM EDT -----  Lab studies reviewed and look fine.  Similar to previous results.  Continue same and follow-up as scheduled.

## 2024-09-16 NOTE — NURSING NOTE
D-Stat Band removed.  Right Radial site is stable. Site is soft with no oozing or hematoma noted.  D-Stat Dressing applied.  Education started with patient with regards to site care and restrictions post band removal.  Patient has no needs at this time.

## 2024-09-16 NOTE — PROGRESS NOTES
Patient is stable status post C under the care of Dr. Sullivan.  Discussed results of procedure with patient.  Pictures provided.  Findings of the LHC revealed mild coronary artery disease and a normal LVEF.  Medical management is advised.  Please see procedural report for complete details.  Continue to monitor on telemetry.  Further recommendations per general cardiology.  Postprocedural activity, restrictions and potential complications discussed at length.  Multiple questions answered.  Patient verbalized an understanding.

## 2024-09-16 NOTE — NURSING NOTE
Pt arrival back to unit. VSS. A&O x4. Pt has no complaints. Site is soft, no hematoma/oozing. D-stat dressing clean, dry, intact.

## 2024-09-16 NOTE — PROGRESS NOTES
"Occupational Therapy    Evaluation/Treatment    Patient Name: Be Luo \"Valentín\"  MRN: [id number]  : 1960  Today's Date: 24  Time Calculation  Start Time: 1015  Stop Time: 1033  Time Calculation (min): 18 min     [facility]/ELY Card *    Assessment:  OT Assessment: pt presents with impairments in ADLS and decreased safety with functional transfers. Will benefit from con't skilled OT to address impairments  Prognosis: Good  Barriers to Discharge: None  End of Session Communication: Bedside nurse  End of Session Patient Position: Bed, 3 rail up, Alarm off, not on at start of session (call light in reach)  OT Assessment Results: Decreased ADL status, Decreased endurance, Decreased functional mobility  Prognosis: Good  Barriers to Discharge: None    Plan:  Treatment Interventions: ADL retraining, Functional transfer training, Endurance training, Patient/family training  OT Frequency: 2 times per week  OT Discharge Recommendations: Moderate intensity level of continued care, Low intensity level of continued care  OT Recommended Transfer Status: Minimal assist  OT - OK to Discharge: Yes  Treatment Interventions: ADL retraining, Functional transfer training, Endurance training, Patient/family training  Subjective     Current Problem:  1. Syncope and collapse  Case Request Cath Lab: Left Heart Cath, With LV    Case Request Cath Lab: Left Heart Cath, With LV    Cardiac Catheterization Procedure    Cardiac Catheterization Procedure      2. Cardiomyopathy, ischemic  Case Request Cath Lab: Left Heart Cath, With LV    Case Request Cath Lab: Left Heart Cath, With LV    Cardiac Catheterization Procedure    Cardiac Catheterization Procedure      3. Angina pectoris, unspecified (CMS-HCC)  Cardiac Catheterization Procedure        General:   OT Received On: 24  General  Reason for Referral: ADL impairment  Referred By: Cayla PT/OT eval and tx 9/15/24  Past Medical History Relevant to Rehab: COPD, HTN, ICM, " blind R eye, seizure (last seizure 2005 per pt), tunnel vision L eye  Family/Caregiver Present: No  Co-Treatment: PT  Co-Treatment Reason: to maximize pt/staff safety  Patient Position Received: Bed, 3 rail up, Alarm off, not on at start of session  General Comment: pt to ED 9/15 with vomiting, syncopal episode. Had abnormal stress test, scheduled for outpt PCI    Precautions:  Hearing/Visual Limitations: legally blind  Medical Precautions: Fall precautions    Vital Signs:  SpO2:  (pt on room air throughout)    Pain:  Pain Assessment  Pain Assessment: 0-10  0-10 (Numeric) Pain Score: 0 - No pain  Objective     Cognition:  Overall Cognitive Status: Within Functional Limits  Orientation Level: Oriented X4  Safety/Judgement:  (decreased safety awareness, poor insight into impairmnets)             Home Living:  Home Living Comments: lives alone, 1 story, 4 ZAFAR with HR, laundry in basement with HR. Has cane for visual impairment. tub/shower no bathroom DME    Prior Function:  Prior Function Comments: Pt indep iwth ADLS, IADLS, reports sister is beginning to assist with medication mgmt. Reports multiple falls, states legs give out, does not drive           Activities of Daily Living:   Eating Assistance: Stand by  Eating Deficit: Setup  Grooming Assistance: Stand by  Bathing Assistance: Minimal  UE Dressing Assistance: Stand by  LE Dressing Assistance: Minimal  Toileting Assistance with Device: Moderate  Functional Assistance:  (pt ambulated with mod A 10' without AD, does not have visual imp cane here with pt at hospital)                         Activity Tolerance:  Endurance:  (fair)           Bed Mobility/Transfers: Bed Mobility  Bed Mobility:  (sup to sit to sup SBA)  Transfers  Transfer:  (sit to stand to sit with min A)                Balance:  Static Sitting: good  Dynamic Sitting: fair   Static Standing: fair   Dynamic Standing: fair-         Vision:Vision - Basic Assessment  Current Vision: No visual deficits         Sensation:  Light Touch: No apparent deficits    Strength:  Strength Comments: B UE 4/5 throughout            Extremities: RUE   RUE : Within Functional Limits and LUE   LUE: Within Functional Limits    Outcome Measures: [facility] Daily Activity  Putting on and taking off regular lower body clothing: A lot  Bathing (including washing, rinsing, drying): A lot  Putting on and taking off regular upper body clothing: A little  Toileting, which includes using toilet, bedpan or urinal: A lot  Taking care of personal grooming such as brushing teeth: A little  Eating Meals: A little  Daily Activity - Total Score: 15    Education Documentation  ADL Training, taught by Patsy Guevara, OT at 9/16/2024  1:05 PM.  Learner: Patient  Readiness: Acceptance  Method: Explanation  Response: Verbalizes Understanding, Needs Reinforcement                 Goals:  Encounter Problems       Encounter Problems (Active)       OT Goals       Pt will attend to ADL task x5 minutes with less than 3 vc's to redirect (Progressing)       Start:  09/16/24    Expected End:  09/30/24            Pt will dress LB with SBA (Progressing)       Start:  09/16/24    Expected End:  09/30/24            Pt will transfer to bed, chair ,toilet with CGA (Progressing)       Start:  09/16/24    Expected End:  09/30/24            Pt will complete grooming tasks with sBA (Progressing)       Start:  09/16/24    Expected End:  09/30/24

## 2024-09-16 NOTE — DISCHARGE SUMMARY
Discharge Diagnosis  Syncope and collapse  Blind in right eye  Ischemic cardiomyopathy   COPD  ANN  HTN/HLD  Tobacco abuse  Marijuana use  nausea and vomiting with presumed cannabis hyperemesis syndrome  Obesity    Discharge Meds     Medication List      CONTINUE taking these medications     acetaZOLAMIDE 250 mg tablet; Commonly known as: Diamox   aspirin 81 mg EC tablet   brimonidine 0.2 % ophthalmic solution; Commonly known as: AlphaGAN   CALCIFEDIOL ORAL   cholecalciferol 50 mcg (2,000 unit) capsule; Commonly known as: Vitamin   D-3   dorzolamide-timoloL 22.3-6.8 mg/mL ophthalmic solution; Commonly known   as: Cosopt   lamoTRIgine 200 mg tablet; Commonly known as: LaMICtal   latanoprost 0.005 % ophthalmic solution; Commonly known as: Xalatan   medical cannabis   metFORMIN 500 mg tablet; Commonly known as: Glucophage   montelukast 10 mg tablet; Commonly known as: Singulair   multivitamin with minerals iron-free; Commonly known as: Centrum Silver   potassium chloride CR 10 mEq ER tablet; Commonly known as: Klor-Con   pramipexole 0.125 mg tablet; Commonly known as: Mirapex   rosuvastatin 5 mg tablet; Commonly known as: Crestor   valsartan 40 mg tablet; Commonly known as: Diovan; Take 1 tablet (40 mg)   by mouth once daily.     STOP taking these medications     meclizine 12.5 mg tablet; Commonly known as: Antivert       Test Results Pending At Discharge  Pending Labs       No current pending labs.            Hospital Course   Be Luo is a 64 y.o. male who is legally blind with history of COPD, seizures, HTN and syncope who is presenting with fall and vomiting.  Patient lives alone states he sat up on the side of his bed vomiting and then fell out of his bed.  Patient did not hit his head.  Patient states he has been falling for 35 years but has worsened over the past 6 months.  Patient has been going through a full cardiac workup and has a scheduled cardiac catheterization on Wednesday with Dr. Sullivan.   Patient has history of tobacco abuse in which he quit smoking cigarettes November 2023.  Patient does smoke medical marijuana daily.  Patient denies shortness of breath or chest pain, remains on room air.  Patient denies abdominal pain, nausea, vomiting or diarrhea.  Patient denies being around anyone ill.  Denies fever or chills.  BMP and CBC unremarkable.  UA negative.  Patient will be admitted for further evaluation and treatment     The patient underwent LHC, medical management was recommended . The patient refused SNF and HHC. He was dicsharged in a stable condition     Pertinent Physical Exam At Time of Discharge  Physical Exam  Constitutional:       Appearance: He is obese.   HENT:      Head: Normocephalic.      Mouth/Throat:      Mouth: Mucous membranes are moist.   Eyes:      Pupils: Pupils are equal, round, and reactive to light.   Cardiovascular:      Rate and Rhythm: Normal rate and regular rhythm.      Heart sounds: Normal heart sounds, S1 normal and S2 normal.   Pulmonary:      Effort: Pulmonary effort is normal.      Breath sounds: Wheezing present.      Comments: Expiratory wheeze  Abdominal:      General: Bowel sounds are normal.      Palpations: Abdomen is soft.   Musculoskeletal:         General: Normal range of motion.      Cervical back: Neck supple.   Skin:     General: Skin is warm.   Neurological:      Mental Status: He is alert and oriented to person, place, and time.   Psychiatric:         Mood and Affect: Mood normal.         Behavior: Behavior normal.        Outpatient Follow-Up  Future Appointments   Date Time Provider Department Center   11/5/2024 10:15 AM Gaston Fonseca MD [id number] [place]   11/14/2024  2:00 PM JEFF Lucia-CNP [id number] [place]         Dulce Maria Cruz MD

## 2024-09-16 NOTE — NURSING NOTE
Patient arrived in [facility] from Cath Lab s/p [facility].  On arrival focused assessment completed and WDL.  Right Radial arterial sheath puncture site is stable.  D-Stat Band in place.  Patient provided with milk and a boxed meal (turkey sandwich, baked chips and vanilla pudding).  Patient denies any needs at this time.

## 2024-09-16 NOTE — NURSING NOTE
Transport here to take patient back to [facility]S. On transfer Right Radial arterial sheath puncture site is stable. Dressing is clean/dry/intact; site is soft with no oozing or hematoma noted.

## 2024-09-16 NOTE — NURSING NOTE
Pt refusing to use medications through hospital, states night shift RN let him take his own. Pt educated that medications need sent for verification, pt agreeable at this time.

## 2024-09-17 ENCOUNTER — TELEPHONE (OUTPATIENT)
Dept: CARDIOLOGY | Facility: CLINIC | Age: 64
End: 2024-09-17
Payer: COMMERCIAL

## 2024-09-17 NOTE — TELEPHONE ENCOUNTER
----- Message from Nurse Rosalva RATLIFF sent at 9/16/2024  1:01 PM EDT -----    ----- Message -----  From: Gaston Fonseca MD  Sent: 9/12/2024   3:04 PM EDT  To: Ange Adair RN    Lab studies reviewed and look fine.  Similar to previous results.  Continue same and follow-up as scheduled.

## 2024-09-25 PROCEDURE — 93298 REM INTERROG DEV EVAL SCRMS: CPT | Performed by: INTERNAL MEDICINE

## 2024-10-09 ENCOUNTER — HOSPITAL ENCOUNTER (EMERGENCY)
Facility: HOSPITAL | Age: 64
Discharge: HOME | End: 2024-10-09
Payer: COMMERCIAL

## 2024-10-09 ENCOUNTER — APPOINTMENT (OUTPATIENT)
Dept: RADIOLOGY | Facility: HOSPITAL | Age: 64
End: 2024-10-09
Payer: COMMERCIAL

## 2024-10-09 VITALS
OXYGEN SATURATION: 97 % | DIASTOLIC BLOOD PRESSURE: 75 MMHG | HEIGHT: 70 IN | TEMPERATURE: 96.8 F | SYSTOLIC BLOOD PRESSURE: 131 MMHG | BODY MASS INDEX: 30.06 KG/M2 | WEIGHT: 210 LBS | RESPIRATION RATE: 18 BRPM | HEART RATE: 86 BPM

## 2024-10-09 DIAGNOSIS — N20.1 LEFT URETERAL STONE: Primary | ICD-10-CM

## 2024-10-09 LAB
ALBUMIN SERPL BCP-MCNC: 4.7 G/DL (ref 3.4–5)
ALP SERPL-CCNC: 78 U/L (ref 33–136)
ALT SERPL W P-5'-P-CCNC: 20 U/L (ref 10–52)
ANION GAP SERPL CALC-SCNC: 13 MMOL/L (ref 10–20)
APPEARANCE UR: ABNORMAL
AST SERPL W P-5'-P-CCNC: 18 U/L (ref 9–39)
BASOPHILS # BLD AUTO: 0.03 X10*3/UL (ref 0–0.1)
BASOPHILS NFR BLD AUTO: 0.2 %
BILIRUB SERPL-MCNC: 0.8 MG/DL (ref 0–1.2)
BILIRUB UR STRIP.AUTO-MCNC: NEGATIVE MG/DL
BUN SERPL-MCNC: 31 MG/DL (ref 6–23)
CALCIUM SERPL-MCNC: 9.6 MG/DL (ref 8.6–10.3)
CHLORIDE SERPL-SCNC: 109 MMOL/L (ref 98–107)
CO2 SERPL-SCNC: 21 MMOL/L (ref 21–32)
COLOR UR: ABNORMAL
CREAT SERPL-MCNC: 1.17 MG/DL (ref 0.5–1.3)
EGFRCR SERPLBLD CKD-EPI 2021: 69 ML/MIN/1.73M*2
EOSINOPHIL # BLD AUTO: 0.04 X10*3/UL (ref 0–0.7)
EOSINOPHIL NFR BLD AUTO: 0.3 %
ERYTHROCYTE [DISTWIDTH] IN BLOOD BY AUTOMATED COUNT: 12.2 % (ref 11.5–14.5)
GLUCOSE SERPL-MCNC: 177 MG/DL (ref 74–99)
GLUCOSE UR STRIP.AUTO-MCNC: NORMAL MG/DL
HCT VFR BLD AUTO: 45 % (ref 41–52)
HGB BLD-MCNC: 15.3 G/DL (ref 13.5–17.5)
HOLD SPECIMEN: NORMAL
IMM GRANULOCYTES # BLD AUTO: 0.07 X10*3/UL (ref 0–0.7)
IMM GRANULOCYTES NFR BLD AUTO: 0.5 % (ref 0–0.9)
KETONES UR STRIP.AUTO-MCNC: NEGATIVE MG/DL
LACTATE SERPL-SCNC: 1.3 MMOL/L (ref 0.4–2)
LEUKOCYTE ESTERASE UR QL STRIP.AUTO: NEGATIVE
LIPASE SERPL-CCNC: 17 U/L (ref 9–82)
LYMPHOCYTES # BLD AUTO: 0.97 X10*3/UL (ref 1.2–4.8)
LYMPHOCYTES NFR BLD AUTO: 7.5 %
MCH RBC QN AUTO: 33.3 PG (ref 26–34)
MCHC RBC AUTO-ENTMCNC: 34 G/DL (ref 32–36)
MCV RBC AUTO: 98 FL (ref 80–100)
MONOCYTES # BLD AUTO: 0.3 X10*3/UL (ref 0.1–1)
MONOCYTES NFR BLD AUTO: 2.3 %
NEUTROPHILS # BLD AUTO: 11.53 X10*3/UL (ref 1.2–7.7)
NEUTROPHILS NFR BLD AUTO: 89.2 %
NITRITE UR QL STRIP.AUTO: NEGATIVE
NRBC BLD-RTO: 0 /100 WBCS (ref 0–0)
PH UR STRIP.AUTO: 8 [PH]
PLATELET # BLD AUTO: 199 X10*3/UL (ref 150–450)
POTASSIUM SERPL-SCNC: 3.5 MMOL/L (ref 3.5–5.3)
PROT SERPL-MCNC: 7 G/DL (ref 6.4–8.2)
PROT UR STRIP.AUTO-MCNC: NEGATIVE MG/DL
RBC # BLD AUTO: 4.59 X10*6/UL (ref 4.5–5.9)
RBC # UR STRIP.AUTO: NEGATIVE /UL
SODIUM SERPL-SCNC: 139 MMOL/L (ref 136–145)
SP GR UR STRIP.AUTO: 1.04
UROBILINOGEN UR STRIP.AUTO-MCNC: NORMAL MG/DL
WBC # BLD AUTO: 12.9 X10*3/UL (ref 4.4–11.3)

## 2024-10-09 PROCEDURE — 80053 COMPREHEN METABOLIC PANEL: CPT | Performed by: PHYSICIAN ASSISTANT

## 2024-10-09 PROCEDURE — 2550000001 HC RX 255 CONTRASTS: Performed by: PHYSICIAN ASSISTANT

## 2024-10-09 PROCEDURE — 83605 ASSAY OF LACTIC ACID: CPT | Performed by: PHYSICIAN ASSISTANT

## 2024-10-09 PROCEDURE — 85025 COMPLETE CBC W/AUTO DIFF WBC: CPT | Performed by: PHYSICIAN ASSISTANT

## 2024-10-09 PROCEDURE — 36415 COLL VENOUS BLD VENIPUNCTURE: CPT | Performed by: PHYSICIAN ASSISTANT

## 2024-10-09 PROCEDURE — 81003 URINALYSIS AUTO W/O SCOPE: CPT | Performed by: PHYSICIAN ASSISTANT

## 2024-10-09 PROCEDURE — 2500000004 HC RX 250 GENERAL PHARMACY W/ HCPCS (ALT 636 FOR OP/ED): Performed by: PHYSICIAN ASSISTANT

## 2024-10-09 PROCEDURE — 96374 THER/PROPH/DIAG INJ IV PUSH: CPT

## 2024-10-09 PROCEDURE — 74177 CT ABD & PELVIS W/CONTRAST: CPT

## 2024-10-09 PROCEDURE — 96375 TX/PRO/DX INJ NEW DRUG ADDON: CPT

## 2024-10-09 PROCEDURE — 99284 EMERGENCY DEPT VISIT MOD MDM: CPT | Mod: 25

## 2024-10-09 PROCEDURE — 83690 ASSAY OF LIPASE: CPT | Performed by: PHYSICIAN ASSISTANT

## 2024-10-09 PROCEDURE — 74177 CT ABD & PELVIS W/CONTRAST: CPT | Performed by: RADIOLOGY

## 2024-10-09 RX ORDER — TAMSULOSIN HYDROCHLORIDE 0.4 MG/1
0.4 CAPSULE ORAL DAILY
Qty: 7 CAPSULE | Refills: 0 | Status: SHIPPED | OUTPATIENT
Start: 2024-10-09 | End: 2024-10-16

## 2024-10-09 RX ORDER — ONDANSETRON HYDROCHLORIDE 2 MG/ML
4 INJECTION, SOLUTION INTRAVENOUS ONCE
Status: COMPLETED | OUTPATIENT
Start: 2024-10-09 | End: 2024-10-09

## 2024-10-09 RX ORDER — OXYCODONE AND ACETAMINOPHEN 5; 325 MG/1; MG/1
1 TABLET ORAL EVERY 8 HOURS PRN
Qty: 10 TABLET | Refills: 0 | Status: SHIPPED | OUTPATIENT
Start: 2024-10-09 | End: 2024-10-12

## 2024-10-09 RX ORDER — KETOROLAC TROMETHAMINE 10 MG/1
10 TABLET, FILM COATED ORAL EVERY 6 HOURS PRN
Qty: 20 TABLET | Refills: 0 | Status: SHIPPED | OUTPATIENT
Start: 2024-10-09 | End: 2024-10-14

## 2024-10-09 RX ORDER — ONDANSETRON 4 MG/1
4 TABLET, ORALLY DISINTEGRATING ORAL EVERY 8 HOURS PRN
Qty: 15 TABLET | Refills: 0 | Status: SHIPPED | OUTPATIENT
Start: 2024-10-09 | End: 2024-10-14

## 2024-10-09 RX ORDER — MORPHINE SULFATE 4 MG/ML
4 INJECTION, SOLUTION INTRAMUSCULAR; INTRAVENOUS ONCE
Status: COMPLETED | OUTPATIENT
Start: 2024-10-09 | End: 2024-10-09

## 2024-10-09 RX ORDER — NALOXONE HYDROCHLORIDE 4 MG/.1ML
4 SPRAY NASAL AS NEEDED
Qty: 2 EACH | Refills: 0 | Status: SHIPPED | OUTPATIENT
Start: 2024-10-09

## 2024-10-09 RX ORDER — KETOROLAC TROMETHAMINE 30 MG/ML
15 INJECTION, SOLUTION INTRAMUSCULAR; INTRAVENOUS ONCE
Status: COMPLETED | OUTPATIENT
Start: 2024-10-09 | End: 2024-10-09

## 2024-10-09 ASSESSMENT — LIFESTYLE VARIABLES
TOTAL SCORE: 0
EVER HAD A DRINK FIRST THING IN THE MORNING TO STEADY YOUR NERVES TO GET RID OF A HANGOVER: NO
EVER FELT BAD OR GUILTY ABOUT YOUR DRINKING: NO
HAVE PEOPLE ANNOYED YOU BY CRITICIZING YOUR DRINKING: NO
HAVE YOU EVER FELT YOU SHOULD CUT DOWN ON YOUR DRINKING: NO

## 2024-10-09 ASSESSMENT — PAIN SCALES - GENERAL
PAINLEVEL_OUTOF10: 10 - WORST POSSIBLE PAIN
PAINLEVEL_OUTOF10: 8
PAINLEVEL_OUTOF10: 7

## 2024-10-09 ASSESSMENT — PAIN DESCRIPTION - PAIN TYPE: TYPE: ACUTE PAIN

## 2024-10-09 ASSESSMENT — PAIN - FUNCTIONAL ASSESSMENT
PAIN_FUNCTIONAL_ASSESSMENT: 0-10

## 2024-10-09 ASSESSMENT — PAIN DESCRIPTION - LOCATION: LOCATION: ABDOMEN

## 2024-10-09 NOTE — ED PROVIDER NOTES
HPI   Chief Complaint   Patient presents with    Abdominal Pain       A 64-year-old male patient comes into the emergency department today with complaints of severe abdominal pain started yesterday.  He describes it as a 10 out of 10 on the pain scale described as sharp.  States he has associated nausea, vomiting, chills.  Denies any prior abdominal surgeries.  For this purpose comes into the emergency department today for further evaluation.  Otherwise no other complaints this present time.              Patient History   Past Medical History:   Diagnosis Date    Blind one eye     Hypertension      Past Surgical History:   Procedure Laterality Date    CARDIAC CATHETERIZATION N/A 2024    Procedure: Left Heart Cath, With LV;  Surgeon: Cori Sullivan MD;  Location: [facility];  Service: Cardiovascular;  Laterality: N/A;     No family history on file.  Social History     Tobacco Use    Smoking status: Former     Current packs/day: 0.00     Types: Cigarettes     Quit date: 2023     Years since quittin.1    Smokeless tobacco: Not on file   Vaping Use    Vaping status: Never Used   Substance Use Topics    Alcohol use: Not Currently    Drug use: Yes     Types: Marijuana       Physical Exam   ED Triage Vitals [10/09/24 0909]   Temperature Heart Rate Respirations BP   36 °C (96.8 °F) 87 20 142/83      Pulse Ox Temp Source Heart Rate Source Patient Position   99 % Temporal -- --      BP Location FiO2 (%)     -- --       Physical Exam  Constitutional:       General: He is in acute distress.      Appearance: Normal appearance. He is ill-appearing. He is not diaphoretic.   HENT:      Head: Normocephalic and atraumatic.      Nose: Nose normal.   Eyes:      Extraocular Movements: Extraocular movements intact.      Conjunctiva/sclera: Conjunctivae normal.      Pupils: Pupils are equal, round, and reactive to light.   Cardiovascular:      Rate and Rhythm: Normal rate and regular rhythm.   Pulmonary:       Effort: Pulmonary effort is normal. No respiratory distress.      Breath sounds: Normal breath sounds. No stridor. No wheezing.   Abdominal:      General: Abdomen is flat.      Tenderness: There is abdominal tenderness in the left lower quadrant. There is left CVA tenderness and guarding.   Musculoskeletal:         General: Normal range of motion.      Cervical back: Normal range of motion.   Skin:     General: Skin is warm and dry.   Neurological:      General: No focal deficit present.      Mental Status: He is alert and oriented to person, place, and time. Mental status is at baseline.   Psychiatric:         Mood and Affect: Mood normal.           ED Course & MDM   Diagnoses as of 10/09/24 1215   Left ureteral stone                 No data recorded     [place] Coma Scale Score: 15 (10/09/24 1144 : Charity Tipton RN)                           Medical Decision Making  A 64-year-old male patient comes into the emergency department today with complaints of severe abdominal pain started yesterday.  He describes it as a 10 out of 10 on the pain scale described as sharp.  States he has associated nausea, vomiting, chills.  Denies any prior abdominal surgeries.  For this purpose comes into the emergency department today for further evaluation.  Otherwise no other complaints this present time.    Laboratory studies ordered, urinalysis, COVID-19 test still CT study abdomen pelvis.  Rule electrolyte maladies, leukocytosis, acute kidney injury, UTI, pyelonephritis, ureteral stone, acute diverticulitis, colitis, bowel perforation or abscess.  IV morphine IV Zofran ordered for the patient's pain and nausea.    Patient's lactate 1.3 lipase 17, no acute renal insufficiency patient does have a white blood cell count of 12.9 absolute neutrophil 11.5, patient CT abdomen pelvis shows a obstructive 6 mm stone in the left UVJ with slight upstream hydroureteronephrosis.  Waiting for urinalysis to rule out overlying infection.  IV  Toradol ordered for the patient for additional pain relief.    Patient's urinalysis negative for infection.  Will discharge patient home close follow-up urology.  Will give patient medications for his pain, nausea.  Patient agrees with this plan expressed verbal understanding.  Questions were answered.    Historian is the patient    Diagnosis: Left ureteral stone      Labs Reviewed   CBC WITH AUTO DIFFERENTIAL - Abnormal       Result Value    WBC 12.9 (*)     nRBC 0.0      RBC 4.59      Hemoglobin 15.3      Hematocrit 45.0      MCV 98      MCH 33.3      MCHC 34.0      RDW 12.2      Platelets 199      Neutrophils % 89.2      Immature Granulocytes %, Automated 0.5      Lymphocytes % 7.5      Monocytes % 2.3      Eosinophils % 0.3      Basophils % 0.2      Neutrophils Absolute 11.53 (*)     Immature Granulocytes Absolute, Automated 0.07      Lymphocytes Absolute 0.97 (*)     Monocytes Absolute 0.30      Eosinophils Absolute 0.04      Basophils Absolute 0.03     COMPREHENSIVE METABOLIC PANEL - Abnormal    Glucose 177 (*)     Sodium 139      Potassium 3.5      Chloride 109 (*)     Bicarbonate 21      Anion Gap 13      Urea Nitrogen 31 (*)     Creatinine 1.17      eGFR 69      Calcium 9.6      Albumin 4.7      Alkaline Phosphatase 78      Total Protein 7.0      AST 18      Bilirubin, Total 0.8      ALT 20     URINALYSIS WITH REFLEX CULTURE AND MICROSCOPIC - Abnormal    Color, Urine Light-Yellow      Appearance, Urine Turbid (*)     Specific Gravity, Urine 1.037 (*)     pH, Urine 8.0      Protein, Urine NEGATIVE      Glucose, Urine Normal      Blood, Urine NEGATIVE      Ketones, Urine NEGATIVE      Bilirubin, Urine NEGATIVE      Urobilinogen, Urine Normal      Nitrite, Urine NEGATIVE      Leukocyte Esterase, Urine NEGATIVE     LIPASE - Normal    Lipase 17      Narrative:     Venipuncture immediately after or during the administration of Metamizole may lead to falsely low results. Testing should be performed immediately  prior to Metamizole dosing.   LACTATE - Normal    Lactate 1.3      Narrative:     Venipuncture immediately after or during the administration of Metamizole may lead to falsely low results. Testing should be performed immediately prior to Metamizole dosing.   URINALYSIS WITH REFLEX CULTURE AND MICROSCOPIC    Narrative:     The following orders were created for panel order Urinalysis with Reflex Culture and Microscopic.  Procedure                               Abnormality         Status                     ---------                               -----------         ------                     Urinalysis with Reflex C...[[id number]]  Abnormal            Final result               Extra Urine Gray Tube[[id number]]                            In process                   Please view results for these tests on the individual orders.   SARS-COV-2 PCR   EXTRA URINE GRAY TUBE        CT abdomen pelvis w IV contrast   Final Result   1.  Obstructive 6 mm stone in the left ureterovesical junction with   slight upstream hydroureteronephrosis, as well as findings suggestive   of left obstructive nephropathy or pyelonephritis, as detailed above.   Correlation with urinalysis is recommended.   2. Tiny amount of layering hyperdense material in the urinary bladder   may represent early excreted contrast or gravel-like stones.   3. Right nephrolithiasis with a nonobstructive 2 mm stone in the   right lower pole.   4. Distal colonic diverticulosis with findings, most extensive in the   sigmoid colon with associated wall thickening suggestive of chronic   diverticular disease. No evidence of superimposed acute   diverticulitis.   5. Stable infrarenal abdominal aortic aneurysm measuring 4 cm and   right common iliac artery aneurysm measuring 2.2 cm.             MACRO:   None        Signed by: Elyse Boles 10/9/2024 10:36 AM   Dictation workstation:   [id number]          Procedure  Procedures     Yann Tariq PA-C  10/09/24 [id number]

## 2024-11-05 ENCOUNTER — APPOINTMENT (OUTPATIENT)
Dept: CARDIOLOGY | Facility: CLINIC | Age: 64
End: 2024-11-05
Payer: COMMERCIAL

## 2024-11-06 ENCOUNTER — APPOINTMENT (OUTPATIENT)
Dept: OTOLARYNGOLOGY | Facility: CLINIC | Age: 64
End: 2024-11-06
Payer: COMMERCIAL

## 2024-11-14 ENCOUNTER — APPOINTMENT (OUTPATIENT)
Dept: NEUROLOGY | Facility: CLINIC | Age: 64
End: 2024-11-14
Payer: COMMERCIAL

## 2024-12-05 ENCOUNTER — APPOINTMENT (OUTPATIENT)
Dept: RADIOLOGY | Facility: HOSPITAL | Age: 64
End: 2024-12-05
Payer: COMMERCIAL

## 2024-12-05 ENCOUNTER — APPOINTMENT (OUTPATIENT)
Dept: CARDIOLOGY | Facility: HOSPITAL | Age: 64
End: 2024-12-05
Payer: COMMERCIAL

## 2024-12-05 ENCOUNTER — HOSPITAL ENCOUNTER (OUTPATIENT)
Facility: HOSPITAL | Age: 64
Setting detail: OBSERVATION
Discharge: HOME | End: 2024-12-07
Attending: EMERGENCY MEDICINE | Admitting: STUDENT IN AN ORGANIZED HEALTH CARE EDUCATION/TRAINING PROGRAM
Payer: COMMERCIAL

## 2024-12-05 DIAGNOSIS — N20.0 NEPHROLITHIASIS: ICD-10-CM

## 2024-12-05 DIAGNOSIS — N20.0 KIDNEY STONE ON RIGHT SIDE: Primary | ICD-10-CM

## 2024-12-05 DIAGNOSIS — N13.2 HYDRONEPHROSIS WITH URINARY OBSTRUCTION DUE TO URETERAL CALCULUS: ICD-10-CM

## 2024-12-05 LAB
ALBUMIN SERPL BCP-MCNC: 4.6 G/DL (ref 3.4–5)
ALP SERPL-CCNC: 78 U/L (ref 33–136)
ALT SERPL W P-5'-P-CCNC: 22 U/L (ref 10–52)
ANION GAP SERPL CALC-SCNC: 13 MMOL/L (ref 10–20)
APPEARANCE UR: ABNORMAL
AST SERPL W P-5'-P-CCNC: 23 U/L (ref 9–39)
ATRIAL RATE: 78 BPM
BACTERIA #/AREA URNS AUTO: ABNORMAL /HPF
BASOPHILS # BLD AUTO: 0.02 X10*3/UL (ref 0–0.1)
BASOPHILS NFR BLD AUTO: 0.2 %
BILIRUB DIRECT SERPL-MCNC: 0.2 MG/DL (ref 0–0.3)
BILIRUB SERPL-MCNC: 1 MG/DL (ref 0–1.2)
BILIRUB UR STRIP.AUTO-MCNC: NEGATIVE MG/DL
BUN SERPL-MCNC: 18 MG/DL (ref 6–23)
CALCIUM SERPL-MCNC: 9 MG/DL (ref 8.6–10.3)
CARDIAC TROPONIN I PNL SERPL HS: 5 NG/L (ref 0–20)
CHLORIDE SERPL-SCNC: 110 MMOL/L (ref 98–107)
CO2 SERPL-SCNC: 20 MMOL/L (ref 21–32)
COLOR UR: ABNORMAL
CREAT SERPL-MCNC: 1.37 MG/DL (ref 0.5–1.3)
EGFRCR SERPLBLD CKD-EPI 2021: 57 ML/MIN/1.73M*2
EOSINOPHIL # BLD AUTO: 0.01 X10*3/UL (ref 0–0.7)
EOSINOPHIL NFR BLD AUTO: 0.1 %
ERYTHROCYTE [DISTWIDTH] IN BLOOD BY AUTOMATED COUNT: 12.7 % (ref 11.5–14.5)
GLUCOSE SERPL-MCNC: 172 MG/DL (ref 74–99)
GLUCOSE UR STRIP.AUTO-MCNC: NORMAL MG/DL
HCT VFR BLD AUTO: 47 % (ref 41–52)
HGB BLD-MCNC: 16.1 G/DL (ref 13.5–17.5)
HOLD SPECIMEN: NORMAL
IMM GRANULOCYTES # BLD AUTO: 0.08 X10*3/UL (ref 0–0.7)
IMM GRANULOCYTES NFR BLD AUTO: 0.8 % (ref 0–0.9)
INR PPP: 0.9 (ref 0.9–1.1)
KETONES UR STRIP.AUTO-MCNC: NEGATIVE MG/DL
LACTATE SERPL-SCNC: 1.3 MMOL/L (ref 0.4–2)
LEUKOCYTE ESTERASE UR QL STRIP.AUTO: NEGATIVE
LIPASE SERPL-CCNC: 16 U/L (ref 9–82)
LYMPHOCYTES # BLD AUTO: 0.72 X10*3/UL (ref 1.2–4.8)
LYMPHOCYTES NFR BLD AUTO: 6.8 %
MCH RBC QN AUTO: 32.5 PG (ref 26–34)
MCHC RBC AUTO-ENTMCNC: 34.3 G/DL (ref 32–36)
MCV RBC AUTO: 95 FL (ref 80–100)
MONOCYTES # BLD AUTO: 0.48 X10*3/UL (ref 0.1–1)
MONOCYTES NFR BLD AUTO: 4.5 %
NEUTROPHILS # BLD AUTO: 9.24 X10*3/UL (ref 1.2–7.7)
NEUTROPHILS NFR BLD AUTO: 87.6 %
NITRITE UR QL STRIP.AUTO: NEGATIVE
NRBC BLD-RTO: 0 /100 WBCS (ref 0–0)
P OFFSET: 155 MS
P ONSET: 127 MS
PH UR STRIP.AUTO: 8 [PH]
PLATELET # BLD AUTO: 196 X10*3/UL (ref 150–450)
POTASSIUM SERPL-SCNC: 3.7 MMOL/L (ref 3.5–5.3)
PR INTERVAL: 156 MS
PROT SERPL-MCNC: 7.2 G/DL (ref 6.4–8.2)
PROT UR STRIP.AUTO-MCNC: ABNORMAL MG/DL
PROTHROMBIN TIME: 10.6 SECONDS (ref 9.8–12.8)
Q ONSET: 205 MS
QRS COUNT: 13 BEATS
QRS DURATION: 102 MS
QT INTERVAL: 400 MS
QTC CALCULATION(BAZETT): 456 MS
QTC FREDERICIA: 436 MS
R AXIS: 84 DEGREES
RBC # BLD AUTO: 4.96 X10*6/UL (ref 4.5–5.9)
RBC # UR STRIP.AUTO: ABNORMAL /UL
RBC #/AREA URNS AUTO: >20 /HPF
SODIUM SERPL-SCNC: 139 MMOL/L (ref 136–145)
SP GR UR STRIP.AUTO: 1.04
T AXIS: 59 DEGREES
T OFFSET: 405 MS
UROBILINOGEN UR STRIP.AUTO-MCNC: NORMAL MG/DL
VENTRICULAR RATE: 78 BPM
WBC # BLD AUTO: 10.6 X10*3/UL (ref 4.4–11.3)
WBC #/AREA URNS AUTO: ABNORMAL /HPF

## 2024-12-05 PROCEDURE — 83605 ASSAY OF LACTIC ACID: CPT | Performed by: NURSE PRACTITIONER

## 2024-12-05 PROCEDURE — 85610 PROTHROMBIN TIME: CPT | Performed by: NURSE PRACTITIONER

## 2024-12-05 PROCEDURE — 96365 THER/PROPH/DIAG IV INF INIT: CPT | Mod: 59

## 2024-12-05 PROCEDURE — G0378 HOSPITAL OBSERVATION PER HR: HCPCS

## 2024-12-05 PROCEDURE — 83690 ASSAY OF LIPASE: CPT | Performed by: NURSE PRACTITIONER

## 2024-12-05 PROCEDURE — 87086 URINE CULTURE/COLONY COUNT: CPT | Mod: ELYLAB | Performed by: NURSE PRACTITIONER

## 2024-12-05 PROCEDURE — 96376 TX/PRO/DX INJ SAME DRUG ADON: CPT

## 2024-12-05 PROCEDURE — 93005 ELECTROCARDIOGRAM TRACING: CPT

## 2024-12-05 PROCEDURE — 2500000002 HC RX 250 W HCPCS SELF ADMINISTERED DRUGS (ALT 637 FOR MEDICARE OP, ALT 636 FOR OP/ED): Performed by: NURSE PRACTITIONER

## 2024-12-05 PROCEDURE — 74177 CT ABD & PELVIS W/CONTRAST: CPT

## 2024-12-05 PROCEDURE — 85025 COMPLETE CBC W/AUTO DIFF WBC: CPT | Performed by: NURSE PRACTITIONER

## 2024-12-05 PROCEDURE — 74177 CT ABD & PELVIS W/CONTRAST: CPT | Performed by: RADIOLOGY

## 2024-12-05 PROCEDURE — 2550000001 HC RX 255 CONTRASTS: Performed by: EMERGENCY MEDICINE

## 2024-12-05 PROCEDURE — 96361 HYDRATE IV INFUSION ADD-ON: CPT

## 2024-12-05 PROCEDURE — 99285 EMERGENCY DEPT VISIT HI MDM: CPT | Mod: 25 | Performed by: EMERGENCY MEDICINE

## 2024-12-05 PROCEDURE — 81001 URINALYSIS AUTO W/SCOPE: CPT | Performed by: NURSE PRACTITIONER

## 2024-12-05 PROCEDURE — 2500000004 HC RX 250 GENERAL PHARMACY W/ HCPCS (ALT 636 FOR OP/ED): Performed by: NURSE PRACTITIONER

## 2024-12-05 PROCEDURE — 2500000004 HC RX 250 GENERAL PHARMACY W/ HCPCS (ALT 636 FOR OP/ED): Performed by: EMERGENCY MEDICINE

## 2024-12-05 PROCEDURE — 84075 ASSAY ALKALINE PHOSPHATASE: CPT | Performed by: NURSE PRACTITIONER

## 2024-12-05 PROCEDURE — 36415 COLL VENOUS BLD VENIPUNCTURE: CPT | Performed by: NURSE PRACTITIONER

## 2024-12-05 PROCEDURE — 99223 1ST HOSP IP/OBS HIGH 75: CPT | Performed by: STUDENT IN AN ORGANIZED HEALTH CARE EDUCATION/TRAINING PROGRAM

## 2024-12-05 PROCEDURE — 84484 ASSAY OF TROPONIN QUANT: CPT | Performed by: NURSE PRACTITIONER

## 2024-12-05 PROCEDURE — 96375 TX/PRO/DX INJ NEW DRUG ADDON: CPT

## 2024-12-05 RX ORDER — MORPHINE SULFATE 4 MG/ML
4 INJECTION, SOLUTION INTRAMUSCULAR; INTRAVENOUS ONCE
Status: COMPLETED | OUTPATIENT
Start: 2024-12-05 | End: 2024-12-05

## 2024-12-05 RX ORDER — ONDANSETRON HYDROCHLORIDE 2 MG/ML
4 INJECTION, SOLUTION INTRAVENOUS ONCE
Status: COMPLETED | OUTPATIENT
Start: 2024-12-05 | End: 2024-12-05

## 2024-12-05 RX ORDER — ACETAMINOPHEN 650 MG/1
650 SUPPOSITORY RECTAL EVERY 4 HOURS PRN
Status: DISCONTINUED | OUTPATIENT
Start: 2024-12-05 | End: 2024-12-07 | Stop reason: HOSPADM

## 2024-12-05 RX ORDER — ACETAMINOPHEN 160 MG/5ML
650 SOLUTION ORAL EVERY 4 HOURS PRN
Status: DISCONTINUED | OUTPATIENT
Start: 2024-12-05 | End: 2024-12-07 | Stop reason: HOSPADM

## 2024-12-05 RX ORDER — MORPHINE SULFATE 4 MG/ML
4 INJECTION, SOLUTION INTRAMUSCULAR; INTRAVENOUS EVERY 4 HOURS PRN
Status: DISCONTINUED | OUTPATIENT
Start: 2024-12-05 | End: 2024-12-07 | Stop reason: HOSPADM

## 2024-12-05 RX ORDER — LABETALOL HYDROCHLORIDE 5 MG/ML
10 INJECTION, SOLUTION INTRAVENOUS EVERY 6 HOURS PRN
Status: DISCONTINUED | OUTPATIENT
Start: 2024-12-05 | End: 2024-12-07 | Stop reason: HOSPADM

## 2024-12-05 RX ORDER — OXYCODONE HYDROCHLORIDE 5 MG/1
5 TABLET ORAL EVERY 6 HOURS PRN
Status: DISCONTINUED | OUTPATIENT
Start: 2024-12-05 | End: 2024-12-07 | Stop reason: HOSPADM

## 2024-12-05 RX ORDER — ENOXAPARIN SODIUM 100 MG/ML
40 INJECTION SUBCUTANEOUS EVERY 24 HOURS
Status: CANCELLED | OUTPATIENT
Start: 2024-12-06

## 2024-12-05 RX ORDER — CEFTRIAXONE 1 G/50ML
1 INJECTION, SOLUTION INTRAVENOUS ONCE
Status: COMPLETED | OUTPATIENT
Start: 2024-12-05 | End: 2024-12-05

## 2024-12-05 RX ORDER — ONDANSETRON HYDROCHLORIDE 2 MG/ML
4 INJECTION, SOLUTION INTRAVENOUS EVERY 6 HOURS PRN
Status: DISCONTINUED | OUTPATIENT
Start: 2024-12-05 | End: 2024-12-07 | Stop reason: HOSPADM

## 2024-12-05 RX ORDER — HYDRALAZINE HYDROCHLORIDE 20 MG/ML
5 INJECTION INTRAMUSCULAR; INTRAVENOUS EVERY 6 HOURS PRN
Status: DISCONTINUED | OUTPATIENT
Start: 2024-12-05 | End: 2024-12-07 | Stop reason: HOSPADM

## 2024-12-05 RX ORDER — POLYETHYLENE GLYCOL 3350 17 G/17G
17 POWDER, FOR SOLUTION ORAL DAILY PRN
Status: DISCONTINUED | OUTPATIENT
Start: 2024-12-05 | End: 2024-12-07 | Stop reason: HOSPADM

## 2024-12-05 RX ORDER — TAMSULOSIN HYDROCHLORIDE 0.4 MG/1
0.4 CAPSULE ORAL ONCE
Status: COMPLETED | OUTPATIENT
Start: 2024-12-05 | End: 2024-12-05

## 2024-12-05 RX ORDER — ACETAMINOPHEN 325 MG/1
650 TABLET ORAL EVERY 4 HOURS PRN
Status: DISCONTINUED | OUTPATIENT
Start: 2024-12-05 | End: 2024-12-07 | Stop reason: HOSPADM

## 2024-12-05 SDOH — SOCIAL STABILITY: SOCIAL INSECURITY
WITHIN THE LAST YEAR, HAVE YOU BEEN KICKED, HIT, SLAPPED, OR OTHERWISE PHYSICALLY HURT BY YOUR PARTNER OR EX-PARTNER?: NO

## 2024-12-05 SDOH — ECONOMIC STABILITY: FOOD INSECURITY: WITHIN THE PAST 12 MONTHS, YOU WORRIED THAT YOUR FOOD WOULD RUN OUT BEFORE YOU GOT THE MONEY TO BUY MORE.: NEVER TRUE

## 2024-12-05 SDOH — SOCIAL STABILITY: SOCIAL INSECURITY: DOES ANYONE TRY TO KEEP YOU FROM HAVING/CONTACTING OTHER FRIENDS OR DOING THINGS OUTSIDE YOUR HOME?: NO

## 2024-12-05 SDOH — SOCIAL STABILITY: SOCIAL INSECURITY: WITHIN THE LAST YEAR, HAVE YOU BEEN HUMILIATED OR EMOTIONALLY ABUSED IN OTHER WAYS BY YOUR PARTNER OR EX-PARTNER?: NO

## 2024-12-05 SDOH — SOCIAL STABILITY: SOCIAL INSECURITY: WERE YOU ABLE TO COMPLETE ALL THE BEHAVIORAL HEALTH SCREENINGS?: YES

## 2024-12-05 SDOH — SOCIAL STABILITY: SOCIAL INSECURITY
WITHIN THE LAST YEAR, HAVE YOU BEEN RAPED OR FORCED TO HAVE ANY KIND OF SEXUAL ACTIVITY BY YOUR PARTNER OR EX-PARTNER?: NO

## 2024-12-05 SDOH — ECONOMIC STABILITY: INCOME INSECURITY: IN THE PAST 12 MONTHS HAS THE ELECTRIC, GAS, OIL, OR WATER COMPANY THREATENED TO SHUT OFF SERVICES IN YOUR HOME?: NO

## 2024-12-05 SDOH — SOCIAL STABILITY: SOCIAL INSECURITY: ABUSE: ADULT

## 2024-12-05 SDOH — SOCIAL STABILITY: SOCIAL INSECURITY: ARE THERE ANY APPARENT SIGNS OF INJURIES/BEHAVIORS THAT COULD BE RELATED TO ABUSE/NEGLECT?: NO

## 2024-12-05 SDOH — ECONOMIC STABILITY: FOOD INSECURITY: WITHIN THE PAST 12 MONTHS, THE FOOD YOU BOUGHT JUST DIDN'T LAST AND YOU DIDN'T HAVE MONEY TO GET MORE.: NEVER TRUE

## 2024-12-05 SDOH — HEALTH STABILITY: MENTAL HEALTH: HOW OFTEN DO YOU HAVE SIX OR MORE DRINKS ON ONE OCCASION?: NEVER

## 2024-12-05 SDOH — HEALTH STABILITY: MENTAL HEALTH: HOW OFTEN DO YOU HAVE A DRINK CONTAINING ALCOHOL?: NEVER

## 2024-12-05 SDOH — SOCIAL STABILITY: SOCIAL INSECURITY: WITHIN THE LAST YEAR, HAVE YOU BEEN AFRAID OF YOUR PARTNER OR EX-PARTNER?: NO

## 2024-12-05 SDOH — SOCIAL STABILITY: SOCIAL INSECURITY: DO YOU FEEL ANYONE HAS EXPLOITED OR TAKEN ADVANTAGE OF YOU FINANCIALLY OR OF YOUR PERSONAL PROPERTY?: NO

## 2024-12-05 SDOH — SOCIAL STABILITY: SOCIAL INSECURITY: DO YOU FEEL UNSAFE GOING BACK TO THE PLACE WHERE YOU ARE LIVING?: NO

## 2024-12-05 SDOH — HEALTH STABILITY: MENTAL HEALTH: HOW MANY DRINKS CONTAINING ALCOHOL DO YOU HAVE ON A TYPICAL DAY WHEN YOU ARE DRINKING?: PATIENT DOES NOT DRINK

## 2024-12-05 SDOH — SOCIAL STABILITY: SOCIAL INSECURITY: HAS ANYONE EVER THREATENED TO HURT YOUR FAMILY OR YOUR PETS?: NO

## 2024-12-05 SDOH — SOCIAL STABILITY: SOCIAL INSECURITY: HAVE YOU HAD ANY THOUGHTS OF HARMING ANYONE ELSE?: NO

## 2024-12-05 SDOH — SOCIAL STABILITY: SOCIAL INSECURITY: HAVE YOU HAD THOUGHTS OF HARMING ANYONE ELSE?: NO

## 2024-12-05 SDOH — SOCIAL STABILITY: SOCIAL INSECURITY: ARE YOU OR HAVE YOU BEEN THREATENED OR ABUSED PHYSICALLY, EMOTIONALLY, OR SEXUALLY BY ANYONE?: NO

## 2024-12-05 ASSESSMENT — ACTIVITIES OF DAILY LIVING (ADL)
HEARING - RIGHT EAR: FUNCTIONAL
PATIENT'S MEMORY ADEQUATE TO SAFELY COMPLETE DAILY ACTIVITIES?: YES
LACK_OF_TRANSPORTATION: NO
HEARING - LEFT EAR: FUNCTIONAL
DRESSING YOURSELF: INDEPENDENT
ASSISTIVE_DEVICE: CANE;EYEGLASSES;DENTURES UPPER;DENTURES LOWER
ADEQUATE_TO_COMPLETE_ADL: YES
FEEDING YOURSELF: INDEPENDENT
BATHING: INDEPENDENT
GROOMING: INDEPENDENT
WALKS IN HOME: INDEPENDENT
JUDGMENT_ADEQUATE_SAFELY_COMPLETE_DAILY_ACTIVITIES: YES
TOILETING: INDEPENDENT

## 2024-12-05 ASSESSMENT — COGNITIVE AND FUNCTIONAL STATUS - GENERAL
MOBILITY SCORE: 24
PATIENT BASELINE BEDBOUND: NO
DAILY ACTIVITIY SCORE: 24

## 2024-12-05 ASSESSMENT — PAIN - FUNCTIONAL ASSESSMENT
PAIN_FUNCTIONAL_ASSESSMENT: 0-10

## 2024-12-05 ASSESSMENT — PAIN DESCRIPTION - DESCRIPTORS
DESCRIPTORS: SORE
DESCRIPTORS: SHARP;SORE
DESCRIPTORS: ACHING

## 2024-12-05 ASSESSMENT — PAIN SCALES - GENERAL
PAINLEVEL_OUTOF10: 8
PAINLEVEL_OUTOF10: 10 - WORST POSSIBLE PAIN
PAINLEVEL_OUTOF10: 0 - NO PAIN
PAINLEVEL_OUTOF10: 10 - WORST POSSIBLE PAIN

## 2024-12-05 ASSESSMENT — PAIN DESCRIPTION - LOCATION
LOCATION: ABDOMEN

## 2024-12-05 ASSESSMENT — PAIN DESCRIPTION - FREQUENCY
FREQUENCY: CONSTANT/CONTINUOUS
FREQUENCY: CONSTANT/CONTINUOUS

## 2024-12-05 ASSESSMENT — PAIN DESCRIPTION - PAIN TYPE
TYPE: ACUTE PAIN
TYPE: ACUTE PAIN

## 2024-12-05 ASSESSMENT — LIFESTYLE VARIABLES
HAVE PEOPLE ANNOYED YOU BY CRITICIZING YOUR DRINKING: NO
HAVE YOU EVER FELT YOU SHOULD CUT DOWN ON YOUR DRINKING: NO
SKIP TO QUESTIONS 9-10: 1
EVER HAD A DRINK FIRST THING IN THE MORNING TO STEADY YOUR NERVES TO GET RID OF A HANGOVER: NO
TOTAL SCORE: 0
AUDIT-C TOTAL SCORE: 0
EVER FELT BAD OR GUILTY ABOUT YOUR DRINKING: NO

## 2024-12-05 ASSESSMENT — PAIN DESCRIPTION - PROGRESSION: CLINICAL_PROGRESSION: GRADUALLY IMPROVING

## 2024-12-05 ASSESSMENT — PATIENT HEALTH QUESTIONNAIRE - PHQ9
SUM OF ALL RESPONSES TO PHQ9 QUESTIONS 1 & 2: 0
1. LITTLE INTEREST OR PLEASURE IN DOING THINGS: NOT AT ALL
2. FEELING DOWN, DEPRESSED OR HOPELESS: NOT AT ALL

## 2024-12-05 ASSESSMENT — PAIN DESCRIPTION - ORIENTATION: ORIENTATION: RIGHT;LEFT

## 2024-12-05 NOTE — ED PROVIDER NOTES
HPI   No chief complaint on file.      64-year-old male presents emergency department, patient states started last night with some generalized belly pain, nausea and vomiting.  Describes multiple episode today, still continuing at 11 AM today.    States history of hypertension and diabetes.  Does not think that he is on any blood thinners.  Denies any previous history of similar abdominal pain.      History provided by:  Patient   used: No            Patient History   Past Medical History:   Diagnosis Date    Blind one eye     Hypertension      Past Surgical History:   Procedure Laterality Date    CARDIAC CATHETERIZATION N/A 2024    Procedure: Left Heart Cath, With LV;  Surgeon: Cori Sullivan MD;  Location: [facility];  Service: Cardiovascular;  Laterality: N/A;     No family history on file.  Social History     Tobacco Use    Smoking status: Former     Current packs/day: 0.00     Types: Cigarettes     Quit date: 2023     Years since quittin.2    Smokeless tobacco: Not on file   Vaping Use    Vaping status: Never Used   Substance Use Topics    Alcohol use: Not Currently    Drug use: Yes     Types: Marijuana       Physical Exam   ED Triage Vitals   Temp Pulse Resp BP   -- -- -- --      SpO2 Temp src Heart Rate Source Patient Position   -- -- -- --      BP Location FiO2 (%)     -- --       Physical Exam  Constitutional: Vitals noted, no distress. Afebrile.   Cardiovascular: Regular, rate, rhythm, no murmur.   Pulmonary: Lungs clear bilaterally with good aeration. No adventitious breath sounds.   Gastrointestinal: Soft, nonsurgical.  Discomfort on palpation. No peritoneal signs. Normoactive bowel sounds.   Musculoskeletal: No peripheral edema. Negative Homans bilaterally, no cords.   Skin: No rash.   Neuro: No focal neurologic deficits, NIH score of 0.      ED Course & MDM   Diagnoses as of 24 1724   Kidney stone on right side   Hydronephrosis with urinary obstruction  due to ureteral calculus          Labs Reviewed   CBC WITH AUTO DIFFERENTIAL - Abnormal       Result Value    WBC 10.6      nRBC 0.0      RBC 4.96      Hemoglobin 16.1      Hematocrit 47.0      MCV 95      MCH 32.5      MCHC 34.3      RDW 12.7      Platelets 196      Neutrophils % 87.6      Immature Granulocytes %, Automated 0.8      Lymphocytes % 6.8      Monocytes % 4.5      Eosinophils % 0.1      Basophils % 0.2      Neutrophils Absolute 9.24 (*)     Immature Granulocytes Absolute, Automated 0.08      Lymphocytes Absolute 0.72 (*)     Monocytes Absolute 0.48      Eosinophils Absolute 0.01      Basophils Absolute 0.02     BASIC METABOLIC PANEL - Abnormal    Glucose 172 (*)     Sodium 139      Potassium 3.7      Chloride 110 (*)     Bicarbonate 20 (*)     Anion Gap 13      Urea Nitrogen 18      Creatinine 1.37 (*)     eGFR 57 (*)     Calcium 9.0     URINALYSIS WITH REFLEX CULTURE AND MICROSCOPIC - Abnormal    Color, Urine Light-Yellow      Appearance, Urine Turbid (*)     Specific Gravity, Urine 1.040 (*)     pH, Urine 8.0      Protein, Urine 10 (TRACE)      Glucose, Urine Normal      Blood, Urine 0.03 (TRACE) (*)     Ketones, Urine NEGATIVE      Bilirubin, Urine NEGATIVE      Urobilinogen, Urine Normal      Nitrite, Urine NEGATIVE      Leukocyte Esterase, Urine NEGATIVE     URINALYSIS MICROSCOPIC WITH REFLEX CULTURE - Abnormal    WBC, Urine 11-20 (*)     RBC, Urine >20 (*)     Bacteria, Urine 4+ (*)    LIPASE - Normal    Lipase 16      Narrative:     Venipuncture immediately after or during the administration of Metamizole may lead to falsely low results. Testing should be performed immediately prior to Metamizole dosing.   HEPATIC FUNCTION PANEL - Normal    Albumin 4.6      Bilirubin, Total 1.0      Bilirubin, Direct 0.2      Alkaline Phosphatase 78      ALT 22      AST 23      Total Protein 7.2     LACTATE - Normal    Lactate 1.3      Narrative:     Venipuncture immediately after or during the administration of  Metamizole may lead to falsely low results. Testing should be performed immediately prior to Metamizole dosing.   PROTIME-INR - Normal    Protime 10.6      INR 0.9     TROPONIN I, HIGH SENSITIVITY - Normal    Troponin I, High Sensitivity 5      Narrative:     Less than 99th percentile of normal range cutoff-  Female and children under 18 years old <14 ng/L; Male <21 ng/L: Negative  Repeat testing should be performed if clinically indicated.     Female and children under 18 years old 14-50 ng/L; Male 21-50 ng/L:  Consistent with possible cardiac damage and possible increased clinical   risk. Serial measurements may help to assess extent of myocardial damage.     >50 ng/L: Consistent with cardiac damage, increased clinical risk and  myocardial infarction. Serial measurements may help assess extent of   myocardial damage.      NOTE: Children less than 1 year old may have higher baseline troponin   levels and results should be interpreted in conjunction with the overall   clinical context.     NOTE: Troponin I testing is performed using a different   testing methodology at [facility] than at other   [facility]. Direct result comparisons should only   be made within the same method.   URINE CULTURE   URINALYSIS WITH REFLEX CULTURE AND MICROSCOPIC    Narrative:     The following orders were created for panel order Urinalysis with Reflex Culture and Microscopic.  Procedure                               Abnormality         Status                     ---------                               -----------         ------                     Urinalysis with Reflex C...[[id number]]  Abnormal            Final result               Extra Urine Gray Tube[[id number]]                            In process                   Please view results for these tests on the individual orders.   EXTRA URINE GRAY TUBE        CT abdomen pelvis w IV contrast   Final Result   1.  Interval development of mild right hydronephrosis  secondary to 3   clustered calculi in the distal right ureter measuring up to 3 mm.   2. Previous left hydronephrosis with associated ureterovesical   junction calculus have resolved.   3. Unchanged 4 cm distal abdominal aortic aneurysm.        MACRO:   None.        Signed by: Shlomo Lewis 12/5/2024 1:55 PM   Dictation workstation:   [id number]                 No data recorded                             Medical Decision Making  Patient complains of right-sided abdominal pain, concerning for appendicitis, cholecystitis.  Workup initiated.    Patient medicated with morphine and Zofran for the pain.    EKG at 1244 with ventricular rate of 78, as interpreted by me, shows normal sinus rhythm normal axis normal interval, unremarkable ST and T wave pattern, no evidence of acute ischemia other acute findings.    CBC unremarkable, metabolic panel with slightly uptrending creatinine from baseline, normal LFTs, normal lactate, lipase, negative troponin.    CT imaging of abdomen/pelvis shows hydronephrosis on the right side secondary to 3 clustered calculus in the distal right ureter.    I did reach out to the urologist, Dr. Sotelo to discuss his the patient's is uptrending creatinine, significant pain.  Discussed hospitalization for hydration, straining urine, Flomax, and reevaluation.    Discussed with Dr. Shepard, who accepts this patient    Procedure  Procedures     Rupal Prado, JEFF-CNP  12/05/24 6945

## 2024-12-05 NOTE — H&P
Medical Group History and Physical  ASSESSMENT & PLAN:     Right nephrolithiasis with hydronephrosis  - Presenting from home with chief complaints of abdominal pain, found to have multiple distal right ureter clustered calculi with associated hydronephrosis  - Urinalysis however rather unremarkable for leukocyte esterase, nitrates, trace blood seen  - Denies having fevers, chills, dysuria  - Urology consulted  - N.p.o. 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 There may be spelling and/or grammatical errors that were not corrected prior to final submission.---    Guy Shepard MD    HISTORY OF PRESENT ILLNESS:   Chief Complaint: Abdominal pain    History Of Present Illness:    Be Luo is a 64 y.o. male with a significant past medical history of COPD, hypertension, type II DM, CAD, who is legally blind from complications of glaucoma/retinal detachment, chronic uveitis, restless leg syndrome, seizure disorder that presented from home with chief complaints of central abdominal pain.  Denies having dysuria, fevers, chills, hematuria.  States that the pain in his mid abdomen began yesterday and is worse today.   "Feels like is a kicking sensation that is always there and is 10/10.  Previous recent history of kidney stones that stated passed without any intervention.    ED course:  - Vitals: Temperature 95.9, HR 80, /78, RR 18 saturating 96% on room air  - Labs: Unremarkable CBC.  Creatinine 1.37, GFR 57, slightly elevated from baseline  - Imaging: CT abdomen/pelvis with IV contrast with concerns for interval development of mild right hydronephrosis secondary to 3 clustered calculi in the distal right ureter measuring up to 3 mm     Review of systems: 10 point review of systems is otherwise negative except as mentioned above.    PAST HISTORIES:     Past Medical History:  Right eye blindness, hypertension, hyperlipidemia, COPD, type II DM, CAD, restless leg syndrome, seizure disorder    Past Surgical History:  He has a past surgical history that includes Cardiac catheterization (N/A, 9/16/2024).      Social History:  He reports that he quit smoking about 15 months ago. His smoking use included cigarettes. He does not have any smokeless tobacco history on file. He reports that he does not currently use alcohol. He reports current drug use. Drug: Marijuana.    Family History:  No family history on file.     Allergies:  Green tea, Gabapentin, Lyrica [pregabalin], Norco [hydrocodone-acetaminophen], Prednisone, Sulfasalazine, and Vioxx [rofecoxib]    OBJECTIVE:     Last Recorded Vitals:  Vitals:    12/05/24 1248 12/05/24 1504 12/05/24 1656 12/05/24 1740   BP: 139/78 142/74 131/63 147/68   BP Location: Left arm Left arm Left arm Left arm   Patient Position: Sitting Sitting Sitting Sitting   Pulse: 80 84 79 80   Resp: 18 16 16 16   Temp: 35.5 °C (95.9 °F)      TempSrc: Temporal      SpO2: 96%  95% 95%   Weight: 99.8 kg (220 lb)      Height: 1.778 m (5' 10\")        Last I/O:  No intake/output data recorded.    Physical Exam  Vitals reviewed.   Constitutional:       General: He is not in acute distress.     Appearance: Normal " appearance. He is not ill-appearing.   HENT:      Head: Normocephalic and atraumatic.      Nose: Nose normal.      Mouth/Throat:      Mouth: Mucous membranes are moist.      Pharynx: Oropharynx is clear.      Comments: No teeth or dentures present.  Eyes:      Extraocular Movements: Extraocular movements intact.      Conjunctiva/sclera: Conjunctivae normal.      Comments: Right eye is legally blind.   Cardiovascular:      Rate and Rhythm: Normal rate and regular rhythm.      Pulses: Normal pulses.      Heart sounds: Normal heart sounds.   Pulmonary:      Effort: Pulmonary effort is normal.      Breath sounds: Normal breath sounds.   Abdominal:      General: Bowel sounds are normal. There is no distension.      Palpations: Abdomen is soft.      Tenderness: There is abdominal tenderness. There is no guarding.      Comments: Central abdominal tenderness to palpation.   Musculoskeletal:         General: No swelling or tenderness. Normal range of motion.      Cervical back: Normal range of motion and neck supple.   Neurological:      General: No focal deficit present.      Mental Status: He is alert and oriented to person, place, and time. Mental status is at baseline.   Psychiatric:         Mood and Affect: Mood normal.         Behavior: Behavior normal.       Scheduled Medications  cefTRIAXone, 1 g, intravenous, Once      PRN Medications    Continuous Medications       Outpatient Medications:  Prior to Admission medications    Medication Sig Start Date End Date Taking? Authorizing Provider   acetaZOLAMIDE (Diamox) 250 mg tablet Take 1 tablet (250 mg) by mouth twice a day. 8/13/24   Historical Provider, MD   aspirin 81 mg EC tablet Take 1 tablet (81 mg) by mouth once daily.    Historical Provider, MD   brimonidine (AlphaGAN) 0.2 % ophthalmic solution Administer 1 drop into the left eye 3 times a day. 3/28/24   Historical Provider, MD   CALCIFEDIOL ORAL Take 50 mcg by mouth once daily.    Historical Provider, MD    cholecalciferol (Vitamin D-3) 50 mcg (2,000 unit) capsule Take 1 capsule (50 mcg) by mouth once daily with breakfast.    Historical Provider, MD   dorzolamide-timoloL (Cosopt) 22.3-6.8 mg/mL ophthalmic solution Administer 1 drop into the left eye 2 times a day.    Historical Provider, MD   lamoTRIgine (LaMICtal) 200 mg tablet Take 3 tablets (600 mg) by mouth 2 times a day.    Historical Provider, MD   latanoprost (Xalatan) 0.005 % ophthalmic solution Administer 1 drop into the left eye once daily at bedtime.    Historical Provider, MD   medical cannabis Take 1 each by mouth.    Historical Provider, MD   metFORMIN (Glucophage) 500 mg tablet Take 1 tablet (500 mg) by mouth once daily in the evening. Take with meals. 10/2/23   Historical Provider, MD   montelukast (Singulair) 10 mg tablet Take 1 tablet (10 mg) by mouth once daily at bedtime.    Historical Provider, MD   multivitamin with minerals iron-free (multivitamin-iron-minerals-folic acid) Take 1 tablet by mouth once daily.    Historical Provider, MD   naloxone (Narcan) 4 mg/0.1 mL nasal spray Administer 1 spray (4 mg) into affected nostril(s) if needed for opioid reversal for up to 1 dose. May repeat every 2-3 minutes if needed, alternating nostrils, until medical assistance becomes available. 10/9/24   Yann Tariq PA-C   potassium chloride CR 10 mEq ER tablet Take 1 tablet (10 mEq) by mouth once daily.    Historical Provider, MD   pramipexole (Mirapex) 0.125 mg tablet Take 1 tablet (0.125 mg) by mouth once daily at bedtime.    Historical Provider, MD   rosuvastatin (Crestor) 5 mg tablet Take 1 tablet (5 mg) by mouth once daily.    Historical Provider, MD   valsartan (Diovan) 40 mg tablet Take 1 tablet (40 mg) by mouth once daily. 9/10/24 9/10/25  Gaston Fonseca MD       LABS AND IMAGING:     Labs:  Results from last 7 days   Lab Units 12/05/24  1251   WBC AUTO x10*3/uL 10.6   RBC AUTO x10*6/uL 4.96   HEMOGLOBIN g/dL 16.1   HEMATOCRIT % 47.0   MCV fL  95   MCH pg 32.5   MCHC g/dL 34.3   RDW % 12.7   PLATELETS AUTO x10*3/uL 196     Results from last 7 days   Lab Units 12/05/24  1251   SODIUM mmol/L 139   POTASSIUM mmol/L 3.7   CHLORIDE mmol/L 110*   CO2 mmol/L 20*   BUN mg/dL 18   CREATININE mg/dL 1.37*   GLUCOSE mg/dL 172*   PROTEIN TOTAL g/dL 7.2   CALCIUM mg/dL 9.0   BILIRUBIN TOTAL mg/dL 1.0   ALK PHOS U/L 78   AST U/L 23   ALT U/L 22         Results from last 7 days   Lab Units 12/05/24  1251   TROPHS ng/L 5       Imaging:  CT abdomen pelvis w IV contrast  Narrative: Interpreted By:  Shlomo Lewis,   STUDY:  CT ABDOMEN PELVIS W IV CONTRAST;  12/5/2024 1:41 pm      INDICATION:  Signs/Symptoms:ABD pain.      COMPARISON:  10/09/2024      ACCESSION NUMBER(S):  [id number]      ORDERING CLINICIAN:  HARSHAL MARISCAL      TECHNIQUE:  CT of the abdomen and pelvis was performed.  Contiguous axial images  were obtained at 3 mm slice thickness through the abdomen and pelvis.  Coronal and sagittal reconstructions at 3 mm slice thickness were  performed. 75 mL Omnipaque 350 administered intravenously without  immediate complication.      FINDINGS:  LOWER CHEST:  Mild bibasilar atelectasis. Coronary atherosclerosis.      ABDOMEN:      LIVER:  Scattered calcified granulomas.      BILE DUCTS:  Not dilated.      GALLBLADDER:  No calcified stone or definite inflammation.      PANCREAS:  Unremarkable      SPLEEN:  Scattered calcified granulomas.      ADRENAL GLANDS:  Mild bilateral nodularity not significantly changed.      KIDNEYS AND URETERS:  Previous left hydronephrosis has resolved. New cluster of stones in  the distal right ureter measuring 3 mm, 2 mm, and 2 mm, causing mild  upstream hydronephrosis. 5 mm calculus in the right lower pole also  noted. Tiny right renal cyst.      PELVIS:      BLADDER:  Layering dependent calculi. Moderately distended.      REPRODUCTIVE ORGANS:  Coarse calcifications of the prostate.      BOWEL:  Severe descending and sigmoid colonic  diverticulosis. Moderate  colonic stool burden. No findings of bowel obstruction or  inflammation. Unremarkable appendix.    Unremarkable mesentery.      VESSELS:  Severe abdominal aortic atherosclerosis. Abdominal aorta is otherwise  patent. Saccular aneurysm of the distal abdominal aorta measuring up  to 4.0 cm not significantly changed. Measured major visceral branches  appear patent. Ectatic right common iliac artery up to 21 mm. IVC and  major branches are grossly patent. Major portal venous branches are  patent.      PERITONEUM AND RETROPERITONEUM:  No free fluid or free air.    No abdominal or pelvic lymphadenopathy.      BONE AND ABDOMINAL WALL:  No acute skeletal findings.   Degenerative changes of the spine.          Impression: 1.  Interval development of mild right hydronephrosis secondary to 3  clustered calculi in the distal right ureter measuring up to 3 mm.  2. Previous left hydronephrosis with associated ureterovesical  junction calculus have resolved.  3. Unchanged 4 cm distal abdominal aortic aneurysm.      MACRO:  None.      Signed by: Shlomo Lewis 12/5/2024 1:55 PM  Dictation workstation:   [id number]  ECG 12 lead  Normal sinus rhythm  Normal ECG  When compared with ECG of 05-DEC-2024 12:44, (unconfirmed)  CT interval has decreased

## 2024-12-06 ENCOUNTER — ANESTHESIA EVENT (OUTPATIENT)
Dept: OPERATING ROOM | Facility: HOSPITAL | Age: 64
End: 2024-12-06
Payer: COMMERCIAL

## 2024-12-06 ENCOUNTER — APPOINTMENT (OUTPATIENT)
Dept: RADIOLOGY | Facility: HOSPITAL | Age: 64
End: 2024-12-06
Payer: COMMERCIAL

## 2024-12-06 ENCOUNTER — ANESTHESIA (OUTPATIENT)
Dept: OPERATING ROOM | Facility: HOSPITAL | Age: 64
End: 2024-12-06
Payer: COMMERCIAL

## 2024-12-06 LAB
ANION GAP SERPL CALC-SCNC: 11 MMOL/L (ref 10–20)
BACTERIA UR CULT: NORMAL
BASOPHILS # BLD AUTO: 0.02 X10*3/UL (ref 0–0.1)
BASOPHILS NFR BLD AUTO: 0.2 %
BUN SERPL-MCNC: 16 MG/DL (ref 6–23)
CALCIUM SERPL-MCNC: 8.5 MG/DL (ref 8.6–10.3)
CHLORIDE SERPL-SCNC: 111 MMOL/L (ref 98–107)
CO2 SERPL-SCNC: 20 MMOL/L (ref 21–32)
CREAT SERPL-MCNC: 1.6 MG/DL (ref 0.5–1.3)
EGFRCR SERPLBLD CKD-EPI 2021: 48 ML/MIN/1.73M*2
EOSINOPHIL # BLD AUTO: 0.26 X10*3/UL (ref 0–0.7)
EOSINOPHIL NFR BLD AUTO: 3.1 %
ERYTHROCYTE [DISTWIDTH] IN BLOOD BY AUTOMATED COUNT: 12.9 % (ref 11.5–14.5)
GLUCOSE SERPL-MCNC: 111 MG/DL (ref 74–99)
HCT VFR BLD AUTO: 44 % (ref 41–52)
HGB BLD-MCNC: 14.8 G/DL (ref 13.5–17.5)
HOLD SPECIMEN: NORMAL
HOLD SPECIMEN: NORMAL
IMM GRANULOCYTES # BLD AUTO: 0.03 X10*3/UL (ref 0–0.7)
IMM GRANULOCYTES NFR BLD AUTO: 0.4 % (ref 0–0.9)
LYMPHOCYTES # BLD AUTO: 1.34 X10*3/UL (ref 1.2–4.8)
LYMPHOCYTES NFR BLD AUTO: 16 %
MAGNESIUM SERPL-MCNC: 1.96 MG/DL (ref 1.6–2.4)
MCH RBC QN AUTO: 32.2 PG (ref 26–34)
MCHC RBC AUTO-ENTMCNC: 33.6 G/DL (ref 32–36)
MCV RBC AUTO: 96 FL (ref 80–100)
MONOCYTES # BLD AUTO: 1.01 X10*3/UL (ref 0.1–1)
MONOCYTES NFR BLD AUTO: 12 %
NEUTROPHILS # BLD AUTO: 5.74 X10*3/UL (ref 1.2–7.7)
NEUTROPHILS NFR BLD AUTO: 68.3 %
NRBC BLD-RTO: 0 /100 WBCS (ref 0–0)
PLATELET # BLD AUTO: 172 X10*3/UL (ref 150–450)
POTASSIUM SERPL-SCNC: 3.5 MMOL/L (ref 3.5–5.3)
RBC # BLD AUTO: 4.6 X10*6/UL (ref 4.5–5.9)
SODIUM SERPL-SCNC: 138 MMOL/L (ref 136–145)
WBC # BLD AUTO: 8.4 X10*3/UL (ref 4.4–11.3)

## 2024-12-06 PROCEDURE — 96376 TX/PRO/DX INJ SAME DRUG ADON: CPT | Mod: 59

## 2024-12-06 PROCEDURE — 2500000005 HC RX 250 GENERAL PHARMACY W/O HCPCS: Performed by: UROLOGY

## 2024-12-06 PROCEDURE — 2500000004 HC RX 250 GENERAL PHARMACY W/ HCPCS (ALT 636 FOR OP/ED): Performed by: STUDENT IN AN ORGANIZED HEALTH CARE EDUCATION/TRAINING PROGRAM

## 2024-12-06 PROCEDURE — 74176 CT ABD & PELVIS W/O CONTRAST: CPT | Performed by: RADIOLOGY

## 2024-12-06 PROCEDURE — 2780000003 HC OR 278 NO HCPCS: Performed by: UROLOGY

## 2024-12-06 PROCEDURE — 80048 BASIC METABOLIC PNL TOTAL CA: CPT | Performed by: STUDENT IN AN ORGANIZED HEALTH CARE EDUCATION/TRAINING PROGRAM

## 2024-12-06 PROCEDURE — 85025 COMPLETE CBC W/AUTO DIFF WBC: CPT | Performed by: STUDENT IN AN ORGANIZED HEALTH CARE EDUCATION/TRAINING PROGRAM

## 2024-12-06 PROCEDURE — C1769 GUIDE WIRE: HCPCS | Performed by: UROLOGY

## 2024-12-06 PROCEDURE — 2500000004 HC RX 250 GENERAL PHARMACY W/ HCPCS (ALT 636 FOR OP/ED): Performed by: ANESTHESIOLOGY

## 2024-12-06 PROCEDURE — 52356 CYSTO/URETERO W/LITHOTRIPSY: CPT | Performed by: UROLOGY

## 2024-12-06 PROCEDURE — 83735 ASSAY OF MAGNESIUM: CPT | Performed by: STUDENT IN AN ORGANIZED HEALTH CARE EDUCATION/TRAINING PROGRAM

## 2024-12-06 PROCEDURE — 2500000002 HC RX 250 W HCPCS SELF ADMINISTERED DRUGS (ALT 637 FOR MEDICARE OP, ALT 636 FOR OP/ED): Performed by: INTERNAL MEDICINE

## 2024-12-06 PROCEDURE — 74176 CT ABD & PELVIS W/O CONTRAST: CPT

## 2024-12-06 PROCEDURE — 3600000008 HC OR TIME - EACH INCREMENTAL 1 MINUTE - PROCEDURE LEVEL THREE: Performed by: UROLOGY

## 2024-12-06 PROCEDURE — 51701 INSERT BLADDER CATHETER: CPT

## 2024-12-06 PROCEDURE — 7100000001 HC RECOVERY ROOM TIME - INITIAL BASE CHARGE: Performed by: UROLOGY

## 2024-12-06 PROCEDURE — G0378 HOSPITAL OBSERVATION PER HR: HCPCS

## 2024-12-06 PROCEDURE — 2720000007 HC OR 272 NO HCPCS: Performed by: UROLOGY

## 2024-12-06 PROCEDURE — 99233 SBSQ HOSP IP/OBS HIGH 50: CPT | Performed by: INTERNAL MEDICINE

## 2024-12-06 PROCEDURE — 3700000001 HC GENERAL ANESTHESIA TIME - INITIAL BASE CHARGE: Performed by: UROLOGY

## 2024-12-06 PROCEDURE — 3700000002 HC GENERAL ANESTHESIA TIME - EACH INCREMENTAL 1 MINUTE: Performed by: UROLOGY

## 2024-12-06 PROCEDURE — 7100000002 HC RECOVERY ROOM TIME - EACH INCREMENTAL 1 MINUTE: Performed by: UROLOGY

## 2024-12-06 PROCEDURE — 99223 1ST HOSP IP/OBS HIGH 75: CPT | Performed by: UROLOGY

## 2024-12-06 PROCEDURE — C2617 STENT, NON-COR, TEM W/O DEL: HCPCS | Performed by: UROLOGY

## 2024-12-06 PROCEDURE — 96375 TX/PRO/DX INJ NEW DRUG ADDON: CPT | Mod: 59

## 2024-12-06 PROCEDURE — 3600000003 HC OR TIME - INITIAL BASE CHARGE - PROCEDURE LEVEL THREE: Performed by: UROLOGY

## 2024-12-06 PROCEDURE — 36415 COLL VENOUS BLD VENIPUNCTURE: CPT | Performed by: STUDENT IN AN ORGANIZED HEALTH CARE EDUCATION/TRAINING PROGRAM

## 2024-12-06 PROCEDURE — 2500000001 HC RX 250 WO HCPCS SELF ADMINISTERED DRUGS (ALT 637 FOR MEDICARE OP): Performed by: INTERNAL MEDICINE

## 2024-12-06 DEVICE — INLAY OPTIMA URETERAL STENT W/O GUIDEWIRE
Type: IMPLANTABLE DEVICE | Site: URETER | Status: FUNCTIONAL
Brand: BARD® INLAY OPTIMA® URETERAL STENT

## 2024-12-06 RX ORDER — LIDOCAINE HYDROCHLORIDE 20 MG/ML
JELLY TOPICAL AS NEEDED
Status: DISCONTINUED | OUTPATIENT
Start: 2024-12-06 | End: 2024-12-06 | Stop reason: HOSPADM

## 2024-12-06 RX ORDER — CYCLOBENZAPRINE HCL 10 MG
10 TABLET ORAL 3 TIMES DAILY PRN
COMMUNITY
Start: 2024-12-02

## 2024-12-06 RX ORDER — TAMSULOSIN HYDROCHLORIDE 0.4 MG/1
0.4 CAPSULE ORAL ONCE
Status: COMPLETED | OUTPATIENT
Start: 2024-12-06 | End: 2024-12-06

## 2024-12-06 RX ORDER — DEXTROSE 50 % IN WATER (D50W) INTRAVENOUS SYRINGE
25
Status: DISCONTINUED | OUTPATIENT
Start: 2024-12-06 | End: 2024-12-07 | Stop reason: HOSPADM

## 2024-12-06 RX ORDER — ROSUVASTATIN CALCIUM 10 MG/1
10 TABLET, COATED ORAL NIGHTLY
COMMUNITY
Start: 2024-10-24

## 2024-12-06 RX ORDER — CEFAZOLIN 1 G/1
INJECTION, POWDER, FOR SOLUTION INTRAVENOUS AS NEEDED
Status: DISCONTINUED | OUTPATIENT
Start: 2024-12-06 | End: 2024-12-06

## 2024-12-06 RX ORDER — PREDNISOLONE ACETATE 10 MG/ML
1 SUSPENSION/ DROPS OPHTHALMIC 2 TIMES DAILY
Status: DISCONTINUED | OUTPATIENT
Start: 2024-12-06 | End: 2024-12-07 | Stop reason: HOSPADM

## 2024-12-06 RX ORDER — INSULIN LISPRO 100 [IU]/ML
0-10 INJECTION, SOLUTION INTRAVENOUS; SUBCUTANEOUS
Status: DISCONTINUED | OUTPATIENT
Start: 2024-12-07 | End: 2024-12-07 | Stop reason: HOSPADM

## 2024-12-06 RX ORDER — ALBUTEROL SULFATE 0.83 MG/ML
2.5 SOLUTION RESPIRATORY (INHALATION) EVERY 6 HOURS PRN
Status: DISCONTINUED | OUTPATIENT
Start: 2024-12-06 | End: 2024-12-07 | Stop reason: HOSPADM

## 2024-12-06 RX ORDER — DEXTROSE 50 % IN WATER (D50W) INTRAVENOUS SYRINGE
12.5
Status: DISCONTINUED | OUTPATIENT
Start: 2024-12-06 | End: 2024-12-07 | Stop reason: HOSPADM

## 2024-12-06 RX ORDER — ALBUTEROL SULFATE 90 UG/1
2 INHALANT RESPIRATORY (INHALATION) EVERY 4 HOURS PRN
Status: DISCONTINUED | OUTPATIENT
Start: 2024-12-06 | End: 2024-12-07 | Stop reason: HOSPADM

## 2024-12-06 RX ORDER — LAMOTRIGINE 100 MG/1
400 TABLET ORAL 2 TIMES DAILY
Status: DISCONTINUED | OUTPATIENT
Start: 2024-12-06 | End: 2024-12-07 | Stop reason: HOSPADM

## 2024-12-06 RX ORDER — ASPIRIN 81 MG/1
81 TABLET ORAL
Status: DISCONTINUED | OUTPATIENT
Start: 2024-12-06 | End: 2024-12-07 | Stop reason: HOSPADM

## 2024-12-06 RX ORDER — BRIMONIDINE TARTRATE 2 MG/ML
1 SOLUTION/ DROPS OPHTHALMIC 3 TIMES DAILY
Status: DISCONTINUED | OUTPATIENT
Start: 2024-12-06 | End: 2024-12-07 | Stop reason: HOSPADM

## 2024-12-06 RX ORDER — TAMSULOSIN HYDROCHLORIDE 0.4 MG/1
0.4 CAPSULE ORAL ONCE
Status: CANCELLED | OUTPATIENT
Start: 2024-12-06 | End: 2024-12-06

## 2024-12-06 RX ORDER — ROSUVASTATIN CALCIUM 10 MG/1
10 TABLET, COATED ORAL NIGHTLY
Status: DISCONTINUED | OUTPATIENT
Start: 2024-12-06 | End: 2024-12-07 | Stop reason: HOSPADM

## 2024-12-06 RX ORDER — SODIUM CHLORIDE, SODIUM LACTATE, POTASSIUM CHLORIDE, CALCIUM CHLORIDE 600; 310; 30; 20 MG/100ML; MG/100ML; MG/100ML; MG/100ML
INJECTION, SOLUTION INTRAVENOUS CONTINUOUS PRN
Status: DISCONTINUED | OUTPATIENT
Start: 2024-12-06 | End: 2024-12-06

## 2024-12-06 RX ORDER — FENTANYL CITRATE 50 UG/ML
50 INJECTION, SOLUTION INTRAMUSCULAR; INTRAVENOUS EVERY 5 MIN PRN
Status: DISCONTINUED | OUTPATIENT
Start: 2024-12-06 | End: 2024-12-07 | Stop reason: HOSPADM

## 2024-12-06 RX ORDER — PRAMIPEXOLE DIHYDROCHLORIDE 0.25 MG/1
0.25 TABLET ORAL NIGHTLY
COMMUNITY
Start: 2024-11-15

## 2024-12-06 RX ORDER — FENTANYL CITRATE 50 UG/ML
25 INJECTION, SOLUTION INTRAMUSCULAR; INTRAVENOUS EVERY 5 MIN PRN
Status: DISCONTINUED | OUTPATIENT
Start: 2024-12-06 | End: 2024-12-07 | Stop reason: HOSPADM

## 2024-12-06 RX ORDER — TOCILIZUMAB 180 MG/ML
INJECTION, SOLUTION SUBCUTANEOUS
COMMUNITY
Start: 2024-11-04

## 2024-12-06 RX ORDER — MEPERIDINE HYDROCHLORIDE 25 MG/ML
12.5 INJECTION INTRAMUSCULAR; INTRAVENOUS; SUBCUTANEOUS EVERY 10 MIN PRN
Status: DISCONTINUED | OUTPATIENT
Start: 2024-12-06 | End: 2024-12-07 | Stop reason: HOSPADM

## 2024-12-06 RX ORDER — VALSARTAN 80 MG/1
40 TABLET ORAL NIGHTLY
Status: DISCONTINUED | OUTPATIENT
Start: 2024-12-06 | End: 2024-12-07 | Stop reason: HOSPADM

## 2024-12-06 RX ORDER — PREDNISOLONE ACETATE 10 MG/ML
1 SUSPENSION/ DROPS OPHTHALMIC 2 TIMES DAILY
COMMUNITY
Start: 2024-11-18

## 2024-12-06 RX ORDER — ACETAZOLAMIDE 500 MG/1
1 CAPSULE, EXTENDED RELEASE ORAL
COMMUNITY
Start: 2024-11-18

## 2024-12-06 RX ORDER — PRAMIPEXOLE DIHYDROCHLORIDE 0.25 MG/1
0.25 TABLET ORAL NIGHTLY
Status: DISCONTINUED | OUTPATIENT
Start: 2024-12-06 | End: 2024-12-07 | Stop reason: HOSPADM

## 2024-12-06 RX ORDER — ALBUTEROL SULFATE 0.83 MG/ML
2.5 SOLUTION RESPIRATORY (INHALATION) EVERY 6 HOURS PRN
COMMUNITY
Start: 2024-09-25

## 2024-12-06 RX ORDER — NETARSUDIL 0.2 MG/ML
1 SOLUTION/ DROPS OPHTHALMIC; TOPICAL NIGHTLY
COMMUNITY
Start: 2024-11-14

## 2024-12-06 RX ORDER — FENTANYL CITRATE 50 UG/ML
INJECTION, SOLUTION INTRAMUSCULAR; INTRAVENOUS AS NEEDED
Status: DISCONTINUED | OUTPATIENT
Start: 2024-12-06 | End: 2024-12-06

## 2024-12-06 RX ORDER — SODIUM CHLORIDE 0.9 G/100ML
IRRIGANT IRRIGATION AS NEEDED
Status: DISCONTINUED | OUTPATIENT
Start: 2024-12-06 | End: 2024-12-06 | Stop reason: HOSPADM

## 2024-12-06 RX ORDER — ONDANSETRON 4 MG/1
4 TABLET, FILM COATED ORAL EVERY 8 HOURS PRN
COMMUNITY
Start: 2024-11-15

## 2024-12-06 RX ORDER — CYCLOBENZAPRINE HCL 10 MG
10 TABLET ORAL 3 TIMES DAILY PRN
Status: DISCONTINUED | OUTPATIENT
Start: 2024-12-06 | End: 2024-12-07 | Stop reason: HOSPADM

## 2024-12-06 RX ORDER — ACETAZOLAMIDE 500 MG/1
500 CAPSULE, EXTENDED RELEASE ORAL
Status: DISCONTINUED | OUTPATIENT
Start: 2024-12-06 | End: 2024-12-07 | Stop reason: HOSPADM

## 2024-12-06 RX ORDER — PROPOFOL 10 MG/ML
INJECTION, EMULSION INTRAVENOUS AS NEEDED
Status: DISCONTINUED | OUTPATIENT
Start: 2024-12-06 | End: 2024-12-06

## 2024-12-06 RX ORDER — BISMUTH SUBSALICYLATE 262 MG
1 TABLET,CHEWABLE ORAL DAILY
COMMUNITY

## 2024-12-06 RX ORDER — SODIUM CHLORIDE, SODIUM LACTATE, POTASSIUM CHLORIDE, CALCIUM CHLORIDE 600; 310; 30; 20 MG/100ML; MG/100ML; MG/100ML; MG/100ML
100 INJECTION, SOLUTION INTRAVENOUS CONTINUOUS
Status: DISCONTINUED | OUTPATIENT
Start: 2024-12-06 | End: 2024-12-07 | Stop reason: HOSPADM

## 2024-12-06 RX ORDER — DORZOLAMIDE HYDROCHLORIDE AND TIMOLOL MALEATE 20; 5 MG/ML; MG/ML
1 SOLUTION/ DROPS OPHTHALMIC 2 TIMES DAILY
Status: DISCONTINUED | OUTPATIENT
Start: 2024-12-06 | End: 2024-12-07 | Stop reason: HOSPADM

## 2024-12-06 RX ORDER — LIDOCAINE HYDROCHLORIDE 20 MG/ML
INJECTION, SOLUTION INFILTRATION; PERINEURAL AS NEEDED
Status: DISCONTINUED | OUTPATIENT
Start: 2024-12-06 | End: 2024-12-06

## 2024-12-06 ASSESSMENT — PAIN - FUNCTIONAL ASSESSMENT
PAIN_FUNCTIONAL_ASSESSMENT: 0-10

## 2024-12-06 ASSESSMENT — PAIN SCALES - GENERAL
PAINLEVEL_OUTOF10: 9
PAINLEVEL_OUTOF10: 10 - WORST POSSIBLE PAIN
PAINLEVEL_OUTOF10: 5 - MODERATE PAIN
PAINLEVEL_OUTOF10: 3
PAINLEVEL_OUTOF10: 6
PAINLEVEL_OUTOF10: 5 - MODERATE PAIN
PAIN_LEVEL: 0
PAINLEVEL_OUTOF10: 10 - WORST POSSIBLE PAIN
PAINLEVEL_OUTOF10: 5 - MODERATE PAIN
PAINLEVEL_OUTOF10: 5 - MODERATE PAIN

## 2024-12-06 ASSESSMENT — COGNITIVE AND FUNCTIONAL STATUS - GENERAL
MOBILITY SCORE: 24
DAILY ACTIVITIY SCORE: 24

## 2024-12-06 NOTE — ANESTHESIA PROCEDURE NOTES
Airway  Date/Time: 12/6/2024 5:01 PM  Urgency: elective    Airway not difficult    Staffing  Performed: attending   Authorized by: Shen Goetz MD    Performed by: Shen oGetz MD  Patient location during procedure: OR    Indications and Patient Condition  Indications for airway management: anesthesia  Spontaneous Ventilation: absent  Sedation level: no sedation  Preoxygenated: yes  Patient position: sniffing  MILS not maintained throughout  Mask difficulty assessment: 0 - not attempted  Planned trial extubation    Final Airway Details  Final airway type: supraglottic airway      Successful airway: Supraglottic airway: ambu.  Size 5     Number of attempts at approach: 1  Ventilation between attempts: none  Number of other approaches attempted: 0

## 2024-12-06 NOTE — CONSULTS
Reason For Consult  Nephrolithiasis    History Of Present Illness  Be Luo is a 64 y.o. male presenting with severe lower abdominal pain nephrolithiasis.  Patient was in the hospital in October for very small distal left ureteral calculi with associated pelvic pain.  That appeared resolved, however yesterday he had severe recurrence of this pain.  No significant pain in the right flank.  Patient had severe nausea and emesis and has been unable to keep any p.o. intake down.  He denies any fevers or chills, dysuria, urgency or frequency, hematuria.    On admission: Afebrile hemodynamically stable  UA nitrite and leuk esterase negative, however WBCs and RBCs on micro  -No significant leukocytosis or SANTHOSH    I reviewed the CT scan from admission, right-sided hydroureteronephrosis down to the level of the ureterovesical junction with a cluster of very small stones.  On the left there also appears to be some stone fragments within the left distal ureter.     Past Medical History  He has a past medical history of Blind one eye and Hypertension.    Surgical History  He has a past surgical history that includes Cardiac catheterization (N/A, 9/16/2024).     Social History  He reports that he quit smoking about 15 months ago. His smoking use included cigarettes. He does not have any smokeless tobacco history on file. He reports that he does not currently use alcohol. He reports current drug use. Drug: Marijuana.    Family History  No family history on file.     Allergies  Green tea, Gabapentin, Lyrica [pregabalin], Norco [hydrocodone-acetaminophen], Prednisone, Sulfasalazine, and Vioxx [rofecoxib]    Review of Systems  A 12 system review was completed and is negative with the exception of those signs and symptoms noted in the history of present illness.     Physical Exam  General: in NAD, appears stated age  Head: normocephalic, atraumatic  Respiratory: normal effort, no use of accessory muscles  Cardiovascular: no  "edema noted  Skin: normal turgor, no rashes  Neurologic: grossly intact, oriented to person/place/time  Psychiatric: mode and affect appropriate     Last Recorded Vitals  Blood pressure 118/62, pulse 82, temperature 37.2 °C (99 °F), temperature source Temporal, resp. rate 17, height 1.778 m (5' 10\"), weight 99.8 kg (220 lb 0.3 oz), SpO2 93%.    Relevant Results      See HPI     Assessment/Plan     Fit 64-year-old male with bilateral distal ureteral calculi, severe 10/10 pelvic pain and right-sided hydronephrosis  -Recommend bilateral ureteroscopy laser lithotripsy, stent placement  -No need for antibiotics  -Okay for discharge tomorrow, 12/7 if pain is well-controlled  -I will set him up as an outpatient for stent removal  -I described surgery in great detail including risk, benefits, alternatives    Yousuf Ramirez MD    "

## 2024-12-06 NOTE — ANESTHESIA PROCEDURE NOTES
Peripheral IV  Date/Time: 12/6/2024 5:00 PM      Placement  Needle size: 20 G  Laterality: right  Location: hand  Local anesthetic: none  Site prep: alcohol  Attempts: 1

## 2024-12-06 NOTE — OP NOTE
Ureteroscopy with laser lithotripsy-semirigid, Stent Placement (B), Operative Images Operative Note     Date: 2024 - 2024  OR Location: [facility]    Name: Be Luo, : 1960, Age: 64 y.o., MRN: [id number], Sex: male    Diagnosis  Pre-op Diagnosis      * Nephrolithiasis [N20.0] Post-op Diagnosis     * Nephrolithiasis [N20.0]     Procedures  Ureteroscopy with laser lithotripsy-semirigid, Stent Placement  27659 - KS CYSTO/URETERO W/LITHOTRIPSY &INDWELL STENT INSRT    Operative Images      Surgeons      * Yosuuf Ramirez - Primary    Resident/Fellow/Other Assistant:  Surgeons and Role:  * No surgeons found with a matching role *    Staff:   Circulator: Paris White Person: Shawna    Anesthesia Staff: Anesthesiologist: Shen Goetz MD; Israel Rueda MD    Procedure Summary  Anesthesia: General  ASA: III  Estimated Blood Loss: 3mL  Intra-op Medications:   Administrations occurring from 1600 to 1700 on 24:   Medication Name Total Dose   lidocaine (Xylocaine) injection 2 % 100 mg   propofol (Diprivan) injection 10 mg/mL 200 mg              Anesthesia Record               Intraprocedure I/O Totals       None           Specimen: No specimens collected              Drains and/or Catheters: * None in log *    Tourniquet Times:         Implants:  Implants       Type Name Action Serial No.      Stent STENT, INLAY OPTIMA, 6FR X 26CM - [id number] Implanted      Stent STENT, INLAY OPTIMA, 6FR X 26CM - [id number] Implanted             Operative Indications: This is a 64-year-old male currently admitted with pelvic pain, found to have distal right ureteral stones as well as left distal  ureteral calculi.  Therefore he was consented for ureteroscopy and laser lithotripsy. After discussing the risks, benefits, and alternatives of the procedure has elected to pursue management of their condition via the aforementioned surgery.     Procedure Details: The patient was correctly identified and the operative plan was  confirmed with the patient and the operative team. A weight appropriate dose of prophylactic antibiotics was administered intravenously prior to the procedure and sequential compression devices were applied to the lower extremities and activated prior to induction of anesthesia. The patient was placed in the dorsal lithotomy position. General anesthesia was induced. All pressure points were padded per protocol and the operative area was prepped and draped in the standard sterile fashion. The 21 Namibian cystoscope was inserted and the bladder was surveyed with the 30 degree lens. The ureteral orifices were noted to be in orthotopic position and without growths and without hematuria. The remainder of the bladder mucosa was noted to be normal without erythema, lesions, or trabeculations.     The right ureteral orifice was identified and canulated with a sensor wire which was coiled in the kidney. The semirigid ureteroscope was then introduced and navigated up the ureter.  I immediately encountered a stone within the distal right ureter.  Using a 365 µm laser fiber I dusted the stone.  At the conclusion there were no stones greater than 1 mm in size remaining.  The ureteroscope was removed.  Over the safety wire, a 6 Namibian by 26 cm JJ stent was placed with good proximal and distal curls.    I then turned my attention to the left side.  I reinserted the cystoscope and inserted a wire to the left ureteral orifice up to the left renal pelvis.  The cystoscope was removed.  The semirigid ureteroscope was inserted into the left ureter and identified several small soft yellow stones in the distal ureter.  Using a 365 µm fiber I dusted these into tiny particles until there were no stone fragments greater than 1 mm remaining.  At the conclusion the ureters both appeared healthy and normal.  Over the remaining safety wire placed a 6 Namibian by 26 cm stent with good proximal and distal curls.    10 cc of viscous lidocaine were  instilled and a 16 Arabic coudé catheter was placed.     The patient was awakened from anesthesia and taken to the recovery room in stable condition.      Yousuf Ramirez  Phone Number: [phone]

## 2024-12-06 NOTE — PROGRESS NOTES
Patient left floor for CT. Patient exhibited no signs of distress at this time.    1030  Patient arrived back on floor from CT.

## 2024-12-06 NOTE — CARE PLAN
Problem: Pain - Adult  Goal: Verbalizes/displays adequate comfort level or baseline comfort level  Outcome: Progressing     Problem: Safety - Adult  Goal: Free from fall injury  Outcome: Progressing     Problem: Discharge Planning  Goal: Discharge to home or other facility with appropriate resources  Outcome: Progressing     Problem: Chronic Conditions and Co-morbidities  Goal: Patient's chronic conditions and co-morbidity symptoms are monitored and maintained or improved  Outcome: Progressing     Problem: Pain  Goal: Takes deep breaths with improved pain control throughout the shift  Outcome: Progressing  Goal: Turns in bed with improved pain control throughout the shift  Outcome: Progressing  Goal: Walks with improved pain control throughout the shift  Outcome: Progressing  Goal: Performs ADL's with improved pain control throughout shift  Outcome: Progressing  Goal: Participates in PT with improved pain control throughout the shift  Outcome: Progressing  Goal: Free from opioid side effects throughout the shift  Outcome: Progressing  Goal: Free from acute confusion related to pain meds throughout the shift  Outcome: Progressing   The patient's goals for the shift include rest    The clinical goals for the shift include Patient will have a decrease in pain from 9 to less than 5 by end of  shift

## 2024-12-06 NOTE — ANESTHESIA PREPROCEDURE EVALUATION
Be Luo is a 64 y.o. male here for:      Ureteroscopy with laser lithotripsy-semirigid  With Yousuf Ramirez MD  Pre-Op Diagnosis Codes:      * Kidney stone [N20.0]    Lab Results   Component Value Date    HGB 14.8 12/06/2024    HCT 44.0 12/06/2024    WBC 8.4 12/06/2024     12/06/2024     12/06/2024    K 3.5 12/06/2024     (H) 12/06/2024    CREATININE 1.60 (H) 12/06/2024    BUN 16 12/06/2024       Social History     Substance and Sexual Activity   Drug Use Yes   • Types: Marijuana      Tobacco Use: Medium Risk (11/29/2024)    Received from [facility]    Patient History    • Smoking Tobacco Use: Former    • Smokeless Tobacco Use: Never    • Passive Exposure: Not on file      Social History     Substance and Sexual Activity   Alcohol Use Not Currently        Allergies   Allergen Reactions   • Green Tea Hives and Shortness of breath   • Gabapentin Agitation   • Lyrica [Pregabalin] GI Upset   • Norco [Hydrocodone-Acetaminophen] Agitation   • Prednisone Other   • Sulfasalazine GI Upset   • Vioxx [Rofecoxib] Other     Retains water/swelling       Current Outpatient Medications   Medication Instructions   • acetaZOLAMIDE (Diamox) 500 mg 12 hr capsule 1 capsule, Every 12 hours scheduled (0630,1830)   • Actemra ACTPen 162 mg/0.9 mL Every 7 days   • albuterol 90 mcg/actuation aerosol powdr breath activated inhaler 2 puffs, inhalation, Every 6 hours PRN   • albuterol 2.5 mg, Every 6 hours PRN   • aspirin 81 mg, Daily RT   • brimonidine (AlphaGAN) 0.2 % ophthalmic solution 1 drop, 3 times daily   • cholecalciferol (VITAMIN D-3) 2,000 Units, Daily with breakfast   • cyclobenzaprine (FLEXERIL) 10 mg, 3 times daily PRN   • dorzolamide-timoloL (Cosopt) 22.3-6.8 mg/mL ophthalmic solution 1 drop, 2 times daily   • lamoTRIgine (LaMICtal) 200 mg tablet 3 tablets, 2 times daily   • medical cannabis 1 each   • metFORMIN (Glucophage) 500 mg tablet 1 tablet, Daily with evening meal   • multivitamin tablet  "1 tablet, Daily   • naloxone (NARCAN) 4 mg, nasal, As needed, May repeat every 2-3 minutes if needed, alternating nostrils, until medical assistance becomes available.   • ondansetron (ZOFRAN) 4 mg, Every 8 hours PRN   • potassium chloride CR 10 mEq ER tablet 10 mEq, Daily   • pramipexole (MIRAPEX) 0.25 mg, Nightly   • prednisoLONE acetate (Pred-Forte) 1 % ophthalmic suspension 1 drop, Left Eye, 2 times daily   • Rhopressa 0.02 % drops opthalmic solution 1 drop, Nightly   • rosuvastatin (CRESTOR) 10 mg, Nightly   • valsartan (DIOVAN) 40 mg, oral, Daily       Past Medical History:   Diagnosis Date   • Blind one eye    • Hypertension        Past Surgical History:   Procedure Laterality Date   • CARDIAC CATHETERIZATION N/A 9/16/2024    Procedure: Left Heart Cath, With LV;  Surgeon: Cori Sullivan MD;  Location: [facility];  Service: Cardiovascular;  Laterality: N/A;       No family history on file.    Relevant Problems   Pulmonary   (+) Chronic obstructive pulmonary disease (Multi)      Neuro   (+) Seizure disorder (Multi)      /Renal   (+) Nephrolithiasis       Visit Vitals  /62 (BP Location: Left arm, Patient Position: Lying)   Pulse 82   Temp 37.2 °C (99 °F) (Temporal)   Resp 17   Ht 1.778 m (5' 10\")   Wt 99.8 kg (220 lb 0.3 oz)   SpO2 93%   BMI 31.57 kg/m²   Smoking Status Former   BSA 2.22 m²       NPO Details:  NPO/Void Status  Date of Last Liquid: 12/05/24  Date of Last Solid: 12/05/24        Physical Exam    Airway  Mallampati: II     Cardiovascular - normal exam     Dental - normal exam     Pulmonary - normal exam     Abdominal   (+) obese         Anesthesia Plan    History of general anesthesia?: yes  History of complications of general anesthesia?: no    ASA 3     general     intravenous induction   Anesthetic plan and risks discussed with patient.  "

## 2024-12-06 NOTE — PROGRESS NOTES
12/06/24 1235   Discharge Planning   Living Arrangements Alone   Support Systems Family members   Assistance Needed none   Type of Residence Private residence   Number of Stairs to Enter Residence 10   Number of Stairs Within Residence 0   Do you have animals or pets at home? No   Who is requesting discharge planning? Provider   Home or Post Acute Services None   Expected Discharge Disposition Home   Does the patient need discharge transport arranged? No   Patient Choice   Provider Choice list and CMS website (https://medicare.gov/care-compare#search) for post-acute Quality and Resource Measure Data were provided and reviewed with: Patient   Patient / Family choosing to utilize agency / facility established prior to hospitalization No   Stroke Family Assessment   Stroke Family Assessment Needed No   Intensity of Service   Intensity of Service 0-30 min     Spoke with patient this morning, admitted from home. Patient lives alone, legally blind, uses cane. Patient states he is waiting for eye surgery from [facility] for this issue. Plan is home, patient is independent, uses public transit or his sister assists with rides to  appointments. CT team will continue to follow.

## 2024-12-06 NOTE — ANESTHESIA POSTPROCEDURE EVALUATION
Patient: Be Luo    Procedure Summary       Date: 12/06/24 Room / Location: [facility] OR 07 / [facility]    Anesthesia Start: 1651 Anesthesia Stop: 1739    Procedures:       Ureteroscopy with laser lithotripsy-semirigid, Stent Placement (Bilateral: Ureter)      Operative Images Diagnosis:       Nephrolithiasis      (Nephrolithiasis [N20.0])    Surgeons: Yousuf Ramirez MD Responsible Provider: Israel Rueda MD    Anesthesia Type: general ASA Status: 3            Anesthesia Type: general    Vitals Value Taken Time   /76 12/06/24 1739   Temp 36.4 12/06/24 1739   Pulse 83 12/06/24 1739   Resp 16 12/06/24 1739   SpO2 99% 12/06/24 1739       Anesthesia Post Evaluation    Patient location during evaluation: bedside  Patient participation: complete - patient participated  Level of consciousness: awake and alert  Pain score: 0  Pain management: adequate  Multimodal analgesia pain management approach  Airway patency: patent  Cardiovascular status: acceptable  Respiratory status: acceptable and face mask  Hydration status: acceptable  Postoperative Nausea and Vomiting: none        There were no known notable events for this encounter.

## 2024-12-06 NOTE — CARE PLAN
The patient's goals for the shift include rest    The clinical goals for the shift include Patient will report a decrease in pain to 5 or less by end of shift.    Over the shift, the patient did make progress toward the following goals.    Problem: Pain - Adult  Goal: Verbalizes/displays adequate comfort level or baseline comfort level  Outcome: Progressing

## 2024-12-07 VITALS
WEIGHT: 220.02 LBS | RESPIRATION RATE: 18 BRPM | HEIGHT: 70 IN | BODY MASS INDEX: 31.5 KG/M2 | SYSTOLIC BLOOD PRESSURE: 143 MMHG | OXYGEN SATURATION: 94 % | HEART RATE: 81 BPM | DIASTOLIC BLOOD PRESSURE: 68 MMHG | TEMPERATURE: 98.2 F

## 2024-12-07 LAB
ANION GAP SERPL CALC-SCNC: 11 MMOL/L (ref 10–20)
BASOPHILS # BLD AUTO: 0.02 X10*3/UL (ref 0–0.1)
BASOPHILS NFR BLD AUTO: 0.2 %
BUN SERPL-MCNC: 16 MG/DL (ref 6–23)
CALCIUM SERPL-MCNC: 8.7 MG/DL (ref 8.6–10.3)
CHLORIDE SERPL-SCNC: 108 MMOL/L (ref 98–107)
CO2 SERPL-SCNC: 25 MMOL/L (ref 21–32)
CREAT SERPL-MCNC: 1.1 MG/DL (ref 0.5–1.3)
EGFRCR SERPLBLD CKD-EPI 2021: 74 ML/MIN/1.73M*2
EOSINOPHIL # BLD AUTO: 0.05 X10*3/UL (ref 0–0.7)
EOSINOPHIL NFR BLD AUTO: 0.5 %
ERYTHROCYTE [DISTWIDTH] IN BLOOD BY AUTOMATED COUNT: 12.8 % (ref 11.5–14.5)
GLUCOSE BLD MANUAL STRIP-MCNC: 111 MG/DL (ref 74–99)
GLUCOSE BLD MANUAL STRIP-MCNC: 112 MG/DL (ref 74–99)
GLUCOSE BLD MANUAL STRIP-MCNC: 95 MG/DL (ref 74–99)
GLUCOSE SERPL-MCNC: 96 MG/DL (ref 74–99)
HCT VFR BLD AUTO: 46.6 % (ref 41–52)
HGB BLD-MCNC: 15.5 G/DL (ref 13.5–17.5)
HOLD SPECIMEN: NORMAL
IMM GRANULOCYTES # BLD AUTO: 0.04 X10*3/UL (ref 0–0.7)
IMM GRANULOCYTES NFR BLD AUTO: 0.4 % (ref 0–0.9)
LYMPHOCYTES # BLD AUTO: 1.36 X10*3/UL (ref 1.2–4.8)
LYMPHOCYTES NFR BLD AUTO: 14.5 %
MAGNESIUM SERPL-MCNC: 2 MG/DL (ref 1.6–2.4)
MCH RBC QN AUTO: 32.6 PG (ref 26–34)
MCHC RBC AUTO-ENTMCNC: 33.3 G/DL (ref 32–36)
MCV RBC AUTO: 98 FL (ref 80–100)
MONOCYTES # BLD AUTO: 0.68 X10*3/UL (ref 0.1–1)
MONOCYTES NFR BLD AUTO: 7.2 %
NEUTROPHILS # BLD AUTO: 7.26 X10*3/UL (ref 1.2–7.7)
NEUTROPHILS NFR BLD AUTO: 77.2 %
NRBC BLD-RTO: 0 /100 WBCS (ref 0–0)
PLATELET # BLD AUTO: 186 X10*3/UL (ref 150–450)
POTASSIUM SERPL-SCNC: 3.7 MMOL/L (ref 3.5–5.3)
RBC # BLD AUTO: 4.76 X10*6/UL (ref 4.5–5.9)
SODIUM SERPL-SCNC: 140 MMOL/L (ref 136–145)
WBC # BLD AUTO: 9.4 X10*3/UL (ref 4.4–11.3)

## 2024-12-07 PROCEDURE — 2500000004 HC RX 250 GENERAL PHARMACY W/ HCPCS (ALT 636 FOR OP/ED): Performed by: ANESTHESIOLOGY

## 2024-12-07 PROCEDURE — 82947 ASSAY GLUCOSE BLOOD QUANT: CPT

## 2024-12-07 PROCEDURE — 82374 ASSAY BLOOD CARBON DIOXIDE: CPT | Performed by: INTERNAL MEDICINE

## 2024-12-07 PROCEDURE — 2500000001 HC RX 250 WO HCPCS SELF ADMINISTERED DRUGS (ALT 637 FOR MEDICARE OP): Performed by: INTERNAL MEDICINE

## 2024-12-07 PROCEDURE — 80048 BASIC METABOLIC PNL TOTAL CA: CPT | Performed by: INTERNAL MEDICINE

## 2024-12-07 PROCEDURE — 85025 COMPLETE CBC W/AUTO DIFF WBC: CPT | Performed by: INTERNAL MEDICINE

## 2024-12-07 PROCEDURE — 83735 ASSAY OF MAGNESIUM: CPT | Performed by: INTERNAL MEDICINE

## 2024-12-07 PROCEDURE — 2500000005 HC RX 250 GENERAL PHARMACY W/O HCPCS: Performed by: INTERNAL MEDICINE

## 2024-12-07 PROCEDURE — 99239 HOSP IP/OBS DSCHRG MGMT >30: CPT | Performed by: STUDENT IN AN ORGANIZED HEALTH CARE EDUCATION/TRAINING PROGRAM

## 2024-12-07 PROCEDURE — 36415 COLL VENOUS BLD VENIPUNCTURE: CPT | Performed by: INTERNAL MEDICINE

## 2024-12-07 PROCEDURE — G0378 HOSPITAL OBSERVATION PER HR: HCPCS

## 2024-12-07 RX ORDER — OXYCODONE HYDROCHLORIDE 5 MG/1
5 TABLET ORAL EVERY 6 HOURS PRN
Qty: 15 TABLET | Refills: 0 | Status: SHIPPED | OUTPATIENT
Start: 2024-12-07

## 2024-12-07 ASSESSMENT — PAIN SCALES - GENERAL
PAINLEVEL_OUTOF10: 0 - NO PAIN
PAINLEVEL_OUTOF10: 0 - NO PAIN

## 2024-12-07 ASSESSMENT — COGNITIVE AND FUNCTIONAL STATUS - GENERAL
DAILY ACTIVITIY SCORE: 24
MOBILITY SCORE: 24

## 2024-12-07 ASSESSMENT — PAIN - FUNCTIONAL ASSESSMENT
PAIN_FUNCTIONAL_ASSESSMENT: 0-10
PAIN_FUNCTIONAL_ASSESSMENT: 0-10

## 2024-12-07 NOTE — DISCHARGE SUMMARY
[facility] Discharge Summary  DISCHARGE DIAGNOSIS     Right nephrolithiasis with hydronephrosis s/p lithotripsy and stent placement  Urinary retention   SANTHOSH    HOSPITAL COURSE AND DETAILS     This is a 64-year-old male with a significant past medical history of COPD, hypertension, type II DM, CAD, legally blind from complications of glaucoma/retinal detachment/chronic uveitis, seizure disorder that presented from home with chief complaints of diffuse central abdominal pain.    Patient was found to have multiple right-sided kidney stones.  Was seen by urology during hospitalization and underwent ureteroscopy with laser lithotripsy and stent placement on 12/6.  Coudé catheter was placed postprocedure which was removed today on day of discharge.  Patient no significant issues with urination and is being discharged home this afternoon.  Will follow-up with urology in 2 to 4 weeks to discuss stent removal.  Will follow-up with PCP as well to discuss hospitalization.  Patient did not require antibiotics on discharge, did receive pain medications however.    Total time spent on discharge: >30min      ---Of note, this documentation is completed using the Dragon Dictation system (voice recognition software). There may be spelling and/or grammatical errors that were not corrected prior to final submission.---    Guy Shepard MD    DISCHARGE PHYSICAL EXAM     Last Recorded Vitals:  Vitals:    12/06/24 1913 12/07/24 0200 12/07/24 0700 12/07/24 0759   BP: 157/72 122/69 128/79 143/68   BP Location:  Left arm Left arm Left arm   Patient Position:  Lying Lying Lying   Pulse: 79 87 77 81   Resp: 18 18 18 18   Temp: 36.6 °C (97.9 °F) 36.9 °C (98.4 °F) 36.6 °C (97.9 °F) 36.8 °C (98.2 °F)   TempSrc: Temporal Temporal Temporal Temporal   SpO2: 93% 92% 94% 94%   Weight:       Height:         Physical Exam  Vitals reviewed.   Constitutional:       General: He is not in acute distress.     Appearance: Normal appearance. He is not  ill-appearing.   HENT:      Head: Normocephalic and atraumatic.   Eyes:      Extraocular Movements: Extraocular movements intact.      Conjunctiva/sclera: Conjunctivae normal.   Cardiovascular:      Rate and Rhythm: Normal rate and regular rhythm.      Pulses: Normal pulses.      Heart sounds: Normal heart sounds.   Pulmonary:      Effort: Pulmonary effort is normal.      Breath sounds: Normal breath sounds.   Abdominal:      General: Abdomen is flat. Bowel sounds are normal. There is no distension.      Palpations: Abdomen is soft.      Tenderness: There is no abdominal tenderness. There is no guarding.   Musculoskeletal:         General: No swelling or tenderness. Normal range of motion.      Cervical back: Normal range of motion and neck supple.   Neurological:      General: No focal deficit present.      Mental Status: He is alert and oriented to person, place, and time. Mental status is at baseline.   Psychiatric:         Mood and Affect: Mood normal.         Behavior: Behavior normal.     DISCHARGE MEDICATIONS        Your medication list        START taking these medications        Instructions Last Dose Given Next Dose Due   oxyCODONE 5 mg immediate release tablet  Commonly known as: Roxicodone      Take 1 tablet (5 mg) by mouth every 6 hours if needed for moderate pain (4 - 6).              CHANGE how you take these medications        Instructions Last Dose Given Next Dose Due   valsartan 40 mg tablet  Commonly known as: Diovan  What changed: when to take this      Take 1 tablet (40 mg) by mouth once daily.              CONTINUE taking these medications        Instructions Last Dose Given Next Dose Due   acetaZOLAMIDE 500 mg 12 hr capsule  Commonly known as: Diamox           Actemra ACTPen 162 mg/0.9 mL  Generic drug: tocilizumab           albuterol 90 mcg/actuation aerosol [facility] breath activated inhaler           albuterol 2.5 mg /3 mL (0.083 %) nebulizer solution           aspirin 81 mg EC tablet            brimonidine 0.2 % ophthalmic solution  Commonly known as: AlphaGAN           cholecalciferol 50 mcg (2,000 unit) capsule  Commonly known as: Vitamin D-3           cyclobenzaprine 10 mg tablet  Commonly known as: Flexeril           dorzolamide-timoloL 22.3-6.8 mg/mL ophthalmic solution  Commonly known as: Cosopt           lamoTRIgine 200 mg tablet  Commonly known as: LaMICtal           medical cannabis           metFORMIN 500 mg tablet  Commonly known as: Glucophage           multivitamin tablet           naloxone 4 mg/0.1 mL nasal spray  Commonly known as: Narcan      Administer 1 spray (4 mg) into affected nostril(s) if needed for opioid reversal for up to 1 dose. May repeat every 2-3 minutes if needed, alternating nostrils, until medical assistance becomes available.       ondansetron 4 mg tablet  Commonly known as: Zofran           potassium chloride CR 10 mEq ER tablet  Commonly known as: Klor-Con           pramipexole 0.25 mg tablet  Commonly known as: Mirapex           prednisoLONE acetate 1 % ophthalmic suspension  Commonly known as: Pred-Forte           Rhopressa 0.02 % drops opthalmic solution  Generic drug: netarsudiL           rosuvastatin 10 mg tablet  Commonly known as: Crestor                     Where to Get Your Medications        You can get these medications from any pharmacy    Bring a paper prescription for each of these medications  oxyCODONE 5 mg immediate release tablet           OUTPATIENT FOLLOW-UP     No future appointments.

## 2024-12-07 NOTE — CARE PLAN
The patient's goals for the shift include rest    The clinical goals for the shift include remain HDS

## 2024-12-07 NOTE — PROGRESS NOTES
Be Luo is a 64 y.o. male on day 0 of admission presenting with Nephrolithiasis.      Subjective   Patient was seen and examined at bedside, he was having abdominal pain, he was retaining urine as well, he was getting ready for a procedure with urology.        Objective     Last Recorded Vitals  /72   Pulse 79   Temp 36.6 °C (97.9 °F) (Temporal)   Resp 18   Wt 99.8 kg (220 lb 0.3 oz)   SpO2 93%   Intake/Output last 3 Shifts:    Intake/Output Summary (Last 24 hours) at 12/6/2024 1926  Last data filed at 12/6/2024 1738  Gross per 24 hour   Intake 200 ml   Output 600 ml   Net -400 ml       Admission Weight  Weight: 99.8 kg (220 lb) (12/05/24 1248)    Daily Weight  12/05/24 : 99.8 kg (220 lb 0.3 oz)    Image Results  Operative Images  Please see OpNote on Notes tab for findings.  FL fluoro images no charge  These images are not reportable by radiology and will not be interpreted   by  Radiologists.  CT abdomen pelvis wo IV contrast  Narrative: Interpreted By:  Aristeo Mancera,   STUDY:  CT ABDOMEN PELVIS WO IV CONTRAST;  12/6/2024 10:21 am      INDICATION:  Signs/Symptoms:Evaluate for stone passage and hydronephrosis.          COMPARISON:  CT abdomen and pelvis with contrast 5 December 2024      ACCESSION NUMBER(S):  [id number]      ORDERING CLINICIAN:  PEDRO ONEILL      TECHNIQUE:  CT of the abdomen and pelvis from the lung bases through the  symphysis pubis without oral or IV contrast      FINDINGS:  There is ongoing, approximately the same degree of right  hydroureteronephrosis      Persistent right nephrogram and persistence of excreted contrast on  the right side from obstructive uropathy      The presence of excreted contrast in the ureter limits evaluation for  the known three distal ureteral stones which may either still be  present and obscured by surrounding excreted contrast or they may  have passed, with a persistent distal ureteral spasm or stricture  accounting for persistent  hydronephrosis and persistent/delayed  nephrogram      The right nephrogram is abnormal enough to consider acute  uncomplicated pyelonephritis      Vicariously excreted contrast has appeared in the gallbladder, not an  acute pathologic finding      No other interval change      No urine leak/urinoma      Impression: Presence of excreted contrast in the right ureter from some element  of persistent obstructive uropathy on the right. The three stones  identified yesterday may still be present but obscured within the  distal most column of contrast in the ureter or there may have passed  and there is a persistent distal ureteral spasm or stricture      No urine leak/urinoma      Acute uncomplicated pyelonephritis should be considered based on how  abnormal the right nephrogram is      MACRO:  None      Signed by: Aristeo Mancera 12/6/2024 11:14 AM  Dictation workstation:   [id number]      Physical Exam  Constitutional:       General: He is not in acute distress.     Appearance: Normal appearance. He is normal weight.   HENT:      Head: Normocephalic and atraumatic.      Nose: Nose normal.      Mouth/Throat:      Mouth: Mucous membranes are moist.      Pharynx: Oropharynx is clear.   Eyes:      Extraocular Movements: Extraocular movements intact.      Conjunctiva/sclera: Conjunctivae normal.      Pupils: Pupils are equal, round, and reactive to light.   Cardiovascular:      Rate and Rhythm: Normal rate and regular rhythm.      Pulses: Normal pulses.      Heart sounds: Normal heart sounds.   Pulmonary:      Effort: Pulmonary effort is normal.      Breath sounds: Normal breath sounds.   Abdominal:      General: Abdomen is flat. Bowel sounds are normal.      Palpations: Abdomen is soft.      Tenderness: There is abdominal tenderness.   Musculoskeletal:         General: Normal range of motion.      Cervical back: Normal range of motion and neck supple.   Skin:     General: Skin is warm and dry.   Neurological:      General:  No focal deficit present.      Mental Status: He is alert and oriented to person, place, and time. Mental status is at baseline.   Psychiatric:         Mood and Affect: Mood normal.         Behavior: Behavior normal.         Thought Content: Thought content normal.         Relevant Results  Scheduled medications  acetaZOLAMIDE, 500 mg, oral, q12h LUCIA  aspirin, 81 mg, oral, Daily  brimonidine, 1 drop, Left Eye, TID  dorzolamide-timoloL, 1 drop, Left Eye, BID  lamoTRIgine, 400 mg, oral, BID  netarsudiL, 1 drop, Left Eye, Nightly  pramipexole, 0.25 mg, oral, Nightly  prednisoLONE acetate, 1 drop, Left Eye, BID  rosuvastatin, 10 mg, oral, Nightly  [Held by provider] valsartan, 40 mg, oral, Nightly      Continuous medications  lactated Ringer's, 100 mL/hr, Last Rate: 100 mL/hr (12/06/24 1921)      PRN medications  PRN medications: acetaminophen **OR** acetaminophen **OR** acetaminophen, albuterol, albuterol, cyclobenzaprine, fentaNYL PF, fentaNYL PF, hydrALAZINE, labetaloL, meperidine, morphine, ondansetron, oxyCODONE, polyethylene glycol                   Assessment/Plan          This patient has a urinary catheter   Reason for the urinary catheter remaining today? urinary retention/bladder outlet obstruction, acute or chronic          Assessment & Plan  Nephrolithiasis    Right nephrolithiasis with hydronephrosis  - Presenting from home with chief complaints of abdominal pain, found to have multiple distal right ureter clustered calculi with associated hydronephrosis  - Urinalysis however rather unremarkable for leukocyte esterase, nitrates, trace blood seen  - Denies having fevers, chills, dysuria  - Urology consulted; bilateral ureteroscopy laser lithotripsy, stent placement, No need for antibiotics, Okay for discharge tomorrow, 12/7 if pain is well-controlled, follow up an outpatient for stent removal  - Hold off on antibiotics at this time  -  IV hydration today  - Flomax       #Acute urinary retention:   Bladder  scan >600 ml  - Continue Flomax 0.4 mg       SANTHOSH  - Creatinine 1.37 today (baseline seems to be around 0.7-0.8), 1. 60   - Received IV contrast in the ED as well  - Patient was retaining urine as well, >600 ml  - Continue to monitor, avoid nephrotoxic meds.       Right eye blindness in setting of chronic glaucoma, uveitis, history of retinal detachment  - Resume home eyedrops.     Essential hypertension  - Holding Valsartan for SANTHOSH, PRN meds with parameters.     Hyperlipidemia  - Continue Rosuvastatin 10 mg nightly.     Type II DM  - Holding home meds, continue sliding scale.     COPD, not in exacerbation    Restless leg syndrome  Pramipexole 0.25 mg nightly     #Seizures; Continue lamotrigine 400 mg daily.      VTE Prophylaxis: SCDs (holding chemoprophylaxis following surgical intervention        Dispo: Patient admitted for nephrolithiasis sp ureteroscopy laser lithotripsy, stent placement, possible discharge tomorrow        Ivis Win, DO

## 2024-12-14 LAB
ATRIAL RATE: 78 BPM
P OFFSET: 155 MS
P ONSET: 127 MS
PR INTERVAL: 156 MS
Q ONSET: 205 MS
QRS COUNT: 13 BEATS
QRS DURATION: 102 MS
QT INTERVAL: 400 MS
QTC CALCULATION(BAZETT): 456 MS
QTC FREDERICIA: 436 MS
R AXIS: 84 DEGREES
T AXIS: 59 DEGREES
T OFFSET: 405 MS
VENTRICULAR RATE: 78 BPM

## 2024-12-19 ENCOUNTER — TELEPHONE (OUTPATIENT)
Facility: CLINIC | Age: 64
End: 2024-12-19
Payer: COMMERCIAL

## 2024-12-19 NOTE — TELEPHONE ENCOUNTER
Patient called [facility] and said he has been calling about what is the next step he had surgery by Dr. Ramirez in 12/6???  Please advise    This was the first call I have gotten on him.

## 2024-12-21 ENCOUNTER — APPOINTMENT (OUTPATIENT)
Dept: RADIOLOGY | Facility: HOSPITAL | Age: 64
End: 2024-12-21
Payer: COMMERCIAL

## 2024-12-21 ENCOUNTER — HOSPITAL ENCOUNTER (EMERGENCY)
Facility: HOSPITAL | Age: 64
Discharge: HOME | End: 2024-12-21
Payer: COMMERCIAL

## 2024-12-21 VITALS
BODY MASS INDEX: 31.5 KG/M2 | OXYGEN SATURATION: 96 % | RESPIRATION RATE: 16 BRPM | TEMPERATURE: 98.6 F | WEIGHT: 220 LBS | DIASTOLIC BLOOD PRESSURE: 83 MMHG | HEART RATE: 100 BPM | SYSTOLIC BLOOD PRESSURE: 142 MMHG | HEIGHT: 70 IN

## 2024-12-21 DIAGNOSIS — N44.2 TESTICULAR CYST: Primary | ICD-10-CM

## 2024-12-21 DIAGNOSIS — R33.9 URINARY RETENTION: ICD-10-CM

## 2024-12-21 DIAGNOSIS — N30.01 ACUTE CYSTITIS WITH HEMATURIA: ICD-10-CM

## 2024-12-21 LAB
ALBUMIN SERPL BCP-MCNC: 4.3 G/DL (ref 3.4–5)
ALP SERPL-CCNC: 67 U/L (ref 33–136)
ALT SERPL W P-5'-P-CCNC: 22 U/L (ref 10–52)
ANION GAP SERPL CALC-SCNC: 10 MMOL/L (ref 10–20)
APPEARANCE UR: ABNORMAL
AST SERPL W P-5'-P-CCNC: 23 U/L (ref 9–39)
BACTERIA #/AREA URNS AUTO: ABNORMAL /HPF
BASOPHILS # BLD AUTO: 0.03 X10*3/UL (ref 0–0.1)
BASOPHILS NFR BLD AUTO: 0.5 %
BILIRUB SERPL-MCNC: 0.5 MG/DL (ref 0–1.2)
BILIRUB UR STRIP.AUTO-MCNC: NEGATIVE MG/DL
BUN SERPL-MCNC: 20 MG/DL (ref 6–23)
CALCIUM SERPL-MCNC: 8.7 MG/DL (ref 8.6–10.3)
CHLORIDE SERPL-SCNC: 111 MMOL/L (ref 98–107)
CO2 SERPL-SCNC: 22 MMOL/L (ref 21–32)
COLOR UR: ABNORMAL
CREAT SERPL-MCNC: 0.91 MG/DL (ref 0.5–1.3)
EGFRCR SERPLBLD CKD-EPI 2021: >90 ML/MIN/1.73M*2
EOSINOPHIL # BLD AUTO: 0.23 X10*3/UL (ref 0–0.7)
EOSINOPHIL NFR BLD AUTO: 3.5 %
ERYTHROCYTE [DISTWIDTH] IN BLOOD BY AUTOMATED COUNT: 12.9 % (ref 11.5–14.5)
GLUCOSE SERPL-MCNC: 111 MG/DL (ref 74–99)
GLUCOSE UR STRIP.AUTO-MCNC: NEGATIVE MG/DL
HCT VFR BLD AUTO: 41.6 % (ref 41–52)
HGB BLD-MCNC: 14.1 G/DL (ref 13.5–17.5)
IMM GRANULOCYTES # BLD AUTO: 0.02 X10*3/UL (ref 0–0.7)
IMM GRANULOCYTES NFR BLD AUTO: 0.3 % (ref 0–0.9)
INR PPP: 0.9 (ref 0.9–1.1)
KETONES UR STRIP.AUTO-MCNC: NEGATIVE MG/DL
LACTATE SERPL-SCNC: 1.2 MMOL/L (ref 0.4–2)
LEUKOCYTE ESTERASE UR QL STRIP.AUTO: ABNORMAL
LIPASE SERPL-CCNC: 20 U/L (ref 9–82)
LYMPHOCYTES # BLD AUTO: 1.12 X10*3/UL (ref 1.2–4.8)
LYMPHOCYTES NFR BLD AUTO: 17.1 %
MCH RBC QN AUTO: 32.3 PG (ref 26–34)
MCHC RBC AUTO-ENTMCNC: 33.9 G/DL (ref 32–36)
MCV RBC AUTO: 95 FL (ref 80–100)
MONOCYTES # BLD AUTO: 0.61 X10*3/UL (ref 0.1–1)
MONOCYTES NFR BLD AUTO: 9.3 %
MUCOUS THREADS #/AREA URNS AUTO: ABNORMAL /LPF
NEUTROPHILS # BLD AUTO: 4.54 X10*3/UL (ref 1.2–7.7)
NEUTROPHILS NFR BLD AUTO: 69.3 %
NITRITE UR QL STRIP.AUTO: NEGATIVE
NRBC BLD-RTO: 0 /100 WBCS (ref 0–0)
PH UR STRIP.AUTO: 6 [PH]
PLATELET # BLD AUTO: 241 X10*3/UL (ref 150–450)
POTASSIUM SERPL-SCNC: 4.2 MMOL/L (ref 3.5–5.3)
PROT SERPL-MCNC: 6.6 G/DL (ref 6.4–8.2)
PROT UR STRIP.AUTO-MCNC: ABNORMAL MG/DL
PROTHROMBIN TIME: 10.6 SECONDS (ref 9.8–12.8)
RBC # BLD AUTO: 4.36 X10*6/UL (ref 4.5–5.9)
RBC # UR STRIP.AUTO: ABNORMAL /UL
RBC #/AREA URNS AUTO: >20 /HPF
SODIUM SERPL-SCNC: 139 MMOL/L (ref 136–145)
SP GR UR STRIP.AUTO: 1.02
SQUAMOUS #/AREA URNS AUTO: ABNORMAL /HPF
UROBILINOGEN UR STRIP.AUTO-MCNC: ABNORMAL MG/DL
WBC # BLD AUTO: 6.6 X10*3/UL (ref 4.4–11.3)
WBC #/AREA URNS AUTO: ABNORMAL /HPF

## 2024-12-21 PROCEDURE — 85025 COMPLETE CBC W/AUTO DIFF WBC: CPT | Performed by: PHYSICIAN ASSISTANT

## 2024-12-21 PROCEDURE — 87086 URINE CULTURE/COLONY COUNT: CPT | Mod: ELYLAB | Performed by: PHYSICIAN ASSISTANT

## 2024-12-21 PROCEDURE — 96374 THER/PROPH/DIAG INJ IV PUSH: CPT | Mod: 59

## 2024-12-21 PROCEDURE — 83605 ASSAY OF LACTIC ACID: CPT | Performed by: PHYSICIAN ASSISTANT

## 2024-12-21 PROCEDURE — 51798 US URINE CAPACITY MEASURE: CPT

## 2024-12-21 PROCEDURE — 36415 COLL VENOUS BLD VENIPUNCTURE: CPT | Performed by: PHYSICIAN ASSISTANT

## 2024-12-21 PROCEDURE — 2550000001 HC RX 255 CONTRASTS: Performed by: PHYSICIAN ASSISTANT

## 2024-12-21 PROCEDURE — 81001 URINALYSIS AUTO W/SCOPE: CPT | Performed by: PHYSICIAN ASSISTANT

## 2024-12-21 PROCEDURE — 2500000004 HC RX 250 GENERAL PHARMACY W/ HCPCS (ALT 636 FOR OP/ED): Performed by: PHYSICIAN ASSISTANT

## 2024-12-21 PROCEDURE — 96375 TX/PRO/DX INJ NEW DRUG ADDON: CPT | Mod: 59

## 2024-12-21 PROCEDURE — 99285 EMERGENCY DEPT VISIT HI MDM: CPT | Mod: 25

## 2024-12-21 PROCEDURE — 93975 VASCULAR STUDY: CPT

## 2024-12-21 PROCEDURE — 51702 INSERT TEMP BLADDER CATH: CPT

## 2024-12-21 PROCEDURE — 74177 CT ABD & PELVIS W/CONTRAST: CPT | Mod: FOREIGN READ | Performed by: RADIOLOGY

## 2024-12-21 PROCEDURE — 76870 US EXAM SCROTUM: CPT | Mod: FOREIGN READ | Performed by: RADIOLOGY

## 2024-12-21 PROCEDURE — 80053 COMPREHEN METABOLIC PANEL: CPT | Performed by: PHYSICIAN ASSISTANT

## 2024-12-21 PROCEDURE — 74177 CT ABD & PELVIS W/CONTRAST: CPT

## 2024-12-21 PROCEDURE — 83690 ASSAY OF LIPASE: CPT | Performed by: PHYSICIAN ASSISTANT

## 2024-12-21 PROCEDURE — 85610 PROTHROMBIN TIME: CPT | Performed by: PHYSICIAN ASSISTANT

## 2024-12-21 RX ORDER — ONDANSETRON HYDROCHLORIDE 2 MG/ML
4 INJECTION, SOLUTION INTRAVENOUS ONCE
Status: COMPLETED | OUTPATIENT
Start: 2024-12-21 | End: 2024-12-21

## 2024-12-21 RX ORDER — MORPHINE SULFATE 4 MG/ML
4 INJECTION, SOLUTION INTRAMUSCULAR; INTRAVENOUS ONCE
Status: COMPLETED | OUTPATIENT
Start: 2024-12-21 | End: 2024-12-21

## 2024-12-21 RX ORDER — CEPHALEXIN 500 MG/1
500 CAPSULE ORAL 2 TIMES DAILY
Qty: 14 CAPSULE | Refills: 0 | Status: ON HOLD | OUTPATIENT
Start: 2024-12-21 | End: 2024-12-26 | Stop reason: ALTCHOICE

## 2024-12-21 RX ADMIN — IOHEXOL 75 ML: 350 INJECTION, SOLUTION INTRAVENOUS at 13:36

## 2024-12-21 RX ADMIN — MORPHINE SULFATE 4 MG: 4 INJECTION, SOLUTION INTRAMUSCULAR; INTRAVENOUS at 13:19

## 2024-12-21 RX ADMIN — ONDANSETRON 4 MG: 2 INJECTION INTRAMUSCULAR; INTRAVENOUS at 13:19

## 2024-12-21 ASSESSMENT — PAIN DESCRIPTION - PROGRESSION: CLINICAL_PROGRESSION: NOT CHANGED

## 2024-12-21 ASSESSMENT — PAIN SCALES - GENERAL
PAINLEVEL_OUTOF10: 10 - WORST POSSIBLE PAIN
PAINLEVEL_OUTOF10: 10 - WORST POSSIBLE PAIN
PAINLEVEL_OUTOF10: 1

## 2024-12-21 ASSESSMENT — PAIN - FUNCTIONAL ASSESSMENT: PAIN_FUNCTIONAL_ASSESSMENT: 0-10

## 2024-12-21 ASSESSMENT — PAIN DESCRIPTION - LOCATION: LOCATION: SCROTUM

## 2024-12-21 NOTE — ED PROVIDER NOTES
HPI   Chief Complaint   Patient presents with    Blood in Urine       A 24-year-old male patient comes into the emergency department today with complaints of suprapubic pain that radiates down to his testicles bilateral.  He rates his pain a 10 out of 10 on the pain scale.  He states he started having some pink urine yesterday but this morning had bright red blood in his urine.  States does have history of kidney stones as well as renal stents.  He otherwise has no other complaints present time for this purpose comes into the emergency department today for further evaluation.              Patient History   Past Medical History:   Diagnosis Date    Blind one eye     Hypertension      Past Surgical History:   Procedure Laterality Date    CARDIAC CATHETERIZATION N/A 2024    Procedure: Left Heart Cath, With LV;  Surgeon: Cori Sullivan MD;  Location: [facility];  Service: Cardiovascular;  Laterality: N/A;     No family history on file.  Social History     Tobacco Use    Smoking status: Former     Current packs/day: 0.00     Types: Cigarettes     Quit date: 2023     Years since quittin.3    Smokeless tobacco: Not on file   Vaping Use    Vaping status: Never Used   Substance Use Topics    Alcohol use: Not Currently    Drug use: Yes     Types: Marijuana       Physical Exam   ED Triage Vitals [24 1215]   Temperature Heart Rate Respirations BP   37 °C (98.6 °F) 92 16 134/69      Pulse Ox Temp Source Heart Rate Source Patient Position   99 % Temporal -- --      BP Location FiO2 (%)     -- --       Physical Exam  Constitutional:       General: He is in acute distress.      Appearance: Normal appearance. He is not ill-appearing or diaphoretic.   HENT:      Head: Normocephalic and atraumatic.      Nose: Nose normal.   Eyes:      Extraocular Movements: Extraocular movements intact.      Conjunctiva/sclera: Conjunctivae normal.      Pupils: Pupils are equal, round, and reactive to light.    Cardiovascular:      Rate and Rhythm: Normal rate and regular rhythm.   Pulmonary:      Effort: Pulmonary effort is normal. No respiratory distress.      Breath sounds: Normal breath sounds. No stridor. No wheezing.   Abdominal:      Tenderness: There is abdominal tenderness (Suprapubic).   Genitourinary:     Comments: Pain with manipulation of bilateral testicles.  Cremasteric reflex present.    No significant scrotal edema, erythema  Musculoskeletal:         General: Normal range of motion.      Cervical back: Normal range of motion.   Skin:     General: Skin is warm and dry.   Neurological:      General: No focal deficit present.      Mental Status: He is alert and oriented to person, place, and time. Mental status is at baseline.   Psychiatric:         Mood and Affect: Mood normal.           ED Course & MDM   Diagnoses as of 12/21/24 1615   Testicular cyst   Acute cystitis with hematuria                 No data recorded                                 Medical Decision Making  A 24-year-old male patient comes into the emergency department today with complaints of suprapubic pain that radiates down to his testicles bilateral.  He rates his pain a 10 out of 10 on the pain scale.  He states he started having some pink urine yesterday but this morning had bright red blood in his urine.  States does have history of kidney stones as well as renal stents.  He otherwise has no other complaints present time for this purpose comes into the emergency department today for further evaluation.    Laboratory studies ordered as well as ultrasound of the scrotum as well as CT study of the abdomen pelvis.  Rule out electrolyte abnormality, leukocytosis, acute kidney injury, acute anemia, testicular torsion, epididymitis, hydroceles, intra-abdominal surgical need.  IV morphine IV Zofran ordered for the patient's pain and potential nausea secondary to narcotic pain medication.    Patient's urinalysis has leukocytes, bacteria and  white blood cells.  Concerning for infection.  This could be the source of patient's hematuria.  Patient's lipase negative, normal renal function, lactate negative, no leukocytosis or left shift or acute anemia.  Patient's CT of the abdomen pelvis shows no acute abnormalities patient's ureteral stents are in place.  Patient does have a distended bladder on CT study.  Ordered a bladder scan to be performed.  This came back at nearly 500 mL.  I will have nurse place a Thapa catheter.  Ultrasound of the scrotum does show a right testicular cyst but otherwise no testicular torsion.    Handoff to Shanthi Neely pending Thapa catheter placement, reevaluation and disposition      Labs Reviewed   CBC WITH AUTO DIFFERENTIAL - Abnormal       Result Value    WBC 6.6      nRBC 0.0      RBC 4.36 (*)     Hemoglobin 14.1      Hematocrit 41.6      MCV 95      MCH 32.3      MCHC 33.9      RDW 12.9      Platelets 241      Neutrophils % 69.3      Immature Granulocytes %, Automated 0.3      Lymphocytes % 17.1      Monocytes % 9.3      Eosinophils % 3.5      Basophils % 0.5      Neutrophils Absolute 4.54      Immature Granulocytes Absolute, Automated 0.02      Lymphocytes Absolute 1.12 (*)     Monocytes Absolute 0.61      Eosinophils Absolute 0.23      Basophils Absolute 0.03     COMPREHENSIVE METABOLIC PANEL - Abnormal    Glucose 111 (*)     Sodium 139      Potassium 4.2      Chloride 111 (*)     Bicarbonate 22      Anion Gap 10      Urea Nitrogen 20      Creatinine 0.91      eGFR >90      Calcium 8.7      Albumin 4.3      Alkaline Phosphatase 67      Total Protein 6.6      AST 23      Bilirubin, Total 0.5      ALT 22     URINALYSIS WITH REFLEX CULTURE AND MICROSCOPIC - Abnormal    Color, Urine Red-brown (*)     Appearance, Urine Turbid (*)     Specific Gravity, Urine 1.025      pH, Urine 6.0      Protein, Urine 100 (2+) (*)     Glucose, Urine NEGATIVE      Blood, Urine 1.0 (3+) (*)     Ketones, Urine NEGATIVE      Bilirubin, Urine  NEGATIVE      Urobilinogen, Urine NORM      Nitrite, Urine NEGATIVE      Leukocyte Esterase, Urine 75 Zane/µL (*)    MICROSCOPIC ONLY, URINE - Abnormal    WBC, Urine 6-10 (*)     RBC, Urine >20 (*)     Squamous Epithelial Cells, Urine NONE      Bacteria, Urine 2+ (*)     Mucus, Urine 1+     LACTATE - Normal    Lactate 1.2      Narrative:     Venipuncture immediately after or during the administration of Metamizole may lead to falsely low results. Testing should be performed immediately prior to Metamizole dosing.   LIPASE - Normal    Lipase 20      Narrative:     Venipuncture immediately after or during the administration of Metamizole may lead to falsely low results. Testing should be performed immediately prior to Metamizole dosing.   PROTIME-INR - Normal    Protime 10.6      INR 0.9     URINE CULTURE   URINALYSIS WITH REFLEX CULTURE AND MICROSCOPIC    Narrative:     The following orders were created for panel order Urinalysis with Reflex Culture and Microscopic.  Procedure                               Abnormality         Status                     ---------                               -----------         ------                     Urinalysis with Reflex C...[[id number]]  Abnormal            Final result               Extra Urine Gray Tube[[id number]]                            In process                   Please view results for these tests on the individual orders.   EXTRA URINE GRAY TUBE        CT abdomen pelvis w IV contrast   Final Result   1.No acute abnormalities are identified in the abdomen or pelvis.   2.Bilateral ureteral stents in place. No hydronephrosis. No   definite urinary tract calculi are apparent although small stones   could be obscured by the presence of the stents. There are   inflammatory changes surrounding the ureters bilaterally, left greater   than right.   3.Moderately distended urinary bladder. The bladder is otherwise   not grossly remarkable in appearance.   4.Prominent colonic  diverticulosis without evidence of associated   inflammatory changes.   5.Prominent atherosclerotic vascular calcifications. 4 cm   aneurysm in the infrarenal abdominal aorta, unchanged. No evidence of   leak/rupture.   6.Benign calcified granulomas in the right lung base, liver, and   spleen.   7.Atherosclerotic coronary artery calcifications.   Signed by Héctor Russell MD      US scrotum w doppler   Final Result   6 mm right testicular cyst.   Normal testicular spectral Doppler evaluation.  No evidence of   testicular torsion   Signed by Michael Coon MD            Procedure  Procedures     Yann Tariq PA-C  12/21/24 [id number]

## 2024-12-21 NOTE — PROGRESS NOTES
Emergency Medicine Transition of Care Note.    I received Be Luo in signout from MARY KATE Tariq. please see the previous ED provider note for all HPI, PE and MDM up to the time of signout at 1600. This is in addition to the primary record.    In brief Be Luo is an 64 y.o. male presenting for   Chief Complaint   Patient presents with    Blood in Urine     At the time of signout we were awaiting: Thapa catheter placement and further evaluation    Diagnoses as of 12/21/24 1718   Testicular cyst   Acute cystitis with hematuria   Urinary retention       Medical Decision Making    I was notified by the bedside nurse the patient is refusing to have Thapa catheter and wants it will be taken out today.  I went into the room and discussed with patient that I cannot guarantee that his catheter will come out today.  Patient tells me that he does have stents placed which were refilled to be taken out from urology by 1/6/2024.  He tells me he has not been able to make a follow-up appointment.  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  Patient is a first disgruntled about having a Thapa catheter placed.  I did tell him that it is up to him ultimately we do not have to place it however.  These are my recommendations and I would stay patient has decided AGAINST MEDICAL ADVICE that he will not have it placed at this time given the amount of urine that he is retaining.  Patient tells me he does pee 15 times a day at home.  We did discuss that this could be a sign of urinary overflow due to retention.  Again patient I discussed the risks and benefits of Thapa catheter placement.  Ultimately patient has decided to have a Thapa catheter placed.  I did have registration make patient an appointment with urology so that he can discuss the retention as well as the stent removal.  Patient will be discharged home with prescription for Keflex twice daily for 7 days.  Patient also  given education on Thapa catheter.  Patient's questions and concerns were addressed prior discharge.  Patient discharged home stable condition.    Final diagnoses:   [N44.2] Testicular cyst   [N30.01] Acute cystitis with hematuria   [R33.9] Urinary retention           Procedure  Procedures    Shanthi Neely, APRN-CNP

## 2024-12-22 LAB — HOLD SPECIMEN: NORMAL

## 2024-12-23 LAB — BACTERIA UR CULT: NORMAL

## 2024-12-24 ENCOUNTER — APPOINTMENT (OUTPATIENT)
Dept: RADIOLOGY | Facility: HOSPITAL | Age: 64
DRG: 101 | End: 2024-12-24
Payer: COMMERCIAL

## 2024-12-24 ENCOUNTER — TELEPHONE (OUTPATIENT)
Dept: UROLOGY | Facility: CLINIC | Age: 64
End: 2024-12-24
Payer: COMMERCIAL

## 2024-12-24 ENCOUNTER — HOSPITAL ENCOUNTER (EMERGENCY)
Facility: HOSPITAL | Age: 64
Discharge: HOME | DRG: 101 | End: 2024-12-25
Attending: EMERGENCY MEDICINE
Payer: COMMERCIAL

## 2024-12-24 ENCOUNTER — APPOINTMENT (OUTPATIENT)
Dept: CARDIOLOGY | Facility: HOSPITAL | Age: 64
DRG: 101 | End: 2024-12-24
Payer: COMMERCIAL

## 2024-12-24 DIAGNOSIS — R42 VERTIGO: Primary | ICD-10-CM

## 2024-12-24 LAB
ANION GAP SERPL CALC-SCNC: 8 MMOL/L (ref 10–20)
APPEARANCE UR: ABNORMAL
BASOPHILS # BLD AUTO: 0.03 X10*3/UL (ref 0–0.1)
BASOPHILS NFR BLD AUTO: 0.5 %
BILIRUB UR STRIP.AUTO-MCNC: NEGATIVE MG/DL
BUN SERPL-MCNC: 22 MG/DL (ref 6–23)
CALCIUM SERPL-MCNC: 8.6 MG/DL (ref 8.6–10.3)
CARDIAC TROPONIN I PNL SERPL HS: 5 NG/L (ref 0–20)
CHLORIDE SERPL-SCNC: 112 MMOL/L (ref 98–107)
CO2 SERPL-SCNC: 24 MMOL/L (ref 21–32)
COLOR UR: ABNORMAL
CREAT SERPL-MCNC: 0.97 MG/DL (ref 0.5–1.3)
EGFRCR SERPLBLD CKD-EPI 2021: 87 ML/MIN/1.73M*2
EOSINOPHIL # BLD AUTO: 0.36 X10*3/UL (ref 0–0.7)
EOSINOPHIL NFR BLD AUTO: 6.5 %
ERYTHROCYTE [DISTWIDTH] IN BLOOD BY AUTOMATED COUNT: 12.7 % (ref 11.5–14.5)
FLUAV RNA RESP QL NAA+PROBE: NOT DETECTED
FLUBV RNA RESP QL NAA+PROBE: NOT DETECTED
GLUCOSE SERPL-MCNC: 94 MG/DL (ref 74–99)
GLUCOSE UR STRIP.AUTO-MCNC: NEGATIVE MG/DL
HCT VFR BLD AUTO: 37.1 % (ref 41–52)
HGB BLD-MCNC: 12.5 G/DL (ref 13.5–17.5)
IMM GRANULOCYTES # BLD AUTO: 0.02 X10*3/UL (ref 0–0.7)
IMM GRANULOCYTES NFR BLD AUTO: 0.4 % (ref 0–0.9)
KETONES UR STRIP.AUTO-MCNC: NEGATIVE MG/DL
LEUKOCYTE ESTERASE UR QL STRIP.AUTO: ABNORMAL
LYMPHOCYTES # BLD AUTO: 1.47 X10*3/UL (ref 1.2–4.8)
LYMPHOCYTES NFR BLD AUTO: 26.6 %
MAGNESIUM SERPL-MCNC: 1.96 MG/DL (ref 1.6–2.4)
MCH RBC QN AUTO: 32.6 PG (ref 26–34)
MCHC RBC AUTO-ENTMCNC: 33.7 G/DL (ref 32–36)
MCV RBC AUTO: 97 FL (ref 80–100)
MONOCYTES # BLD AUTO: 0.77 X10*3/UL (ref 0.1–1)
MONOCYTES NFR BLD AUTO: 13.9 %
NEUTROPHILS # BLD AUTO: 2.87 X10*3/UL (ref 1.2–7.7)
NEUTROPHILS NFR BLD AUTO: 52.1 %
NITRITE UR QL STRIP.AUTO: NEGATIVE
NRBC BLD-RTO: 0 /100 WBCS (ref 0–0)
PH UR STRIP.AUTO: 6 [PH]
PHOSPHATE SERPL-MCNC: 2.5 MG/DL (ref 2.5–4.9)
PLATELET # BLD AUTO: 188 X10*3/UL (ref 150–450)
POTASSIUM SERPL-SCNC: 3.6 MMOL/L (ref 3.5–5.3)
PROT UR STRIP.AUTO-MCNC: ABNORMAL MG/DL
RBC # BLD AUTO: 3.84 X10*6/UL (ref 4.5–5.9)
RBC # UR STRIP.AUTO: ABNORMAL /UL
RBC #/AREA URNS AUTO: >20 /HPF
SARS-COV-2 RNA RESP QL NAA+PROBE: NOT DETECTED
SODIUM SERPL-SCNC: 140 MMOL/L (ref 136–145)
SP GR UR STRIP.AUTO: 1.02
SQUAMOUS #/AREA URNS AUTO: ABNORMAL /HPF
UROBILINOGEN UR STRIP.AUTO-MCNC: ABNORMAL MG/DL
WBC # BLD AUTO: 5.5 X10*3/UL (ref 4.4–11.3)
WBC #/AREA URNS AUTO: ABNORMAL /HPF
YEAST BUDDING #/AREA UR COMP ASSIST: PRESENT /HPF

## 2024-12-24 PROCEDURE — 84100 ASSAY OF PHOSPHORUS: CPT | Performed by: EMERGENCY MEDICINE

## 2024-12-24 PROCEDURE — 99285 EMERGENCY DEPT VISIT HI MDM: CPT | Mod: 25 | Performed by: EMERGENCY MEDICINE

## 2024-12-24 PROCEDURE — 87086 URINE CULTURE/COLONY COUNT: CPT | Mod: ELYLAB | Performed by: EMERGENCY MEDICINE

## 2024-12-24 PROCEDURE — 71045 X-RAY EXAM CHEST 1 VIEW: CPT | Performed by: RADIOLOGY

## 2024-12-24 PROCEDURE — 70450 CT HEAD/BRAIN W/O DYE: CPT | Performed by: RADIOLOGY

## 2024-12-24 PROCEDURE — 96361 HYDRATE IV INFUSION ADD-ON: CPT

## 2024-12-24 PROCEDURE — 2500000002 HC RX 250 W HCPCS SELF ADMINISTERED DRUGS (ALT 637 FOR MEDICARE OP, ALT 636 FOR OP/ED): Performed by: EMERGENCY MEDICINE

## 2024-12-24 PROCEDURE — 85025 COMPLETE CBC W/AUTO DIFF WBC: CPT | Performed by: EMERGENCY MEDICINE

## 2024-12-24 PROCEDURE — 80048 BASIC METABOLIC PNL TOTAL CA: CPT | Performed by: EMERGENCY MEDICINE

## 2024-12-24 PROCEDURE — 36415 COLL VENOUS BLD VENIPUNCTURE: CPT | Performed by: EMERGENCY MEDICINE

## 2024-12-24 PROCEDURE — 83735 ASSAY OF MAGNESIUM: CPT | Performed by: EMERGENCY MEDICINE

## 2024-12-24 PROCEDURE — 70450 CT HEAD/BRAIN W/O DYE: CPT

## 2024-12-24 PROCEDURE — 2500000004 HC RX 250 GENERAL PHARMACY W/ HCPCS (ALT 636 FOR OP/ED): Performed by: EMERGENCY MEDICINE

## 2024-12-24 PROCEDURE — 84484 ASSAY OF TROPONIN QUANT: CPT | Performed by: EMERGENCY MEDICINE

## 2024-12-24 PROCEDURE — 71045 X-RAY EXAM CHEST 1 VIEW: CPT

## 2024-12-24 PROCEDURE — 81001 URINALYSIS AUTO W/SCOPE: CPT | Performed by: EMERGENCY MEDICINE

## 2024-12-24 PROCEDURE — 87636 SARSCOV2 & INF A&B AMP PRB: CPT | Performed by: EMERGENCY MEDICINE

## 2024-12-24 PROCEDURE — 74176 CT ABD & PELVIS W/O CONTRAST: CPT

## 2024-12-24 PROCEDURE — 93005 ELECTROCARDIOGRAM TRACING: CPT

## 2024-12-24 PROCEDURE — 96374 THER/PROPH/DIAG INJ IV PUSH: CPT

## 2024-12-24 RX ORDER — ONDANSETRON HYDROCHLORIDE 2 MG/ML
4 INJECTION, SOLUTION INTRAVENOUS ONCE
Status: COMPLETED | OUTPATIENT
Start: 2024-12-24 | End: 2024-12-24

## 2024-12-24 RX ORDER — MECLIZINE HCL 12.5 MG 12.5 MG/1
50 TABLET ORAL ONCE
Status: COMPLETED | OUTPATIENT
Start: 2024-12-24 | End: 2024-12-24

## 2024-12-24 RX ADMIN — ONDANSETRON 4 MG: 2 INJECTION INTRAMUSCULAR; INTRAVENOUS at 22:08

## 2024-12-24 RX ADMIN — SODIUM CHLORIDE 500 ML: 9 INJECTION, SOLUTION INTRAVENOUS at 22:08

## 2024-12-24 RX ADMIN — MECLIZINE HCL 12.5 MG 50 MG: 12.5 TABLET ORAL at 22:08

## 2024-12-24 ASSESSMENT — LIFESTYLE VARIABLES
HAVE YOU EVER FELT YOU SHOULD CUT DOWN ON YOUR DRINKING: NO
EVER HAD A DRINK FIRST THING IN THE MORNING TO STEADY YOUR NERVES TO GET RID OF A HANGOVER: NO
HAVE PEOPLE ANNOYED YOU BY CRITICIZING YOUR DRINKING: NO
EVER FELT BAD OR GUILTY ABOUT YOUR DRINKING: NO
TOTAL SCORE: 0

## 2024-12-24 ASSESSMENT — PAIN - FUNCTIONAL ASSESSMENT: PAIN_FUNCTIONAL_ASSESSMENT: 0-10

## 2024-12-24 ASSESSMENT — PAIN SCALES - GENERAL: PAINLEVEL_OUTOF10: 0 - NO PAIN

## 2024-12-24 ASSESSMENT — COLUMBIA-SUICIDE SEVERITY RATING SCALE - C-SSRS
1. IN THE PAST MONTH, HAVE YOU WISHED YOU WERE DEAD OR WISHED YOU COULD GO TO SLEEP AND NOT WAKE UP?: NO
2. HAVE YOU ACTUALLY HAD ANY THOUGHTS OF KILLING YOURSELF?: NO
6. HAVE YOU EVER DONE ANYTHING, STARTED TO DO ANYTHING, OR PREPARED TO DO ANYTHING TO END YOUR LIFE?: NO

## 2024-12-24 ASSESSMENT — PAIN DESCRIPTION - FREQUENCY: FREQUENCY: CONSTANT/CONTINUOUS

## 2024-12-24 NOTE — TELEPHONE ENCOUNTER
Patient called in withg questions about his catheter, nodule on testicle and stated he has many other questions. Requesting to be seen sooner, he is scheduled with Dr. Ramirez on 1/13 and Dr. Robertson on 1/17. Patient callback # [phone]

## 2024-12-25 ENCOUNTER — APPOINTMENT (OUTPATIENT)
Dept: RADIOLOGY | Facility: HOSPITAL | Age: 64
DRG: 101 | End: 2024-12-25
Payer: COMMERCIAL

## 2024-12-25 ENCOUNTER — HOSPITAL ENCOUNTER (INPATIENT)
Facility: HOSPITAL | Age: 64
Discharge: ANOTHER HOSPITAL | DRG: 101 | End: 2024-12-25
Attending: EMERGENCY MEDICINE | Admitting: HOSPITALIST
Payer: COMMERCIAL

## 2024-12-25 ENCOUNTER — APPOINTMENT (OUTPATIENT)
Dept: CARDIOLOGY | Facility: HOSPITAL | Age: 64
DRG: 101 | End: 2024-12-25
Payer: COMMERCIAL

## 2024-12-25 VITALS
WEIGHT: 220 LBS | RESPIRATION RATE: 18 BRPM | OXYGEN SATURATION: 94 % | DIASTOLIC BLOOD PRESSURE: 65 MMHG | HEIGHT: 70 IN | BODY MASS INDEX: 31.5 KG/M2 | HEART RATE: 95 BPM | TEMPERATURE: 97.9 F | SYSTOLIC BLOOD PRESSURE: 126 MMHG

## 2024-12-25 DIAGNOSIS — R56.9 SEIZURE-LIKE ACTIVITY (MULTI): ICD-10-CM

## 2024-12-25 DIAGNOSIS — N20.0 NEPHROLITHIASIS: ICD-10-CM

## 2024-12-25 DIAGNOSIS — R11.2 NAUSEA AND VOMITING, UNSPECIFIED VOMITING TYPE: Primary | ICD-10-CM

## 2024-12-25 DIAGNOSIS — R56.9 SEIZURE (MULTI): ICD-10-CM

## 2024-12-25 DIAGNOSIS — N39.0 ACUTE UTI: ICD-10-CM

## 2024-12-25 DIAGNOSIS — R42 DIZZINESS: ICD-10-CM

## 2024-12-25 DIAGNOSIS — J44.9 CHRONIC OBSTRUCTIVE PULMONARY DISEASE, UNSPECIFIED COPD TYPE (MULTI): ICD-10-CM

## 2024-12-25 LAB
ALBUMIN SERPL BCP-MCNC: 4.1 G/DL (ref 3.4–5)
ALP SERPL-CCNC: 63 U/L (ref 33–136)
ALT SERPL W P-5'-P-CCNC: 20 U/L (ref 10–52)
ANION GAP BLDV CALCULATED.4IONS-SCNC: 9 MMOL/L (ref 10–25)
ANION GAP SERPL CALC-SCNC: 10 MMOL/L (ref 10–20)
APPEARANCE UR: ABNORMAL
AST SERPL W P-5'-P-CCNC: 16 U/L (ref 9–39)
BACTERIA #/AREA URNS AUTO: ABNORMAL /HPF
BASE EXCESS BLDV CALC-SCNC: -0.4 MMOL/L (ref -2–3)
BASOPHILS # BLD AUTO: 0.04 X10*3/UL (ref 0–0.1)
BASOPHILS NFR BLD AUTO: 0.8 %
BILIRUB SERPL-MCNC: 0.6 MG/DL (ref 0–1.2)
BILIRUB UR STRIP.AUTO-MCNC: NEGATIVE MG/DL
BODY TEMPERATURE: ABNORMAL
BUN SERPL-MCNC: 15 MG/DL (ref 6–23)
CA-I BLDV-SCNC: 1.17 MMOL/L (ref 1.1–1.33)
CALCIUM SERPL-MCNC: 8.6 MG/DL (ref 8.6–10.3)
CARDIAC TROPONIN I PNL SERPL HS: 5 NG/L (ref 0–20)
CHLORIDE BLDV-SCNC: 108 MMOL/L (ref 98–107)
CHLORIDE SERPL-SCNC: 110 MMOL/L (ref 98–107)
CO2 SERPL-SCNC: 24 MMOL/L (ref 21–32)
COLOR UR: ABNORMAL
CREAT SERPL-MCNC: 0.83 MG/DL (ref 0.5–1.3)
EGFRCR SERPLBLD CKD-EPI 2021: >90 ML/MIN/1.73M*2
EOSINOPHIL # BLD AUTO: 0.37 X10*3/UL (ref 0–0.7)
EOSINOPHIL NFR BLD AUTO: 7 %
ERYTHROCYTE [DISTWIDTH] IN BLOOD BY AUTOMATED COUNT: 12.7 % (ref 11.5–14.5)
FLUAV RNA RESP QL NAA+PROBE: NOT DETECTED
FLUBV RNA RESP QL NAA+PROBE: NOT DETECTED
GLUCOSE BLDV-MCNC: 92 MG/DL (ref 74–99)
GLUCOSE SERPL-MCNC: 108 MG/DL (ref 74–99)
GLUCOSE UR STRIP.AUTO-MCNC: NORMAL MG/DL
HCO3 BLDV-SCNC: 24.8 MMOL/L (ref 22–26)
HCT VFR BLD AUTO: 38.2 % (ref 41–52)
HCT VFR BLD EST: 37 % (ref 41–52)
HGB BLD-MCNC: 12.8 G/DL (ref 13.5–17.5)
HGB BLDV-MCNC: 12.4 G/DL (ref 13.5–17.5)
HOLD SPECIMEN: NORMAL
IMM GRANULOCYTES # BLD AUTO: 0.02 X10*3/UL (ref 0–0.7)
IMM GRANULOCYTES NFR BLD AUTO: 0.4 % (ref 0–0.9)
INHALED O2 CONCENTRATION: 21 %
KETONES UR STRIP.AUTO-MCNC: NEGATIVE MG/DL
LACTATE BLDV-SCNC: 1 MMOL/L (ref 0.4–2)
LACTATE SERPL-SCNC: 1.4 MMOL/L (ref 0.4–2)
LAMOTRIGINE SERPL-MCNC: 35.2 UG/ML (ref 2.5–15)
LEUKOCYTE ESTERASE UR QL STRIP.AUTO: ABNORMAL
LYMPHOCYTES # BLD AUTO: 1.26 X10*3/UL (ref 1.2–4.8)
LYMPHOCYTES NFR BLD AUTO: 23.8 %
MCH RBC QN AUTO: 32.4 PG (ref 26–34)
MCHC RBC AUTO-ENTMCNC: 33.5 G/DL (ref 32–36)
MCV RBC AUTO: 97 FL (ref 80–100)
MONOCYTES # BLD AUTO: 0.7 X10*3/UL (ref 0.1–1)
MONOCYTES NFR BLD AUTO: 13.2 %
MUCOUS THREADS #/AREA URNS AUTO: ABNORMAL /LPF
NEUTROPHILS # BLD AUTO: 2.91 X10*3/UL (ref 1.2–7.7)
NEUTROPHILS NFR BLD AUTO: 54.8 %
NITRITE UR QL STRIP.AUTO: NEGATIVE
NRBC BLD-RTO: 0 /100 WBCS (ref 0–0)
OXYHGB MFR BLDV: 88.9 % (ref 45–75)
PCO2 BLDV: 42 MM HG (ref 41–51)
PH BLDV: 7.38 PH (ref 7.33–7.43)
PH UR STRIP.AUTO: 6 [PH]
PLATELET # BLD AUTO: 199 X10*3/UL (ref 150–450)
PO2 BLDV: 55 MM HG (ref 35–45)
POTASSIUM BLDV-SCNC: 3.7 MMOL/L (ref 3.5–5.3)
POTASSIUM SERPL-SCNC: 3.7 MMOL/L (ref 3.5–5.3)
PROT SERPL-MCNC: 5.9 G/DL (ref 6.4–8.2)
PROT UR STRIP.AUTO-MCNC: ABNORMAL MG/DL
RBC # BLD AUTO: 3.95 X10*6/UL (ref 4.5–5.9)
RBC # UR STRIP.AUTO: ABNORMAL /UL
RBC #/AREA URNS AUTO: >20 /HPF
SAO2 % BLDV: 91 % (ref 45–75)
SARS-COV-2 RNA RESP QL NAA+PROBE: NOT DETECTED
SODIUM BLDV-SCNC: 138 MMOL/L (ref 136–145)
SODIUM SERPL-SCNC: 140 MMOL/L (ref 136–145)
SP GR UR STRIP.AUTO: 1.02
UROBILINOGEN UR STRIP.AUTO-MCNC: NORMAL MG/DL
WBC # BLD AUTO: 5.3 X10*3/UL (ref 4.4–11.3)
WBC #/AREA URNS AUTO: >50 /HPF

## 2024-12-25 PROCEDURE — 84484 ASSAY OF TROPONIN QUANT: CPT | Performed by: EMERGENCY MEDICINE

## 2024-12-25 PROCEDURE — 83605 ASSAY OF LACTIC ACID: CPT | Performed by: EMERGENCY MEDICINE

## 2024-12-25 PROCEDURE — 36415 COLL VENOUS BLD VENIPUNCTURE: CPT | Performed by: EMERGENCY MEDICINE

## 2024-12-25 PROCEDURE — 71045 X-RAY EXAM CHEST 1 VIEW: CPT

## 2024-12-25 PROCEDURE — 87086 URINE CULTURE/COLONY COUNT: CPT | Mod: ELYLAB | Performed by: EMERGENCY MEDICINE

## 2024-12-25 PROCEDURE — 87040 BLOOD CULTURE FOR BACTERIA: CPT | Mod: ELYLAB | Performed by: EMERGENCY MEDICINE

## 2024-12-25 PROCEDURE — 84132 ASSAY OF SERUM POTASSIUM: CPT | Performed by: EMERGENCY MEDICINE

## 2024-12-25 PROCEDURE — G0378 HOSPITAL OBSERVATION PER HR: HCPCS

## 2024-12-25 PROCEDURE — 99285 EMERGENCY DEPT VISIT HI MDM: CPT | Mod: 25 | Performed by: EMERGENCY MEDICINE

## 2024-12-25 PROCEDURE — 96376 TX/PRO/DX INJ SAME DRUG ADON: CPT

## 2024-12-25 PROCEDURE — 93005 ELECTROCARDIOGRAM TRACING: CPT

## 2024-12-25 PROCEDURE — 70450 CT HEAD/BRAIN W/O DYE: CPT | Performed by: RADIOLOGY

## 2024-12-25 PROCEDURE — 2500000004 HC RX 250 GENERAL PHARMACY W/ HCPCS (ALT 636 FOR OP/ED): Performed by: EMERGENCY MEDICINE

## 2024-12-25 PROCEDURE — 99223 1ST HOSP IP/OBS HIGH 75: CPT

## 2024-12-25 PROCEDURE — 96365 THER/PROPH/DIAG IV INF INIT: CPT

## 2024-12-25 PROCEDURE — 2500000001 HC RX 250 WO HCPCS SELF ADMINISTERED DRUGS (ALT 637 FOR MEDICARE OP): Performed by: NURSE PRACTITIONER

## 2024-12-25 PROCEDURE — 74176 CT ABD & PELVIS W/O CONTRAST: CPT | Performed by: STUDENT IN AN ORGANIZED HEALTH CARE EDUCATION/TRAINING PROGRAM

## 2024-12-25 PROCEDURE — 96375 TX/PRO/DX INJ NEW DRUG ADDON: CPT

## 2024-12-25 PROCEDURE — 85025 COMPLETE CBC W/AUTO DIFF WBC: CPT | Performed by: EMERGENCY MEDICINE

## 2024-12-25 PROCEDURE — 2500000002 HC RX 250 W HCPCS SELF ADMINISTERED DRUGS (ALT 637 FOR MEDICARE OP, ALT 636 FOR OP/ED): Performed by: NURSE PRACTITIONER

## 2024-12-25 PROCEDURE — 87636 SARSCOV2 & INF A&B AMP PRB: CPT | Performed by: EMERGENCY MEDICINE

## 2024-12-25 PROCEDURE — 80175 DRUG SCREEN QUAN LAMOTRIGINE: CPT | Mod: ELYLAB | Performed by: EMERGENCY MEDICINE

## 2024-12-25 PROCEDURE — 84075 ASSAY ALKALINE PHOSPHATASE: CPT | Performed by: EMERGENCY MEDICINE

## 2024-12-25 PROCEDURE — 81001 URINALYSIS AUTO W/SCOPE: CPT | Performed by: EMERGENCY MEDICINE

## 2024-12-25 PROCEDURE — 70450 CT HEAD/BRAIN W/O DYE: CPT

## 2024-12-25 RX ORDER — ENOXAPARIN SODIUM 100 MG/ML
40 INJECTION SUBCUTANEOUS EVERY 24 HOURS
Status: DISCONTINUED | OUTPATIENT
Start: 2024-12-25 | End: 2024-12-30 | Stop reason: HOSPADM

## 2024-12-25 RX ORDER — ONDANSETRON 4 MG/1
4 TABLET, FILM COATED ORAL EVERY 8 HOURS PRN
Status: DISCONTINUED | OUTPATIENT
Start: 2024-12-25 | End: 2024-12-30 | Stop reason: HOSPADM

## 2024-12-25 RX ORDER — LORAZEPAM 2 MG/ML
1 INJECTION INTRAMUSCULAR ONCE
Status: COMPLETED | OUTPATIENT
Start: 2024-12-25 | End: 2024-12-25

## 2024-12-25 RX ORDER — ACETAZOLAMIDE 500 MG/1
500 CAPSULE, EXTENDED RELEASE ORAL
Status: DISCONTINUED | OUTPATIENT
Start: 2024-12-25 | End: 2024-12-26

## 2024-12-25 RX ORDER — CEFTRIAXONE 1 G/50ML
1 INJECTION, SOLUTION INTRAVENOUS ONCE
Status: COMPLETED | OUTPATIENT
Start: 2024-12-25 | End: 2024-12-25

## 2024-12-25 RX ORDER — ONDANSETRON 4 MG/1
4 TABLET, ORALLY DISINTEGRATING ORAL EVERY 8 HOURS PRN
Qty: 20 TABLET | Refills: 0 | Status: SHIPPED | OUTPATIENT
Start: 2024-12-25 | End: 2024-12-30 | Stop reason: HOSPADM

## 2024-12-25 RX ORDER — ONDANSETRON HYDROCHLORIDE 2 MG/ML
4 INJECTION, SOLUTION INTRAVENOUS ONCE
Status: COMPLETED | OUTPATIENT
Start: 2024-12-25 | End: 2024-12-25

## 2024-12-25 RX ORDER — BRIMONIDINE TARTRATE 2 MG/ML
1 SOLUTION/ DROPS OPHTHALMIC 3 TIMES DAILY
Status: DISCONTINUED | OUTPATIENT
Start: 2024-12-25 | End: 2024-12-28

## 2024-12-25 RX ORDER — POTASSIUM CHLORIDE 750 MG/1
10 TABLET, FILM COATED, EXTENDED RELEASE ORAL DAILY
Status: DISCONTINUED | OUTPATIENT
Start: 2024-12-25 | End: 2024-12-30 | Stop reason: HOSPADM

## 2024-12-25 RX ORDER — PREDNISOLONE ACETATE 10 MG/ML
1 SUSPENSION/ DROPS OPHTHALMIC 4 TIMES DAILY
Status: DISCONTINUED | OUTPATIENT
Start: 2024-12-25 | End: 2024-12-28

## 2024-12-25 RX ORDER — LAMOTRIGINE 100 MG/1
600 TABLET ORAL 2 TIMES DAILY
Status: DISCONTINUED | OUTPATIENT
Start: 2024-12-25 | End: 2024-12-28

## 2024-12-25 RX ORDER — LORAZEPAM 2 MG/ML
2 INJECTION INTRAMUSCULAR EVERY 5 MIN PRN
Status: DISCONTINUED | OUTPATIENT
Start: 2024-12-25 | End: 2024-12-30 | Stop reason: HOSPADM

## 2024-12-25 RX ORDER — ONDANSETRON HYDROCHLORIDE 2 MG/ML
4 INJECTION, SOLUTION INTRAVENOUS EVERY 8 HOURS PRN
Status: DISCONTINUED | OUTPATIENT
Start: 2024-12-25 | End: 2024-12-30 | Stop reason: HOSPADM

## 2024-12-25 RX ORDER — MECLIZINE HYDROCHLORIDE 25 MG/1
25 TABLET ORAL 3 TIMES DAILY PRN
Qty: 30 TABLET | Refills: 0 | Status: SHIPPED | OUTPATIENT
Start: 2024-12-25 | End: 2024-12-30 | Stop reason: HOSPADM

## 2024-12-25 RX ORDER — POLYETHYLENE GLYCOL 3350 17 G/17G
17 POWDER, FOR SOLUTION ORAL DAILY
Status: DISCONTINUED | OUTPATIENT
Start: 2024-12-25 | End: 2024-12-30 | Stop reason: HOSPADM

## 2024-12-25 RX ADMIN — POTASSIUM CHLORIDE 10 MEQ: 750 TABLET, EXTENDED RELEASE ORAL at 22:25

## 2024-12-25 RX ADMIN — LORAZEPAM 1 MG: 2 INJECTION INTRAMUSCULAR; INTRAVENOUS at 14:01

## 2024-12-25 RX ADMIN — CEFTRIAXONE SODIUM 1 G: 1 INJECTION, SOLUTION INTRAVENOUS at 14:52

## 2024-12-25 RX ADMIN — ONDANSETRON 4 MG: 2 INJECTION INTRAMUSCULAR; INTRAVENOUS at 12:55

## 2024-12-25 RX ADMIN — LAMOTRIGINE 600 MG: 100 TABLET ORAL at 22:25

## 2024-12-25 RX ADMIN — LORAZEPAM 1 MG: 2 INJECTION INTRAMUSCULAR; INTRAVENOUS at 12:55

## 2024-12-25 SDOH — SOCIAL STABILITY: SOCIAL INSECURITY: DOES ANYONE TRY TO KEEP YOU FROM HAVING/CONTACTING OTHER FRIENDS OR DOING THINGS OUTSIDE YOUR HOME?: NO

## 2024-12-25 SDOH — SOCIAL STABILITY: SOCIAL INSECURITY: DO YOU FEEL UNSAFE GOING BACK TO THE PLACE WHERE YOU ARE LIVING?: NO

## 2024-12-25 SDOH — ECONOMIC STABILITY: FOOD INSECURITY: WITHIN THE PAST 12 MONTHS, YOU WORRIED THAT YOUR FOOD WOULD RUN OUT BEFORE YOU GOT THE MONEY TO BUY MORE.: NEVER TRUE

## 2024-12-25 SDOH — SOCIAL STABILITY: SOCIAL INSECURITY: HAVE YOU HAD ANY THOUGHTS OF HARMING ANYONE ELSE?: NO

## 2024-12-25 SDOH — SOCIAL STABILITY: SOCIAL INSECURITY: ABUSE: ADULT

## 2024-12-25 SDOH — ECONOMIC STABILITY: INCOME INSECURITY: IN THE PAST 12 MONTHS HAS THE ELECTRIC, GAS, OIL, OR WATER COMPANY THREATENED TO SHUT OFF SERVICES IN YOUR HOME?: NO

## 2024-12-25 SDOH — ECONOMIC STABILITY: HOUSING INSECURITY: IN THE LAST 12 MONTHS, WAS THERE A TIME WHEN YOU WERE NOT ABLE TO PAY THE MORTGAGE OR RENT ON TIME?: NO

## 2024-12-25 SDOH — SOCIAL STABILITY: SOCIAL INSECURITY: DO YOU FEEL ANYONE HAS EXPLOITED OR TAKEN ADVANTAGE OF YOU FINANCIALLY OR OF YOUR PERSONAL PROPERTY?: NO

## 2024-12-25 SDOH — SOCIAL STABILITY: SOCIAL INSECURITY: WITHIN THE LAST YEAR, HAVE YOU BEEN AFRAID OF YOUR PARTNER OR EX-PARTNER?: NO

## 2024-12-25 SDOH — SOCIAL STABILITY: SOCIAL INSECURITY: ARE YOU OR HAVE YOU BEEN THREATENED OR ABUSED PHYSICALLY, EMOTIONALLY, OR SEXUALLY BY ANYONE?: NO

## 2024-12-25 SDOH — SOCIAL STABILITY: SOCIAL INSECURITY: WITHIN THE LAST YEAR, HAVE YOU BEEN HUMILIATED OR EMOTIONALLY ABUSED IN OTHER WAYS BY YOUR PARTNER OR EX-PARTNER?: NO

## 2024-12-25 SDOH — SOCIAL STABILITY: SOCIAL INSECURITY: ARE THERE ANY APPARENT SIGNS OF INJURIES/BEHAVIORS THAT COULD BE RELATED TO ABUSE/NEGLECT?: NO

## 2024-12-25 SDOH — ECONOMIC STABILITY: HOUSING INSECURITY: AT ANY TIME IN THE PAST 12 MONTHS, WERE YOU HOMELESS OR LIVING IN A SHELTER (INCLUDING NOW)?: NO

## 2024-12-25 SDOH — ECONOMIC STABILITY: FOOD INSECURITY: WITHIN THE PAST 12 MONTHS, THE FOOD YOU BOUGHT JUST DIDN'T LAST AND YOU DIDN'T HAVE MONEY TO GET MORE.: NEVER TRUE

## 2024-12-25 SDOH — SOCIAL STABILITY: SOCIAL INSECURITY: HAS ANYONE EVER THREATENED TO HURT YOUR FAMILY OR YOUR PETS?: NO

## 2024-12-25 SDOH — ECONOMIC STABILITY: FOOD INSECURITY: HOW HARD IS IT FOR YOU TO PAY FOR THE VERY BASICS LIKE FOOD, HOUSING, MEDICAL CARE, AND HEATING?: NOT HARD AT ALL

## 2024-12-25 SDOH — SOCIAL STABILITY: SOCIAL INSECURITY: HAVE YOU HAD THOUGHTS OF HARMING ANYONE ELSE?: NO

## 2024-12-25 SDOH — ECONOMIC STABILITY: HOUSING INSECURITY: IN THE PAST 12 MONTHS, HOW MANY TIMES HAVE YOU MOVED WHERE YOU WERE LIVING?: 0

## 2024-12-25 SDOH — SOCIAL STABILITY: SOCIAL INSECURITY: WERE YOU ABLE TO COMPLETE ALL THE BEHAVIORAL HEALTH SCREENINGS?: YES

## 2024-12-25 SDOH — ECONOMIC STABILITY: TRANSPORTATION INSECURITY: IN THE PAST 12 MONTHS, HAS LACK OF TRANSPORTATION KEPT YOU FROM MEDICAL APPOINTMENTS OR FROM GETTING MEDICATIONS?: NO

## 2024-12-25 ASSESSMENT — ACTIVITIES OF DAILY LIVING (ADL)
PATIENT'S MEMORY ADEQUATE TO SAFELY COMPLETE DAILY ACTIVITIES?: YES
LACK_OF_TRANSPORTATION: NO
FEEDING YOURSELF: INDEPENDENT
JUDGMENT_ADEQUATE_SAFELY_COMPLETE_DAILY_ACTIVITIES: YES
LACK_OF_TRANSPORTATION: NO
DRESSING YOURSELF: INDEPENDENT
GROOMING: INDEPENDENT
HEARING - LEFT EAR: FUNCTIONAL
TOILETING: INDEPENDENT
BATHING: INDEPENDENT
WALKS IN HOME: INDEPENDENT
ADEQUATE_TO_COMPLETE_ADL: NO
HEARING - RIGHT EAR: FUNCTIONAL

## 2024-12-25 ASSESSMENT — LIFESTYLE VARIABLES
HOW OFTEN DO YOU HAVE 6 OR MORE DRINKS ON ONE OCCASION: NEVER
HOW OFTEN DO YOU HAVE A DRINK CONTAINING ALCOHOL: NEVER
SKIP TO QUESTIONS 9-10: 1
AUDIT-C TOTAL SCORE: 0
HOW MANY STANDARD DRINKS CONTAINING ALCOHOL DO YOU HAVE ON A TYPICAL DAY: PATIENT DOES NOT DRINK
AUDIT-C TOTAL SCORE: 0

## 2024-12-25 ASSESSMENT — COGNITIVE AND FUNCTIONAL STATUS - GENERAL
TURNING FROM BACK TO SIDE WHILE IN FLAT BAD: A LITTLE
MOVING FROM LYING ON BACK TO SITTING ON SIDE OF FLAT BED WITH BEDRAILS: A LITTLE
DAILY ACTIVITIY SCORE: 19
PATIENT BASELINE BEDBOUND: NO
PERSONAL GROOMING: A LITTLE
DRESSING REGULAR UPPER BODY CLOTHING: A LITTLE
DRESSING REGULAR LOWER BODY CLOTHING: A LITTLE
STANDING UP FROM CHAIR USING ARMS: A LITTLE
MOBILITY SCORE: 18
DAILY ACTIVITIY SCORE: 22
TOILETING: A LITTLE
CLIMB 3 TO 5 STEPS WITH RAILING: A LITTLE
PERSONAL GROOMING: A LITTLE
TOILETING: A LITTLE
WALKING IN HOSPITAL ROOM: A LITTLE
MOVING TO AND FROM BED TO CHAIR: A LITTLE
HELP NEEDED FOR BATHING: A LITTLE

## 2024-12-25 ASSESSMENT — PAIN - FUNCTIONAL ASSESSMENT
PAIN_FUNCTIONAL_ASSESSMENT: 0-10
PAIN_FUNCTIONAL_ASSESSMENT: 0-10

## 2024-12-25 ASSESSMENT — PAIN SCALES - GENERAL
PAINLEVEL_OUTOF10: 0 - NO PAIN

## 2024-12-25 ASSESSMENT — PAIN DESCRIPTION - PROGRESSION: CLINICAL_PROGRESSION: NOT CHANGED

## 2024-12-25 ASSESSMENT — PATIENT HEALTH QUESTIONNAIRE - PHQ9
2. FEELING DOWN, DEPRESSED OR HOPELESS: NOT AT ALL
1. LITTLE INTEREST OR PLEASURE IN DOING THINGS: NOT AT ALL
SUM OF ALL RESPONSES TO PHQ9 QUESTIONS 1 & 2: 0

## 2024-12-25 NOTE — H&P
History Of Present Illness  Be Luo is a 64 y.o. 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  Patient states that he had shaking of his extremities associated with nausea and vomiting.  Does note a history of seizures for which she is compliant with his medications but this episode felt different.  He explained that his seizures typically have an aura prior to occurring whereas this episode did not.  States that he normally is able to go to sleep and the shaking goes away, however the shaking did not stop this time.  Does not recognize a pattern to the episodes.  Unsure of what makes symptoms worse.  Also endorses dizziness, unable to describe sensation, and generalized weakness.  Denies chest pain, shortness of breath, abdominal pain, current nausea or vomiting, headache, vision changes, numbness, tingling, fever, chills.  Admits to recent travel to [place] last week.  Denies ill contacts.  Is a former smoker, quit about 15 months ago.  Denies alcohol use.  Admits to marijuana use.  Denies use of blood thinners at home.    Of note, the patient was seen in the ED yesterday for a chief complaint of dizziness, explained as the room spinning.  At that time CT head without acute intracranial process and CXR with mild left basilar atelectasis.    ED course: Patient was treated with 2 courses of Ativan for a possible witnessed seizure.  According to the ED provider, he has had no more shaking episodes since.  UA suspicious for infection, was started on Rocephin.  Given Zofran for nausea.  BMP relatively unremarkable.  CBC with mild anemia of hemoglobin 12.8.  COVID and flu negative.  CT head without acute intracranial abnormality.  CXR without acute cardiopulmonary process.  EKG in NSR.  Patient will be admitted for further evaluation and treatment.     Past Medical  History  Past Medical History:   Diagnosis Date    Blind one eye     Hypertension        Surgical History  Past Surgical History:   Procedure Laterality Date    CARDIAC CATHETERIZATION N/A 9/16/2024    Procedure: Left Heart Cath, With LV;  Surgeon: Cori Sullivan MD;  Location: [facility];  Service: Cardiovascular;  Laterality: N/A;        Social History  He reports that he quit smoking about 15 months ago. His smoking use included cigarettes. He does not have any smokeless tobacco history on file. He reports that he does not currently use alcohol. He reports current drug use. Drug: Marijuana.    Family History  No family history on file.     Allergies  Green tea, Gabapentin, Lyrica [pregabalin], Norco [hydrocodone-acetaminophen], Prednisone, Sulfasalazine, and Vioxx [rofecoxib]    Review of Systems  All ROS are negative except for what is mentioned in the HPI    Physical Exam  Constitutional:       Appearance: He is obese.   HENT:      Head: Normocephalic.      Mouth/Throat:      Mouth: Mucous membranes are moist.   Eyes:      Pupils: Pupils are equal, round, and reactive to light.   Cardiovascular:      Rate and Rhythm: Normal rate and regular rhythm.      Heart sounds: Normal heart sounds, S1 normal and S2 normal.   Pulmonary:      Effort: Pulmonary effort is normal.      Breath sounds: Wheezing present.   Abdominal:      General: Bowel sounds are normal.      Palpations: Abdomen is soft.      Tenderness: There is no abdominal tenderness.   Genitourinary:     Comments: Thapa catheter, gross hematuria in Thapa bag which patient states is normal for him  Musculoskeletal:         General: Normal range of motion.      Cervical back: Neck supple.   Skin:     General: Skin is warm.   Neurological:      Mental Status: He is alert and oriented to person, place, and time.      Motor: Weakness and tremor present.      Comments: Patient appeared somewhat disoriented, he was A and O x 3 however he had slow  "responses and provided answers unrelated to my questions.  Bilateral arm tremor noted   Psychiatric:         Mood and Affect: Mood normal.         Behavior: Behavior normal.          Last Recorded Vitals  Blood pressure 131/65, pulse 92, temperature 36.4 °C (97.5 °F), temperature source Temporal, resp. rate 14, height 1.778 m (5' 10\"), weight 99.8 kg (220 lb), SpO2 94%.    Relevant Results  Scheduled medications    Continuous medications    PRN medications    Results for orders placed or performed during the hospital encounter of 12/25/24 (from the past 24 hours)   CBC and Auto Differential   Result Value Ref Range    WBC 5.3 4.4 - 11.3 x10*3/uL    nRBC 0.0 0.0 - 0.0 /100 WBCs    RBC 3.95 (L) 4.50 - 5.90 x10*6/uL    Hemoglobin 12.8 (L) 13.5 - 17.5 g/dL    Hematocrit 38.2 (L) 41.0 - 52.0 %    MCV 97 80 - 100 fL    MCH 32.4 26.0 - 34.0 pg    MCHC 33.5 32.0 - 36.0 g/dL    RDW 12.7 11.5 - 14.5 %    Platelets 199 150 - 450 x10*3/uL    Neutrophils % 54.8 40.0 - 80.0 %    Immature Granulocytes %, Automated 0.4 0.0 - 0.9 %    Lymphocytes % 23.8 13.0 - 44.0 %    Monocytes % 13.2 2.0 - 10.0 %    Eosinophils % 7.0 0.0 - 6.0 %    Basophils % 0.8 0.0 - 2.0 %    Neutrophils Absolute 2.91 1.20 - 7.70 x10*3/uL    Immature Granulocytes Absolute, Automated 0.02 0.00 - 0.70 x10*3/uL    Lymphocytes Absolute 1.26 1.20 - 4.80 x10*3/uL    Monocytes Absolute 0.70 0.10 - 1.00 x10*3/uL    Eosinophils Absolute 0.37 0.00 - 0.70 x10*3/uL    Basophils Absolute 0.04 0.00 - 0.10 x10*3/uL   Comprehensive Metabolic Panel   Result Value Ref Range    Glucose 108 (H) 74 - 99 mg/dL    Sodium 140 136 - 145 mmol/L    Potassium 3.7 3.5 - 5.3 mmol/L    Chloride 110 (H) 98 - 107 mmol/L    Bicarbonate 24 21 - 32 mmol/L    Anion Gap 10 10 - 20 mmol/L    Urea Nitrogen 15 6 - 23 mg/dL    Creatinine 0.83 0.50 - 1.30 mg/dL    eGFR >90 >60 mL/min/1.73m*2    Calcium 8.6 8.6 - 10.3 mg/dL    Albumin 4.1 3.4 - 5.0 g/dL    Alkaline Phosphatase 63 33 - 136 U/L    Total " Protein 5.9 (L) 6.4 - 8.2 g/dL    AST 16 9 - 39 U/L    Bilirubin, Total 0.6 0.0 - 1.2 mg/dL    ALT 20 10 - 52 U/L   Lactate   Result Value Ref Range    Lactate 1.4 0.4 - 2.0 mmol/L   Troponin I, High Sensitivity   Result Value Ref Range    Troponin I, High Sensitivity 5 0 - 20 ng/L   Blood Gas Venous Full Panel   Result Value Ref Range    POCT pH, Venous 7.38 7.33 - 7.43 pH    POCT pCO2, Venous 42 41 - 51 mm Hg    POCT pO2, Venous 55 (H) 35 - 45 mm Hg    POCT SO2, Venous 91 (H) 45 - 75 %    POCT Oxy Hemoglobin, Venous 88.9 (H) 45.0 - 75.0 %    POCT Hematocrit Calculated, Venous 37.0 (L) 41.0 - 52.0 %    POCT Sodium, Venous 138 136 - 145 mmol/L    POCT Potassium, Venous 3.7 3.5 - 5.3 mmol/L    POCT Chloride, Venous 108 (H) 98 - 107 mmol/L    POCT Ionized Calicum, Venous 1.17 1.10 - 1.33 mmol/L    POCT Glucose, Venous 92 74 - 99 mg/dL    POCT Lactate, Venous 1.0 0.4 - 2.0 mmol/L    POCT Base Excess, Venous -0.4 -2.0 - 3.0 mmol/L    POCT HCO3 Calculated, Venous 24.8 22.0 - 26.0 mmol/L    POCT Hemoglobin, Venous 12.4 (L) 13.5 - 17.5 g/dL    POCT Anion Gap, Venous 9.0 (L) 10.0 - 25.0 mmol/L    Patient Temperature      FiO2 21 %   Sars-CoV-2 and Influenza A/B PCR   Result Value Ref Range    Flu A Result Not Detected Not Detected    Flu B Result Not Detected Not Detected    Coronavirus 2019, PCR Not Detected Not Detected   Electrocardiogram, 12-lead ACS symptoms   Result Value Ref Range    Ventricular Rate 96 BPM    Atrial Rate 100 BPM    MD Interval 180 ms    QRS Duration 106 ms    QT Interval 378 ms    QTC Calculation(Bazett) 477 ms    P Axis 52 degrees    R Axis 87 degrees    T Axis 57 degrees    QRS Count 16 beats    Q Onset 222 ms    P Onset 132 ms    P Offset 172 ms    T Offset 411 ms    QTC Fredericia 442 ms   Urinalysis with Reflex Culture and Microscopic   Result Value Ref Range    Color, Urine Dark-Brown (N) Light-Yellow, Yellow, Dark-Yellow    Appearance, Urine Ex.Turbid (N) Clear    Specific Gravity, Urine  1.017 1.005 - 1.035    pH, Urine 6.0 5.0, 5.5, 6.0, 6.5, 7.0, 7.5, 8.0    Protein, Urine 70 (1+) (A) NEGATIVE, 10 (TRACE), 20 (TRACE) mg/dL    Glucose, Urine Normal Normal mg/dL    Blood, Urine OVER (3+) (A) NEGATIVE    Ketones, Urine NEGATIVE NEGATIVE mg/dL    Bilirubin, Urine NEGATIVE NEGATIVE    Urobilinogen, Urine Normal Normal mg/dL    Nitrite, Urine NEGATIVE NEGATIVE    Leukocyte Esterase, Urine 250 Zane/µL (A) NEGATIVE   Microscopic Only, Urine   Result Value Ref Range    WBC, Urine >50 (A) 1-5, NONE /HPF    RBC, Urine >20 (A) NONE, 1-2, 3-5 /HPF    Bacteria, Urine 1+ (A) NONE SEEN /HPF    Mucus, Urine FEW Reference range not established. /LPF     XR chest 1 view    Result Date: 12/25/2024  STUDY: Chest Radiograph;  12/25/2024 1:44 PM INDICATION: Seizure. COMPARISON: XR chest 12/24/2024 ACCESSION NUMBER(S): [id number] ORDERING CLINICIAN: JUJU JIMENEZ TECHNIQUE:  Frontal chest was obtained at 13:41 hours. FINDINGS: CARDIOMEDIASTINAL SILHOUETTE: Cardiomediastinal silhouette is normal in size and configuration.  LUNGS: Lungs are clear.  ABDOMEN: No remarkable upper abdominal findings.  BONES: No acute osseous changes.    No acute cardiopulmonary abnormality. Signed by Tommy Dietz    CT head wo IV contrast    Result Date: 12/25/2024  Interpreted By:  Jennifer Valenzuela, STUDY: CT HEAD WO IV CONTRAST; ;  12/25/2024 1:51 pm   INDICATION: Signs/Symptoms:AMS.   COMPARISON: 12/24/2024   ACCESSION NUMBER(S): [id number]   ORDERING CLINICIAN: JUJU JIMENEZ   TECHNIQUE: Serial axial images of the head were obtained without intravenous contrast. Sagittal and coronal reconstructions were generated.   FINDINGS: Ventricles are midline and normal in size. There are no acute parenchymal abnormalities.   There is no hemorrhage or extra-axial fluid.   There is no obvious scalp hematoma or skull fracture.   The visualized portions of the mastoids are unremarkable. The patient is status post previous and prostate knees.   The  patient is status post right I surgery.   COMPARISON OF FINDINGS: The brain is similar.       No acute intracranial abnormality.     MACRO: none   Signed by: Jennifer Valenzuela 12/25/2024 2:45 PM Dictation workstation:   [id number]    Electrocardiogram, 12-lead ACS symptoms    Result Date: 12/25/2024  Normal sinus rhythm Normal ECG When compared with ECG of 24-DEC-2024 21:45, (unconfirmed) Sinus rhythm has replaced Junctional rhythm    ECG 12 lead    Result Date: 12/25/2024  Accelerated Junctional rhythm Abnormal ECG When compared with ECG of 05-DEC-2024 12:44, Junctional rhythm has replaced Sinus rhythm    CT abdomen pelvis wo IV contrast    Result Date: 12/25/2024  Interpreted By:  Joey Haines, STUDY: CT ABDOMEN PELVIS WO IV CONTRAST;  12/25/2024 12:08 am   INDICATION: Signs/Symptoms:Assess for obstructive uropathy.   COMPARISON: CT abdomen pelvis 12/21/2024.   ACCESSION NUMBER(S): [id number]   ORDERING CLINICIAN: ROCCO GARCIA   TECHNIQUE: CT of the abdomen and pelvis was performed. Contiguous axial images were obtained through the abdomen and pelvis. Coronal and sagittal reconstructions at 3 mm slice thickness were performed.  No intravenous contrast was administered.   FINDINGS: Please note that the evaluation of vessels, lymph nodes and organs is limited without intravenous contrast.   LOWER CHEST: There is no acute abnormality in the lower chest. The heart is normal in size without evidence of pericardial effusion. No pleural effusion is present. There is a calcified granuloma in the lateral right lung.   ABDOMEN:   LIVER: The liver is normal in size and contour.   BILE DUCTS: The intrahepatic and extrahepatic ducts are not dilated.   GALLBLADDER: The gallbladder is nondistended and without evidence of radiopaque stones.   PANCREAS: The pancreas is not enlarged.   SPLEEN: Within normal limits.   ADRENAL GLANDS: Within normal limits.   KIDNEYS AND URETERS: The kidneys are normal in size and contour. There is  unchanged appearance of the bilateral internal nephrostomy tubes extending from the bilateral renal pelvis to the bladder.. Mild fullness of the left renal collecting system similar to prior without harsh hydronephrosis. No nephrolithiasis. There is similar appearance of fat stranding along the course of the left ureter, which may be chronic inflammatory.   PELVIS:   BLADDER: The urinary bladder is decompressed by a Thapa catheter, limiting assessment.   REPRODUCTIVE ORGANS: Prostate is not enlarged.   BOWEL: The stomach is incompletely distended, limiting evaluation for focal wall thickening. There is no bowel wall dilatation or obstruction. The appendix is normal. There is moderate amount of stool throughout the colon without wall thickening or acute inflammatory change. Sigmoid diverticulosis without diverticulitis..   VESSELS: Unchanged infrarenal abdominal aortic aneurysm measuring 3.9 x 3.8 cm axially. Moderate atherosclerotic calcifications of the abdominal aorta and its branches.   PERITONEUM/RETROPERITONEUM/LYMPH NODES: There is no ascites or intraperitoneal free air. There is no lymphadenopathy by CT criteria.   ABDOMINAL WALL: The abdominal wall soft tissues appear normal.   BONES: No suspicious osseous lesions are identified. Degenerative discogenic disease is noted in the lower thoracic and lumbar spine.       1.  Unchanged appearance of the bilateral internal nephrostomy tubes. Mild fullness of the left renal collecting system without harsh hydronephrosis similar to prior. No nephrolithiasis. Unchanged inflammatory change along the course of the left ureter, which may be chronic inflammatory. Bladder is decompressed by Thapa catheter limiting assessment without gross abnormality. 2. Unchanged infrarenal abdominal aortic aneurysm.     Signed by: Joey Haines 12/25/2024 12:22 AM Dictation workstation:   [id number]    XR chest 1 view    Result Date: 12/24/2024  Interpreted By:  Michael Beebe, STUDY: XR  CHEST 1 VIEW;  12/24/2024 9:56 pm   INDICATION: Signs/Symptoms:Shortness of breath.   COMPARISON: 7/3/2024   ACCESSION NUMBER(S): [id number]   ORDERING CLINICIAN: ROCCO GARCIA   FINDINGS: The cardiac silhouette is normal in size. Mild left basilar atelectasis. No focal airspace consolidation or pleural effusion. No pneumothorax.       Mild left basilar atelectasis.   MACRO: None   Signed by: Michael Beebe 12/24/2024 10:37 PM Dictation workstation:   [id number]    CT head wo IV contrast    Result Date: 12/24/2024  Interpreted By:  Michael Beebe, STUDY: CT HEAD WO IV CONTRAST;  12/24/2024 10:06 pm   INDICATION: Signs/Symptoms:Dizziness.   COMPARISON: 7/4/2024   ACCESSION NUMBER(S): [id number]   ORDERING CLINICIAN: ROCCO GARCIA   TECHNIQUE: Contiguous axial images of the head were obtained without intravenous contrast.   FINDINGS: BRAIN PARENCHYMA:   The gray white matter differentiation is preserved.  No mass effect or midline shift.   HEMORRHAGE:  No evidence of acute intracranial hemorrhage. VENTRICLES AND EXTRA-AXIAL SPACES:  The ventricles are within normal limits in size for brain volume.  No evidence of abnormal extraaxial fluid collection. EXTRACRANIAL SOFT TISSUES:  Within normal limits. PARANASAL SINUSES/MASTOIDS:  Mucosal retention cyst or polyp in left maxillary sinus. CALVARIUM:  No evidence of depressed calvarial fracture.   OTHER FINDINGS:  Stable chronic irregularity and hyperdensity of the right globe and metallic density in right orbit.       No evidence of acute intracranial hemorrhage or mass effect.       MACRO: None   Signed by: Michael Beebe 12/24/2024 10:36 PM Dictation workstation:   [id number]    CT abdomen pelvis w IV contrast    Result Date: 12/21/2024  STUDY: CT Abdomen and Pelvis with IV Contrast; 12/21/2024 at 1:40 PM. INDICATION: Hematuria, suprapubic pain. COMPARISON: US scrotum 12/21/2024; CT AP 12/6/2024, 12/5/2024. ACCESSION NUMBER(S): [id number] ORDERING CLINICIAN:  KUNAL HAUSER TECHNIQUE: CT of the abdomen and pelvis was performed.  Contiguous axial images were obtained at 3 mm slice thickness through the abdomen and pelvis. Coronal and sagittal reconstructions at 3 mm slice thickness were performed.  Omnipaque 350 75 mL was administered intravenously.  FINDINGS: LOWER CHEST: Normal heart size.  No pericardial effusion.  There are atherosclerotic coronary artery calcifications.  Lung bases are clear.  Benign calcified granuloma in the right lung base.  There are also benign calcified mediastinal/hilar lymph nodes present.  ABDOMEN:  LIVER: No hepatomegaly.  Smooth surface contour.  Normal attenuation. Numerous benign calcified granulomas in the liver. No suspicious lesions.  BILE DUCTS: No evidence of biliary ductal dilatation.  GALLBLADDER: The gallbladder is unremarkable in CT appearance. STOMACH: No abnormalities identified.  PANCREAS: No masses or ductal dilatation.  SPLEEN: Numerous benign calcified granulomas in the spleen.  ADRENAL GLANDS: No thickening or nodules.  KIDNEYS AND URETERS: There are bilateral ureteral stents in place extending from the renal pelvis into the bladder on each side. No hydronephrosis. No definite urinary tract calculi are apparent although small stones could be obscured by the presence of the stents. There are inflammatory changes surrounding the ureters bilaterally, left greater than right.  PELVIS:  BLADDER: Moderately distended urinary bladder. The bladder is otherwise not grossly remarkable in appearance.  REPRODUCTIVE ORGANS: No abnormalities identified.  BOWEL: Relatively pronounced colonic diverticulosis without evidence of associated inflammatory changes.  A normal appendix is suggested.  VESSELS: Prominent atherosclerotic vascular calcifications. 4 cm aneurysm in the infrarenal abdominal aorta, unchanged. No evidence of leak/rupture.   PERITONEUM/RETROPERITONEUM/LYMPH NODES: No free fluid.  No pneumoperitoneum. No lymphadenopathy.   ABDOMINAL WALL: No abnormalities identified. SOFT TISSUES: No abnormalities identified.  BONES: Prominent degenerative changes in the visualized spine.  No acute fracture or aggressive osseous lesion.    1.No acute abnormalities are identified in the abdomen or pelvis. 2.Bilateral ureteral stents in place. No hydronephrosis. No definite urinary tract calculi are apparent although small stones could be obscured by the presence of the stents. There are inflammatory changes surrounding the ureters bilaterally, left greater than right. 3.Moderately distended urinary bladder. The bladder is otherwise not grossly remarkable in appearance. 4.Prominent colonic diverticulosis without evidence of associated inflammatory changes. 5.Prominent atherosclerotic vascular calcifications. 4 cm aneurysm in the infrarenal abdominal aorta, unchanged. No evidence of leak/rupture. 6.Benign calcified granulomas in the right lung base, liver, and spleen. 7.Atherosclerotic coronary artery calcifications. Signed by Héctor Russell MD    US scrotum w doppler    Result Date: 12/21/2024  STUDY: Scrotal Ultrasound; 12/21/2024 1:05 pm. INDICATION:  Testicular soreness for two days. No known trauma. Blood in urine. COMPARISON:  None available. ACCESSION NUMBER(S): [id number] ORDERING CLINICIAN: KUNAL HAUSER TECHNIQUE: Ultrasound of the scrotum and testicular spectral doppler evaluation performed. FINDINGS:    The right testicle measures 4.1 x 1.6 x 2.3 cm.  It demonstrates a normal homogeneous echotexture.  Normal color and spectral Doppler flow is demonstrated. There is a cystic structure within the right testicle measuring 0.5 x 0.4 x 0.6 cm.   The right epididymis measures 0.9 x 0.9 x 1.1 cm and demonstrates normal echogenicity. The left testicle measures 4.1 x 1.8 x 2.8 cm. It demonstrates a normal homogeneous echotexture.  Normal color and spectral Doppler flow is demonstrated.    The left epididymis measures 0.6 x 1.0 x 0.7 cm and  demonstrates normal echogenicity.    6 mm right testicular cyst. Normal testicular spectral Doppler evaluation.  No evidence of testicular torsion Signed by Michael Coon MD    ECG 12 lead    Result Date: 12/14/2024  Normal sinus rhythm Normal ECG When compared with ECG of 05-DEC-2024 12:44, (unconfirmed) AL interval has decreased See ED provider note for full interpretation and clinical correlation Confirmed by Rupal Quinteros ([id number]) on 12/14/2024 8:56:51 PM    Operative Images    Result Date: 12/6/2024  Please see OpNote on Notes tab for findings.    FL fluoro images no charge    Result Date: 12/6/2024  These images are not reportable by radiology and will not be interpreted by  Radiologists.    CT abdomen pelvis wo IV contrast    Result Date: 12/6/2024  Interpreted By:  Aristeo Mancera, STUDY: CT ABDOMEN PELVIS WO IV CONTRAST;  12/6/2024 10:21 am   INDICATION: Signs/Symptoms:Evaluate for stone passage and hydronephrosis.     COMPARISON: CT abdomen and pelvis with contrast 5 December 2024   ACCESSION NUMBER(S): [id number]   ORDERING CLINICIAN: PEDRO ONEILL   TECHNIQUE: CT of the abdomen and pelvis from the lung bases through the symphysis pubis without oral or IV contrast   FINDINGS: There is ongoing, approximately the same degree of right hydroureteronephrosis   Persistent right nephrogram and persistence of excreted contrast on the right side from obstructive uropathy   The presence of excreted contrast in the ureter limits evaluation for the known three distal ureteral stones which may either still be present and obscured by surrounding excreted contrast or they may have passed, with a persistent distal ureteral spasm or stricture accounting for persistent hydronephrosis and persistent/delayed nephrogram   The right nephrogram is abnormal enough to consider acute uncomplicated pyelonephritis   Vicariously excreted contrast has appeared in the gallbladder, not an acute pathologic finding   No other interval  change   No urine leak/urinoma       Presence of excreted contrast in the right ureter from some element of persistent obstructive uropathy on the right. The three stones identified yesterday may still be present but obscured within the distal most column of contrast in the ureter or there may have passed and there is a persistent distal ureteral spasm or stricture   No urine leak/urinoma   Acute uncomplicated pyelonephritis should be considered based on how abnormal the right nephrogram is   MACRO: None   Signed by: Aristeo Mancera 12/6/2024 11:14 AM Dictation workstation:   [id number]    CT abdomen pelvis w IV contrast    Result Date: 12/5/2024  Interpreted By:  Shlomo Lewis, STUDY: CT ABDOMEN PELVIS W IV CONTRAST;  12/5/2024 1:41 pm   INDICATION: Signs/Symptoms:ABD pain.   COMPARISON: 10/09/2024   ACCESSION NUMBER(S): [id number]   ORDERING CLINICIAN: HARSHAL MARISCAL   TECHNIQUE: CT of the abdomen and pelvis was performed.  Contiguous axial images were obtained at 3 mm slice thickness through the abdomen and pelvis. Coronal and sagittal reconstructions at 3 mm slice thickness were performed. 75 mL Omnipaque 350 administered intravenously without immediate complication.   FINDINGS: LOWER CHEST: Mild bibasilar atelectasis. Coronary atherosclerosis.   ABDOMEN:   LIVER: Scattered calcified granulomas.   BILE DUCTS: Not dilated.   GALLBLADDER: No calcified stone or definite inflammation.   PANCREAS: Unremarkable   SPLEEN: Scattered calcified granulomas.   ADRENAL GLANDS: Mild bilateral nodularity not significantly changed.   KIDNEYS AND URETERS: Previous left hydronephrosis has resolved. New cluster of stones in the distal right ureter measuring 3 mm, 2 mm, and 2 mm, causing mild upstream hydronephrosis. 5 mm calculus in the right lower pole also noted. Tiny right renal cyst.   PELVIS:   BLADDER: Layering dependent calculi. Moderately distended.   REPRODUCTIVE ORGANS: Coarse calcifications of the prostate.   BOWEL: Severe  descending and sigmoid colonic diverticulosis. Moderate colonic stool burden. No findings of bowel obstruction or inflammation. Unremarkable appendix.    Unremarkable mesentery.   VESSELS: Severe abdominal aortic atherosclerosis. Abdominal aorta is otherwise patent. Saccular aneurysm of the distal abdominal aorta measuring up to 4.0 cm not significantly changed. Measured major visceral branches appear patent. Ectatic right common iliac artery up to 21 mm. IVC and major branches are grossly patent. Major portal venous branches are patent.   PERITONEUM AND RETROPERITONEUM: No free fluid or free air.    No abdominal or pelvic lymphadenopathy.   BONE AND ABDOMINAL WALL: No acute skeletal findings.   Degenerative changes of the spine.         1.  Interval development of mild right hydronephrosis secondary to 3 clustered calculi in the distal right ureter measuring up to 3 mm. 2. Previous left hydronephrosis with associated ureterovesical junction calculus have resolved. 3. Unchanged 4 cm distal abdominal aortic aneurysm.   MACRO: None.   Signed by: Shlomo Lweis 12/5/2024 1:55 PM Dictation workstation:   [id number]        Assessment/Plan     Seizure-like activity  History of seizures  Dizziness, consider vertigo  Nausea and vomiting  -CT head unremarkable  -Holter monitor from 7/2024 showed main rhythm as NSR, occasional PAC and PVCs  -Neuro consult  -Seizure precautions  -Resume home Lamictal  -Telemetry monitoring and continuous pulse ox  -Ativan for seizures  -Follow-up blood cultures  -Zofran for nausea control  -Daily CBC, BMP, Mg    Acute UTI with Thapa catheter in place  Gross hematuria  -UA with leukocytes, WBCs, bacteria, and protein  -Was given a dose of Rocephin in the ED, will continue  -Follow urine culture  -Patient has gross hematuria in Thapa bag, which states is normal for him  -Appears to follow with urology outpatient with [facility] as well as with Dr. Ramirez    T2DM not requiring insulin  -Hold home  metformin  -Start SSI, Accu-Cheks, diabetic diet  -A1c 10/2024 was 5.6%    COPD  -Not in acute exacerbation  -CXR without acute cardiopulmonary process  -Resume home inhalers    HTN/HLD: Resume home meds once reconciled    DVTp: Lovenox, SCDs  Plan: TBD, anticipate home       I spent 75 minutes in the professional and overall care of this patient.      Ana Palmer PA-C    Plan of care was discussed extensively with patient.  Patient verbalized understanding through teach back method.  All question and concerns addressed upon examination.     Of note, this documentation is completed using the Dragon Dictation system (voice recognition software). There may be spelling and/or grammatical errors that were not corrected prior to final submission.

## 2024-12-25 NOTE — ED PROVIDER NOTES
HPI   Chief Complaint   Patient presents with    Vomiting    Shaking       Patient is a 64-year-old male history of hypertension diabetes coronary artery disease COPD and seizures comes from squad complaining of shaking of his extremities and nausea and vomiting, was seen here last night worked up for dizziness and sent home he is legally blind in the right eye, states that he gets his episodes restart shaking and normally he goes to sleep and they go away but this time is not stopping any came to be seen.              Patient History   Past Medical History:   Diagnosis Date    Blind one eye     Hypertension      Past Surgical History:   Procedure Laterality Date    CARDIAC CATHETERIZATION N/A 2024    Procedure: Left Heart Cath, With LV;  Surgeon: Cori Sullivan MD;  Location: [facility];  Service: Cardiovascular;  Laterality: N/A;     No family history on file.  Social History     Tobacco Use    Smoking status: Former     Current packs/day: 0.00     Types: Cigarettes     Quit date: 2023     Years since quittin.3    Smokeless tobacco: Not on file   Vaping Use    Vaping status: Never Used   Substance Use Topics    Alcohol use: Not Currently    Drug use: Yes     Types: Marijuana       Physical Exam   ED Triage Vitals [24 1227]   Temperature Heart Rate Respirations BP   36.4 °C (97.5 °F) (!) 104 (!) 24 176/60      Pulse Ox Temp Source Heart Rate Source Patient Position   94 % Temporal -- --      BP Location FiO2 (%)     -- --       Physical Exam  Cardiovascular:      Rate and Rhythm: Normal rate and regular rhythm.   Pulmonary:      Effort: Pulmonary effort is normal.      Breath sounds: Wheezing present.   Abdominal:      Palpations: Abdomen is soft.   Genitourinary:     Comments: Presence of Thapa catheter  Musculoskeletal:         General: Normal range of motion.   Skin:     General: Skin is warm.   Neurological:      General: No focal deficit present.      Mental Status: He is alert.       Motor: No weakness.      Comments: Continually shaking all his 4 extremities while giving me the history that this happens to him and he normally goes to sleep and goes away but this time did not go away and he called the ambulance           ED Course & MDM   Diagnoses as of 12/25/24 1505   Nausea and vomiting, unspecified vomiting type   Dizziness   Seizure (Multi)   Acute UTI                 No data recorded                                 Medical Decision Making  EKG interpreted by me shows normal sinus rhythm rate of 96 normal QRS and normal ST segment  My differential diagnosis  Acute electrolyte imbalance acute sepsis acute dehydration breakthrough seizures  I ordered a CBC chemistries urinalysis blood cultures chest x-ray, I also ordered IV Ativan for the patient will reevaluate the patient once all the results are obtained  CBC chemistry serum lactate were unremarkable CT scan of the head and chest x-ray were negative urinalysis did show leukocyte esterase urgency patient is comfortable, Rocephin antibiotics for UTI and discussed with the hospitalist team and admitted to the hospital.        Procedure  Procedures     Valdez Zapata MD  12/25/24 2779

## 2024-12-25 NOTE — ED PROVIDER NOTES
HPI   Chief Complaint   Patient presents with    Dizziness     Pt says he feels unstable, and wobbly. Pt states dizziness started last night.       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  He is here with dizziness vertigo-like sensation.  Spinning sensation denies blacking out or any focal motor weakness urinary fecal incontinence or seizure activity and is here for assessment with blood pressure 126/65 pulse 94 respiratory 95%              Patient History   Past Medical History:   Diagnosis Date    Blind one eye     Hypertension      Past Surgical History:   Procedure Laterality Date    CARDIAC CATHETERIZATION N/A 2024    Procedure: Left Heart Cath, With LV;  Surgeon: Cori Sullivan MD;  Location: [facility];  Service: Cardiovascular;  Laterality: N/A;     No family history on file.  Social History     Tobacco Use    Smoking status: Former     Current packs/day: 0.00     Types: Cigarettes     Quit date: 2023     Years since quittin.3    Smokeless tobacco: Not on file   Vaping Use    Vaping status: Never Used   Substance Use Topics    Alcohol use: Not Currently    Drug use: Yes     Types: Marijuana       Physical Exam   ED Triage Vitals   Temp Pulse Resp BP   -- -- -- --      SpO2 Temp src Heart Rate Source Patient Position   -- -- -- --      BP Location FiO2 (%)     -- --       Physical Exam  Vitals and nursing note reviewed.   HENT:      Head: Normocephalic.      Right Ear: Tympanic membrane normal.      Left Ear: Tympanic membrane normal.   Eyes:      Pupils: Pupils are equal, round, and reactive to light.   Cardiovascular:      Rate and Rhythm: Regular rhythm. Tachycardia present.   Pulmonary:      Effort: Pulmonary effort is normal.   Abdominal:      Palpations: Abdomen is soft.   Musculoskeletal:      Cervical back: Normal range of motion.   Skin:     General: Skin is warm.       Capillary Refill: Capillary refill takes less than 2 seconds.   Neurological:      General: No focal deficit present.      Mental Status: He is alert and oriented to person, place, and time.      Cranial Nerves: No cranial nerve deficit.      Sensory: No sensory deficit.      Motor: No weakness.      Coordination: Coordination normal.      Gait: Gait normal.   Psychiatric:         Mood and Affect: Mood normal.           ED Course & MDM   Diagnoses as of 12/25/24 0131   Vertigo                 No data recorded     [place] Coma Scale Score: 15 (12/24/24 2128 : Zaheer Oneil RN)                           Medical Decision Making  Differential diagnosis  Arrhythmia  Vertigo  Renal hemorrhage  Intracranial  Sepsis  Anemia   Renal failure  Considering the above differential diagnosis: Testing.  Treatments were ordered IV fluids and Zofran CT of the head CT of the abdomen chest x-ray EKG blood work urinalysis    Amount and/or Complexity of Data Reviewed  ECG/medicine tests: ordered and independent interpretation performed.     Details: EKG #1 reveals sinus tachycardia with a rate of 109 no acute ischemic changes as interpreted by me  Discussion of management or test interpretation with external provider(s): Considering the patient's improvement in negative CT and negative CT of the abdomen patient was discharged home stable      Labs Reviewed   CBC WITH AUTO DIFFERENTIAL - Abnormal       Result Value    WBC 5.5      nRBC 0.0      RBC 3.84 (*)     Hemoglobin 12.5 (*)     Hematocrit 37.1 (*)     MCV 97      MCH 32.6      MCHC 33.7      RDW 12.7      Platelets 188      Neutrophils % 52.1      Immature Granulocytes %, Automated 0.4      Lymphocytes % 26.6      Monocytes % 13.9      Eosinophils % 6.5      Basophils % 0.5      Neutrophils Absolute 2.87      Immature Granulocytes Absolute, Automated 0.02      Lymphocytes Absolute 1.47      Monocytes Absolute 0.77      Eosinophils Absolute 0.36      Basophils Absolute  0.03     BASIC METABOLIC PANEL - Abnormal    Glucose 94      Sodium 140      Potassium 3.6      Chloride 112 (*)     Bicarbonate 24      Anion Gap 8 (*)     Urea Nitrogen 22      Creatinine 0.97      eGFR 87      Calcium 8.6     URINALYSIS WITH REFLEX CULTURE AND MICROSCOPIC - Abnormal    Color, Urine Red-brown (*)     Appearance, Urine Turbid (*)     Specific Gravity, Urine 1.025      pH, Urine 6.0      Protein, Urine 100 (2+) (*)     Glucose, Urine NEGATIVE      Blood, Urine 1.0 (3+) (*)     Ketones, Urine NEGATIVE      Bilirubin, Urine NEGATIVE      Urobilinogen, Urine NORM      Nitrite, Urine NEGATIVE      Leukocyte Esterase, Urine 75 Zane/µL (*)    MICROSCOPIC ONLY, URINE - Abnormal    WBC, Urine 6-10 (*)     RBC, Urine >20 (*)     Squamous Epithelial Cells, Urine NONE      Budding Yeast, Urine PRESENT (*)    PHOSPHORUS - Normal    Phosphorus 2.5     MAGNESIUM - Normal    Magnesium 1.96     TROPONIN I, HIGH SENSITIVITY - Normal    Troponin I, High Sensitivity 5      Narrative:     Less than 99th percentile of normal range cutoff-  Female and children under 18 years old <14 ng/L; Male <21 ng/L: Negative  Repeat testing should be performed if clinically indicated.     Female and children under 18 years old 14-50 ng/L; Male 21-50 ng/L:  Consistent with possible cardiac damage and possible increased clinical   risk. Serial measurements may help to assess extent of myocardial damage.     >50 ng/L: Consistent with cardiac damage, increased clinical risk and  myocardial infarction. Serial measurements may help assess extent of   myocardial damage.      NOTE: Children less than 1 year old may have higher baseline troponin   levels and results should be interpreted in conjunction with the overall   clinical context.     NOTE: Troponin I testing is performed using a different   testing methodology at [facility] than at other   [facility]. Direct result comparisons should only   be made within the same  method.   INFLUENZA A AND B PCR - Normal    Flu A Result Not Detected      Flu B Result Not Detected      Narrative:     This assay is an in vitro diagnostic multiplex nucleic acid amplification test for the detection and discrimination of Influenza A & B from nasopharyngeal specimens, and has been validated for use at [facility]. Negative results do not preclude Influenza A/B infections, and should not be used as the sole basis for diagnosis, treatment, or other management decisions. If Influenza A/B and RSV PCR results are negative, testing for Parainfluenza virus, Adenovirus and Metapneumovirus is routinely performed for [facility] pediatric oncology and intensive care inpatients, and is available on other patients by placing an add-on request.   SARS-COV-2 PCR - Normal    Coronavirus 2019, PCR Not Detected      Narrative:     This assay has received FDA Emergency Use Authorization (EUA) and is only authorized for the duration of time that circumstances exist to justify the authorization of the emergency use of in vitro diagnostic tests for the detection of SARS-CoV-2 virus and/or diagnosis of COVID-19 infection under section 564(b)(1) of the Act, 21 U.S.C. 360bbb-3(b)(1). This assay is an in vitro diagnostic nucleic acid amplification test for the qualitative detection of SARS-CoV-2 from nasopharyngeal specimens and has been validated for use at [facility]. Negative results do not preclude COVID-19 infections and should not be used as the sole basis for diagnosis, treatment, or other management decisions.     URINE CULTURE   URINALYSIS WITH REFLEX CULTURE AND MICROSCOPIC    Narrative:     The following orders were created for panel order Urinalysis with Reflex Culture and Microscopic.  Procedure                               Abnormality         Status                     ---------                               -----------         ------                     Urinalysis with  Reflex C...[[id number]]  Abnormal            Final result               Extra Urine Gray Tube[[id number]]                            In process                   Please view results for these tests on the individual orders.   EXTRA URINE GRAY TUBE        CT abdomen pelvis wo IV contrast   Final Result   1.  Unchanged appearance of the bilateral internal nephrostomy tubes.   Mild fullness of the left renal collecting system without harsh   hydronephrosis similar to prior. No nephrolithiasis. Unchanged   inflammatory change along the course of the left ureter, which may be   chronic inflammatory. Bladder is decompressed by Thapa catheter   limiting assessment without gross abnormality.   2. Unchanged infrarenal abdominal aortic aneurysm.             Signed by: Joey Haines 12/25/2024 12:22 AM   Dictation workstation:   [id number]      CT head wo IV contrast   Final Result   No evidence of acute intracranial hemorrhage or mass effect.                  MACRO:   None        Signed by: Michael Beebe 12/24/2024 10:36 PM   Dictation workstation:   [id number]      XR chest 1 view   Final Result   Mild left basilar atelectasis.        MACRO:   None        Signed by: Michael Beebe 12/24/2024 10:37 PM   Dictation workstation:   [id number]         I will make an addendum in any of this yeah 17 positivity  Procedure  Procedures     Kelsi Lucio MD  12/25/24 0131

## 2024-12-26 PROBLEM — R11.2 NAUSEA AND VOMITING, UNSPECIFIED VOMITING TYPE: Status: ACTIVE | Noted: 2024-12-26

## 2024-12-26 LAB
ANION GAP SERPL CALC-SCNC: 8 MMOL/L (ref 10–20)
BACTERIA UR CULT: NO GROWTH
BACTERIA UR CULT: NO GROWTH
BUN SERPL-MCNC: 10 MG/DL (ref 6–23)
CALCIUM SERPL-MCNC: 8.5 MG/DL (ref 8.6–10.3)
CHLORIDE SERPL-SCNC: 111 MMOL/L (ref 98–107)
CO2 SERPL-SCNC: 28 MMOL/L (ref 21–32)
CREAT SERPL-MCNC: 0.9 MG/DL (ref 0.5–1.3)
EGFRCR SERPLBLD CKD-EPI 2021: >90 ML/MIN/1.73M*2
ERYTHROCYTE [DISTWIDTH] IN BLOOD BY AUTOMATED COUNT: 12.7 % (ref 11.5–14.5)
GLUCOSE SERPL-MCNC: 85 MG/DL (ref 74–99)
HCT VFR BLD AUTO: 37.1 % (ref 41–52)
HGB BLD-MCNC: 12.2 G/DL (ref 13.5–17.5)
HOLD SPECIMEN: NORMAL
HOLD SPECIMEN: NORMAL
MAGNESIUM SERPL-MCNC: 2 MG/DL (ref 1.6–2.4)
MCH RBC QN AUTO: 32.4 PG (ref 26–34)
MCHC RBC AUTO-ENTMCNC: 32.9 G/DL (ref 32–36)
MCV RBC AUTO: 98 FL (ref 80–100)
NRBC BLD-RTO: 0 /100 WBCS (ref 0–0)
PLATELET # BLD AUTO: 192 X10*3/UL (ref 150–450)
POTASSIUM SERPL-SCNC: 3.9 MMOL/L (ref 3.5–5.3)
RBC # BLD AUTO: 3.77 X10*6/UL (ref 4.5–5.9)
SODIUM SERPL-SCNC: 143 MMOL/L (ref 136–145)
WBC # BLD AUTO: 4.2 X10*3/UL (ref 4.4–11.3)

## 2024-12-26 PROCEDURE — 2500000004 HC RX 250 GENERAL PHARMACY W/ HCPCS (ALT 636 FOR OP/ED)

## 2024-12-26 PROCEDURE — 2500000005 HC RX 250 GENERAL PHARMACY W/O HCPCS

## 2024-12-26 PROCEDURE — 83735 ASSAY OF MAGNESIUM: CPT

## 2024-12-26 PROCEDURE — 2500000002 HC RX 250 W HCPCS SELF ADMINISTERED DRUGS (ALT 637 FOR MEDICARE OP, ALT 636 FOR OP/ED)

## 2024-12-26 PROCEDURE — 2500000005 HC RX 250 GENERAL PHARMACY W/O HCPCS: Performed by: NURSE PRACTITIONER

## 2024-12-26 PROCEDURE — 36415 COLL VENOUS BLD VENIPUNCTURE: CPT

## 2024-12-26 PROCEDURE — 99223 1ST HOSP IP/OBS HIGH 75: CPT | Performed by: SPECIALIST

## 2024-12-26 PROCEDURE — 1200000002 HC GENERAL ROOM WITH TELEMETRY DAILY

## 2024-12-26 PROCEDURE — 99232 SBSQ HOSP IP/OBS MODERATE 35: CPT

## 2024-12-26 PROCEDURE — 85027 COMPLETE CBC AUTOMATED: CPT

## 2024-12-26 PROCEDURE — 2500000001 HC RX 250 WO HCPCS SELF ADMINISTERED DRUGS (ALT 637 FOR MEDICARE OP): Performed by: NURSE PRACTITIONER

## 2024-12-26 PROCEDURE — 2500000001 HC RX 250 WO HCPCS SELF ADMINISTERED DRUGS (ALT 637 FOR MEDICARE OP)

## 2024-12-26 PROCEDURE — 80048 BASIC METABOLIC PNL TOTAL CA: CPT

## 2024-12-26 RX ORDER — VALSARTAN 80 MG/1
40 TABLET ORAL NIGHTLY
Status: DISCONTINUED | OUTPATIENT
Start: 2024-12-26 | End: 2024-12-30 | Stop reason: HOSPADM

## 2024-12-26 RX ORDER — ASPIRIN 81 MG/1
81 TABLET ORAL
Status: DISCONTINUED | OUTPATIENT
Start: 2024-12-26 | End: 2024-12-30 | Stop reason: HOSPADM

## 2024-12-26 RX ORDER — ATROPINE SULFATE 10 MG/ML
1 SOLUTION/ DROPS OPHTHALMIC 3 TIMES DAILY
COMMUNITY

## 2024-12-26 RX ORDER — CYCLOBENZAPRINE HCL 10 MG
10 TABLET ORAL 3 TIMES DAILY PRN
Status: DISCONTINUED | OUTPATIENT
Start: 2024-12-26 | End: 2024-12-30 | Stop reason: HOSPADM

## 2024-12-26 RX ORDER — PRAMIPEXOLE DIHYDROCHLORIDE 0.25 MG/1
0.25 TABLET ORAL NIGHTLY
Status: DISCONTINUED | OUTPATIENT
Start: 2024-12-26 | End: 2024-12-30 | Stop reason: HOSPADM

## 2024-12-26 RX ORDER — MONTELUKAST SODIUM 10 MG/1
10 TABLET ORAL NIGHTLY
Status: DISCONTINUED | OUTPATIENT
Start: 2024-12-26 | End: 2024-12-30 | Stop reason: HOSPADM

## 2024-12-26 RX ORDER — MONTELUKAST SODIUM 10 MG/1
10 TABLET ORAL NIGHTLY
Status: ON HOLD | COMMUNITY
End: 2024-12-26 | Stop reason: ENTERED-IN-ERROR

## 2024-12-26 RX ORDER — ROSUVASTATIN CALCIUM 10 MG/1
10 TABLET, COATED ORAL NIGHTLY
Status: DISCONTINUED | OUTPATIENT
Start: 2024-12-26 | End: 2024-12-30 | Stop reason: HOSPADM

## 2024-12-26 RX ORDER — ATROPINE SULFATE 10 MG/ML
1 SOLUTION/ DROPS OPHTHALMIC 3 TIMES DAILY
Status: DISCONTINUED | OUTPATIENT
Start: 2024-12-26 | End: 2024-12-30 | Stop reason: HOSPADM

## 2024-12-26 RX ORDER — ACETAMINOPHEN 500 MG
4 TABLET ORAL 2 TIMES DAILY
COMMUNITY
End: 2024-12-30 | Stop reason: HOSPADM

## 2024-12-26 RX ADMIN — PREDNISOLONE ACETATE 1 DROP: 10 SUSPENSION/ DROPS OPHTHALMIC at 00:08

## 2024-12-26 RX ADMIN — PREDNISOLONE ACETATE 1 DROP: 10 SUSPENSION/ DROPS OPHTHALMIC at 22:00

## 2024-12-26 RX ADMIN — BRIMONIDINE TARTRATE 1 DROP: 2 SOLUTION/ DROPS OPHTHALMIC at 00:08

## 2024-12-26 RX ADMIN — PREDNISOLONE ACETATE 1 DROP: 10 SUSPENSION/ DROPS OPHTHALMIC at 18:08

## 2024-12-26 RX ADMIN — PRAMIPEXOLE DIHYDROCHLORIDE 0.25 MG: 0.25 TABLET ORAL at 22:00

## 2024-12-26 RX ADMIN — MONTELUKAST SODIUM 10 MG: 10 TABLET, FILM COATED ORAL at 22:00

## 2024-12-26 RX ADMIN — ATROPINE SULFATE 1 DROP: 10 SOLUTION/ DROPS OPHTHALMIC at 22:00

## 2024-12-26 RX ADMIN — DEXTROSE MONOHYDRATE 2 G: 5 INJECTION INTRAVENOUS at 09:01

## 2024-12-26 RX ADMIN — ROSUVASTATIN CALCIUM 10 MG: 10 TABLET, FILM COATED ORAL at 22:00

## 2024-12-26 RX ADMIN — VALSARTAN 40 MG: 80 TABLET, FILM COATED ORAL at 22:00

## 2024-12-26 RX ADMIN — BRIMONIDINE TARTRATE 1 DROP: 2 SOLUTION/ DROPS OPHTHALMIC at 22:00

## 2024-12-26 RX ADMIN — ASPIRIN 81 MG: 81 TABLET, COATED ORAL at 11:54

## 2024-12-26 ASSESSMENT — COGNITIVE AND FUNCTIONAL STATUS - GENERAL
PERSONAL GROOMING: A LITTLE
MOVING FROM LYING ON BACK TO SITTING ON SIDE OF FLAT BED WITH BEDRAILS: A LITTLE
MOVING TO AND FROM BED TO CHAIR: A LITTLE
TOILETING: A LITTLE
TURNING FROM BACK TO SIDE WHILE IN FLAT BAD: A LITTLE
DAILY ACTIVITIY SCORE: 22
WALKING IN HOSPITAL ROOM: A LITTLE
CLIMB 3 TO 5 STEPS WITH RAILING: A LITTLE
STANDING UP FROM CHAIR USING ARMS: A LITTLE
MOBILITY SCORE: 18

## 2024-12-26 ASSESSMENT — PAIN SCALES - GENERAL: PAINLEVEL_OUTOF10: 0 - NO PAIN

## 2024-12-26 NOTE — PROGRESS NOTES
Daily Progress Note    Be Luo is a 64 y.o. male on day 0 of admission presenting with Seizure-like activity (Multi).    Subjective   Overnight, the nurse attempted to reconcile the patient's home medications.  It was noted that the patient does not know which medications he takes, stating that his doctors feel him to throw certain ones out.  Family member was able to bring in active medications for nursing today.    Patient seen and examined, labs and vitals reviewed.  Patient's Lamictal level was elevated.  States he feels well today, has not noticed the tremors as much.  Denies chest pain, shortness of breath, abdominal pain, nausea, vomiting, headache, dizziness.  Patient notes that he has had many falls at home after he stands up, resulting in either weakness or a shaking episode.  Will get orthostatic vitals.  Neurology consulted.       Objective   Physical Exam  Constitutional:       Appearance: He is obese.   HENT:      Head: Normocephalic.      Mouth/Throat:      Mouth: Mucous membranes are moist.   Eyes:      Pupils: Pupils are equal, round, and reactive to light.      Comments: Legally blind in right eye   Cardiovascular:      Rate and Rhythm: Normal rate and regular rhythm.      Heart sounds: Normal heart sounds, S1 normal and S2 normal.   Pulmonary:      Effort: Pulmonary effort is normal.      Breath sounds: Wheezing present.   Abdominal:      General: Bowel sounds are normal.      Palpations: Abdomen is soft.      Tenderness: There is no abdominal tenderness.   Genitourinary:     Comments: Thapa in place, gross hematuria in Thapa bag  Musculoskeletal:         General: Normal range of motion.      Cervical back: Neck supple.      Right lower leg: No edema.      Left lower leg: Edema present.   Skin:     General: Skin is warm.   Neurological:      Mental Status: He is alert and oriented to person, place, and time.      Comments: Mild bilateral upper extremity tremor noted.  Is able to respond  "to questions more appropriately today, however does tend to get off topic frequently   Psychiatric:         Mood and Affect: Mood normal.         Behavior: Behavior normal.         Last Recorded Vitals  Blood pressure 124/68, pulse 100, temperature 36.2 °C (97.2 °F), temperature source Temporal, resp. rate 16, height 1.778 m (5' 10\"), weight 99.8 kg (220 lb), SpO2 93%.  Intake/Output last 3 Shifts:  I/O last 3 completed shifts:  In: 125 (1.3 mL/kg) [P.O.:125]  Out: 835 (8.4 mL/kg) [Urine:835 (0.2 mL/kg/hr)]  Weight: 99.8 kg     Medications  Scheduled medications  aspirin, 81 mg, oral, Daily  atropine, 1 drop, Left Eye, TID  brimonidine, 1 drop, Left Eye, TID  cefTRIAXone, 2 g, intravenous, q24h  enoxaparin, 40 mg, subcutaneous, q24h  [Held by provider] lamoTRIgine, 600 mg, oral, BID  montelukast, 10 mg, oral, Nightly  netarsudiL, 1 drop, Left Eye, Nightly  polyethylene glycol, 17 g, oral, Daily  potassium chloride CR, 10 mEq, oral, Daily  pramipexole, 0.25 mg, oral, Nightly  prednisoLONE acetate, 1 drop, Left Eye, 4x daily  rosuvastatin, 10 mg, oral, Nightly  valsartan, 40 mg, oral, Nightly      Continuous medications     PRN medications  PRN medications: cyclobenzaprine, LORazepam, ondansetron **OR** ondansetron    Labs  CBC:   Results from last 7 days   Lab Units 12/26/24  0550 12/25/24  1254 12/24/24  2152   WBC AUTO x10*3/uL 4.2* 5.3 5.5   RBC AUTO x10*6/uL 3.77* 3.95* 3.84*   HEMOGLOBIN g/dL 12.2* 12.8* 12.5*   HEMATOCRIT % 37.1* 38.2* 37.1*   MCV fL 98 97 97   MCH pg 32.4 32.4 32.6   MCHC g/dL 32.9 33.5 33.7   RDW % 12.7 12.7 12.7   PLATELETS AUTO x10*3/uL 192 199 188     CMP:    Results from last 7 days   Lab Units 12/26/24  0550 12/25/24  1254 12/24/24  2152 12/21/24  1228   SODIUM mmol/L 143 140 140 139   POTASSIUM mmol/L 3.9 3.7 3.6 4.2   CHLORIDE mmol/L 111* 110* 112* 111*   CO2 mmol/L 28 24 24 22   BUN mg/dL 10 15 22 20   CREATININE mg/dL 0.90 0.83 0.97 0.91   GLUCOSE mg/dL 85 108* 94 111*   PROTEIN " TOTAL g/dL  --  5.9*  --  6.6   CALCIUM mg/dL 8.5* 8.6 8.6 8.7   BILIRUBIN TOTAL mg/dL  --  0.6  --  0.5   ALK PHOS U/L  --  63  --  67   AST U/L  --  16  --  23   ALT U/L  --  20  --  22     BMP:    Results from last 7 days   Lab Units 12/26/24  0550 12/25/24  1254 12/24/24  2152   SODIUM mmol/L 143 140 140   POTASSIUM mmol/L 3.9 3.7 3.6   CHLORIDE mmol/L 111* 110* 112*   CO2 mmol/L 28 24 24   BUN mg/dL 10 15 22   CREATININE mg/dL 0.90 0.83 0.97   CALCIUM mg/dL 8.5* 8.6 8.6   GLUCOSE mg/dL 85 108* 94     Magnesium:  Results from last 7 days   Lab Units 12/26/24  0550 12/24/24  2152   MAGNESIUM mg/dL 2.00 1.96     Troponin:    Results from last 7 days   Lab Units 12/25/24  1254 12/24/24  2152   TROPHS ng/L 5 5     BNP:     Lipid Panel:  Results from last 7 days   Lab Units 12/21/24  1228   INR  0.9   PROTIME seconds 10.6        Nutrition             Relevant Results  Results from last 7 days   Lab Units 12/26/24  0550 12/25/24  1254 12/24/24  2152 12/21/24  1228   GLUCOSE mg/dL 85 108* 94 111*     Lab Results   Component Value Date    HGBA1C 5.6 10/23/2024        Assessment/Plan    Seizure-like activity  History of seizures  Dizziness, consider vertigo  Nausea and vomiting  -CT head unremarkable  -Holter monitor from 7/2024 showed main rhythm as NSR, occasional PAC and PVCs  -Neuro consult  -Seizure precautions  -Lamictal level was elevated, placed on hold for now  -Telemetry monitoring and continuous pulse ox  -Ativan for seizures  -Follow-up blood cultures  -Zofran for nausea control  -Daily CBC, BMP, Mg  -PT/OT eval and treat  -TCC for discharge planning  -Obtain orthostatic vitals every shift     Acute UTI with Thapa catheter in place  Gross hematuria  -UA with leukocytes, WBCs, bacteria, and protein  -Continue IV Rocephin  -Follow urine culture  -Patient has gross hematuria in Thapa bag, which states is normal for him  -Appears to follow with urology outpatient with [facility] as well as with Dr. James PACHECO not  requiring insulin  -Hold home metformin  -SSI, Accu-Cheks, diabetic diet  -A1c 10/2024 was 5.6%     COPD  -Not in acute exacerbation  -CXR without acute cardiopulmonary process  -Continue home inhalers/meds     HTN/HLD: Continue home meds     DVTp: Lovenox, SCDs  Plan: TBD pending PT/OT recs    Ana Palmer PA-C    Plan of care was discussed extensively with patient.  Patient verbalized understanding through teach back method.  All question and concerns addressed upon examination.    Of note, this documentation is completed using the Dragon Dictation system (voice recognition software). There may be spelling and/or grammatical errors that were not corrected prior to final submission.

## 2024-12-26 NOTE — CONSULTS
"Nutrition Initial Assessment:   Nutrition Assessment    Reason for Assessment: Admission nursing screening    Patient is a 64 y.o. male presenting with shaking, nausea, and vomiting/seizure like activity at home.     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 Patient denies current abdominal pain, nausea, or vomiting. Consulted by [facility] for weight loss. Patient with no weight loss noted, wts stable. No nutritional concerns at this time.     Nutrition History:  Energy Intake: Fair 50-75 %  Food and Nutrient History: Pt reports no changes in appetite or any weight changes over the last 6 months. N/V episode from the shaking episode and is back to eating like normal. No nutritional concerns at this time.  Food Allergies/Intolerances:   green tea   GI Symptoms: Nausea and Vomiting  Oral Problems: None     Anthropometrics:  Height: 177.8 cm (5' 10\")   Weight: 99.8 kg (220 lb)   BMI (Calculated): 31.57    Weight Change %:  Weight History / % Weight Change: No weight changes noted, wts stable.    Nutrition Focused Physical Exam Findings:  Subcutaneous Fat Loss:   Orbital Fat Pads: Mild-Moderate (slight dark circles and slight hollowing)  Buccal Fat Pads: Well nourished (full, rounded cheeks)  Muscle Wasting:  Temporalis: Mild-Moderate (slight depression)  Pectoralis (Clavicular Region): Well nourished (clavicle not visible)  Edema:  Edema: none  Physical Findings:  Hair: Negative  Eyes: Negative  Mouth: Negative    Nutrition Significant Labs:  CBC Trend:   Results from last 7 days   Lab Units 12/26/24  0550 12/25/24  1254 12/24/24  2152 12/21/24  1228   WBC AUTO x10*3/uL 4.2* 5.3 5.5 6.6   RBC AUTO x10*6/uL 3.77* 3.95* 3.84* 4.36*   HEMOGLOBIN g/dL 12.2* 12.8* 12.5* 14.1   HEMATOCRIT % 37.1* 38.2* 37.1* 41.6   MCV fL 98 97 97 95   PLATELETS AUTO x10*3/uL 192 199 188 241    , BMP Trend:   Results from last 7 days   Lab Units 12/26/24  0550 12/25/24  1254 12/24/24  2152 12/21/24  1228   GLUCOSE mg/dL 85 " 108* 94 111*   CALCIUM mg/dL 8.5* 8.6 8.6 8.7   SODIUM mmol/L 143 140 140 139   POTASSIUM mmol/L 3.9 3.7 3.6 4.2   CO2 mmol/L 28 24 24 22   CHLORIDE mmol/L 111* 110* 112* 111*   BUN mg/dL 10 15 22 20   CREATININE mg/dL 0.90 0.83 0.97 0.91    , A1C:  Lab Results   Component Value Date    HGBA1C 5.6 10/23/2024       Nutrition Specific Medications:  acetaZOLAMIDE, 500 mg, oral, q12h LUCIA  brimonidine, 1 drop, Left Eye, TID  cefTRIAXone, 2 g, intravenous, q24h  enoxaparin, 40 mg, subcutaneous, q24h  [Held by provider] lamoTRIgine, 600 mg, oral, BID  polyethylene glycol, 17 g, oral, Daily  potassium chloride CR, 10 mEq, oral, Daily  prednisoLONE acetate, 1 drop, Left Eye, 4x daily      I/O:   Last BM Date: 12/24/24;      Dietary Orders (From admission, onward)       Start     Ordered    12/25/24 2057  May Participate in Room Service  ( ROOM SERVICE MAY PARTICIPATE)  Once        Question:  .  Answer:  Yes    12/25/24 2057 12/25/24 1709  Adult diet Regular, Consistent Carb, Cardiac; CCD 75 gm/meal; 70 gm fat; 2 - 3 grams Sodium  Diet effective now        Question Answer Comment   Diet type Regular    Diet type Consistent Carb    Diet type Cardiac    Carb diet selection: CCD 75 gm/meal    Fat restriction: 70 gm fat    Sodium restriction: 2 - 3 grams Sodium        12/25/24 1708                    Estimated Needs:   Total Energy Estimated Needs (kCal): 2195 kCal  Method for Estimating Needs: 30kcals/kg IBW  Total Protein Estimated Needs (g): 73 g  Method for Estimating Needs: 1g/kg IBW  Total Fluid Estimated Needs (mL): 2195 mL  Method for Estimating Needs: 1ml/kcal or per MD        Nutrition Diagnosis   Malnutrition Diagnosis  Patient has Malnutrition Diagnosis: No    Nutrition Diagnosis  Patient has Nutrition Diagnosis: Yes  Diagnosis Status (1): New  Nutrition Diagnosis 1: Inadequate energy intake  Related to (1): nausea and vomiting  As Evidenced by (1): N/V, poor PO intakes       Nutrition  Interventions/Recommendations         Nutrition Prescription:  Individualized Nutrition Prescription Provided for : Individualized nutrition prescription of 2195kcals and 73g of protein to be provided with diet order. Continue with Cons CHO diet, 70g Fat and 2-3g Na.        Nutrition Interventions:   Interventions: Meals and snacks  Meals and Snacks: Carbohydrate-modified diet, Fat-modified diet, Mineral-modified diet  Goal: >50% PO of all meals.    Nutrition Monitoring and Evaluation   Food/Nutrient Related History Monitoring  Monitoring and Evaluation Plan: Energy intake  Energy Intake: Estimated energy intake  Criteria: Continue to monitor PO intakes, goal of >50% of all meals.    Body Composition/Growth/Weight History  Monitoring and Evaluation Plan: Weight  Weight: Weight change  Criteria: Continue to monitor wt status and wt changes.    Biochemical Data, Medical Tests and Procedures  Monitoring and Evaluation Plan: Glucose/endocrine profile, Electrolyte/renal panel    Time Spent (min): 45 minutes

## 2024-12-26 NOTE — PROGRESS NOTES
"   12/26/24 1752   Discharge Planning   Living Arrangements Alone   Support Systems Friends/neighbors;Family members   Assistance Needed pt denies. legally blind. medication management is a challenge. pt is forgetful and he contradicts himself when discussing hismedications. he is forgetful. has CM w/ caresource i caled and left a vm. reports he walks or takes public transit. has ins coverage for medical appts. reports multiple falls. reports thi sis not knew and pcp is aware, it is d/t his blindness. he uses a\" white cane\". pt can use hi sphone and directed me to his cm 's ph #     pt denies needs. Confirms demo's and pcp.  legally blind. medication management is a challenge. pt is forgetful and he contradicts himself when discussing his medications. he is forgetful. has CM w/ caresource i called and left a vm. reports he walks or takes public transit. has ins coverage for medical appts. reports multiple falls. reports this is not new and pcp is aware, it is d/t his blindness. he uses a\" white cane\". pt can use his phone and directed me to his cm 's ph #. Asked  JANIE to assist w/ initiating application for passport services. Pt reports to me he has no benefit for passport or for C.  Pt has [facility] benefit. Call placed to  re: passport benefit. LVM.   is Ivory Ragland [phone]. I spoke w/ pt's sister neena she will assist w/ application f or passport service but does not have access to printing/faxing services.   "

## 2024-12-26 NOTE — CONSULTS
Inpatient consult to Neurology  Consult performed by: Nahun Streeter MD  Consult ordered by: Ana Palmer PA-C          History Of Present Illness  Be Luo is a 64 y.o. 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  Patient states that he had shaking of his extremities associated with nausea and vomiting.  Does note a history of seizures for which he is compliant with his medications but this episode felt different.  He explained that his seizures typically have an aura prior to occurring whereas this episode did not.  States that he normally is able to go to sleep and the shaking goes away, however the shaking did not stop this time.  Does not recognize a pattern to the episodes.  Unsure of what makes symptoms worse.  Also endorses dizziness, unable to describe sensation, and generalized weakness.  Denies chest pain, shortness of breath, abdominal pain, current nausea or vomiting, headache, vision changes, numbness, tingling, fever, chills.  Admits to recent travel to [place] last week.  Denies ill contacts.  Is a former smoker, quit about 15 months ago.  Denies alcohol use.  Admits to marijuana use.  Denies use of blood thinners at home.     Of note, the patient was seen in the ED yesterday for a chief complaint of dizziness, explained as the room spinning.  At that time CT head without acute intracranial process and CXR with mild left basilar atelectasis.     ED course: Patient was treated with 2 courses of Ativan for a possible witnessed seizure.  According to the ED provider, he has had no more shaking episodes since.  UA suspicious for infection, was started on Rocephin.  Given Zofran for nausea.  BMP relatively unremarkable.  CBC with mild anemia of hemoglobin 12.8.  COVID and flu negative.  CT head without acute intracranial abnormality.  CXR without acute  cardiopulmonary process.  EKG in NSR.  Patient will be admitted for further evaluation and treatment.    Past Medical History  Past Medical History:   Diagnosis Date    Blind one eye     Hypertension      Surgical History  Past Surgical History:   Procedure Laterality Date    CARDIAC CATHETERIZATION N/A 2024    Procedure: Left Heart Cath, With LV;  Surgeon: Cori Sullivan MD;  Location: [facility];  Service: Cardiovascular;  Laterality: N/A;     Social History  Social History     Tobacco Use    Smoking status: Former     Current packs/day: 0.00     Types: Cigarettes     Quit date: 2023     Years since quittin.3   Vaping Use    Vaping status: Never Used   Substance Use Topics    Alcohol use: Not Currently    Drug use: Yes     Types: Marijuana     Allergies  Green tea, Gabapentin, Lyrica [pregabalin], Norco [hydrocodone-acetaminophen], Prednisone, Sulfasalazine, and Vioxx [rofecoxib]  Medications Prior to Admission   Medication Sig Dispense Refill Last Dose/Taking    aspirin 81 mg EC tablet Take 1 tablet (81 mg) by mouth once daily.   Past Week    atropine 1 % ophthalmic solution 1 drop 3 times a day.   Past Week    brimonidine (AlphaGAN) 0.2 % ophthalmic solution Administer 1 drop into the left eye 3 times a day.   Past Week    cholecalciferol (Vitamin D-3) 50 mcg (2,000 unit) capsule Take 1 capsule (50 mcg) by mouth once daily with breakfast.   Past Week    cyclobenzaprine (Flexeril) 10 mg tablet Take 1 tablet (10 mg) by mouth 3 times a day as needed for muscle spasms.   Past Week    lamoTRIgine (LaMICtal) 200 mg tablet Take 3 tablets (600 mg) by mouth 2 times a day.   2024    metFORMIN (Glucophage) 500 mg tablet Take 1 tablet (500 mg) by mouth once daily in the evening. Take with meals.   Past Week    montelukast (Singulair) 10 mg tablet Take 1 tablet (10 mg) by mouth once daily at bedtime.   Past Week    potassium chloride CR 10 mEq ER tablet Take 1 tablet (10 mEq) by mouth once daily.    12/25/2024    pramipexole (Mirapex) 0.25 mg tablet Take 1 tablet (0.25 mg) by mouth once daily at bedtime.   Past Week    prednisoLONE acetate (Pred-Forte) 1 % ophthalmic suspension Administer 1 drop into the left eye 4 times a day.   Past Week    Rhopressa 0.02 % drops opthalmic solution Administer 1 drop into the left eye once daily at bedtime.   Past Week    rosuvastatin (Crestor) 10 mg tablet Take 1 tablet (10 mg) by mouth once daily at bedtime.   Past Week    acetaZOLAMIDE (Diamox) 500 mg 12 hr capsule Take 1 capsule (500 mg) by mouth every 12 hours.   Unknown    Actemra ACTPen 162 mg/0.9 mL Inject under the skin every 7 days. Friday   Unknown    albuterol 2.5 mg /3 mL (0.083 %) nebulizer solution Inhale 3 mL (2.5 mg) every 6 hours if needed.   Unknown    albuterol 90 mcg/actuation aerosol powdr breath activated inhaler Inhale 2 puffs every 6 hours if needed for wheezing.   Unknown    dorzolamide-timoloL (Cosopt) 22.3-6.8 mg/mL ophthalmic solution Administer 1 drop into the left eye 2 times a day. (Patient not taking: Reported on 12/25/2024)   Unknown    meclizine (Antivert) 25 mg tablet Take 1 tablet (25 mg) by mouth 3 times a day as needed for dizziness for up to 10 days. (Patient not taking: Reported on 12/25/2024) 30 tablet 0 Unknown    medical cannabis Take 1 each by mouth.  A few times a day, for his eyes.   Unknown    multivitamin tablet Take 1 tablet by mouth once daily.   Unknown    naloxone (Narcan) 4 mg/0.1 mL nasal spray Administer 1 spray (4 mg) into affected nostril(s) if needed for opioid reversal for up to 1 dose. May repeat every 2-3 minutes if needed, alternating nostrils, until medical assistance becomes available. 2 each 0     ondansetron (Zofran) 4 mg tablet Take 1 tablet (4 mg) by mouth every 8 hours if needed.   Unknown    ondansetron ODT (Zofran-ODT) 4 mg disintegrating tablet Dissolve 1 tablet (4 mg) in the mouth every 8 hours if needed for nausea or vomiting for up to 7 days.  (Patient not taking: Reported on 12/25/2024) 20 tablet 0 Unknown    oxyCODONE (Roxicodone) 5 mg immediate release tablet Take 1 tablet (5 mg) by mouth every 6 hours if needed for moderate pain (4 - 6). (Patient not taking: Reported on 12/25/2024) 15 tablet 0 Unknown    valsartan (Diovan) 40 mg tablet Take 1 tablet (40 mg) by mouth once daily. (Patient taking differently: Take 1 tablet (40 mg) by mouth once daily at bedtime.) 90 tablet 3 Unknown     Results for orders placed or performed during the hospital encounter of 12/25/24 (from the past 96 hours)   Blood Culture    Specimen: Peripheral Venipuncture; Blood culture   Result Value Ref Range    Blood Culture No growth at 1 day    CBC and Auto Differential   Result Value Ref Range    WBC 5.3 4.4 - 11.3 x10*3/uL    nRBC 0.0 0.0 - 0.0 /100 WBCs    RBC 3.95 (L) 4.50 - 5.90 x10*6/uL    Hemoglobin 12.8 (L) 13.5 - 17.5 g/dL    Hematocrit 38.2 (L) 41.0 - 52.0 %    MCV 97 80 - 100 fL    MCH 32.4 26.0 - 34.0 pg    MCHC 33.5 32.0 - 36.0 g/dL    RDW 12.7 11.5 - 14.5 %    Platelets 199 150 - 450 x10*3/uL    Neutrophils % 54.8 40.0 - 80.0 %    Immature Granulocytes %, Automated 0.4 0.0 - 0.9 %    Lymphocytes % 23.8 13.0 - 44.0 %    Monocytes % 13.2 2.0 - 10.0 %    Eosinophils % 7.0 0.0 - 6.0 %    Basophils % 0.8 0.0 - 2.0 %    Neutrophils Absolute 2.91 1.20 - 7.70 x10*3/uL    Immature Granulocytes Absolute, Automated 0.02 0.00 - 0.70 x10*3/uL    Lymphocytes Absolute 1.26 1.20 - 4.80 x10*3/uL    Monocytes Absolute 0.70 0.10 - 1.00 x10*3/uL    Eosinophils Absolute 0.37 0.00 - 0.70 x10*3/uL    Basophils Absolute 0.04 0.00 - 0.10 x10*3/uL   Comprehensive Metabolic Panel   Result Value Ref Range    Glucose 108 (H) 74 - 99 mg/dL    Sodium 140 136 - 145 mmol/L    Potassium 3.7 3.5 - 5.3 mmol/L    Chloride 110 (H) 98 - 107 mmol/L    Bicarbonate 24 21 - 32 mmol/L    Anion Gap 10 10 - 20 mmol/L    Urea Nitrogen 15 6 - 23 mg/dL    Creatinine 0.83 0.50 - 1.30 mg/dL    eGFR >90 >60  mL/min/1.73m*2    Calcium 8.6 8.6 - 10.3 mg/dL    Albumin 4.1 3.4 - 5.0 g/dL    Alkaline Phosphatase 63 33 - 136 U/L    Total Protein 5.9 (L) 6.4 - 8.2 g/dL    AST 16 9 - 39 U/L    Bilirubin, Total 0.6 0.0 - 1.2 mg/dL    ALT 20 10 - 52 U/L   Lactate   Result Value Ref Range    Lactate 1.4 0.4 - 2.0 mmol/L   Blood Culture    Specimen: Peripheral Venipuncture; Blood culture   Result Value Ref Range    Blood Culture No growth at 1 day    Troponin I, High Sensitivity   Result Value Ref Range    Troponin I, High Sensitivity 5 0 - 20 ng/L   Blood Gas Venous Full Panel   Result Value Ref Range    POCT pH, Venous 7.38 7.33 - 7.43 pH    POCT pCO2, Venous 42 41 - 51 mm Hg    POCT pO2, Venous 55 (H) 35 - 45 mm Hg    POCT SO2, Venous 91 (H) 45 - 75 %    POCT Oxy Hemoglobin, Venous 88.9 (H) 45.0 - 75.0 %    POCT Hematocrit Calculated, Venous 37.0 (L) 41.0 - 52.0 %    POCT Sodium, Venous 138 136 - 145 mmol/L    POCT Potassium, Venous 3.7 3.5 - 5.3 mmol/L    POCT Chloride, Venous 108 (H) 98 - 107 mmol/L    POCT Ionized Calicum, Venous 1.17 1.10 - 1.33 mmol/L    POCT Glucose, Venous 92 74 - 99 mg/dL    POCT Lactate, Venous 1.0 0.4 - 2.0 mmol/L    POCT Base Excess, Venous -0.4 -2.0 - 3.0 mmol/L    POCT HCO3 Calculated, Venous 24.8 22.0 - 26.0 mmol/L    POCT Hemoglobin, Venous 12.4 (L) 13.5 - 17.5 g/dL    POCT Anion Gap, Venous 9.0 (L) 10.0 - 25.0 mmol/L    Patient Temperature      FiO2 21 %   Lamotrigine   Result Value Ref Range    Lamotrigine  35.2 (HH) 2.5 - 15.0 ug/mL   Sars-CoV-2 and Influenza A/B PCR   Result Value Ref Range    Flu A Result Not Detected Not Detected    Flu B Result Not Detected Not Detected    Coronavirus 2019, PCR Not Detected Not Detected   Electrocardiogram, 12-lead ACS symptoms   Result Value Ref Range    Ventricular Rate 96 BPM    Atrial Rate 100 BPM    MI Interval 180 ms    QRS Duration 106 ms    QT Interval 378 ms    QTC Calculation(Bazett) 477 ms    P Axis 52 degrees    R Axis 87 degrees    T Axis 57  degrees    QRS Count 16 beats    Q Onset 222 ms    P Onset 132 ms    P Offset 172 ms    T Offset 411 ms    QTC Fredericia 442 ms   Urinalysis with Reflex Culture and Microscopic   Result Value Ref Range    Color, Urine Dark-Brown (N) Light-Yellow, Yellow, Dark-Yellow    Appearance, Urine Ex.Turbid (N) Clear    Specific Gravity, Urine 1.017 1.005 - 1.035    pH, Urine 6.0 5.0, 5.5, 6.0, 6.5, 7.0, 7.5, 8.0    Protein, Urine 70 (1+) (A) NEGATIVE, 10 (TRACE), 20 (TRACE) mg/dL    Glucose, Urine Normal Normal mg/dL    Blood, Urine OVER (3+) (A) NEGATIVE    Ketones, Urine NEGATIVE NEGATIVE mg/dL    Bilirubin, Urine NEGATIVE NEGATIVE    Urobilinogen, Urine Normal Normal mg/dL    Nitrite, Urine NEGATIVE NEGATIVE    Leukocyte Esterase, Urine 250 Zane/µL (A) NEGATIVE   Extra Urine Gray Tube   Result Value Ref Range    Extra Tube Hold for add-ons.    Microscopic Only, Urine   Result Value Ref Range    WBC, Urine >50 (A) 1-5, NONE /HPF    RBC, Urine >20 (A) NONE, 1-2, 3-5 /HPF    Bacteria, Urine 1+ (A) NONE SEEN /HPF    Mucus, Urine FEW Reference range not established. /LPF   Urine Culture    Specimen: Clean Catch/Voided; Urine   Result Value Ref Range    Urine Culture No growth    SST TOP   Result Value Ref Range    Extra Tube Hold for add-ons.    Magnesium   Result Value Ref Range    Magnesium 2.00 1.60 - 2.40 mg/dL   Basic Metabolic Panel   Result Value Ref Range    Glucose 85 74 - 99 mg/dL    Sodium 143 136 - 145 mmol/L    Potassium 3.9 3.5 - 5.3 mmol/L    Chloride 111 (H) 98 - 107 mmol/L    Bicarbonate 28 21 - 32 mmol/L    Anion Gap 8 (L) 10 - 20 mmol/L    Urea Nitrogen 10 6 - 23 mg/dL    Creatinine 0.90 0.50 - 1.30 mg/dL    eGFR >90 >60 mL/min/1.73m*2    Calcium 8.5 (L) 8.6 - 10.3 mg/dL   CBC   Result Value Ref Range    WBC 4.2 (L) 4.4 - 11.3 x10*3/uL    nRBC 0.0 0.0 - 0.0 /100 WBCs    RBC 3.77 (L) 4.50 - 5.90 x10*6/uL    Hemoglobin 12.2 (L) 13.5 - 17.5 g/dL    Hematocrit 37.1 (L) 41.0 - 52.0 %    MCV 98 80 - 100 fL    MCH  32.4 26.0 - 34.0 pg    MCHC 32.9 32.0 - 36.0 g/dL    RDW 12.7 11.5 - 14.5 %    Platelets 192 150 - 450 x10*3/uL   Magnesium   Result Value Ref Range    Magnesium 2.06 1.60 - 2.40 mg/dL   Basic Metabolic Panel   Result Value Ref Range    Glucose 90 74 - 99 mg/dL    Sodium 140 136 - 145 mmol/L    Potassium 3.5 3.5 - 5.3 mmol/L    Chloride 107 98 - 107 mmol/L    Bicarbonate 26 21 - 32 mmol/L    Anion Gap 11 10 - 20 mmol/L    Urea Nitrogen 13 6 - 23 mg/dL    Creatinine 0.76 0.50 - 1.30 mg/dL    eGFR >90 >60 mL/min/1.73m*2    Calcium 8.4 (L) 8.6 - 10.3 mg/dL   CBC   Result Value Ref Range    WBC 4.6 4.4 - 11.3 x10*3/uL    nRBC 0.0 0.0 - 0.0 /100 WBCs    RBC 4.18 (L) 4.50 - 5.90 x10*6/uL    Hemoglobin 13.6 13.5 - 17.5 g/dL    Hematocrit 41.1 41.0 - 52.0 %    MCV 98 80 - 100 fL    MCH 32.5 26.0 - 34.0 pg    MCHC 33.1 32.0 - 36.0 g/dL    RDW 12.5 11.5 - 14.5 %    Platelets 209 150 - 450 x10*3/uL   SST TOP   Result Value Ref Range    Extra Tube Hold for add-ons.          Scheduled medications  aspirin, 81 mg, oral, Daily  atropine, 1 drop, Left Eye, TID  brimonidine, 1 drop, Left Eye, TID  cefTRIAXone, 2 g, intravenous, q24h  enoxaparin, 40 mg, subcutaneous, q24h  [Held by provider] lamoTRIgine, 600 mg, oral, BID  montelukast, 10 mg, oral, Nightly  netarsudiL, 1 drop, Left Eye, Nightly  polyethylene glycol, 17 g, oral, Daily  potassium chloride CR, 10 mEq, oral, Daily  pramipexole, 0.25 mg, oral, Nightly  prednisoLONE acetate, 1 drop, Left Eye, 4x daily  rosuvastatin, 10 mg, oral, Nightly  valsartan, 40 mg, oral, Nightly        Results for orders placed or performed during the hospital encounter of 12/25/24 (from the past 96 hours)   Blood Culture    Specimen: Peripheral Venipuncture; Blood culture   Result Value Ref Range    Blood Culture Loaded on Instrument - Culture in progress    CBC and Auto Differential   Result Value Ref Range    WBC 5.3 4.4 - 11.3 x10*3/uL    nRBC 0.0 0.0 - 0.0 /100 WBCs    RBC 3.95 (L) 4.50 - 5.90  x10*6/uL    Hemoglobin 12.8 (L) 13.5 - 17.5 g/dL    Hematocrit 38.2 (L) 41.0 - 52.0 %    MCV 97 80 - 100 fL    MCH 32.4 26.0 - 34.0 pg    MCHC 33.5 32.0 - 36.0 g/dL    RDW 12.7 11.5 - 14.5 %    Platelets 199 150 - 450 x10*3/uL    Neutrophils % 54.8 40.0 - 80.0 %    Immature Granulocytes %, Automated 0.4 0.0 - 0.9 %    Lymphocytes % 23.8 13.0 - 44.0 %    Monocytes % 13.2 2.0 - 10.0 %    Eosinophils % 7.0 0.0 - 6.0 %    Basophils % 0.8 0.0 - 2.0 %    Neutrophils Absolute 2.91 1.20 - 7.70 x10*3/uL    Immature Granulocytes Absolute, Automated 0.02 0.00 - 0.70 x10*3/uL    Lymphocytes Absolute 1.26 1.20 - 4.80 x10*3/uL    Monocytes Absolute 0.70 0.10 - 1.00 x10*3/uL    Eosinophils Absolute 0.37 0.00 - 0.70 x10*3/uL    Basophils Absolute 0.04 0.00 - 0.10 x10*3/uL   Comprehensive Metabolic Panel   Result Value Ref Range    Glucose 108 (H) 74 - 99 mg/dL    Sodium 140 136 - 145 mmol/L    Potassium 3.7 3.5 - 5.3 mmol/L    Chloride 110 (H) 98 - 107 mmol/L    Bicarbonate 24 21 - 32 mmol/L    Anion Gap 10 10 - 20 mmol/L    Urea Nitrogen 15 6 - 23 mg/dL    Creatinine 0.83 0.50 - 1.30 mg/dL    eGFR >90 >60 mL/min/1.73m*2    Calcium 8.6 8.6 - 10.3 mg/dL    Albumin 4.1 3.4 - 5.0 g/dL    Alkaline Phosphatase 63 33 - 136 U/L    Total Protein 5.9 (L) 6.4 - 8.2 g/dL    AST 16 9 - 39 U/L    Bilirubin, Total 0.6 0.0 - 1.2 mg/dL    ALT 20 10 - 52 U/L   Lactate   Result Value Ref Range    Lactate 1.4 0.4 - 2.0 mmol/L   Blood Culture    Specimen: Peripheral Venipuncture; Blood culture   Result Value Ref Range    Blood Culture Loaded on Instrument - Culture in progress    Troponin I, High Sensitivity   Result Value Ref Range    Troponin I, High Sensitivity 5 0 - 20 ng/L   Blood Gas Venous Full Panel   Result Value Ref Range    POCT pH, Venous 7.38 7.33 - 7.43 pH    POCT pCO2, Venous 42 41 - 51 mm Hg    POCT pO2, Venous 55 (H) 35 - 45 mm Hg    POCT SO2, Venous 91 (H) 45 - 75 %    POCT Oxy Hemoglobin, Venous 88.9 (H) 45.0 - 75.0 %    POCT  Hematocrit Calculated, Venous 37.0 (L) 41.0 - 52.0 %    POCT Sodium, Venous 138 136 - 145 mmol/L    POCT Potassium, Venous 3.7 3.5 - 5.3 mmol/L    POCT Chloride, Venous 108 (H) 98 - 107 mmol/L    POCT Ionized Calicum, Venous 1.17 1.10 - 1.33 mmol/L    POCT Glucose, Venous 92 74 - 99 mg/dL    POCT Lactate, Venous 1.0 0.4 - 2.0 mmol/L    POCT Base Excess, Venous -0.4 -2.0 - 3.0 mmol/L    POCT HCO3 Calculated, Venous 24.8 22.0 - 26.0 mmol/L    POCT Hemoglobin, Venous 12.4 (L) 13.5 - 17.5 g/dL    POCT Anion Gap, Venous 9.0 (L) 10.0 - 25.0 mmol/L    Patient Temperature      FiO2 21 %   Lamotrigine   Result Value Ref Range    Lamotrigine  35.2 (HH) 2.5 - 15.0 ug/mL   Sars-CoV-2 and Influenza A/B PCR   Result Value Ref Range    Flu A Result Not Detected Not Detected    Flu B Result Not Detected Not Detected    Coronavirus 2019, PCR Not Detected Not Detected   Electrocardiogram, 12-lead ACS symptoms   Result Value Ref Range    Ventricular Rate 96 BPM    Atrial Rate 100 BPM    SD Interval 180 ms    QRS Duration 106 ms    QT Interval 378 ms    QTC Calculation(Bazett) 477 ms    P Axis 52 degrees    R Axis 87 degrees    T Axis 57 degrees    QRS Count 16 beats    Q Onset 222 ms    P Onset 132 ms    P Offset 172 ms    T Offset 411 ms    QTC Fredericia 442 ms   Urinalysis with Reflex Culture and Microscopic   Result Value Ref Range    Color, Urine Dark-Brown (N) Light-Yellow, Yellow, Dark-Yellow    Appearance, Urine Ex.Turbid (N) Clear    Specific Gravity, Urine 1.017 1.005 - 1.035    pH, Urine 6.0 5.0, 5.5, 6.0, 6.5, 7.0, 7.5, 8.0    Protein, Urine 70 (1+) (A) NEGATIVE, 10 (TRACE), 20 (TRACE) mg/dL    Glucose, Urine Normal Normal mg/dL    Blood, Urine OVER (3+) (A) NEGATIVE    Ketones, Urine NEGATIVE NEGATIVE mg/dL    Bilirubin, Urine NEGATIVE NEGATIVE    Urobilinogen, Urine Normal Normal mg/dL    Nitrite, Urine NEGATIVE NEGATIVE    Leukocyte Esterase, Urine 250 Zane/µL (A) NEGATIVE   Extra Urine Gray Tube   Result Value Ref Range  "   Extra Tube Hold for add-ons.    Microscopic Only, Urine   Result Value Ref Range    WBC, Urine >50 (A) 1-5, NONE /HPF    RBC, Urine >20 (A) NONE, 1-2, 3-5 /HPF    Bacteria, Urine 1+ (A) NONE SEEN /HPF    Mucus, Urine FEW Reference range not established. /LPF   SST TOP   Result Value Ref Range    Extra Tube Hold for add-ons.    Magnesium   Result Value Ref Range    Magnesium 2.00 1.60 - 2.40 mg/dL   Basic Metabolic Panel   Result Value Ref Range    Glucose 85 74 - 99 mg/dL    Sodium 143 136 - 145 mmol/L    Potassium 3.9 3.5 - 5.3 mmol/L    Chloride 111 (H) 98 - 107 mmol/L    Bicarbonate 28 21 - 32 mmol/L    Anion Gap 8 (L) 10 - 20 mmol/L    Urea Nitrogen 10 6 - 23 mg/dL    Creatinine 0.90 0.50 - 1.30 mg/dL    eGFR >90 >60 mL/min/1.73m*2    Calcium 8.5 (L) 8.6 - 10.3 mg/dL   CBC   Result Value Ref Range    WBC 4.2 (L) 4.4 - 11.3 x10*3/uL    nRBC 0.0 0.0 - 0.0 /100 WBCs    RBC 3.77 (L) 4.50 - 5.90 x10*6/uL    Hemoglobin 12.2 (L) 13.5 - 17.5 g/dL    Hematocrit 37.1 (L) 41.0 - 52.0 %    MCV 98 80 - 100 fL    MCH 32.4 26.0 - 34.0 pg    MCHC 32.9 32.0 - 36.0 g/dL    RDW 12.7 11.5 - 14.5 %    Platelets 192 150 - 450 x10*3/uL         Review of Systems  Neurological Exam  Physical Exam  Last Recorded Vitals  Blood pressure 155/82, pulse 100, temperature 37.5 °C (99.5 °F), temperature source Temporal, resp. rate 16, height 1.778 m (5' 10\"), weight 99.8 kg (220 lb), SpO2 93%.    GENERAL APPEARANCE:  No distress, alert and cooperative.     MENTAL STATE:  Orientation was normal to time, place and person. .         CRANIAL NERVES:  Cranial nerves were normal.      CN 2- Visual Acuity decreased bilateral visual acuity ( right more than left).        CN 3, 4, 6-  Pupils round, 4 mm in diameter, equally reactive to light. No ptosis. EOMs normal alignment, full range of movement, no nystagmus     CN 5- Facial sensation intact bilaterally. Normal corneal reflexes.      CN 7- Normal and symmetric facial strength. Nasolabial folds " symmetric.     CN 8- Hearing intact to finger rub, whisper.      CN 9- Palate elevates symmetrically. Normal gag reflex.      CN 11- Normal strength of shoulder shrug and neck turning      CN 12- Tongue midline, with normal bulk and strength; no fasciculations.     MOTOR:  Motor exam was normal. Muscle bulk and tone were normal in both upper and lower extremities. Muscle strength was 5/5 in distal and proximal muscles in both upper and lower extremities. No fasciculations, tremor or other abnormal movements were present.     REFLEXES:  Right/ Left:  Biceps 2/2, brachioradialis 2/2, triceps 2/2, patellar 2/2, ankle 2/2  Babinski: toes downgoing to plantar stimulation. No clonus, frontal release signs or other pathologic reflexes present.     SENSORY: Sensory exam was intact to light touch, sharp/dull, vibration and position sense in both UE and LE.     COORDINATION:gait: not tested.        [place] Coma Scale  Best Eye Response: Spontaneous  Best Verbal Response: Oriented  Best Motor Response: Follows commands  [place] Coma Scale Score: 15                 I have personally reviewed the following imaging results XR chest 1 view    Result Date: 12/25/2024  STUDY: Chest Radiograph;  12/25/2024 1:44 PM INDICATION: Seizure. COMPARISON: XR chest 12/24/2024 ACCESSION NUMBER(S): [id number] ORDERING CLINICIAN: JUJU JIMENEZ TECHNIQUE:  Frontal chest was obtained at 13:41 hours. FINDINGS: CARDIOMEDIASTINAL SILHOUETTE: Cardiomediastinal silhouette is normal in size and configuration.  LUNGS: Lungs are clear.  ABDOMEN: No remarkable upper abdominal findings.  BONES: No acute osseous changes.    No acute cardiopulmonary abnormality. Signed by Tommy Dietz    CT head wo IV contrast    Result Date: 12/25/2024  Interpreted By:  Jennifer Valenzuela, STUDY: CT HEAD WO IV CONTRAST; ;  12/25/2024 1:51 pm   INDICATION: Signs/Symptoms:AMS.   COMPARISON: 12/24/2024   ACCESSION NUMBER(S): [id number]   ORDERING CLINICIAN: JUJU JIMENEZ    TECHNIQUE: Serial axial images of the head were obtained without intravenous contrast. Sagittal and coronal reconstructions were generated.   FINDINGS: Ventricles are midline and normal in size. There are no acute parenchymal abnormalities.   There is no hemorrhage or extra-axial fluid.   There is no obvious scalp hematoma or skull fracture.   The visualized portions of the mastoids are unremarkable. The patient is status post previous and prostate knees.   The patient is status post right I surgery.   COMPARISON OF FINDINGS: The brain is similar.       No acute intracranial abnormality.     MACRO: none   Signed by: Jennifer Valenzuela 12/25/2024 2:45 PM Dictation workstation:   [id number]    Electrocardiogram, 12-lead ACS symptoms    Result Date: 12/25/2024  Normal sinus rhythm Normal ECG When compared with ECG of 24-DEC-2024 21:45, (unconfirmed) Sinus rhythm has replaced Junctional rhythm    ECG 12 lead    Result Date: 12/25/2024  Accelerated Junctional rhythm Abnormal ECG When compared with ECG of 05-DEC-2024 12:44, Junctional rhythm has replaced Sinus rhythm    CT abdomen pelvis wo IV contrast    Result Date: 12/25/2024  Interpreted By:  Joey Haines, STUDY: CT ABDOMEN PELVIS WO IV CONTRAST;  12/25/2024 12:08 am   INDICATION: Signs/Symptoms:Assess for obstructive uropathy.   COMPARISON: CT abdomen pelvis 12/21/2024.   ACCESSION NUMBER(S): [id number]   ORDERING CLINICIAN: ROCCO GARCIA   TECHNIQUE: CT of the abdomen and pelvis was performed. Contiguous axial images were obtained through the abdomen and pelvis. Coronal and sagittal reconstructions at 3 mm slice thickness were performed.  No intravenous contrast was administered.   FINDINGS: Please note that the evaluation of vessels, lymph nodes and organs is limited without intravenous contrast.   LOWER CHEST: There is no acute abnormality in the lower chest. The heart is normal in size without evidence of pericardial effusion. No pleural effusion is present.  There is a calcified granuloma in the lateral right lung.   ABDOMEN:   LIVER: The liver is normal in size and contour.   BILE DUCTS: The intrahepatic and extrahepatic ducts are not dilated.   GALLBLADDER: The gallbladder is nondistended and without evidence of radiopaque stones.   PANCREAS: The pancreas is not enlarged.   SPLEEN: Within normal limits.   ADRENAL GLANDS: Within normal limits.   KIDNEYS AND URETERS: The kidneys are normal in size and contour. There is unchanged appearance of the bilateral internal nephrostomy tubes extending from the bilateral renal pelvis to the bladder.. Mild fullness of the left renal collecting system similar to prior without harsh hydronephrosis. No nephrolithiasis. There is similar appearance of fat stranding along the course of the left ureter, which may be chronic inflammatory.   PELVIS:   BLADDER: The urinary bladder is decompressed by a Thapa catheter, limiting assessment.   REPRODUCTIVE ORGANS: Prostate is not enlarged.   BOWEL: The stomach is incompletely distended, limiting evaluation for focal wall thickening. There is no bowel wall dilatation or obstruction. The appendix is normal. There is moderate amount of stool throughout the colon without wall thickening or acute inflammatory change. Sigmoid diverticulosis without diverticulitis..   VESSELS: Unchanged infrarenal abdominal aortic aneurysm measuring 3.9 x 3.8 cm axially. Moderate atherosclerotic calcifications of the abdominal aorta and its branches.   PERITONEUM/RETROPERITONEUM/LYMPH NODES: There is no ascites or intraperitoneal free air. There is no lymphadenopathy by CT criteria.   ABDOMINAL WALL: The abdominal wall soft tissues appear normal.   BONES: No suspicious osseous lesions are identified. Degenerative discogenic disease is noted in the lower thoracic and lumbar spine.       1.  Unchanged appearance of the bilateral internal nephrostomy tubes. Mild fullness of the left renal collecting system without harsh  hydronephrosis similar to prior. No nephrolithiasis. Unchanged inflammatory change along the course of the left ureter, which may be chronic inflammatory. Bladder is decompressed by Thapa catheter limiting assessment without gross abnormality. 2. Unchanged infrarenal abdominal aortic aneurysm.     Signed by: Joey Haines 12/25/2024 12:22 AM Dictation workstation:   [id number]    XR chest 1 view    Result Date: 12/24/2024  Interpreted By:  Michael Beebe, STUDY: XR CHEST 1 VIEW;  12/24/2024 9:56 pm   INDICATION: Signs/Symptoms:Shortness of breath.   COMPARISON: 7/3/2024   ACCESSION NUMBER(S): [id number]   ORDERING CLINICIAN: ROCCO GARCIA   FINDINGS: The cardiac silhouette is normal in size. Mild left basilar atelectasis. No focal airspace consolidation or pleural effusion. No pneumothorax.       Mild left basilar atelectasis.   MACRO: None   Signed by: Michael Beebe 12/24/2024 10:37 PM Dictation workstation:   [id number]    CT head wo IV contrast    Result Date: 12/24/2024  Interpreted By:  Michael Beebe, STUDY: CT HEAD WO IV CONTRAST;  12/24/2024 10:06 pm   INDICATION: Signs/Symptoms:Dizziness.   COMPARISON: 7/4/2024   ACCESSION NUMBER(S): [id number]   ORDERING CLINICIAN: ROCCO GARCIA   TECHNIQUE: Contiguous axial images of the head were obtained without intravenous contrast.   FINDINGS: BRAIN PARENCHYMA:   The gray white matter differentiation is preserved.  No mass effect or midline shift.   HEMORRHAGE:  No evidence of acute intracranial hemorrhage. VENTRICLES AND EXTRA-AXIAL SPACES:  The ventricles are within normal limits in size for brain volume.  No evidence of abnormal extraaxial fluid collection. EXTRACRANIAL SOFT TISSUES:  Within normal limits. PARANASAL SINUSES/MASTOIDS:  Mucosal retention cyst or polyp in left maxillary sinus. CALVARIUM:  No evidence of depressed calvarial fracture.   OTHER FINDINGS:  Stable chronic irregularity and hyperdensity of the right globe and metallic density in  right orbit.       No evidence of acute intracranial hemorrhage or mass effect.       MACRO: None   Signed by: Michael Basiliocher 12/24/2024 10:36 PM Dictation workstation:   [id number]    CT abdomen pelvis w IV contrast    Result Date: 12/21/2024  STUDY: CT Abdomen and Pelvis with IV Contrast; 12/21/2024 at 1:40 PM. INDICATION: Hematuria, suprapubic pain. COMPARISON: US scrotum 12/21/2024; CT AP 12/6/2024, 12/5/2024. ACCESSION NUMBER(S): [id number] ORDERING CLINICIAN: KUNAL HAUSER TECHNIQUE: CT of the abdomen and pelvis was performed.  Contiguous axial images were obtained at 3 mm slice thickness through the abdomen and pelvis. Coronal and sagittal reconstructions at 3 mm slice thickness were performed.  Omnipaque 350 75 mL was administered intravenously.  FINDINGS: LOWER CHEST: Normal heart size.  No pericardial effusion.  There are atherosclerotic coronary artery calcifications.  Lung bases are clear.  Benign calcified granuloma in the right lung base.  There are also benign calcified mediastinal/hilar lymph nodes present.  ABDOMEN:  LIVER: No hepatomegaly.  Smooth surface contour.  Normal attenuation. Numerous benign calcified granulomas in the liver. No suspicious lesions.  BILE DUCTS: No evidence of biliary ductal dilatation.  GALLBLADDER: The gallbladder is unremarkable in CT appearance. STOMACH: No abnormalities identified.  PANCREAS: No masses or ductal dilatation.  SPLEEN: Numerous benign calcified granulomas in the spleen.  ADRENAL GLANDS: No thickening or nodules.  KIDNEYS AND URETERS: There are bilateral ureteral stents in place extending from the renal pelvis into the bladder on each side. No hydronephrosis. No definite urinary tract calculi are apparent although small stones could be obscured by the presence of the stents. There are inflammatory changes surrounding the ureters bilaterally, left greater than right.  PELVIS:  BLADDER: Moderately distended urinary bladder. The bladder is otherwise not  grossly remarkable in appearance.  REPRODUCTIVE ORGANS: No abnormalities identified.  BOWEL: Relatively pronounced colonic diverticulosis without evidence of associated inflammatory changes.  A normal appendix is suggested.  VESSELS: Prominent atherosclerotic vascular calcifications. 4 cm aneurysm in the infrarenal abdominal aorta, unchanged. No evidence of leak/rupture.   PERITONEUM/RETROPERITONEUM/LYMPH NODES: No free fluid.  No pneumoperitoneum. No lymphadenopathy.  ABDOMINAL WALL: No abnormalities identified. SOFT TISSUES: No abnormalities identified.  BONES: Prominent degenerative changes in the visualized spine.  No acute fracture or aggressive osseous lesion.    1.No acute abnormalities are identified in the abdomen or pelvis. 2.Bilateral ureteral stents in place. No hydronephrosis. No definite urinary tract calculi are apparent although small stones could be obscured by the presence of the stents. There are inflammatory changes surrounding the ureters bilaterally, left greater than right. 3.Moderately distended urinary bladder. The bladder is otherwise not grossly remarkable in appearance. 4.Prominent colonic diverticulosis without evidence of associated inflammatory changes. 5.Prominent atherosclerotic vascular calcifications. 4 cm aneurysm in the infrarenal abdominal aorta, unchanged. No evidence of leak/rupture. 6.Benign calcified granulomas in the right lung base, liver, and spleen. 7.Atherosclerotic coronary artery calcifications. Signed by Héctor Russell MD    US scrotum w doppler    Result Date: 12/21/2024  STUDY: Scrotal Ultrasound; 12/21/2024 1:05 pm. INDICATION:  Testicular soreness for two days. No known trauma. Blood in urine. COMPARISON:  None available. ACCESSION NUMBER(S): [id number] ORDERING CLINICIAN: KUNAL HAUSER TECHNIQUE: Ultrasound of the scrotum and testicular spectral doppler evaluation performed. FINDINGS:    The right testicle measures 4.1 x 1.6 x 2.3 cm.  It demonstrates a normal  homogeneous echotexture.  Normal color and spectral Doppler flow is demonstrated. There is a cystic structure within the right testicle measuring 0.5 x 0.4 x 0.6 cm.   The right epididymis measures 0.9 x 0.9 x 1.1 cm and demonstrates normal echogenicity. The left testicle measures 4.1 x 1.8 x 2.8 cm. It demonstrates a normal homogeneous echotexture.  Normal color and spectral Doppler flow is demonstrated.    The left epididymis measures 0.6 x 1.0 x 0.7 cm and demonstrates normal echogenicity.    6 mm right testicular cyst. Normal testicular spectral Doppler evaluation.  No evidence of testicular torsion Signed by Michael Coon MD    ECG 12 lead    Result Date: 12/14/2024  Normal sinus rhythm Normal ECG When compared with ECG of 05-DEC-2024 12:44, (unconfirmed) AK interval has decreased See ED provider note for full interpretation and clinical correlation Confirmed by Rupal Quinteros ([id number]) on 12/14/2024 8:56:51 PM    Operative Images    Result Date: 12/6/2024  Please see OpNote on Notes tab for findings.    FL fluoro images no charge    Result Date: 12/6/2024  These images are not reportable by radiology and will not be interpreted by  Radiologists.    CT abdomen pelvis wo IV contrast    Result Date: 12/6/2024  Interpreted By:  Aristeo Mancera, STUDY: CT ABDOMEN PELVIS WO IV CONTRAST;  12/6/2024 10:21 am   INDICATION: Signs/Symptoms:Evaluate for stone passage and hydronephrosis.     COMPARISON: CT abdomen and pelvis with contrast 5 December 2024   ACCESSION NUMBER(S): [id number]   ORDERING CLINICIAN: PEDRO ONEILL   TECHNIQUE: CT of the abdomen and pelvis from the lung bases through the symphysis pubis without oral or IV contrast   FINDINGS: There is ongoing, approximately the same degree of right hydroureteronephrosis   Persistent right nephrogram and persistence of excreted contrast on the right side from obstructive uropathy   The presence of excreted contrast in the ureter limits evaluation for the known three  distal ureteral stones which may either still be present and obscured by surrounding excreted contrast or they may have passed, with a persistent distal ureteral spasm or stricture accounting for persistent hydronephrosis and persistent/delayed nephrogram   The right nephrogram is abnormal enough to consider acute uncomplicated pyelonephritis   Vicariously excreted contrast has appeared in the gallbladder, not an acute pathologic finding   No other interval change   No urine leak/urinoma       Presence of excreted contrast in the right ureter from some element of persistent obstructive uropathy on the right. The three stones identified yesterday may still be present but obscured within the distal most column of contrast in the ureter or there may have passed and there is a persistent distal ureteral spasm or stricture   No urine leak/urinoma   Acute uncomplicated pyelonephritis should be considered based on how abnormal the right nephrogram is   MACRO: None   Signed by: Aristeo Mancera 12/6/2024 11:14 AM Dictation workstation:   [id number]    CT abdomen pelvis w IV contrast    Result Date: 12/5/2024  Interpreted By:  Shlomo Lewis, STUDY: CT ABDOMEN PELVIS W IV CONTRAST;  12/5/2024 1:41 pm   INDICATION: Signs/Symptoms:ABD pain.   COMPARISON: 10/09/2024   ACCESSION NUMBER(S): [id number]   ORDERING CLINICIAN: HARSHAL MARISCAL   TECHNIQUE: CT of the abdomen and pelvis was performed.  Contiguous axial images were obtained at 3 mm slice thickness through the abdomen and pelvis. Coronal and sagittal reconstructions at 3 mm slice thickness were performed. 75 mL Omnipaque 350 administered intravenously without immediate complication.   FINDINGS: LOWER CHEST: Mild bibasilar atelectasis. Coronary atherosclerosis.   ABDOMEN:   LIVER: Scattered calcified granulomas.   BILE DUCTS: Not dilated.   GALLBLADDER: No calcified stone or definite inflammation.   PANCREAS: Unremarkable   SPLEEN: Scattered calcified granulomas.   ADRENAL  GLANDS: Mild bilateral nodularity not significantly changed.   KIDNEYS AND URETERS: Previous left hydronephrosis has resolved. New cluster of stones in the distal right ureter measuring 3 mm, 2 mm, and 2 mm, causing mild upstream hydronephrosis. 5 mm calculus in the right lower pole also noted. Tiny right renal cyst.   PELVIS:   BLADDER: Layering dependent calculi. Moderately distended.   REPRODUCTIVE ORGANS: Coarse calcifications of the prostate.   BOWEL: Severe descending and sigmoid colonic diverticulosis. Moderate colonic stool burden. No findings of bowel obstruction or inflammation. Unremarkable appendix.    Unremarkable mesentery.   VESSELS: Severe abdominal aortic atherosclerosis. Abdominal aorta is otherwise patent. Saccular aneurysm of the distal abdominal aorta measuring up to 4.0 cm not significantly changed. Measured major visceral branches appear patent. Ectatic right common iliac artery up to 21 mm. IVC and major branches are grossly patent. Major portal venous branches are patent.   PERITONEUM AND RETROPERITONEUM: No free fluid or free air.    No abdominal or pelvic lymphadenopathy.   BONE AND ABDOMINAL WALL: No acute skeletal findings.   Degenerative changes of the spine.         1.  Interval development of mild right hydronephrosis secondary to 3 clustered calculi in the distal right ureter measuring up to 3 mm. 2. Previous left hydronephrosis with associated ureterovesical junction calculus have resolved. 3. Unchanged 4 cm distal abdominal aortic aneurysm.   MACRO: None.   Signed by: Shlomo Lewis 12/5/2024 1:55 PM Dictation workstation:   [id number]        Assessment/Plan   Assessment & Plan  Seizure-like activity (Multi)    History of seizure disorder, on Lamictal 600 mg po bid with supra-therapeutic Lamictal blood level ( currently on hold) with UTI   ( Both of UTI and supra-therapeutic Lamictal blood level may contribute to seizure breakthrough).  I suggest:  - Agree with holding Lamictal for  now ( to recheck blood level by tomorrow)  - To check EEG and MRI brain ( both ordered)  - Agree with medical management including treatment of UTI  - Seizure precautions: ( the patient the need to avoid driving, operating heavy machines, or being in any condition, should he  have altered mentation may put him or others in danger ( swimming, climbing stairs, behind locked doors,  etc...)    - We will follow up.        I spent 80 minutes in the professional and overall care of this patient.      Nahun Streeter MD

## 2024-12-26 NOTE — SIGNIFICANT EVENT
"I tried to go over this pt's home meds, but he only knows his eye drops and Lamictal, potassium and diamox.  He tells me that \"the doctor\" tells him to throw certain ones out, although he cannot tell my which doctor or why. He said his eye doctor told him to throw out the Keflex for his UTI. He said his PCP told him that the patient \"knows more about his body than anyone else and he should only take what he feels is needed\". He dont remember getting his urethral stent or aldana placed either. His memory is very poor. Pt constantly resorts bact to say if the pharmacist was still at [facility] that he knows, he would know which pills he throws out. He also states that he misses his Lamictal doses \"sometimes\".Per pt, no one is able to get home meds from home. Pt is focused on his perception of closeness between he and his providers and how \"they let me do what I think is best\".  for pt's sister Nunu to call regarding pt's meds. Candelario Baldwin NPO notified.  "

## 2024-12-27 ENCOUNTER — HOSPITAL ENCOUNTER (EMERGENCY)
Facility: HOSPITAL | Age: 64
Discharge: HOME | End: 2024-12-28
Attending: EMERGENCY MEDICINE
Payer: COMMERCIAL

## 2024-12-27 ENCOUNTER — APPOINTMENT (OUTPATIENT)
Dept: NEUROLOGY | Facility: HOSPITAL | Age: 64
DRG: 101 | End: 2024-12-27
Payer: COMMERCIAL

## 2024-12-27 ENCOUNTER — APPOINTMENT (OUTPATIENT)
Dept: RADIOLOGY | Facility: HOSPITAL | Age: 64
DRG: 101 | End: 2024-12-27
Payer: COMMERCIAL

## 2024-12-27 DIAGNOSIS — H20.9 IRITIS: Primary | ICD-10-CM

## 2024-12-27 DIAGNOSIS — H40.052 INCREASED INTRAOCULAR PRESSURE, LEFT: ICD-10-CM

## 2024-12-27 DIAGNOSIS — H15.102 EPISCLERITIS OF LEFT EYE: ICD-10-CM

## 2024-12-27 LAB
ANION GAP SERPL CALC-SCNC: 11 MMOL/L (ref 10–20)
ATRIAL RATE: 105 BPM
BUN SERPL-MCNC: 13 MG/DL (ref 6–23)
CALCIUM SERPL-MCNC: 8.4 MG/DL (ref 8.6–10.3)
CHLORIDE SERPL-SCNC: 107 MMOL/L (ref 98–107)
CO2 SERPL-SCNC: 26 MMOL/L (ref 21–32)
CREAT SERPL-MCNC: 0.76 MG/DL (ref 0.5–1.3)
CRP SERPL-MCNC: <0.1 MG/DL
EGFRCR SERPLBLD CKD-EPI 2021: >90 ML/MIN/1.73M*2
ERYTHROCYTE [DISTWIDTH] IN BLOOD BY AUTOMATED COUNT: 12.5 % (ref 11.5–14.5)
ERYTHROCYTE [SEDIMENTATION RATE] IN BLOOD BY WESTERGREN METHOD: <1 MM/H (ref 0–20)
GLUCOSE SERPL-MCNC: 90 MG/DL (ref 74–99)
HCT VFR BLD AUTO: 41.1 % (ref 41–52)
HGB BLD-MCNC: 13.6 G/DL (ref 13.5–17.5)
HOLD SPECIMEN: NORMAL
LAMOTRIGINE SERPL-MCNC: 9.5 UG/ML (ref 2.5–15)
MAGNESIUM SERPL-MCNC: 2.06 MG/DL (ref 1.6–2.4)
MCH RBC QN AUTO: 32.5 PG (ref 26–34)
MCHC RBC AUTO-ENTMCNC: 33.1 G/DL (ref 32–36)
MCV RBC AUTO: 98 FL (ref 80–100)
NRBC BLD-RTO: 0 /100 WBCS (ref 0–0)
PLATELET # BLD AUTO: 209 X10*3/UL (ref 150–450)
POTASSIUM SERPL-SCNC: 3.5 MMOL/L (ref 3.5–5.3)
Q ONSET: 206 MS
QRS COUNT: 18 BEATS
QRS DURATION: 102 MS
QT INTERVAL: 348 MS
QTC CALCULATION(BAZETT): 468 MS
QTC FREDERICIA: 424 MS
R AXIS: 50 DEGREES
RBC # BLD AUTO: 4.18 X10*6/UL (ref 4.5–5.9)
SODIUM SERPL-SCNC: 140 MMOL/L (ref 136–145)
T AXIS: 55 DEGREES
T OFFSET: 380 MS
VENTRICULAR RATE: 109 BPM
WBC # BLD AUTO: 4.6 X10*3/UL (ref 4.4–11.3)

## 2024-12-27 PROCEDURE — 99285 EMERGENCY DEPT VISIT HI MDM: CPT | Performed by: EMERGENCY MEDICINE

## 2024-12-27 PROCEDURE — 36415 COLL VENOUS BLD VENIPUNCTURE: CPT

## 2024-12-27 PROCEDURE — 95819 EEG AWAKE AND ASLEEP: CPT | Performed by: PSYCHIATRY & NEUROLOGY

## 2024-12-27 PROCEDURE — 97161 PT EVAL LOW COMPLEX 20 MIN: CPT | Mod: GP

## 2024-12-27 PROCEDURE — 2500000001 HC RX 250 WO HCPCS SELF ADMINISTERED DRUGS (ALT 637 FOR MEDICARE OP)

## 2024-12-27 PROCEDURE — 2500000002 HC RX 250 W HCPCS SELF ADMINISTERED DRUGS (ALT 637 FOR MEDICARE OP, ALT 636 FOR OP/ED): Performed by: NURSE PRACTITIONER

## 2024-12-27 PROCEDURE — 99284 EMERGENCY DEPT VISIT MOD MDM: CPT | Performed by: EMERGENCY MEDICINE

## 2024-12-27 PROCEDURE — 80175 DRUG SCREEN QUAN LAMOTRIGINE: CPT | Mod: ELYLAB | Performed by: SPECIALIST

## 2024-12-27 PROCEDURE — 85027 COMPLETE CBC AUTOMATED: CPT

## 2024-12-27 PROCEDURE — 1850000001 HC LEAVE OF ABSENCE - HOSPITAL SERVICES

## 2024-12-27 PROCEDURE — 83735 ASSAY OF MAGNESIUM: CPT

## 2024-12-27 PROCEDURE — 85652 RBC SED RATE AUTOMATED: CPT | Performed by: INTERNAL MEDICINE

## 2024-12-27 PROCEDURE — 2500000004 HC RX 250 GENERAL PHARMACY W/ HCPCS (ALT 636 FOR OP/ED)

## 2024-12-27 PROCEDURE — 97165 OT EVAL LOW COMPLEX 30 MIN: CPT | Mod: GO

## 2024-12-27 PROCEDURE — 99239 HOSP IP/OBS DSCHRG MGMT >30: CPT | Performed by: INTERNAL MEDICINE

## 2024-12-27 PROCEDURE — 86140 C-REACTIVE PROTEIN: CPT | Performed by: INTERNAL MEDICINE

## 2024-12-27 PROCEDURE — 80048 BASIC METABOLIC PNL TOTAL CA: CPT

## 2024-12-27 PROCEDURE — 2500000001 HC RX 250 WO HCPCS SELF ADMINISTERED DRUGS (ALT 637 FOR MEDICARE OP): Performed by: EMERGENCY MEDICINE

## 2024-12-27 PROCEDURE — 95819 EEG AWAKE AND ASLEEP: CPT

## 2024-12-27 PROCEDURE — 99233 SBSQ HOSP IP/OBS HIGH 50: CPT | Performed by: SPECIALIST

## 2024-12-27 PROCEDURE — 36415 COLL VENOUS BLD VENIPUNCTURE: CPT | Performed by: SPECIALIST

## 2024-12-27 PROCEDURE — 2500000005 HC RX 250 GENERAL PHARMACY W/O HCPCS: Performed by: EMERGENCY MEDICINE

## 2024-12-27 RX ORDER — PREDNISOLONE ACETATE 10 MG/ML
1 SUSPENSION/ DROPS OPHTHALMIC
Status: DISCONTINUED | OUTPATIENT
Start: 2024-12-27 | End: 2024-12-28 | Stop reason: HOSPADM

## 2024-12-27 RX ORDER — LATANOPROST 50 UG/ML
1 SOLUTION/ DROPS OPHTHALMIC NIGHTLY
Status: DISCONTINUED | OUTPATIENT
Start: 2024-12-27 | End: 2024-12-28 | Stop reason: HOSPADM

## 2024-12-27 RX ORDER — BRIMONIDINE TARTRATE 2 MG/ML
1 SOLUTION/ DROPS OPHTHALMIC 3 TIMES DAILY
Status: DISCONTINUED | OUTPATIENT
Start: 2024-12-27 | End: 2024-12-28 | Stop reason: HOSPADM

## 2024-12-27 RX ORDER — LATANOPROST 50 UG/ML
1 SOLUTION/ DROPS OPHTHALMIC DAILY
Status: DISCONTINUED | OUTPATIENT
Start: 2024-12-27 | End: 2024-12-27

## 2024-12-27 RX ADMIN — BRIMONIDINE TARTRATE 1 DROP: 2 SOLUTION/ DROPS OPHTHALMIC at 21:57

## 2024-12-27 RX ADMIN — DEXTROSE MONOHYDRATE 2 G: 5 INJECTION INTRAVENOUS at 08:46

## 2024-12-27 RX ADMIN — LATANOPROST 1 DROP: 50 SOLUTION OPHTHALMIC at 21:57

## 2024-12-27 RX ADMIN — NETARSUDIL 1 DROP: 0.2 SOLUTION/ DROPS OPHTHALMIC; TOPICAL at 01:29

## 2024-12-27 RX ADMIN — ASPIRIN 81 MG: 81 TABLET, COATED ORAL at 06:25

## 2024-12-27 RX ADMIN — PREDNISOLONE ACETATE 1 DROP: 10 SUSPENSION/ DROPS OPHTHALMIC at 06:25

## 2024-12-27 RX ADMIN — POTASSIUM CHLORIDE 10 MEQ: 750 TABLET, EXTENDED RELEASE ORAL at 08:46

## 2024-12-27 RX ADMIN — BRIMONIDINE TARTRATE 1 DROP: 2 SOLUTION/ DROPS OPHTHALMIC at 08:46

## 2024-12-27 RX ADMIN — PREDNISOLONE ACETATE 1 DROP: 10 SUSPENSION/ DROPS OPHTHALMIC at 13:45

## 2024-12-27 RX ADMIN — PREDNISOLONE ACETATE 1 DROP: 10 SUSPENSION/ DROPS OPHTHALMIC at 21:57

## 2024-12-27 ASSESSMENT — CONF VISUAL FIELD
OD_SUPERIOR_TEMPORAL_RESTRICTION: 1
OD_SUPERIOR_NASAL_RESTRICTION: 1
OD_INFERIOR_TEMPORAL_RESTRICTION: 1
OS_INFERIOR_NASAL_RESTRICTION: 1
OS_SUPERIOR_NASAL_RESTRICTION: 1
OD_INFERIOR_NASAL_RESTRICTION: 1

## 2024-12-27 ASSESSMENT — PAIN - FUNCTIONAL ASSESSMENT
PAIN_FUNCTIONAL_ASSESSMENT: 0-10

## 2024-12-27 ASSESSMENT — PAIN DESCRIPTION - PAIN TYPE: TYPE: ACUTE PAIN

## 2024-12-27 ASSESSMENT — COGNITIVE AND FUNCTIONAL STATUS - GENERAL
TOILETING: A LITTLE
CLIMB 3 TO 5 STEPS WITH RAILING: A LITTLE
PERSONAL GROOMING: A LITTLE
TURNING FROM BACK TO SIDE WHILE IN FLAT BAD: A LITTLE
WALKING IN HOSPITAL ROOM: A LITTLE
DAILY ACTIVITIY SCORE: 22
DRESSING REGULAR UPPER BODY CLOTHING: A LITTLE
MOBILITY SCORE: 18
STANDING UP FROM CHAIR USING ARMS: A LITTLE
PERSONAL GROOMING: A LITTLE
MOVING TO AND FROM BED TO CHAIR: A LITTLE
MOBILITY SCORE: 19
DAILY ACTIVITIY SCORE: 17
HELP NEEDED FOR BATHING: A LOT
MOVING FROM LYING ON BACK TO SITTING ON SIDE OF FLAT BED WITH BEDRAILS: A LITTLE
DRESSING REGULAR LOWER BODY CLOTHING: A LOT
MOVING TO AND FROM BED TO CHAIR: A LITTLE
STANDING UP FROM CHAIR USING ARMS: A LITTLE
WALKING IN HOSPITAL ROOM: A LITTLE
TOILETING: A LITTLE
CLIMB 3 TO 5 STEPS WITH RAILING: A LOT

## 2024-12-27 ASSESSMENT — VISUAL ACUITY
METHOD: SNELLEN - LINEAR
OD_SC: NLP

## 2024-12-27 ASSESSMENT — PAIN DESCRIPTION - ORIENTATION: ORIENTATION: LEFT

## 2024-12-27 ASSESSMENT — PAIN DESCRIPTION - DESCRIPTORS
DESCRIPTORS: JABBING
DESCRIPTORS: DISCOMFORT

## 2024-12-27 ASSESSMENT — PAIN SCALES - GENERAL
PAINLEVEL_OUTOF10: 10 - WORST POSSIBLE PAIN
PAINLEVEL_OUTOF10: 10 - WORST POSSIBLE PAIN
PAINLEVEL_OUTOF10: 2
PAINLEVEL_OUTOF10: 10 - WORST POSSIBLE PAIN

## 2024-12-27 ASSESSMENT — TONOMETRY
OD_IOP_MMHG: 6
IOP_METHOD: TONOPEN
OS_IOP_MMHG: 16

## 2024-12-27 ASSESSMENT — PAIN DESCRIPTION - PROGRESSION: CLINICAL_PROGRESSION: NOT CHANGED

## 2024-12-27 ASSESSMENT — PAIN DESCRIPTION - ONSET: ONSET: GRADUAL

## 2024-12-27 ASSESSMENT — PAIN DESCRIPTION - LOCATION: LOCATION: EYE

## 2024-12-27 ASSESSMENT — PAIN DESCRIPTION - FREQUENCY: FREQUENCY: CONSTANT/CONTINUOUS

## 2024-12-27 ASSESSMENT — ACTIVITIES OF DAILY LIVING (ADL)
BATHING_ASSISTANCE: MINIMAL
ADL_ASSISTANCE: INDEPENDENT

## 2024-12-27 NOTE — PROGRESS NOTES
Dr brito initiated transfer of pt to [facility]. Awaiting acceptance.  CM for [facility] called and contact number left on her VM.

## 2024-12-27 NOTE — DISCHARGE SUMMARY
Discharge Diagnosis  Seizure-like activity (Multi)    Issues Requiring Follow-Up  Per [facility]     Discharge Meds     Medication List      CONTINUE taking these medications     Actemra ACTPen 162 mg/0.9 mL; Generic drug: tocilizumab   aspirin 81 mg EC tablet   atropine 1 % ophthalmic solution   brimonidine 0.2 % ophthalmic solution; Commonly known as: AlphaGAN   cholecalciferol 50 mcg (2,000 unit) capsule; Commonly known as: Vitamin   D-3   cyclobenzaprine 10 mg tablet; Commonly known as: Flexeril   lubricating eye drops ophthalmic solution   medical cannabis   metFORMIN 500 mg tablet; Commonly known as: Glucophage   potassium chloride CR 10 mEq ER tablet; Commonly known as: Klor-Con   pramipexole 0.25 mg tablet; Commonly known as: Mirapex   prednisoLONE acetate 1 % ophthalmic suspension; Commonly known as:   Pred-Forte   Rhopressa 0.02 % drops opthalmic solution; Generic drug: netarsudiL   rosuvastatin 20 mg tablet; Commonly known as: Crestor     STOP taking these medications     acetaminophen 500 mg tablet; Commonly known as: Tylenol   albuterol 90 mcg/actuation aerosol powdr breath activated inhaler   lamoTRIgine 200 mg tablet; Commonly known as: LaMICtal   meclizine 25 mg tablet; Commonly known as: Antivert   naloxone 4 mg/0.1 mL nasal spray; Commonly known as: Narcan   ondansetron ODT 4 mg disintegrating tablet; Commonly known as:   Zofran-ODT   oxyCODONE 5 mg immediate release tablet; Commonly known as: Roxicodone     ASK your doctor about these medications     valsartan 40 mg tablet; Commonly known as: Diovan; Take 1 tablet (40 mg)   by mouth once daily.       Test Results Pending At Discharge  Pending Labs       Order Current Status    Lamotrigine level In process    Blood Culture Preliminary result    Blood Culture Preliminary result            Hospital Course   Be Luo is a 64 y.o. 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 Patient was treated with 2 courses of Ativan for a possible witnessed seizure. CT head without acute intracranial process and CXR with mild left basilar atelectasis.  According to the ED provider, he has had no more shaking episodes since. UA suspicious for infection, was started on Rocephin. Neurology saw the pt Lamictal was held as levels were very high. The pt stated that he has significantly decreased vision in his left eye, per the pt its ually at 30% and now his vision is completely couldy and significantly diminished. The pt requested transfer. 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 The pt was discussed with the ED provider at [facility] and accepted for further opthalmologic evaluation and being trasferred in stable condition.     Pertinent Physical Exam At Time of Discharge  Physical Exam  Constitutional:       Appearance: He is ill-appearing.   HENT:      Head: Normocephalic and atraumatic.      Nose: Nose normal.      Mouth/Throat:      Mouth: Mucous membranes are dry.   Eyes:      Conjunctiva/sclera: Conjunctivae normal.      Comments: Blind in right eye scientifically decreased vision in the left eye    Cardiovascular:      Rate and Rhythm: Normal rate and regular rhythm.      Pulses: Normal pulses.      Heart sounds: Normal heart sounds.   Pulmonary:      Effort: Pulmonary effort is normal.      Breath sounds: Normal breath sounds.   Abdominal:      General: Bowel sounds are normal.      Palpations: Abdomen is soft.   Musculoskeletal:         General: Normal range of motion.      Cervical back: Normal range of motion and neck supple.   Skin:     General: Skin is warm.   Neurological:      General: No focal deficit present.      Mental Status: He is alert. Mental status is at  baseline.         Outpatient Follow-Up  Future Appointments   Date Time Provider Department Center   1/13/2025 10:30 AM Yousuf Ramirez MD [id number]   1/17/2025 10:00 AM Mine Enamorado MD [id number]         Lauren Wells MD

## 2024-12-27 NOTE — PROGRESS NOTES
"Physical Therapy    Physical Therapy Evaluation    Patient Name: Be Luo \"Jorge"  MRN: [id number]  Today's Date: 12/27/2024   Time Calculation  Start Time: 0920  Stop Time: 0931  Time Calculation (min): 11 min  915/915-A    Assessment/Plan   PT Assessment  PT Assessment Results: Decreased strength, Decreased endurance, Impaired balance, Decreased mobility, Impaired judgement, Decreased safety awareness, Impaired vision  Rehab Prognosis: Good  Barriers to Participation: Premorbid level of function  End of Session Communication: Bedside nurse  Assessment Comment: Pt presents with decreased functional mobility related to decreased strength, endurance, balance, and safety awareness. Pt will benefit from continued PT to address these deficits.  End of Session Patient Position: Up in chair, Alarm off, not on at start of session  IP OR SWING BED PT PLAN  Inpatient or Swing Bed: Inpatient  PT Plan  Treatment/Interventions: Bed mobility, Transfer training, Gait training, Stair training, Balance training, Neuromuscular re-education, Strengthening, Endurance training, Range of motion, Therapeutic exercise, Therapeutic activity, Home exercise program  PT Plan: Ongoing PT  PT Frequency: 3 times per week  PT Discharge Recommendations: Low intensity level of continued care  PT Recommended Transfer Status: Assist x1  PT - OK to Discharge: Yes (PT eval complete, ok to d/c once deemed medically appropriate.)    Subjective     Current Problem:  1. Nausea and vomiting, unspecified vomiting type        2. Dizziness        3. Seizure (Multi)        4. Acute UTI          Patient Active Problem List   Diagnosis    Syncope and collapse    Fall    Chronic obstructive pulmonary disease (Multi)    Seizure disorder (Multi)    Abnormal myocardial perfusion study    Cardiomyopathy, ischemic    Nephrolithiasis    Seizure-like activity (Multi)    Nausea and vomiting, unspecified vomiting type       General Visit Information:  General  Reason " "for Referral: impaired mobility  Referred By: Spencer PT/OT eval and treat  Past Medical History Relevant to Rehab: COPD, HTN, anemia, DM, CAD, R eye blindness, former smoker, seizures  Family/Caregiver Present: No  Co-Treatment: OT  Co-Treatment Reason: Maximize pt safety  Patient Position Received: Alarm off, not on at start of session, Bed, 4 rail up (seizure pads present, aldana catheter, IV)  General Comment: Pt to ED 12/24 with reports of dizziness. Returned to ED 12/25 with shaking of extremities, nausea, and vomiting. Flu A/B (-), Covid (-), CT abd/pelvis (-), CTH (-), CXR mild left basilar atelectasis. Elevated lamictal levels.    Home Living:  Home Living  Type of Home: House  Lives With: Alone  Home Adaptive Equipment: Walker rolling or standard (white seeing eye cane)  Home Layout: One level (+ basement)  Home Access: Stairs to enter with rails  Entrance Stairs-Rails: Both  Entrance Stairs-Number of Steps: 3  Bathroom Shower/Tub: Tub/shower unit  Bathroom Equipment: Grab bars in shower    Prior Level of Function:  Prior Function Per Pt/Caregiver Report  Level of [place]: Independent with ADLs and functional transfers, Independent with homemaking with ambulation  Receives Help From: Neighbor (assists with medications if needed)  ADL Assistance: Independent  Homemaking Assistance: Independent  Ambulatory Assistance: Independent (with white seeing eye cane)  Prior Function Comments: Pt reports \"1000\" falls in the last year due to impaired vision. Able to get himself up after falling. Relies on public transportation. States he still mows his lawn.    Precautions:  Precautions  Medical Precautions: Fall precautions, Seizure precautions    Vital Signs:  Vital Signs  SpO2:  (room air)  Objective     Pain:  Pain Assessment  Pain Assessment: 0-10  0-10 (Numeric) Pain Score: 10 - Worst possible pain  Pain Type: Acute pain  Pain Location: Eye  Pain Orientation: Left    Cognition:  Cognition  Orientation Level: " Oriented X4  Attention:  (easily distracted)  Insight:  (impaired)    General Assessments:      Activity Tolerance  Endurance: Endurance does not limit participation in activity  Sensation  Light Touch:  (c/o decreased sensation to L knee following a surgery)  Strength  Strength Comments: BLE MMT 4/5 overall           Static Sitting Balance  Static Sitting-Comment/Number of Minutes: Good  Dynamic Sitting Balance  Dynamic Sitting-Comments: Good  Static Standing Balance  Static Standing-Comment/Number of Minutes: Fair  Dynamic Standing Balance  Dynamic Standing-Comments: Fair    Functional Assessments:     Bed Mobility  Bed Mobility:  (sup > sit mod I)  Transfers  Transfer:  (sit > stand > sit SBA without device; assist for line management)  Ambulation/Gait Training  Ambulation/Gait Training Performed:  (ambulates ~4' bed > chair with SBA no device, assist for line management; shortened, unsteady gait)          Extremity/Trunk Assessments:        RLE   RLE : Within Functional Limits  LLE   LLE : Within Functional Limits    Outcome Measures:     [facility] Basic Mobility  Turning from your back to your side while in a flat bed without using bedrails: None  Moving from lying on your back to sitting on the side of a flat bed without using bedrails: None  Moving to and from bed to chair (including a wheelchair): A little  Standing up from a chair using your arms (e.g. wheelchair or bedside chair): A little  To walk in hospital room: A little  Climbing 3-5 steps with railing: A lot  Basic Mobility - Total Score: 19                                                             Goals:  Encounter Problems       Encounter Problems (Active)       PT Problem       Pt will demo sit > stand and stand > sit transfer with independence and no LOB  (Progressing)       Start:  12/27/24    Expected End:  01/10/25            Pt will ambulate 20' with supervision and no LOB  (Progressing)       Start:  12/27/24    Expected End:  01/10/25             Pt will demonstrate ability to tolerate 8 minutes of seated or standing therapeutic exercise to demonstrate improved activity tolerance.  (Progressing)       Start:  12/27/24    Expected End:  01/10/25               Pain - Adult            Education Documentation  Mobility Training, taught by Lacy Carmen PT at 12/27/2024 11:36 AM.  Learner: Patient  Readiness: Acceptance  Method: Explanation  Response: Needs Reinforcement  Comment: Safety, fall risk

## 2024-12-27 NOTE — CARE PLAN
"  Problem: Discharge Planning  Pt information obtained at admission about typical living arrangement and level of community support that pt has access to. Changes in level of function evaluated by multidisciplinary team and plan made for optimal level of pt independence and function once discharged from this hospital facility. Family or significant social support personnel sought for corroborating input if pt unable to speak for self or appears to be less than forthcoming with factual report of situation.      Goal: Discharge to home or other facility with appropriate resources  Outcome: Progressing     Problem: Chronic Conditions and Co-morbidities  Pt with history and current complications of renal calculi, resent lithotripsy and subsequent hematuria. To ED recently, leaving against medical advise, return with unsteady gait, then returning 12/25 with rigors emesis from presumptive UTI.    Goal: Patient's chronic conditions and co-morbidity symptoms are monitored and maintained or improved  Outcome: Progressing     Problem: Fall/Injury  Falls prevention measures have been explained. Pt warned of bed alarm use if activated, use of nurse call bell system detailed and modifications made to devices if activation is difficult.   Purposeful hourly rounding program explained for assurance of the \"five Ps (pain, position, potty, possessions, privacy). Monitoring devices, IV tubing and SCD connecting tubes shown to pt to educate on their contribution to falling hazard and personal injury potential.   The above points reviewed with pts lacking retention of new information until evidence of learning is shown.     Goal: Not fall by end of shift  Outcome: Progressing     Problem: Fall/Injury  Goal: Verbalize understanding of risk factor reduction measures to prevent injury from fall in the home  Outcome: Progressing     Problem: Indwelling Catheter Maintenance  Thapa for retention and hematuria secondary to lithotripsy. Mild " hematuria persisting (light pink overnight). Thapa to gravity drain with no suspicion of obstructive state.  Goal: I will have no complications from indwelling catheter  Outcome: Progressing     Problem: Seizures  Pt (who is legally blind) with history of seizure d/o on Lamictal came in with supra therapeutic level.Holding currently and will resume dosage when appropriate.Bed rails padded, side rails up x4.  Goal: Absence or minimized seizure activity  Outcome: Progressing     Problem: Seizures  Goal: Freedom from injury  Outcome: Progressing     Problem: Seizures  Goal: Freedom from injury  Outcome: Progressing     Problem: Seizures  Goal: No signs of respiratory or cardiac compromise  Outcome: Progressing

## 2024-12-27 NOTE — PROGRESS NOTES
Discussed case and potential services w/ Cm Ivory orta [place] [facility]. She reports she has attempted to place pt w/ [facility] and [facility] Nurse for med management and pt refuses. She consulted w/ her supervisor Shanthi wilkes and Ivory will  reach out to pcp at [facility] for a geriatric assessment. Pt ot go to [facility] for opathamlogy evaluation then return to [facility].  Prior to pt's departure I met w/ pt, he is agreeable to home discharge as long as he can see and to [facility] services sn pt/ot.

## 2024-12-27 NOTE — PROGRESS NOTES
Per mitchell pt. Will need info. On passport services.  Met with pt. , provided info. On passport services and explained process, offered to assist in calling to set up in-home appt. Pt. Became tearful, stating he can not see, he is blind, has lost about 40 percent of his vision since he has been here, states he was told this hospital does not have an eye doctor to see him.  He is asking if he can be sent to eye specialist from here.  He does not feel he can go home like this.  Explained I would notify attending.  Message sent to NP and mitchell. Also left message with sister to explain passport services/process.

## 2024-12-27 NOTE — PROGRESS NOTES
"Occupational Therapy    Evaluation    Patient Name: Be Luo \"Jorge"  MRN: [id number]  : 1960  Today's Date: 24  Time Calculation  Start Time: 921  Stop Time: 931  Time Calculation (min): 10 min     915/915-A    Assessment:  OT Assessment: pt presents with decreased indep with ADLs and functional moblity related to impaired vision. Will benefit from con't skilled OT to address impairments  Prognosis: Good  End of Session Communication: Bedside nurse  End of Session Patient Position: Up in chair, Alarm off, not on at start of session  OT Assessment Results: Decreased ADL status, Decreased endurance, Decreased functional mobility  Prognosis: Good    Plan:  Treatment Interventions: ADL retraining, Functional transfer training, Patient/family training  OT Frequency: 2 times per week  OT Discharge Recommendations: Low intensity level of continued care  Equipment Recommended upon Discharge:  (shower seat)  OT - OK to Discharge: Yes  Treatment Interventions: ADL retraining, Functional transfer training, Patient/family training  Subjective     Current Problem:  1. Nausea and vomiting, unspecified vomiting type        2. Dizziness        3. Seizure (Multi)        4. Acute UTI                General:   OT Received On: 24  General  Reason for Referral: ADL impairment  Referred By: Spencer PT/OT eval and treat  Past Medical History Relevant to Rehab: COPD, HTN, anemia, DM, CAD, R eye blindness, former smoker, seizures  Family/Caregiver Present: No  Co-Treatment: PT  Co-Treatment Reason: Maximize pt safety  Patient Position Received: Alarm off, not on at start of session, Bed, 4 rail up (seizure pads present, aldana cathter, IV)  General Comment: Pt to ED  with reports of dizziness. Returned to ED  with shaking of extremities, nausea, and vomiting. Flu A/B (-), Covid (-), CT abd/pelvis (-), CTH (-), CXR mild left basilar atelectasis. Elevated lamictal levels.    Precautions:  Medical " "Precautions: Fall precautions, Seizure precautions    Vital Signs:  SpO2:  (room air)    Pain:  Pain Assessment  Pain Assessment: 0-10  0-10 (Numeric) Pain Score: 10 - Worst possible pain  Pain Type: Acute pain  Pain Location: Eye  Pain Orientation: Left  Objective     Cognition:  Orientation Level: Oriented X4  Attention:  (easily distracted)  Insight:  (impaired)             Home Living:  Home Living Comments: lives alone, 1 story, 3 ZAFAR with HR, tub/shower with grab bar    Prior Function:  Prior Function Comments: Pt reports \"1000\" falls in the last year due to impaired vision. Able to get himself up after falling. Relies on public transportation. States he still mows his lawn. Pt indep with ADLS, ambulates with \"white cane\" for visually impaired. Pt reports increased difficulty with med mgmt due to visual impairments, states a neighbor has offered to assist him with med mgt           Activities of Daily Living:   Eating Assistance: Stand by  Grooming Assistance: Stand by  Bathing Assistance: Minimal  UE Dressing Assistance: Stand by  LE Dressing Assistance: Minimal  Toileting Assistance with Device: Stand by  Functional Assistance:  (pt ambulated with SBA for line mgmt of aldana and IV 6' from bed to chair)                         Activity Tolerance:  Endurance: Endurance does not limit participation in activity           Bed Mobility/Transfers: Bed Mobility  Bed Mobility:  (sup to sit modified independence)  Transfers  Transfer:  (sit to stand SBA without AD, stand to sit SBA at high back chair)                Balance:  Static Sitting: good  Dynamic Sitting: good -  Static Standing: fair +  Dynamic Standing: fair         Vision:Vision - Basic Assessment  Current Vision:  (pt blind in R eye, pt reports significant change in L eye vision in past week that has decreased his overall functioning)        Sensation:  Light Touch:  (c/o decreased sensation to L knee following a surgery)    Strength:  Strength Comments: " B UE 4/5 throughout            Extremities: RUE   RUE : Within Functional Limits and LUE   LUE: Within Functional Limits    Outcome Measures: [facility] Daily Activity  Putting on and taking off regular lower body clothing: A lot  Bathing (including washing, rinsing, drying): A lot  Putting on and taking off regular upper body clothing: A little  Toileting, which includes using toilet, bedpan or urinal: A little  Taking care of personal grooming such as brushing teeth: A little  Eating Meals: None  Daily Activity - Total Score: 17    Education Documentation  ADL Training, taught by Patsy Guevara, OT at 12/27/2024 12:50 PM.  Learner: Patient  Readiness: Acceptance  Method: Explanation  Response: Verbalizes Understanding, Needs Reinforcement                 Goals:  Encounter Problems       Encounter Problems (Active)       OT Goals       pt will transfer to bed, chair, toilet with modified indep (Progressing)       Start:  12/27/24    Expected End:  01/09/25            Pt will demo fair + dyn std balance with aDLS (Progressing)       Start:  12/27/24    Expected End:  01/09/25            Pt will dress LB with modified indep (Progressing)       Start:  12/27/24    Expected End:  01/09/25

## 2024-12-27 NOTE — PROGRESS NOTES
"Be Luo is a 64 y.o. male on day 1 of admission presenting with Seizure-like activity (Multi).      Subjective        Objective   EEG:  This rEEG is normal. No epileptiform discharges, lateralizing signs or seizures are noted.    MRI brain:  No acute infarct, hemorrhage or mass is noted. The parenchymal  hyperintensities are nonspecific and may be secondary to migraine,  hypertension or chronic microvascular ischemic changes.      The patient was evaluated by:    Urology, and reported\" .    # Nephrolithiasis, bilateral stents  -Patient is scheduled for stent removal on January 13, 2025 in my office  -Please inform patient prior to discharge so he can arrange for follow-up     # Urinary retention  -No subjective signs or symptoms  -Recommend trial of void with catheter removal, bladder scan following first void and if greater than 250 cc, catheter replacement\"    He was evaluate by Ophthalmology who reported:      \"Mr. 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 Today patient's eye shows minimal anterior chamber (AC) inflammation and OS pressure is elevated to 16 from 6 status post (s/p) cyclo-photocoagulation 1 week ago.      #Rebound iritis  #Episcleritis  #Elevated intraocular pressure (IOP) OS  - It is imperative that patient gets his eye drops as he will go blind without them, and he is already monocular. We must protect his good eye. Patient sent with intraocular pressure (IOP) drops today so that he can apply them himself at other hospital while undergoing seizure workup (should he need to apply them himself if he is not provided with them as he claims).  - intraocular pressure (IOP) OS 16 today, (from 6 status post (s/p) cyclo-photocoagulation 1 week ago) " "dropped to 13 after one round of brimonidine  - Vision OS is CF today, was documented as HM at last visit this month     Recommendations:  - Start brimonidine TID OS  - Start prednisolone QID OS  - Start latanoprost at bedtime OS  - Avoid PO/IV steroids if possible as patient is prior steroid responder  - Patient has appointment with prior eye-care provider Dr. Watson on 1/3/25, advised to keep appointment.\"      Scheduled medications  aspirin, 81 mg, oral, Daily  atropine, 1 drop, Left Eye, TID  brimonidine, 1 drop, Left Eye, TID  cefTRIAXone, 2 g, intravenous, q24h  enoxaparin, 40 mg, subcutaneous, q24h  [Held by provider] lamoTRIgine, 600 mg, oral, BID  montelukast, 10 mg, oral, Nightly  netarsudiL, 1 drop, Left Eye, Nightly  polyethylene glycol, 17 g, oral, Daily  potassium chloride CR, 10 mEq, oral, Daily  pramipexole, 0.25 mg, oral, Nightly  prednisoLONE acetate, 1 drop, Left Eye, 4x daily  rosuvastatin, 10 mg, oral, Nightly  valsartan, 40 mg, oral, Nightly      Continuous medications     PRN medications  PRN medications: cyclobenzaprine, LORazepam, ondansetron **OR** ondansetron    Results for orders placed or performed during the hospital encounter of 12/25/24 (from the past 96 hours)   Blood Culture    Specimen: Peripheral Venipuncture; Blood culture   Result Value Ref Range    Blood Culture No growth at 2 days    CBC and Auto Differential   Result Value Ref Range    WBC 5.3 4.4 - 11.3 x10*3/uL    nRBC 0.0 0.0 - 0.0 /100 WBCs    RBC 3.95 (L) 4.50 - 5.90 x10*6/uL    Hemoglobin 12.8 (L) 13.5 - 17.5 g/dL    Hematocrit 38.2 (L) 41.0 - 52.0 %    MCV 97 80 - 100 fL    MCH 32.4 26.0 - 34.0 pg    MCHC 33.5 32.0 - 36.0 g/dL    RDW 12.7 11.5 - 14.5 %    Platelets 199 150 - 450 x10*3/uL    Neutrophils % 54.8 40.0 - 80.0 %    Immature Granulocytes %, Automated 0.4 0.0 - 0.9 %    Lymphocytes % 23.8 13.0 - 44.0 %    Monocytes % 13.2 2.0 - 10.0 %    Eosinophils % 7.0 0.0 - 6.0 %    Basophils % 0.8 0.0 - 2.0 %    " Neutrophils Absolute 2.91 1.20 - 7.70 x10*3/uL    Immature Granulocytes Absolute, Automated 0.02 0.00 - 0.70 x10*3/uL    Lymphocytes Absolute 1.26 1.20 - 4.80 x10*3/uL    Monocytes Absolute 0.70 0.10 - 1.00 x10*3/uL    Eosinophils Absolute 0.37 0.00 - 0.70 x10*3/uL    Basophils Absolute 0.04 0.00 - 0.10 x10*3/uL   Comprehensive Metabolic Panel   Result Value Ref Range    Glucose 108 (H) 74 - 99 mg/dL    Sodium 140 136 - 145 mmol/L    Potassium 3.7 3.5 - 5.3 mmol/L    Chloride 110 (H) 98 - 107 mmol/L    Bicarbonate 24 21 - 32 mmol/L    Anion Gap 10 10 - 20 mmol/L    Urea Nitrogen 15 6 - 23 mg/dL    Creatinine 0.83 0.50 - 1.30 mg/dL    eGFR >90 >60 mL/min/1.73m*2    Calcium 8.6 8.6 - 10.3 mg/dL    Albumin 4.1 3.4 - 5.0 g/dL    Alkaline Phosphatase 63 33 - 136 U/L    Total Protein 5.9 (L) 6.4 - 8.2 g/dL    AST 16 9 - 39 U/L    Bilirubin, Total 0.6 0.0 - 1.2 mg/dL    ALT 20 10 - 52 U/L   Lactate   Result Value Ref Range    Lactate 1.4 0.4 - 2.0 mmol/L   Blood Culture    Specimen: Peripheral Venipuncture; Blood culture   Result Value Ref Range    Blood Culture No growth at 2 days    Troponin I, High Sensitivity   Result Value Ref Range    Troponin I, High Sensitivity 5 0 - 20 ng/L   Blood Gas Venous Full Panel   Result Value Ref Range    POCT pH, Venous 7.38 7.33 - 7.43 pH    POCT pCO2, Venous 42 41 - 51 mm Hg    POCT pO2, Venous 55 (H) 35 - 45 mm Hg    POCT SO2, Venous 91 (H) 45 - 75 %    POCT Oxy Hemoglobin, Venous 88.9 (H) 45.0 - 75.0 %    POCT Hematocrit Calculated, Venous 37.0 (L) 41.0 - 52.0 %    POCT Sodium, Venous 138 136 - 145 mmol/L    POCT Potassium, Venous 3.7 3.5 - 5.3 mmol/L    POCT Chloride, Venous 108 (H) 98 - 107 mmol/L    POCT Ionized Calicum, Venous 1.17 1.10 - 1.33 mmol/L    POCT Glucose, Venous 92 74 - 99 mg/dL    POCT Lactate, Venous 1.0 0.4 - 2.0 mmol/L    POCT Base Excess, Venous -0.4 -2.0 - 3.0 mmol/L    POCT HCO3 Calculated, Venous 24.8 22.0 - 26.0 mmol/L    POCT Hemoglobin, Venous 12.4 (L) 13.5  - 17.5 g/dL    POCT Anion Gap, Venous 9.0 (L) 10.0 - 25.0 mmol/L    Patient Temperature      FiO2 21 %   Lamotrigine   Result Value Ref Range    Lamotrigine  35.2 (HH) 2.5 - 15.0 ug/mL   Sars-CoV-2 and Influenza A/B PCR   Result Value Ref Range    Flu A Result Not Detected Not Detected    Flu B Result Not Detected Not Detected    Coronavirus 2019, PCR Not Detected Not Detected   Electrocardiogram, 12-lead ACS symptoms   Result Value Ref Range    Ventricular Rate 96 BPM    Atrial Rate 100 BPM    ID Interval 180 ms    QRS Duration 106 ms    QT Interval 378 ms    QTC Calculation(Bazett) 477 ms    P Axis 52 degrees    R Axis 87 degrees    T Axis 57 degrees    QRS Count 16 beats    Q Onset 222 ms    P Onset 132 ms    P Offset 172 ms    T Offset 411 ms    QTC Fredericia 442 ms   Urinalysis with Reflex Culture and Microscopic   Result Value Ref Range    Color, Urine Dark-Brown (N) Light-Yellow, Yellow, Dark-Yellow    Appearance, Urine Ex.Turbid (N) Clear    Specific Gravity, Urine 1.017 1.005 - 1.035    pH, Urine 6.0 5.0, 5.5, 6.0, 6.5, 7.0, 7.5, 8.0    Protein, Urine 70 (1+) (A) NEGATIVE, 10 (TRACE), 20 (TRACE) mg/dL    Glucose, Urine Normal Normal mg/dL    Blood, Urine OVER (3+) (A) NEGATIVE    Ketones, Urine NEGATIVE NEGATIVE mg/dL    Bilirubin, Urine NEGATIVE NEGATIVE    Urobilinogen, Urine Normal Normal mg/dL    Nitrite, Urine NEGATIVE NEGATIVE    Leukocyte Esterase, Urine 250 Zane/µL (A) NEGATIVE   Extra Urine Gray Tube   Result Value Ref Range    Extra Tube Hold for add-ons.    Microscopic Only, Urine   Result Value Ref Range    WBC, Urine >50 (A) 1-5, NONE /HPF    RBC, Urine >20 (A) NONE, 1-2, 3-5 /HPF    Bacteria, Urine 1+ (A) NONE SEEN /HPF    Mucus, Urine FEW Reference range not established. /LPF   Urine Culture    Specimen: Clean Catch/Voided; Urine   Result Value Ref Range    Urine Culture No growth    SST TOP   Result Value Ref Range    Extra Tube Hold for add-ons.    Magnesium   Result Value Ref Range     Magnesium 2.00 1.60 - 2.40 mg/dL   Basic Metabolic Panel   Result Value Ref Range    Glucose 85 74 - 99 mg/dL    Sodium 143 136 - 145 mmol/L    Potassium 3.9 3.5 - 5.3 mmol/L    Chloride 111 (H) 98 - 107 mmol/L    Bicarbonate 28 21 - 32 mmol/L    Anion Gap 8 (L) 10 - 20 mmol/L    Urea Nitrogen 10 6 - 23 mg/dL    Creatinine 0.90 0.50 - 1.30 mg/dL    eGFR >90 >60 mL/min/1.73m*2    Calcium 8.5 (L) 8.6 - 10.3 mg/dL   CBC   Result Value Ref Range    WBC 4.2 (L) 4.4 - 11.3 x10*3/uL    nRBC 0.0 0.0 - 0.0 /100 WBCs    RBC 3.77 (L) 4.50 - 5.90 x10*6/uL    Hemoglobin 12.2 (L) 13.5 - 17.5 g/dL    Hematocrit 37.1 (L) 41.0 - 52.0 %    MCV 98 80 - 100 fL    MCH 32.4 26.0 - 34.0 pg    MCHC 32.9 32.0 - 36.0 g/dL    RDW 12.7 11.5 - 14.5 %    Platelets 192 150 - 450 x10*3/uL   Magnesium   Result Value Ref Range    Magnesium 2.06 1.60 - 2.40 mg/dL   Basic Metabolic Panel   Result Value Ref Range    Glucose 90 74 - 99 mg/dL    Sodium 140 136 - 145 mmol/L    Potassium 3.5 3.5 - 5.3 mmol/L    Chloride 107 98 - 107 mmol/L    Bicarbonate 26 21 - 32 mmol/L    Anion Gap 11 10 - 20 mmol/L    Urea Nitrogen 13 6 - 23 mg/dL    Creatinine 0.76 0.50 - 1.30 mg/dL    eGFR >90 >60 mL/min/1.73m*2    Calcium 8.4 (L) 8.6 - 10.3 mg/dL   CBC   Result Value Ref Range    WBC 4.6 4.4 - 11.3 x10*3/uL    nRBC 0.0 0.0 - 0.0 /100 WBCs    RBC 4.18 (L) 4.50 - 5.90 x10*6/uL    Hemoglobin 13.6 13.5 - 17.5 g/dL    Hematocrit 41.1 41.0 - 52.0 %    MCV 98 80 - 100 fL    MCH 32.5 26.0 - 34.0 pg    MCHC 33.1 32.0 - 36.0 g/dL    RDW 12.5 11.5 - 14.5 %    Platelets 209 150 - 450 x10*3/uL   SST TOP   Result Value Ref Range    Extra Tube Hold for add-ons.    Sedimentation rate, automated   Result Value Ref Range    Sedimentation Rate <1 0 - 20 mm/h   C-reactive protein   Result Value Ref Range    C-Reactive Protein <0.10 <1.00 mg/dL   Lamotrigine level   Result Value Ref Range    Lamotrigine  9.5 2.5 - 15.0 ug/mL         Results for orders placed or performed during the  hospital encounter of 12/25/24 (from the past 96 hours)   Blood Culture    Specimen: Peripheral Venipuncture; Blood culture   Result Value Ref Range    Blood Culture No growth at 1 day    CBC and Auto Differential   Result Value Ref Range    WBC 5.3 4.4 - 11.3 x10*3/uL    nRBC 0.0 0.0 - 0.0 /100 WBCs    RBC 3.95 (L) 4.50 - 5.90 x10*6/uL    Hemoglobin 12.8 (L) 13.5 - 17.5 g/dL    Hematocrit 38.2 (L) 41.0 - 52.0 %    MCV 97 80 - 100 fL    MCH 32.4 26.0 - 34.0 pg    MCHC 33.5 32.0 - 36.0 g/dL    RDW 12.7 11.5 - 14.5 %    Platelets 199 150 - 450 x10*3/uL    Neutrophils % 54.8 40.0 - 80.0 %    Immature Granulocytes %, Automated 0.4 0.0 - 0.9 %    Lymphocytes % 23.8 13.0 - 44.0 %    Monocytes % 13.2 2.0 - 10.0 %    Eosinophils % 7.0 0.0 - 6.0 %    Basophils % 0.8 0.0 - 2.0 %    Neutrophils Absolute 2.91 1.20 - 7.70 x10*3/uL    Immature Granulocytes Absolute, Automated 0.02 0.00 - 0.70 x10*3/uL    Lymphocytes Absolute 1.26 1.20 - 4.80 x10*3/uL    Monocytes Absolute 0.70 0.10 - 1.00 x10*3/uL    Eosinophils Absolute 0.37 0.00 - 0.70 x10*3/uL    Basophils Absolute 0.04 0.00 - 0.10 x10*3/uL   Comprehensive Metabolic Panel   Result Value Ref Range    Glucose 108 (H) 74 - 99 mg/dL    Sodium 140 136 - 145 mmol/L    Potassium 3.7 3.5 - 5.3 mmol/L    Chloride 110 (H) 98 - 107 mmol/L    Bicarbonate 24 21 - 32 mmol/L    Anion Gap 10 10 - 20 mmol/L    Urea Nitrogen 15 6 - 23 mg/dL    Creatinine 0.83 0.50 - 1.30 mg/dL    eGFR >90 >60 mL/min/1.73m*2    Calcium 8.6 8.6 - 10.3 mg/dL    Albumin 4.1 3.4 - 5.0 g/dL    Alkaline Phosphatase 63 33 - 136 U/L    Total Protein 5.9 (L) 6.4 - 8.2 g/dL    AST 16 9 - 39 U/L    Bilirubin, Total 0.6 0.0 - 1.2 mg/dL    ALT 20 10 - 52 U/L   Lactate   Result Value Ref Range    Lactate 1.4 0.4 - 2.0 mmol/L   Blood Culture    Specimen: Peripheral Venipuncture; Blood culture   Result Value Ref Range    Blood Culture No growth at 1 day    Troponin I, High Sensitivity   Result Value Ref Range    Troponin I,  High Sensitivity 5 0 - 20 ng/L   Blood Gas Venous Full Panel   Result Value Ref Range    POCT pH, Venous 7.38 7.33 - 7.43 pH    POCT pCO2, Venous 42 41 - 51 mm Hg    POCT pO2, Venous 55 (H) 35 - 45 mm Hg    POCT SO2, Venous 91 (H) 45 - 75 %    POCT Oxy Hemoglobin, Venous 88.9 (H) 45.0 - 75.0 %    POCT Hematocrit Calculated, Venous 37.0 (L) 41.0 - 52.0 %    POCT Sodium, Venous 138 136 - 145 mmol/L    POCT Potassium, Venous 3.7 3.5 - 5.3 mmol/L    POCT Chloride, Venous 108 (H) 98 - 107 mmol/L    POCT Ionized Calicum, Venous 1.17 1.10 - 1.33 mmol/L    POCT Glucose, Venous 92 74 - 99 mg/dL    POCT Lactate, Venous 1.0 0.4 - 2.0 mmol/L    POCT Base Excess, Venous -0.4 -2.0 - 3.0 mmol/L    POCT HCO3 Calculated, Venous 24.8 22.0 - 26.0 mmol/L    POCT Hemoglobin, Venous 12.4 (L) 13.5 - 17.5 g/dL    POCT Anion Gap, Venous 9.0 (L) 10.0 - 25.0 mmol/L    Patient Temperature      FiO2 21 %   Lamotrigine   Result Value Ref Range    Lamotrigine  35.2 (HH) 2.5 - 15.0 ug/mL   Sars-CoV-2 and Influenza A/B PCR   Result Value Ref Range    Flu A Result Not Detected Not Detected    Flu B Result Not Detected Not Detected    Coronavirus 2019, PCR Not Detected Not Detected   Electrocardiogram, 12-lead ACS symptoms   Result Value Ref Range    Ventricular Rate 96 BPM    Atrial Rate 100 BPM    WI Interval 180 ms    QRS Duration 106 ms    QT Interval 378 ms    QTC Calculation(Bazett) 477 ms    P Axis 52 degrees    R Axis 87 degrees    T Axis 57 degrees    QRS Count 16 beats    Q Onset 222 ms    P Onset 132 ms    P Offset 172 ms    T Offset 411 ms    QTC Fredericia 442 ms   Urinalysis with Reflex Culture and Microscopic   Result Value Ref Range    Color, Urine Dark-Brown (N) Light-Yellow, Yellow, Dark-Yellow    Appearance, Urine Ex.Turbid (N) Clear    Specific Gravity, Urine 1.017 1.005 - 1.035    pH, Urine 6.0 5.0, 5.5, 6.0, 6.5, 7.0, 7.5, 8.0    Protein, Urine 70 (1+) (A) NEGATIVE, 10 (TRACE), 20 (TRACE) mg/dL    Glucose, Urine Normal Normal  mg/dL    Blood, Urine OVER (3+) (A) NEGATIVE    Ketones, Urine NEGATIVE NEGATIVE mg/dL    Bilirubin, Urine NEGATIVE NEGATIVE    Urobilinogen, Urine Normal Normal mg/dL    Nitrite, Urine NEGATIVE NEGATIVE    Leukocyte Esterase, Urine 250 Zane/µL (A) NEGATIVE   Extra Urine Gray Tube   Result Value Ref Range    Extra Tube Hold for add-ons.    Microscopic Only, Urine   Result Value Ref Range    WBC, Urine >50 (A) 1-5, NONE /HPF    RBC, Urine >20 (A) NONE, 1-2, 3-5 /HPF    Bacteria, Urine 1+ (A) NONE SEEN /HPF    Mucus, Urine FEW Reference range not established. /LPF   Urine Culture    Specimen: Clean Catch/Voided; Urine   Result Value Ref Range    Urine Culture No growth    SST TOP   Result Value Ref Range    Extra Tube Hold for add-ons.    Magnesium   Result Value Ref Range    Magnesium 2.00 1.60 - 2.40 mg/dL   Basic Metabolic Panel   Result Value Ref Range    Glucose 85 74 - 99 mg/dL    Sodium 143 136 - 145 mmol/L    Potassium 3.9 3.5 - 5.3 mmol/L    Chloride 111 (H) 98 - 107 mmol/L    Bicarbonate 28 21 - 32 mmol/L    Anion Gap 8 (L) 10 - 20 mmol/L    Urea Nitrogen 10 6 - 23 mg/dL    Creatinine 0.90 0.50 - 1.30 mg/dL    eGFR >90 >60 mL/min/1.73m*2    Calcium 8.5 (L) 8.6 - 10.3 mg/dL   CBC   Result Value Ref Range    WBC 4.2 (L) 4.4 - 11.3 x10*3/uL    nRBC 0.0 0.0 - 0.0 /100 WBCs    RBC 3.77 (L) 4.50 - 5.90 x10*6/uL    Hemoglobin 12.2 (L) 13.5 - 17.5 g/dL    Hematocrit 37.1 (L) 41.0 - 52.0 %    MCV 98 80 - 100 fL    MCH 32.4 26.0 - 34.0 pg    MCHC 32.9 32.0 - 36.0 g/dL    RDW 12.7 11.5 - 14.5 %    Platelets 192 150 - 450 x10*3/uL   Magnesium   Result Value Ref Range    Magnesium 2.06 1.60 - 2.40 mg/dL   Basic Metabolic Panel   Result Value Ref Range    Glucose 90 74 - 99 mg/dL    Sodium 140 136 - 145 mmol/L    Potassium 3.5 3.5 - 5.3 mmol/L    Chloride 107 98 - 107 mmol/L    Bicarbonate 26 21 - 32 mmol/L    Anion Gap 11 10 - 20 mmol/L    Urea Nitrogen 13 6 - 23 mg/dL    Creatinine 0.76 0.50 - 1.30 mg/dL    eGFR >90 >60  "mL/min/1.73m*2    Calcium 8.4 (L) 8.6 - 10.3 mg/dL   CBC   Result Value Ref Range    WBC 4.6 4.4 - 11.3 x10*3/uL    nRBC 0.0 0.0 - 0.0 /100 WBCs    RBC 4.18 (L) 4.50 - 5.90 x10*6/uL    Hemoglobin 13.6 13.5 - 17.5 g/dL    Hematocrit 41.1 41.0 - 52.0 %    MCV 98 80 - 100 fL    MCH 32.5 26.0 - 34.0 pg    MCHC 33.1 32.0 - 36.0 g/dL    RDW 12.5 11.5 - 14.5 %    Platelets 209 150 - 450 x10*3/uL   SST TOP   Result Value Ref Range    Extra Tube Hold for add-ons.    Sedimentation rate, automated   Result Value Ref Range    Sedimentation Rate <1 0 - 20 mm/h   C-reactive protein   Result Value Ref Range    C-Reactive Protein <0.10 <1.00 mg/dL        Scheduled medications  aspirin, 81 mg, oral, Daily  atropine, 1 drop, Left Eye, TID  brimonidine, 1 drop, Left Eye, TID  cefTRIAXone, 2 g, intravenous, q24h  enoxaparin, 40 mg, subcutaneous, q24h  [Held by provider] lamoTRIgine, 600 mg, oral, BID  montelukast, 10 mg, oral, Nightly  netarsudiL, 1 drop, Left Eye, Nightly  polyethylene glycol, 17 g, oral, Daily  potassium chloride CR, 10 mEq, oral, Daily  pramipexole, 0.25 mg, oral, Nightly  prednisoLONE acetate, 1 drop, Left Eye, 4x daily  rosuvastatin, 10 mg, oral, Nightly  valsartan, 40 mg, oral, Nightly      Continuous medications     PRN medications  PRN medications: cyclobenzaprine, LORazepam, ondansetron **OR** ondansetron        Last Recorded Vitals  Blood pressure 143/89, pulse 97, temperature 36.7 °C (98.1 °F), temperature source Temporal, resp. rate 16, height 1.778 m (5' 10\"), weight 99.8 kg (220 lb), SpO2 95%.    Physical Exam  Neurological Exam  Awake, alert, restricted vision. Cranial nerves otherwise intact.   Intact muscle strength, bulk and tone  Coordination was intact  Gait was not tested.         [place] Coma Scale  Best Eye Response: Spontaneous  Best Verbal Response: Oriented  Best Motor Response: Follows commands  [place] Coma Scale Score: 15                             Assessment/Plan      Assessment & " Plan  Seizure-like activity (Multi)    Nausea and vomiting, unspecified vomiting type    History of seizure disorder, on Lamictal 600 mg po bid with supra-therapeutic Lamictal blood level ( currently on hold) with UTI   ( Both of UTI and supra-therapeutic Lamictal blood level may contribute to seizure breakthrough)with normal MRI brain and normal EEG evaluation.    I suggest:  - I suggest to start Lamictal 200 mg po bid.    - Agree with medical management   - Seizure precautions: ( the patient the need to avoid driving, operating heavy machines, or being in any condition, should he  have altered mentation may put him or others in danger ( swimming, climbing stairs, behind locked doors,  etc...)    - The patient to follow up in  6-8 weeks post discharge with Neurology.    I spent 40 minutes in the professional and overall care of this patient.      Nahun Streeter MD

## 2024-12-27 NOTE — ED TRIAGE NOTES
Pt transferred from [place] for optho consult. Pt states that he had some left eye blurred vision along with some pain that started last night, and now states his vision is completely gone. 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 Pt denies any trauma to the area, pain is 10/10.

## 2024-12-28 ENCOUNTER — HOSPITAL ENCOUNTER (INPATIENT)
Age: 64
End: 2024-12-28
Attending: INTERNAL MEDICINE | Admitting: INTERNAL MEDICINE
Payer: COMMERCIAL

## 2024-12-28 ENCOUNTER — APPOINTMENT (OUTPATIENT)
Dept: RADIOLOGY | Facility: HOSPITAL | Age: 64
DRG: 101 | End: 2024-12-28
Payer: COMMERCIAL

## 2024-12-28 VITALS
HEIGHT: 70 IN | RESPIRATION RATE: 16 BRPM | SYSTOLIC BLOOD PRESSURE: 101 MMHG | DIASTOLIC BLOOD PRESSURE: 83 MMHG | OXYGEN SATURATION: 91 % | HEART RATE: 87 BPM | BODY MASS INDEX: 31.5 KG/M2 | TEMPERATURE: 98.1 F | WEIGHT: 220 LBS

## 2024-12-28 LAB
ATRIAL RATE: 100 BPM
P AXIS: 52 DEGREES
P OFFSET: 172 MS
P ONSET: 132 MS
PR INTERVAL: 180 MS
Q ONSET: 222 MS
QRS COUNT: 16 BEATS
QRS DURATION: 106 MS
QT INTERVAL: 378 MS
QTC CALCULATION(BAZETT): 477 MS
QTC FREDERICIA: 442 MS
R AXIS: 87 DEGREES
T AXIS: 57 DEGREES
T OFFSET: 411 MS
VENTRICULAR RATE: 96 BPM

## 2024-12-28 PROCEDURE — 99233 SBSQ HOSP IP/OBS HIGH 50: CPT | Performed by: INTERNAL MEDICINE

## 2024-12-28 PROCEDURE — 2500000001 HC RX 250 WO HCPCS SELF ADMINISTERED DRUGS (ALT 637 FOR MEDICARE OP): Performed by: EMERGENCY MEDICINE

## 2024-12-28 PROCEDURE — 2500000002 HC RX 250 W HCPCS SELF ADMINISTERED DRUGS (ALT 637 FOR MEDICARE OP, ALT 636 FOR OP/ED): Performed by: INTERNAL MEDICINE

## 2024-12-28 PROCEDURE — 2500000002 HC RX 250 W HCPCS SELF ADMINISTERED DRUGS (ALT 637 FOR MEDICARE OP, ALT 636 FOR OP/ED): Performed by: NURSE PRACTITIONER

## 2024-12-28 PROCEDURE — 99222 1ST HOSP IP/OBS MODERATE 55: CPT | Performed by: UROLOGY

## 2024-12-28 PROCEDURE — 70553 MRI BRAIN STEM W/O & W/DYE: CPT

## 2024-12-28 PROCEDURE — 2500000002 HC RX 250 W HCPCS SELF ADMINISTERED DRUGS (ALT 637 FOR MEDICARE OP, ALT 636 FOR OP/ED): Mod: MUE | Performed by: EMERGENCY MEDICINE

## 2024-12-28 PROCEDURE — 2500000001 HC RX 250 WO HCPCS SELF ADMINISTERED DRUGS (ALT 637 FOR MEDICARE OP): Performed by: NURSE PRACTITIONER

## 2024-12-28 PROCEDURE — A9575 INJ GADOTERATE MEGLUMI 0.1ML: HCPCS

## 2024-12-28 PROCEDURE — 2500000001 HC RX 250 WO HCPCS SELF ADMINISTERED DRUGS (ALT 637 FOR MEDICARE OP): Performed by: INTERNAL MEDICINE

## 2024-12-28 PROCEDURE — 2550000001 HC RX 255 CONTRASTS

## 2024-12-28 PROCEDURE — 94640 AIRWAY INHALATION TREATMENT: CPT

## 2024-12-28 PROCEDURE — 1200000002 HC GENERAL ROOM WITH TELEMETRY DAILY

## 2024-12-28 PROCEDURE — 2500000004 HC RX 250 GENERAL PHARMACY W/ HCPCS (ALT 636 FOR OP/ED): Performed by: INTERNAL MEDICINE

## 2024-12-28 PROCEDURE — 70553 MRI BRAIN STEM W/O & W/DYE: CPT | Performed by: RADIOLOGY

## 2024-12-28 PROCEDURE — 2500000001 HC RX 250 WO HCPCS SELF ADMINISTERED DRUGS (ALT 637 FOR MEDICARE OP): Performed by: SPECIALIST

## 2024-12-28 RX ORDER — LATANOPROST 50 UG/ML
1 SOLUTION/ DROPS OPHTHALMIC NIGHTLY
COMMUNITY

## 2024-12-28 RX ORDER — ACETAMINOPHEN 160 MG/5ML
650 SOLUTION ORAL EVERY 4 HOURS PRN
Status: DISCONTINUED | OUTPATIENT
Start: 2024-12-28 | End: 2024-12-30 | Stop reason: HOSPADM

## 2024-12-28 RX ORDER — ACETAMINOPHEN 325 MG/1
650 TABLET ORAL EVERY 4 HOURS PRN
Status: DISCONTINUED | OUTPATIENT
Start: 2024-12-28 | End: 2024-12-30 | Stop reason: HOSPADM

## 2024-12-28 RX ORDER — BRIMONIDINE TARTRATE 2 MG/ML
1 SOLUTION/ DROPS OPHTHALMIC 3 TIMES DAILY
Status: DISCONTINUED | OUTPATIENT
Start: 2024-12-28 | End: 2024-12-30 | Stop reason: HOSPADM

## 2024-12-28 RX ORDER — ACETAMINOPHEN 650 MG/1
650 SUPPOSITORY RECTAL EVERY 4 HOURS PRN
Status: DISCONTINUED | OUTPATIENT
Start: 2024-12-28 | End: 2024-12-30 | Stop reason: HOSPADM

## 2024-12-28 RX ORDER — VALSARTAN 80 MG/1
40 TABLET ORAL ONCE
Status: COMPLETED | OUTPATIENT
Start: 2024-12-28 | End: 2024-12-28

## 2024-12-28 RX ORDER — LAMOTRIGINE 100 MG/1
200 TABLET ORAL 2 TIMES DAILY
Status: DISCONTINUED | OUTPATIENT
Start: 2024-12-28 | End: 2024-12-30 | Stop reason: HOSPADM

## 2024-12-28 RX ORDER — LATANOPROST 50 UG/ML
1 SOLUTION/ DROPS OPHTHALMIC NIGHTLY
Status: DISCONTINUED | OUTPATIENT
Start: 2024-12-28 | End: 2024-12-30 | Stop reason: HOSPADM

## 2024-12-28 RX ORDER — PREDNISOLONE ACETATE 10 MG/ML
1 SUSPENSION/ DROPS OPHTHALMIC 4 TIMES DAILY
Status: DISCONTINUED | OUTPATIENT
Start: 2024-12-28 | End: 2024-12-30 | Stop reason: HOSPADM

## 2024-12-28 RX ORDER — IPRATROPIUM BROMIDE AND ALBUTEROL SULFATE 2.5; .5 MG/3ML; MG/3ML
3 SOLUTION RESPIRATORY (INHALATION)
Status: DISCONTINUED | OUTPATIENT
Start: 2024-12-28 | End: 2024-12-29

## 2024-12-28 RX ORDER — PRAMIPEXOLE DIHYDROCHLORIDE 0.5 MG/1
0.5 TABLET ORAL ONCE
Status: COMPLETED | OUTPATIENT
Start: 2024-12-28 | End: 2024-12-28

## 2024-12-28 RX ORDER — ROSUVASTATIN CALCIUM 20 MG/1
10 TABLET, COATED ORAL ONCE
Status: COMPLETED | OUTPATIENT
Start: 2024-12-28 | End: 2024-12-28

## 2024-12-28 RX ORDER — MONTELUKAST SODIUM 10 MG/1
10 TABLET ORAL ONCE
Status: COMPLETED | OUTPATIENT
Start: 2024-12-28 | End: 2024-12-28

## 2024-12-28 RX ORDER — GADOTERATE MEGLUMINE 376.9 MG/ML
19.5 INJECTION INTRAVENOUS
Status: COMPLETED | OUTPATIENT
Start: 2024-12-28 | End: 2024-12-28

## 2024-12-28 RX ORDER — HYDROXYZINE HYDROCHLORIDE 25 MG/1
25 TABLET, FILM COATED ORAL EVERY 6 HOURS PRN
Status: DISCONTINUED | OUTPATIENT
Start: 2024-12-28 | End: 2024-12-30 | Stop reason: HOSPADM

## 2024-12-28 RX ADMIN — PREDNISOLONE ACETATE 1 DROP: 10 SUSPENSION/ DROPS OPHTHALMIC at 03:08

## 2024-12-28 RX ADMIN — VALSARTAN 40 MG: 80 TABLET, FILM COATED ORAL at 02:53

## 2024-12-28 RX ADMIN — ASPIRIN 81 MG: 81 TABLET, COATED ORAL at 10:47

## 2024-12-28 RX ADMIN — ONDANSETRON 4 MG: 4 TABLET, FILM COATED ORAL at 11:25

## 2024-12-28 RX ADMIN — PRAMIPEXOLE DIHYDROCHLORIDE 0.5 MG: 0.5 TABLET ORAL at 02:53

## 2024-12-28 RX ADMIN — PREDNISOLONE ACETATE 1 DROP: 10 SUSPENSION/ DROPS OPHTHALMIC at 20:18

## 2024-12-28 RX ADMIN — PRAMIPEXOLE DIHYDROCHLORIDE 0.25 MG: 0.25 TABLET ORAL at 20:17

## 2024-12-28 RX ADMIN — GADOTERATE MEGLUMINE 19.5 ML: 376.9 INJECTION INTRAVENOUS at 15:52

## 2024-12-28 RX ADMIN — POTASSIUM CHLORIDE 10 MEQ: 750 TABLET, EXTENDED RELEASE ORAL at 10:48

## 2024-12-28 RX ADMIN — ROSUVASTATIN CALCIUM 10 MG: 10 TABLET, FILM COATED ORAL at 20:17

## 2024-12-28 RX ADMIN — BRIMONIDINE TARTRATE 1 DROP: 2 SOLUTION/ DROPS OPHTHALMIC at 16:25

## 2024-12-28 RX ADMIN — LATANOPROST 1 DROP: 50 SOLUTION/ DROPS OPHTHALMIC at 20:17

## 2024-12-28 RX ADMIN — ENOXAPARIN SODIUM 40 MG: 40 INJECTION SUBCUTANEOUS at 16:27

## 2024-12-28 RX ADMIN — DEXTROSE MONOHYDRATE 2 G: 5 INJECTION INTRAVENOUS at 12:32

## 2024-12-28 RX ADMIN — VALSARTAN 40 MG: 80 TABLET, FILM COATED ORAL at 20:17

## 2024-12-28 RX ADMIN — LAMOTRIGINE 200 MG: 100 TABLET ORAL at 20:17

## 2024-12-28 RX ADMIN — PREDNISOLONE ACETATE 1 DROP: 10 SUSPENSION/ DROPS OPHTHALMIC at 16:25

## 2024-12-28 RX ADMIN — LAMOTRIGINE 200 MG: 100 TABLET ORAL at 13:48

## 2024-12-28 RX ADMIN — ROSUVASTATIN CALCIUM 10 MG: 20 TABLET, FILM COATED ORAL at 02:53

## 2024-12-28 RX ADMIN — BRIMONIDINE TARTRATE 1 DROP: 2 SOLUTION/ DROPS OPHTHALMIC at 20:18

## 2024-12-28 RX ADMIN — MONTELUKAST 10 MG: 10 TABLET, FILM COATED ORAL at 03:07

## 2024-12-28 RX ADMIN — HYDROXYZINE HYDROCHLORIDE 25 MG: 25 TABLET ORAL at 21:44

## 2024-12-28 RX ADMIN — MONTELUKAST SODIUM 10 MG: 10 TABLET, FILM COATED ORAL at 20:16

## 2024-12-28 RX ADMIN — IPRATROPIUM BROMIDE AND ALBUTEROL SULFATE 3 ML: 2.5; .5 SOLUTION RESPIRATORY (INHALATION) at 20:35

## 2024-12-28 ASSESSMENT — LIFESTYLE VARIABLES
TOTAL SCORE: 0
EVER FELT BAD OR GUILTY ABOUT YOUR DRINKING: NO
HAVE YOU EVER FELT YOU SHOULD CUT DOWN ON YOUR DRINKING: NO
HAVE PEOPLE ANNOYED YOU BY CRITICIZING YOUR DRINKING: NO
EVER HAD A DRINK FIRST THING IN THE MORNING TO STEADY YOUR NERVES TO GET RID OF A HANGOVER: NO

## 2024-12-28 ASSESSMENT — SLIT LAMP EXAM - LIDS
COMMENTS: MGD
COMMENTS: MGD

## 2024-12-28 ASSESSMENT — COGNITIVE AND FUNCTIONAL STATUS - GENERAL
MOBILITY SCORE: 15
WALKING IN HOSPITAL ROOM: A LITTLE
TURNING FROM BACK TO SIDE WHILE IN FLAT BAD: A LITTLE
HELP NEEDED FOR BATHING: A LITTLE
DAILY ACTIVITIY SCORE: 22
STANDING UP FROM CHAIR USING ARMS: A LOT
PERSONAL GROOMING: A LITTLE
STANDING UP FROM CHAIR USING ARMS: A LITTLE
PERSONAL GROOMING: A LITTLE
EATING MEALS: A LITTLE
MOBILITY SCORE: 18
CLIMB 3 TO 5 STEPS WITH RAILING: TOTAL
MOVING TO AND FROM BED TO CHAIR: A LITTLE
DRESSING REGULAR LOWER BODY CLOTHING: A LITTLE
WALKING IN HOSPITAL ROOM: A LOT
MOVING FROM LYING ON BACK TO SITTING ON SIDE OF FLAT BED WITH BEDRAILS: A LITTLE
TOILETING: A LITTLE
MOVING TO AND FROM BED TO CHAIR: A LOT
CLIMB 3 TO 5 STEPS WITH RAILING: A LITTLE
DRESSING REGULAR UPPER BODY CLOTHING: A LITTLE
DAILY ACTIVITIY SCORE: 19

## 2024-12-28 ASSESSMENT — EXTERNAL EXAM - LEFT EYE: OS_EXAM: NORMAL

## 2024-12-28 ASSESSMENT — CUP TO DISC RATIO: OS_RATIO: 0.9

## 2024-12-28 ASSESSMENT — PAIN SCALES - GENERAL: PAINLEVEL_OUTOF10: 0 - NO PAIN

## 2024-12-28 ASSESSMENT — PAIN SCALES - WONG BAKER: WONGBAKER_NUMERICALRESPONSE: NO HURT

## 2024-12-28 ASSESSMENT — EXTERNAL EXAM - RIGHT EYE: OD_EXAM: NORMAL

## 2024-12-28 NOTE — ED PROVIDER NOTES
History of Present Illness     History provided by: Patient  Limitations to History: None Identified  External Records Reviewed with Brief Summary: Previous ED visits/recent PCP notes for PMH     HPI:  Be Luo is a 64 y.o. 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  Patient had initially presented to [facility] with seizure-like activity found to have UTI was started on Rocephin and found to have high levels of Lamictal.  Patient had recently had transscleral cycle photocoagulation of the left eye for treatment of open angle glaucoma with Dr. Zapata on 12/16/2024 at [facility].  He states that his vision has been worsening since the procedure and does not think that he has been using the right eyedrops.  He can see fingers shapes and colors but has had a significant decrease in his vision.  He states he feels like his pressure is high in his eye.  No visual field deficits.  Is endorsing photophobia.    Physical Exam   Triage vitals:  T 36.9 °C (98.4 °F)  HR 90  BP (!) 126/101  RR 16  O2 94 % None (Room air)    General: Awake, alert, in no acute distress  Eyes: Gaze conjugate.  No scleral icterus or injection PE RR LA EOMI right eye at baseline left eye fingers countable field deficits.    HENT: Normo-cephalic, atraumatic. No stridor  CV: RRR. Radial/PT pulses 2+ bilaterally  Resp: Breathing non-labored, speaking in full sentences.  Clear to auscultation bilaterally  GI: Soft, non-distended, non-tender. No rebound or guarding.  : Deferred  MSK/Extremities: No gross bony deformities. Moving all extremities  Skin: Warm. Appropriate color  Neuro: Alert. Oriented. Face symmetric. Speech is fluent.  Gross strength and sensation intact in b/l UE and LEs  Psych: Appropriate mood and affect      Medical Decision Making & ED Course    Medical Decision Makin y.o. male presents as a transfer from [place] for ophthalmology consult in the setting of left eye vision loss approximately 2 weeks postop from surgical intervention for glaucoma.  Patient is hemodynamically stable.  Had received ESR and CRP at the outside hospital which were negative.  Patient with no acute other medical concerns at this time.  He was seen by ophthalmology who found that his presentation is concerning for rebound iritis episcleritis in the setting of recent surgery and not having access to appropriate drops.  Multiple changes to his eyedrop regimen were made and plan for disposition patient to home with close ophtho follow-up.  ----     Social Determinants of Health which Significantly Impact Care: Difficulty obtaining outpatient follow-up The following actions were taken to address these social determinants: Patient given referral to ophthalmology  Chronic conditions affecting the patient's care: As documented in the [facility]    Care Considerations: As per [facility]    ED Course:  Diagnoses as of 24   Iritis   Episcleritis of left eye   Increased intraocular pressure, left     Disposition   Likely disposition back to facility/home    Procedures   Procedures    Patient seen and discussed with ED attending physician.    Kim Ortiz DO  PGY-3 Emergency Medicine     Kim Ortiz DO  Resident  24

## 2024-12-28 NOTE — CONSULTS
Reason For Consult  Red painful eye     History Of Present Illness  Be Luo is a 64 y.o. male with POHx of retinal detachment (RD) OS status post (s/p) repair 2017, retinal detachment (RD) OD many years ago with no light perception (NLP) vision OD, posterior chamber intraocular lens (PCIOL) OU, history of GCA?, chronic uveitis OS, CPC OS 12/16/24 (intraocular pressure (IOP) 27->6) presenting with red and painful left eye with decreased vision.     Patient reports that he has been admitted at hospital for 2 days now for seizure workup, and over this time he has not been getting his eye drops frequently (says probably less than 20-30% of the time). Patient knows his drops and frequencies well. Over this time period he has developed redness and pain OS with some blurry vision.     Denies other visual complaints including diplopia, flashes, floaters, or sudden vision loss. Denies headaches or jaw claudication.      Past Medical History  He has a past medical history of Blind one eye and Hypertension.    Physical Exam  Base Eye Exam       Visual Acuity (Snellen - Linear)         Right Left    Near sc NLP CF at <1 foot              Tonometry (Tonopen, 7:03 PM)         Right Left    Pressure 6 16              Pupils         Dark Light Shape React APD    Right 6 6 Surgical No -    Left 6 6 Surgical No -              Visual Fields         Left Right                                Extraocular Movement         Right Left     Full, Ortho Full, Ortho              Neuro/Psych       Oriented x3: Yes    Mood/Affect: Normal              Dilation       Both eyes: 1% Mydriacyl & 2.5% Saurabh  @ 7:04 PM                  Additional Tests       Color    Unable OU                 Slit Lamp and Fundus Exam       External Exam         Right Left    External Normal Normal              Slit Lamp Exam         Right Left    Lids/Lashes MGD MGD    Conjunctiva/Sclera White and quiet 2+ diffuse injection    Cornea IK touch temporal Clear     "Anterior Chamber Deep and quiet tr-1+ WBC and flare    Iris surgical surgical    Lens PCIOL PCIOL    Anterior Vitreous Normal Normal              Fundus Exam         Right Left    Disc  Pale    C/D Ratio  0.9    Macula  Normal    Vessels  Normal    Periphery  Laser scars in periphery, no tears or detachment    Poor view in right eye                     Last Recorded Vitals  Blood pressure 132/71, pulse 87, temperature 37.4 °C (99.3 °F), resp. rate 18, height 1.778 m (5' 10\"), weight 99.8 kg (220 lb), SpO2 93%.    Relevant Results  Results for orders placed or performed during the hospital encounter of 12/25/24 (from the past 24 hours)   Magnesium   Result Value Ref Range    Magnesium 2.06 1.60 - 2.40 mg/dL   Basic Metabolic Panel   Result Value Ref Range    Glucose 90 74 - 99 mg/dL    Sodium 140 136 - 145 mmol/L    Potassium 3.5 3.5 - 5.3 mmol/L    Chloride 107 98 - 107 mmol/L    Bicarbonate 26 21 - 32 mmol/L    Anion Gap 11 10 - 20 mmol/L    Urea Nitrogen 13 6 - 23 mg/dL    Creatinine 0.76 0.50 - 1.30 mg/dL    eGFR >90 >60 mL/min/1.73m*2    Calcium 8.4 (L) 8.6 - 10.3 mg/dL   CBC   Result Value Ref Range    WBC 4.6 4.4 - 11.3 x10*3/uL    nRBC 0.0 0.0 - 0.0 /100 WBCs    RBC 4.18 (L) 4.50 - 5.90 x10*6/uL    Hemoglobin 13.6 13.5 - 17.5 g/dL    Hematocrit 41.1 41.0 - 52.0 %    MCV 98 80 - 100 fL    MCH 32.5 26.0 - 34.0 pg    MCHC 33.1 32.0 - 36.0 g/dL    RDW 12.5 11.5 - 14.5 %    Platelets 209 150 - 450 x10*3/uL   SST TOP   Result Value Ref Range    Extra Tube Hold for add-ons.    Sedimentation rate, automated   Result Value Ref Range    Sedimentation Rate <1 0 - 20 mm/h   C-reactive protein   Result Value Ref Range    C-Reactive Protein <0.10 <1.00 mg/dL   Lamotrigine level   Result Value Ref Range    Lamotrigine  9.5 2.5 - 15.0 ug/mL        CRP <0.10, ESR <1 (both wnl)    Assessment/Plan     Mr. Luo is a 65 yo male with hx of no light perception (NLP) OD and prior retinal detachment (RD) OS with poor vision, " chronic uveitis OS on prednisolone and pressure drops, presenting with red painful OS after acutely discontinuing intraocular pressure (IOP) lowering and steroid drops. Today patient's eye shows minimal anterior chamber (AC) inflammation and OS pressure is elevated to 16 from 6 status post (s/p) cyclo-photocoagulation 1 week ago.     #Rebound iritis  #Episcleritis  #Elevated intraocular pressure (IOP) OS  - It is imperative that patient gets his eye drops as he will go blind without them, and he is already monocular. We must protect his good eye. Patient sent with intraocular pressure (IOP) drops today so that he can apply them himself at other hospital while undergoing seizure workup (should he need to apply them himself if he is not provided with them as he claims).  - intraocular pressure (IOP) OS 16 today, (from 6 status post (s/p) cyclo-photocoagulation 1 week ago) dropped to 13 after one round of brimonidine  - Vision OS is CF today, was documented as HM at last visit this month    Recommendations:  - Start brimonidine TID OS  - Start prednisolone QID OS  - Start latanoprost at bedtime OS  - Avoid PO/IV steroids if possible as patient is prior steroid responder  - Patient has appointment with prior eye-care provider Dr. Watson on 1/3/25, advised to keep appointment.     Patient discussed with PGY-4 Hortensia Ro MD  PGY2 - Ophthalmology     Ophthalmology Adult Pager - [phone]  Ophthalmology Pediatrics Pager - [phone]    For adult follow-up appointments, call: [phone]  For pediatric follow-up appointments, call: [phone]    NOTE: This note is not finalized until attending reviews and signs.

## 2024-12-28 NOTE — PROGRESS NOTES
Be Luo is a 64 y.o. male on day 2 of admission presenting with Seizure-like activity (Multi).      Subjective   The patient is seen evaluate this morning.  Patient came back from [facility] and was evaluated by ophthalmology.  The patient was recommended to continue taking his eyedrops.  Patient states his vision decreased but not significantly worsening.  Patient denies any further episodes of seizures.  Patient was also seen by urology       Objective     Last Recorded Vitals  /79   Pulse (!) 114   Temp 36.5 °C (97.7 °F)   Resp 16   Wt 99.8 kg (220 lb)   SpO2 93%   Intake/Output last 3 Shifts:    Intake/Output Summary (Last 24 hours) at 12/28/2024 1307  Last data filed at 12/28/2024 0125  Gross per 24 hour   Intake --   Output 1150 ml   Net -1150 ml       Admission Weight  Weight: 99.8 kg (220 lb) (12/25/24 1300)    Daily Weight  12/25/24 : 99.8 kg (220 lb)    Image Results  Electrocardiogram, 12-lead ACS symptoms  Normal sinus rhythm  Normal ECG  When compared with ECG of 24-DEC-2024 21:45, (unconfirmed)  Sinus rhythm has replaced Junctional rhythm  See ED provider note for full interpretation and clinical correlation  Confirmed by Rupal Quinteros ([id number]) on 12/28/2024 9:44:00 AM      Physical Exam  HENT:      Head: Normocephalic and atraumatic.      Nose: Nose normal.      Mouth/Throat:      Mouth: Mucous membranes are dry.   Eyes:      Extraocular Movements: Extraocular movements intact.      Conjunctiva/sclera: Conjunctivae normal.   Cardiovascular:      Rate and Rhythm: Normal rate and regular rhythm.      Pulses: Normal pulses.      Heart sounds: Normal heart sounds.   Pulmonary:      Effort: Pulmonary effort is normal.      Breath sounds: Normal breath sounds.   Abdominal:      General: Bowel sounds are normal.      Palpations: Abdomen is soft.   Genitourinary:     Comments: Thapa in place  Musculoskeletal:         General: Normal range of motion.      Cervical back: Normal range of motion  and neck supple.   Skin:     General: Skin is warm and dry.   Neurological:      General: No focal deficit present.      Mental Status: He is alert. Mental status is at baseline.   Psychiatric:         Mood and Affect: Mood normal.         Relevant Results               Assessment/Plan            Be Luo is a 64 y.o. 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 Patient was treated with 2 courses of Ativan for a possible witnessed seizure. CT head without acute intracranial process and CXR with mild left basilar atelectasis.  According to the ED provider, he has had no more shaking episodes since. UA suspicious for infection, was started on Rocephin. Neurology saw the pt Lamictal was held as levels were very high. The pt stated that he has significantly decreased vision in his left eye, per the pt its usually at 30% and now his vision is completely couldy and significantly diminished.  ESR CRP were negative.  The pt requested transfer. The case was discussed with ophthalmology at [facility] and they requested to see the pt on urgent basic as the pt is already blind in the right eye with significantly  vision in the right eye. The pt was discussed with the ED provider at [facility] and accepted for further opthalmologic evaluation, patient has history of prior retinal detachment chronic uveitis takes chronic eyedrops that were interrupted due to recent surgery.  Patient had rebound iritis and episcleritis.  The patient now with his own eyedrops to avoid interruptions and the patient can apply themselves.  Await p.o. and IV steroids.  Patient does have an appointment on 1/3/2025 and the patient was recommended to maintain that appointment.  Neurology is recommending Lamictal 200 mg twice daily.  The patient likely had high levels due to high dose at home and seizure was  likely toxicity induced seizure.  The patient worked with PT OT and possible SNF placement      Assessment & Plan  Seizure-like activity (Multi)    Nausea and vomiting, unspecified vomiting type    Seizure-like activity  History of seizures  Dizziness, consider vertigo  Nausea and vomiting  -CT head unremarkable  -Holter monitor from 7/2024 showed main rhythm as NSR, occasional PAC and PVCs  -Neuro consult appreciated.  Lamictal 200 twice daily is to be continued.  Patient was taking too high of a dose at home.  Concerns for toxicity induced seizures  -PT/OT eval and treat  -TCC for discharge planning    History of prior retinal detachment  Legally blind with no vision from the right eye  Rebound irits and episcleritis  -Was transferred to [facility] ER for urgent ophthalmologic evaluation and brought back to [place] after evaluation.  - The patient now with his own eyedrops to avoid interruptions and the patient can apply themselves.  Await p.o. and IV steroids.  Patient does have an appointment on 1/3/2025 and the patient was recommended to maintain that appointment.     Nephrolithiasis with bilateral stents, scheduled for stent removal on January 13, 2025  Indwelling Thapa catheter for urinary retention  -UA with leukocytes, WBCs, bacteria, and protein  -Was started on ceftriaxone will discontinue.  Urine cultures came back negative  -Appreciate urology recommendations.  Voiding trial today.  Bladder scan scan following first void and if greater than 250 cc catheter to be replaced.     T2DM not requiring insulin  -Hold home metformin  -SSI, Accu-Cheks, diabetic diet  -A1c 10/2024 was 5.6%     COPD  -Not in acute exacerbation  -CXR without acute cardiopulmonary process  -Continue home inhalers/meds     HTN/HLD: Continue home meds     DVTp: Lovenox, SCDs  Plan: TBD pending PT/OT              Lauren Wells MD

## 2024-12-28 NOTE — PROGRESS NOTES
Emergency Medicine Transition of Care Note.    I received Be Luo in signout from Dr. Ortiz.  Please see the previous ED provider note for all HPI, PE and MDM up to the time of signout at 2300. This is in addition to the primary record.    In brief Be Luo is an 64 y.o. male with history of epilepsy hypertension CAD COPD cardiomyopathy likely blind in right eye who presented as a transfer from [facility] for ophthalmology consult in the setting of left eye vision loss.  Patient evaluated by ophthalmology who noted episcleritis.  Patient administered her brimonidine, prednisolone, and latanoprost drops.    Chief Complaint   Patient presents with    Eye Pain     At the time of signout we were awaiting: Transfer to [facility]    Diagnoses as of 12/28/24 0530   Iritis   Episcleritis of left eye   Increased intraocular pressure, left       Medical Decision Making  Patient noted to be inpatient at [facility].  Home meds ordered.  Transfer arranged back to [facility] inpatient.    Final diagnoses:   [H20.9] Iritis   [H15.102] Episcleritis of left eye   [H40.052] Increased intraocular pressure, left           Procedure  Procedures    Patient presentation and plan discussed with attending physician.    Claudio Garland MD

## 2024-12-28 NOTE — DISCHARGE INSTRUCTIONS
You are seen today in the emergency department by the eye doctors who gave you new eyedrops.  You are leaving with these in hand.  Please take as prescribed.  --   Ophthalmology Clinic   [facility]  Phone: [phone]

## 2024-12-28 NOTE — CONSULTS
Reason For Consult  Urinary retention, possible UTI, bilateral stents    History Of Present Illness  Be Luo is a 64 y.o. male with complex past medical history including seizures, hypertension, CAD, COPD, ischemic cardiomyopathy and right eye blindness is currently admitted with seizure-like activity.  From a urologic perspective, he underwent bilateral ureteroscopy and laser lithotripsy with me on 12/6/2024.  Since, he has had urinary frequency, urgency, urge incontinence as well as some discomfort with voiding.    Patient denies any fevers or chills at home.  He has been having some hematuria and dark-colored urine on and off.    Relevant workup:  Afebrile, hemodynamically stable  -Urine culture negative  -UA with micro with inflammation and RBCs consistent with known stent  Creatinine 0.83, better than baseline    I have reviewed his CT scan from admission, stents are in appropriate position, no hydroureteronephrosis or calculi identified.    On his scan from 12/21, he has some bladder distention.     Past Medical History  He has a past medical history of Blind one eye and Hypertension.    Surgical History  He has a past surgical history that includes Cardiac catheterization (N/A, 9/16/2024).     Social History  He reports that he quit smoking about 15 months ago. His smoking use included cigarettes. He does not have any smokeless tobacco history on file. He reports that he does not currently use alcohol. He reports current drug use. Drug: Marijuana.    Family History  No family history on file.     Allergies  Green tea, Gabapentin, Lyrica [pregabalin], Norco [hydrocodone-acetaminophen], Prednisone, Sulfasalazine, and Vioxx [rofecoxib]    Review of Systems  A 12 system review was completed and is negative with the exception of those signs and symptoms noted in the history of present illness.     Physical Exam  General: in NAD, appears stated age  Head: normocephalic, atraumatic  Respiratory: normal  "effort, no use of accessory muscles  Cardiovascular: no edema noted  Skin: normal turgor, no rashes  Neurologic: grossly intact, oriented to person/place/time  Psychiatric: mode and affect appropriate  Abdomen: Soft, nontender, nondistended, bladder nonpalpable  : Urine draining zay colored with some sediment     Last Recorded Vitals  Blood pressure 155/78, pulse 94, temperature 36.6 °C (97.9 °F), temperature source Temporal, resp. rate 16, height 1.778 m (5' 10\"), weight 99.8 kg (220 lb), SpO2 93%.    Relevant Results      See HPI     Assessment/Plan   # Nephrolithiasis, bilateral stents  -Patient is scheduled for stent removal on January 13, 2025 in my office  -Please inform patient prior to discharge so he can arrange for follow-up    # Urinary retention  -No subjective signs or symptoms  -Recommend trial of void with catheter removal, bladder scan following first void and if greater than 250 cc, catheter replacement    Yousuf Ramirez MD    "

## 2024-12-29 VITALS
SYSTOLIC BLOOD PRESSURE: 143 MMHG | RESPIRATION RATE: 19 BRPM | HEIGHT: 70 IN | HEART RATE: 112 BPM | BODY MASS INDEX: 31.5 KG/M2 | DIASTOLIC BLOOD PRESSURE: 70 MMHG | OXYGEN SATURATION: 94 % | TEMPERATURE: 99 F | WEIGHT: 220 LBS

## 2024-12-29 LAB
ANION GAP SERPL CALC-SCNC: 11 MMOL/L (ref 10–20)
BACTERIA BLD CULT: NORMAL
BACTERIA BLD CULT: NORMAL
BUN SERPL-MCNC: 16 MG/DL (ref 6–23)
CALCIUM SERPL-MCNC: 9.1 MG/DL (ref 8.6–10.3)
CHLORIDE SERPL-SCNC: 107 MMOL/L (ref 98–107)
CO2 SERPL-SCNC: 27 MMOL/L (ref 21–32)
CREAT SERPL-MCNC: 0.84 MG/DL (ref 0.5–1.3)
EGFRCR SERPLBLD CKD-EPI 2021: >90 ML/MIN/1.73M*2
ERYTHROCYTE [DISTWIDTH] IN BLOOD BY AUTOMATED COUNT: 12.7 % (ref 11.5–14.5)
GLUCOSE SERPL-MCNC: 101 MG/DL (ref 74–99)
HCT VFR BLD AUTO: 44.8 % (ref 41–52)
HGB BLD-MCNC: 15 G/DL (ref 13.5–17.5)
HOLD SPECIMEN: NORMAL
MAGNESIUM SERPL-MCNC: 2.09 MG/DL (ref 1.6–2.4)
MCH RBC QN AUTO: 32.4 PG (ref 26–34)
MCHC RBC AUTO-ENTMCNC: 33.5 G/DL (ref 32–36)
MCV RBC AUTO: 97 FL (ref 80–100)
NRBC BLD-RTO: 0 /100 WBCS (ref 0–0)
PLATELET # BLD AUTO: 231 X10*3/UL (ref 150–450)
POTASSIUM SERPL-SCNC: 3.6 MMOL/L (ref 3.5–5.3)
RBC # BLD AUTO: 4.63 X10*6/UL (ref 4.5–5.9)
SODIUM SERPL-SCNC: 141 MMOL/L (ref 136–145)
WBC # BLD AUTO: 6.5 X10*3/UL (ref 4.4–11.3)

## 2024-12-29 PROCEDURE — 2500000002 HC RX 250 W HCPCS SELF ADMINISTERED DRUGS (ALT 637 FOR MEDICARE OP, ALT 636 FOR OP/ED): Performed by: INTERNAL MEDICINE

## 2024-12-29 PROCEDURE — 2500000001 HC RX 250 WO HCPCS SELF ADMINISTERED DRUGS (ALT 637 FOR MEDICARE OP): Performed by: NURSE PRACTITIONER

## 2024-12-29 PROCEDURE — 1100000001 HC PRIVATE ROOM DAILY

## 2024-12-29 PROCEDURE — 2500000004 HC RX 250 GENERAL PHARMACY W/ HCPCS (ALT 636 FOR OP/ED): Mod: JZ | Performed by: INTERNAL MEDICINE

## 2024-12-29 PROCEDURE — 99233 SBSQ HOSP IP/OBS HIGH 50: CPT | Performed by: INTERNAL MEDICINE

## 2024-12-29 PROCEDURE — 2500000004 HC RX 250 GENERAL PHARMACY W/ HCPCS (ALT 636 FOR OP/ED): Performed by: INTERNAL MEDICINE

## 2024-12-29 PROCEDURE — 94640 AIRWAY INHALATION TREATMENT: CPT

## 2024-12-29 PROCEDURE — 2500000001 HC RX 250 WO HCPCS SELF ADMINISTERED DRUGS (ALT 637 FOR MEDICARE OP): Performed by: SPECIALIST

## 2024-12-29 PROCEDURE — 36415 COLL VENOUS BLD VENIPUNCTURE: CPT | Performed by: INTERNAL MEDICINE

## 2024-12-29 PROCEDURE — 85027 COMPLETE CBC AUTOMATED: CPT | Performed by: INTERNAL MEDICINE

## 2024-12-29 PROCEDURE — 80048 BASIC METABOLIC PNL TOTAL CA: CPT | Performed by: INTERNAL MEDICINE

## 2024-12-29 PROCEDURE — 83735 ASSAY OF MAGNESIUM: CPT | Performed by: INTERNAL MEDICINE

## 2024-12-29 PROCEDURE — 2500000002 HC RX 250 W HCPCS SELF ADMINISTERED DRUGS (ALT 637 FOR MEDICARE OP, ALT 636 FOR OP/ED): Performed by: NURSE PRACTITIONER

## 2024-12-29 PROCEDURE — 2500000001 HC RX 250 WO HCPCS SELF ADMINISTERED DRUGS (ALT 637 FOR MEDICARE OP): Performed by: INTERNAL MEDICINE

## 2024-12-29 RX ORDER — IPRATROPIUM BROMIDE AND ALBUTEROL SULFATE 2.5; .5 MG/3ML; MG/3ML
3 SOLUTION RESPIRATORY (INHALATION) EVERY 4 HOURS PRN
Status: DISCONTINUED | OUTPATIENT
Start: 2024-12-29 | End: 2024-12-30 | Stop reason: HOSPADM

## 2024-12-29 RX ADMIN — ENOXAPARIN SODIUM 40 MG: 40 INJECTION SUBCUTANEOUS at 16:44

## 2024-12-29 RX ADMIN — MONTELUKAST SODIUM 10 MG: 10 TABLET, FILM COATED ORAL at 21:34

## 2024-12-29 RX ADMIN — PREDNISOLONE ACETATE 1 DROP: 10 SUSPENSION/ DROPS OPHTHALMIC at 21:39

## 2024-12-29 RX ADMIN — HYDROXYZINE HYDROCHLORIDE 25 MG: 25 TABLET ORAL at 14:39

## 2024-12-29 RX ADMIN — PRAMIPEXOLE DIHYDROCHLORIDE 0.25 MG: 0.25 TABLET ORAL at 21:34

## 2024-12-29 RX ADMIN — ROSUVASTATIN CALCIUM 10 MG: 10 TABLET, FILM COATED ORAL at 21:34

## 2024-12-29 RX ADMIN — PREDNISOLONE ACETATE 1 DROP: 10 SUSPENSION/ DROPS OPHTHALMIC at 13:47

## 2024-12-29 RX ADMIN — BRIMONIDINE TARTRATE 1 DROP: 2 SOLUTION/ DROPS OPHTHALMIC at 21:39

## 2024-12-29 RX ADMIN — DEXTROSE MONOHYDRATE 2 G: 5 INJECTION INTRAVENOUS at 08:35

## 2024-12-29 RX ADMIN — LAMOTRIGINE 200 MG: 100 TABLET ORAL at 08:30

## 2024-12-29 RX ADMIN — BRIMONIDINE TARTRATE 1 DROP: 2 SOLUTION/ DROPS OPHTHALMIC at 14:40

## 2024-12-29 RX ADMIN — LATANOPROST 1 DROP: 50 SOLUTION/ DROPS OPHTHALMIC at 21:39

## 2024-12-29 RX ADMIN — POLYETHYLENE GLYCOL 3350 17 G: 17 POWDER, FOR SOLUTION ORAL at 08:28

## 2024-12-29 RX ADMIN — ASPIRIN 81 MG: 81 TABLET, COATED ORAL at 06:32

## 2024-12-29 RX ADMIN — HYDROXYZINE HYDROCHLORIDE 25 MG: 25 TABLET ORAL at 21:35

## 2024-12-29 RX ADMIN — TOCILIZUMAB 162 MG: 180 INJECTION, SOLUTION SUBCUTANEOUS at 21:34

## 2024-12-29 RX ADMIN — IPRATROPIUM BROMIDE AND ALBUTEROL SULFATE 3 ML: 2.5; .5 SOLUTION RESPIRATORY (INHALATION) at 01:49

## 2024-12-29 RX ADMIN — PREDNISOLONE ACETATE 1 DROP: 10 SUSPENSION/ DROPS OPHTHALMIC at 06:34

## 2024-12-29 RX ADMIN — POTASSIUM CHLORIDE 10 MEQ: 750 TABLET, EXTENDED RELEASE ORAL at 08:28

## 2024-12-29 RX ADMIN — LAMOTRIGINE 200 MG: 100 TABLET ORAL at 21:35

## 2024-12-29 RX ADMIN — BRIMONIDINE TARTRATE 1 DROP: 2 SOLUTION/ DROPS OPHTHALMIC at 08:24

## 2024-12-29 RX ADMIN — IPRATROPIUM BROMIDE AND ALBUTEROL SULFATE 3 ML: 2.5; .5 SOLUTION RESPIRATORY (INHALATION) at 19:25

## 2024-12-29 RX ADMIN — PREDNISOLONE ACETATE 1 DROP: 10 SUSPENSION/ DROPS OPHTHALMIC at 16:46

## 2024-12-29 RX ADMIN — VALSARTAN 40 MG: 80 TABLET, FILM COATED ORAL at 21:34

## 2024-12-29 ASSESSMENT — PAIN SCALES - GENERAL
PAINLEVEL_OUTOF10: 0 - NO PAIN
PAINLEVEL_OUTOF10: 0 - NO PAIN

## 2024-12-29 ASSESSMENT — PAIN - FUNCTIONAL ASSESSMENT: PAIN_FUNCTIONAL_ASSESSMENT: 0-10

## 2024-12-29 ASSESSMENT — PAIN SCALES - WONG BAKER: WONGBAKER_NUMERICALRESPONSE: NO HURT

## 2024-12-29 NOTE — SIGNIFICANT EVENT
"Notified by nursing patient reported feeling chest tightness, subjective slurred speech/delayed speech, and \"like he's going to have a seizure\". Patient seen and examined. He does exhibit some pauses while speaking and describing his symptoms, however these seem to resolve when distracted discussing other topics. No focal deficit noted. Recent [facility] results reviewed. Reassured patient regarding measures in place should he experience a seizure.   "

## 2024-12-29 NOTE — PROGRESS NOTES
Be Luo is a 64 y.o. male on day 3 of admission presenting with Seizure-like activity (Multi).      Subjective   The patient is seen evaluate this morning.  Over night the pt had chest discomfort and was thought to be possibly due to anxiety. The pt currently denies any chest pain but has anxiety. Thapa was removed. He states his vision is stable is this time. Not further seizures.        Objective     Last Recorded Vitals  /81   Pulse (!) 115   Temp 36.1 °C (97 °F)   Resp 19   Wt 99.8 kg (220 lb)   SpO2 93%   Intake/Output last 3 Shifts:    Intake/Output Summary (Last 24 hours) at 12/29/2024 1321  Last data filed at 12/29/2024 0907  Gross per 24 hour   Intake 150 ml   Output 830 ml   Net -680 ml       Admission Weight  Weight: 99.8 kg (220 lb) (12/25/24 1300)    Daily Weight  12/25/24 : 99.8 kg (220 lb)    Image Results  MR brain w and wo IV contrast  Narrative: Interpreted By:  Jonathan Padilla,   STUDY:  MR BRAIN W AND WO IV CONTRAST;  12/28/2024 4:10 pm      INDICATION:  Signs/Symptoms:seizure-like activity, confusion, tremor.          COMPARISON:  12/25/2024 brain CT      ACCESSION NUMBER(S):  [id number]      ORDERING CLINICIAN:  SHOAIB BAKER      TECHNIQUE:  Axial T2, FLAIR, DWI, gradient echo T2 and sagittal and coronal T1  weighted images of brain were acquired. Post contrast T1 weighted  images were acquired after administration of 19.5 ML Dotarem  gadolinium based intravenous contrast.      FINDINGS:  CSF Spaces: The ventricles and sulci are normal, unchanged.      Parenchyma: No acute infarct, hemorrhage or mass is noted. The white  matter has a few focal nonspecific FLAIR hyperintensities in the  periventricular region. The brain parenchyma enhances normally.      Right scleral band with silicone vitreous replacement again noted.      Paranasal Sinuses and Mastoids: The left maxillary sinus has a 1 cm  retention cyst. Mastoid air cells are normally aerated.      Impression: No acute  infarct, hemorrhage or mass is noted. The parenchymal  hyperintensities are nonspecific and may be secondary to migraine,  hypertension or chronic microvascular ischemic changes.      MACRO:  None      Signed by: Jonathan Padilla 12/28/2024 4:35 PM  Dictation workstation:   [id number]  Electrocardiogram, 12-lead ACS symptoms  Normal sinus rhythm  Normal ECG  When compared with ECG of 24-DEC-2024 21:45, (unconfirmed)  Sinus rhythm has replaced Junctional rhythm  See ED provider note for full interpretation and clinical correlation  Confirmed by Rupal Quinteros ([id number]) on 12/28/2024 9:44:00 AM      Physical Exam  HENT:      Head: Normocephalic and atraumatic.      Nose: Nose normal.      Mouth/Throat:      Mouth: Mucous membranes are dry.   Eyes:      Extraocular Movements: Extraocular movements intact.      Conjunctiva/sclera: Conjunctivae normal.   Cardiovascular:      Rate and Rhythm: Normal rate and regular rhythm.      Pulses: Normal pulses.      Heart sounds: Normal heart sounds.   Pulmonary:      Effort: Pulmonary effort is normal.      Breath sounds: Normal breath sounds.   Abdominal:      General: Bowel sounds are normal.      Palpations: Abdomen is soft.   Genitourinary:     Comments: Thapa in place  Musculoskeletal:         General: Normal range of motion.      Cervical back: Normal range of motion and neck supple.   Skin:     General: Skin is warm and dry.   Neurological:      General: No focal deficit present.      Mental Status: He is alert. Mental status is at baseline.   Psychiatric:         Mood and Affect: Mood normal.         Relevant Results               Assessment/Plan            Be Luo is a 64 y.o. 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 Patient was treated with 2 courses of Ativan for a possible witnessed seizure. CT head without acute intracranial  process and CXR with mild left basilar atelectasis.  According to the ED provider, he has had no more shaking episodes since. UA suspicious for infection, was started on Rocephin. Neurology saw the pt Lamictal was held as levels were very high. The pt stated that he has significantly decreased vision in his left eye, per the pt its usually at 30% and now his vision is completely couldy and significantly diminished.  ESR CRP were negative.  The pt requested transfer. The case was discussed with ophthalmology at [facility] and they requested to see the pt on urgent basic as the pt is already blind in the right eye with significantly  vision in the right eye. The pt was discussed with the ED provider at [facility] and accepted for further opthalmologic evaluation, patient has history of prior retinal detachment chronic uveitis takes chronic eyedrops that were interrupted due to recent surgery.  Patient had rebound iritis and episcleritis.  The patient now with his own eyedrops to avoid interruptions and the patient can apply themselves.  Await p.o. and IV steroids.  Patient does have an appointment on 1/3/2025 and the patient was recommended to maintain that appointment.  Neurology is recommending Lamictal 200 mg twice daily.  The patient likely had high levels due to high dose at home and seizure was likely toxicity induced seizure.  The patient worked with PT OT and possible SNF placement      Assessment & Plan  Seizure-like activity (Multi)    Nausea and vomiting, unspecified vomiting type    Seizure-like activity  History of seizures  Dizziness, consider vertigo  Nausea and vomiting  -CT head unremarkable  -Holter monitor from 2024 showed main rhythm as NSR, occasional PAC and PVCs  -Neuro consult appreciated.  Lamictal 200 twice daily is to be continued.  Patient was taking too high of a dose at home.  Concerns for toxicity induced seizures  -Pt need to avoid driving, operating heavy machines  until cleared  by neurology.   -Follow up with Neurology in out pt settings.   -PT/OT eval and treat  -TCC for discharge planning    History of prior retinal detachment  Legally blind with no vision from the right eye  Rebound irits and episcleritis  -Was transferred to [facility] ER for urgent ophthalmologic evaluation and brought back to [place] after evaluation.  - The patient now with his own eyedrops to avoid interruptions and the patient can apply themselves.  Await p.o. and IV steroids.  Patient does have an appointment on 1/3/2025 and the patient was recommended to maintain that appointment.     Nephrolithiasis with bilateral stents, scheduled for stent removal on January 13, 2025  Indwelling Aldana catheter for urinary retention-removed this admission.   -UA with leukocytes, WBCs, bacteria, and protein  -Was started on ceftriaxone will discontinue.  Urine cultures came back negative  -Appreciate urology recommendations.  Voiding trials and aldana was removed.     - Patient is scheduled for stent removal on January 13, 2025 with Dr. Ramirez.      T2DM not requiring insulin  -Hold home metformin  -SSI, Accu-Cheks, diabetic diet  -A1c 10/2024 was 5.6%     COPD  -Not in acute exacerbation  -CXR without acute cardiopulmonary process  -Continue home inhalers/meds     HTN/HLD: Continue home meds     DVTp: Lovenox, SCDs  Plan: TBD pending PT/OT.              Lauren Wells MD

## 2024-12-29 NOTE — CARE PLAN
The patient's goals for the shift include comfort and safety.     The clinical goals for the shift include safety and comfort.    Over the shift, the patient did not make progress toward the following goals. Barriers to progression include pt anxious about seizures. Recommendations to address these barriers include antianxiety meds as needed.

## 2024-12-30 VITALS
HEIGHT: 70 IN | BODY MASS INDEX: 31.5 KG/M2 | HEART RATE: 111 BPM | OXYGEN SATURATION: 93 % | WEIGHT: 220 LBS | DIASTOLIC BLOOD PRESSURE: 69 MMHG | SYSTOLIC BLOOD PRESSURE: 100 MMHG | RESPIRATION RATE: 18 BRPM | TEMPERATURE: 97.2 F

## 2024-12-30 PROCEDURE — 97530 THERAPEUTIC ACTIVITIES: CPT | Mod: GP,CQ

## 2024-12-30 PROCEDURE — 99239 HOSP IP/OBS DSCHRG MGMT >30: CPT | Performed by: INTERNAL MEDICINE

## 2024-12-30 PROCEDURE — 2500000001 HC RX 250 WO HCPCS SELF ADMINISTERED DRUGS (ALT 637 FOR MEDICARE OP): Performed by: INTERNAL MEDICINE

## 2024-12-30 PROCEDURE — 97116 GAIT TRAINING THERAPY: CPT | Mod: GP,CQ

## 2024-12-30 PROCEDURE — 2500000002 HC RX 250 W HCPCS SELF ADMINISTERED DRUGS (ALT 637 FOR MEDICARE OP, ALT 636 FOR OP/ED): Performed by: INTERNAL MEDICINE

## 2024-12-30 PROCEDURE — 2500000001 HC RX 250 WO HCPCS SELF ADMINISTERED DRUGS (ALT 637 FOR MEDICARE OP): Performed by: SPECIALIST

## 2024-12-30 RX ORDER — LAMOTRIGINE 200 MG/1
200 TABLET ORAL 2 TIMES DAILY
Qty: 60 TABLET | Refills: 0 | Status: SHIPPED | OUTPATIENT
Start: 2024-12-30 | End: 2025-01-29

## 2024-12-30 RX ADMIN — BRIMONIDINE TARTRATE 1 DROP: 2 SOLUTION/ DROPS OPHTHALMIC at 09:47

## 2024-12-30 RX ADMIN — PREDNISOLONE ACETATE 1 DROP: 10 SUSPENSION/ DROPS OPHTHALMIC at 06:58

## 2024-12-30 RX ADMIN — PREDNISOLONE ACETATE 1 DROP: 10 SUSPENSION/ DROPS OPHTHALMIC at 13:54

## 2024-12-30 RX ADMIN — POTASSIUM CHLORIDE 10 MEQ: 750 TABLET, EXTENDED RELEASE ORAL at 09:46

## 2024-12-30 RX ADMIN — LAMOTRIGINE 200 MG: 100 TABLET ORAL at 09:45

## 2024-12-30 RX ADMIN — ASPIRIN 81 MG: 81 TABLET, COATED ORAL at 06:55

## 2024-12-30 ASSESSMENT — COGNITIVE AND FUNCTIONAL STATUS - GENERAL
CLIMB 3 TO 5 STEPS WITH RAILING: A LOT
MOBILITY SCORE: 19
STANDING UP FROM CHAIR USING ARMS: A LITTLE
MOVING TO AND FROM BED TO CHAIR: A LITTLE
WALKING IN HOSPITAL ROOM: A LITTLE

## 2024-12-30 ASSESSMENT — PAIN - FUNCTIONAL ASSESSMENT: PAIN_FUNCTIONAL_ASSESSMENT: 0-10

## 2024-12-30 ASSESSMENT — PAIN SCALES - GENERAL: PAINLEVEL_OUTOF10: 0 - NO PAIN

## 2024-12-30 NOTE — PROGRESS NOTES
SW and TCC spoke with pt regarding discharge plans. Pt requesting SNF placement, TCC explained to pt that he currently does not have a skilled need for SNF placement and discussed option for HHC at home. Pt states he needs assistance with cooking, cleaning, and other household chores at home. TCC/SW discussed applying for Passport services and explained what the service can provide pending eligibility, pt in agreement and would like to apply for program.     Noted by another SW that message was left with pt sister explaining the program and process to apply. Sister notified TCC that she did not receive the VM message. SW completed online application for Passport services on behalf of pt. SW called and notified sister Mario. Pt updated and SW provided pt with Pamphlet for Passport services. Pt and sister aware that Passport program will be following up and will be contacting Mario to continue with initiating services. Pt will be discharging home today, sister Mario is transporting pt home.

## 2024-12-30 NOTE — DOCUMENTATION CLARIFICATION NOTE
"    PATIENT:               NILDA MULLINS  ACCT #:                  [id number]  MRN:                       [id number]  :                       1960  ADMIT DATE:       2024 12:21 PM  DISCH DATE:  RESPONDING PROVIDER #:        [id number]          PROVIDER RESPONSE TEXT:    UTI unlikely    CDI QUERY TEXT:    Clarification        Instruction:    Based on your assessment of the patient and the clinical information, please provide the requested documentation by clicking on the appropriate radio button and enter any additional information if prompted.    Question: Please further clarify the relationship between the UTI and the urinary device    When answering this query, please exercise your independent professional judgment. The fact that a question is being asked, does not imply that any particular answer is desired or expected.    The patient's clinical indicators include:  Clinical Information: 63 yo male admitted with Seizure, UTI    Clinical Indicators:   Vital signs: T36.4  R24 /60 94% RA  ED:\" Comments: Presence of Aldana catheter\"   note:\"Acute UTI with Aldana catheter in place  Gross hematuria  -UA with leukocytes, WBCs, bacteria, and protein \"    Treatment:  Rocephin 2gm IV Q24hrs - present    Risk Factors: Epilepsy, HTN, UTI, Chronic aldana, COPD, hematuria  Options provided:  -- UTI related to Aldana catheter POA  -- UTI unrelated to Aldana catheter POA  -- Other - I will add my own diagnosis  -- Refer to Clinical Documentation Reviewer    Query created by: Shu Mendoza on 2024 2:22 PM      Electronically signed by:  SHOAIB BAKER MD 2024 2:46 PM          "

## 2024-12-30 NOTE — PROGRESS NOTES
Pd today home w/ hhc. Pt given cms list for hhc. When sister arrived they agreed on caretenders as first choice. Referral sent and agency accepted. Pt contacted.

## 2024-12-30 NOTE — PROGRESS NOTES
Physical Therapy    Physical Therapy Treatment    Patient Name: Be Luo  MRN: [id number]  Today's Date: 12/30/2024  Time Calculation  Start Time: 0955  Stop Time: 1040  Time Calculation (min): 45 min     915/915-A    Assessment/Plan   End of Session Communication: Bedside nurse, Care Coordinator  Assessment Comment: Patient presents with fair effort throughout todays session. Max re-directing required to maintain attn to task as pt is very easily distracted. Patient demonstrates improved confidence with walker; however, when moving slow and controlled without AD, steadiness improved. Patient would benefit from continued therapy to building strength/improving balance for optimal stability with functional mobility.Call light and all needs in reach.  End of Session Patient Position: Up in bathroom (pt utilizing RR, advised to pull alarm notificant for assist to get up when ready to stand.)          General Visit Information:   PT  Visit  PT Received On: 12/30/24  General  Prior to Session Communication: Bedside nurse  Patient Position Received: Bed, 4 rail up, Alarm on (seizure pads)  General Comment: Pt agreeable to therapy this date.  Subjective     Precautions:  Precautions  Medical Precautions: Fall precautions, Seizure precautions       Objective     Pain:  Pain Assessment  Pain Assessment: 0-10  0-10 (Numeric) Pain Score: 0 - No pain    Cognition:  Cognition  Overall Cognitive Status: Impaired  Orientation Level: Oriented X4  Safety Judgment: Decreased awareness of need for assistance  Problem Solving: Assistance required to implement solutions  Cognition Comments: Inconsistent with visual deficits, increased confidence with use of FWW. Pt also mentioning swelling of L foot is impairing gait and balance; however, no noticeable swelling from therapist assessment at start of session. Girth not measured this session.  Problem Solving: Exceptions to WFL  Safety/Judgement: Exceptions to WFL  Insight:  Moderate  Impulsive: Moderately  Processing Speed: Within funtional limits         Treatments:  Therapeutic Exercise  Therapeutic Exercise Performed: Yes  Therapeutic Exercise Activity 1: seated, BLE ankle pumps, LAQ, marches, hip ABD/ADD 10x with VC for proper performance and hold times for optimal contraction. Focus on LE strength for improved stability with functional mobility.        Bed Mobility  Bed Mobility: Yes  Bed Mobility 1  Bed Mobility 1: Supine to sitting  Level of Assistance 1: Modified independent  Bed Mobility Comments 1: Pt performed supine<>EOB Krishan with HOB slightly elevated, use of bedrails to lift trunk. Pt demonstrates ability to walk BLE off edge of bed.  Ambulation/Gait Training  Ambulation/Gait Training Performed: Yes  Ambulation/Gait Training 1  Surface 1: Level tile  Device 1: Rolling walker (FWW)  Gait Support Devices: Gait belt  Assistance 1: Minimum assistance  Quality of Gait 1: Decreased step length, Ataxic  Comments/Distance (ft) 1: Pt ambulated 15' x2 with FWW per pt request first bout of ambulation CGA. No LOB/unsteadiness noted. VC for slow controlled ambulation as pt tends to move quickly, performs directional changes quickly. Able to navigate in and out of doorway without incident with no cues from therapist required. Second bout Shashank required due to ambulation without use of AD, mild unsteadiness and ataxia present. Significant decrease in step length and mild narrowing of SOUMYA. Minimal arm swing and trunk rotation present, required ues for ambulating in and out of doorway to prevent bumping into door frame. VC for corrections. Fair AD management with impulsive tendencies present. Third bout of ambulation 10' without AD, furniture walking and utilizing wall to feel for direction on a flat, linear surface.  Transfers  Transfer: Yes  Transfer 1  Transfer From 1: Stand to  Transfer to 1: Commode-standard, Bed  Technique 1: Stand pivot  Transfer Device 1: Walker, Gait belt  (FWW)  Transfer Level of Assistance 1: Contact guard  Trials/Comments 1: Pt performed stand pivot from FWW/no AD<>EOB/toilet with and without use of AD CGA. Pt requires min VC for sequencing, fair eccentric control to sitting position due to mild impulsive tendencies. Fair AD management, would pick walker up entirely and pivot vs rolling walker on ground to pivot with support.  Transfers 2  Transfer From 2: Bed to  Transfer to 2: Stand  Technique 2: Stand to sit, Sit to stand  Transfer Device 2: Walker, Gait belt (FWW)  Transfer Level of Assistance 2: Contact guard  Trials/Comments 2: Pt performed STS from EOB<>FWW/no AD CGA. Minimal VC required for sequencing. One attempt performed impulsively. Education on safety awareness provided.          Outcome Measures:     [facility] Basic Mobility  Turning from your back to your side while in a flat bed without using bedrails: None  Moving from lying on your back to sitting on the side of a flat bed without using bedrails: None  Moving to and from bed to chair (including a wheelchair): A little  Standing up from a chair using your arms (e.g. wheelchair or bedside chair): A little  To walk in hospital room: A little  Climbing 3-5 steps with railing: A lot  Basic Mobility - Total Score: 19                                      Education Documentation  Mobility Training, taught by Jennifer Dasilva PTA at 12/30/2024 12:04 PM.  Learner: Patient  Readiness: Acceptance  Method: Explanation, Demonstration  Response: Verbalizes Understanding, Needs Reinforcement    Education Comments  No comments found.           EDUCATION:     Encounter Problems       Encounter Problems (Active)       PT Problem       Pt will demo sit > stand and stand > sit transfer with independence and no LOB  (Progressing)       Start:  12/27/24    Expected End:  01/10/25            Pt will ambulate 20' with supervision and no LOB  (Progressing)       Start:  12/27/24    Expected End:  01/10/25            Pt will  demonstrate ability to tolerate 8 minutes of seated or standing therapeutic exercise to demonstrate improved activity tolerance.  (Progressing)       Start:  12/27/24    Expected End:  01/10/25               Pain - Adult

## 2024-12-30 NOTE — DISCHARGE SUMMARY
Discharge Diagnosis  Seizure-like activity (Multi)    Issues Requiring Follow-Up  Follow-up with neurology, follow up with Ophthalmology on 1/3/25, Urology in a week. PCP in a week.  Patient is scheduled for stent removal on January 13, 2025 with Dr. Ramirez.     Discharge Meds     Medication List      CHANGE how you take these medications     lamoTRIgine 200 mg tablet; Commonly known as: LaMICtal; Take 1 tablet   (200 mg) by mouth 2 times a day.; What changed: how much to take     CONTINUE taking these medications     Actemra ACTPen 162 mg/0.9 mL; Generic drug: tocilizumab   aspirin 81 mg EC tablet   atropine 1 % ophthalmic solution   brimonidine 0.2 % ophthalmic solution; Commonly known as: AlphaGAN   cholecalciferol 50 mcg (2,000 unit) capsule; Commonly known as: Vitamin   D-3   cyclobenzaprine 10 mg tablet; Commonly known as: Flexeril   latanoprost 0.005 % ophthalmic solution; Commonly known as: Xalatan   lubricating eye drops ophthalmic solution   metFORMIN 500 mg tablet; Commonly known as: Glucophage   potassium chloride CR 10 mEq ER tablet; Commonly known as: Klor-Con   pramipexole 0.25 mg tablet; Commonly known as: Mirapex   prednisoLONE acetate 1 % ophthalmic suspension; Commonly known as:   Pred-Forte   Rhopressa 0.02 % drops opthalmic solution; Generic drug: netarsudiL   rosuvastatin 20 mg tablet; Commonly known as: Crestor     STOP taking these medications     acetaminophen 500 mg tablet; Commonly known as: Tylenol   albuterol 90 mcg/actuation aerosol powdr breath activated inhaler   meclizine 25 mg tablet; Commonly known as: Antivert   medical cannabis   naloxone 4 mg/0.1 mL nasal spray; Commonly known as: Narcan   ondansetron ODT 4 mg disintegrating tablet; Commonly known as:   Zofran-ODT   oxyCODONE 5 mg immediate release tablet; Commonly known as: Roxicodone     ASK your doctor about these medications     valsartan 40 mg tablet; Commonly known as: Diovan; Take 1 tablet (40 mg)   by mouth once  daily.       Test Results Pending At Discharge  Pending Labs       No current pending labs.            Hospital Course   Be Luo is a 64 y.o. 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 Patient was treated with 2 courses of Ativan for a possible witnessed seizure. CT head without acute intracranial process and CXR with mild left basilar atelectasis. According to the ED provider, he has had no more shaking episodes since. UA suspicious for infection, was started on Rocephin. Neurology saw the pt Lamictal was held as levels were very high. The pt stated that he has significantly decreased vision in his left eye, per the pt its usually at 30% and now his vision is completely couldy and significantly diminished. ESR CRP were negative. The pt requested transfer. The case was discussed with ophthalmology at [facility] and they requested to see the pt on urgent basic as the pt is already blind in the right eye with significantly  vision in the right eye. The pt was discussed with the ED provider at [facility] and accepted for further opthalmologic evaluation, patient has history of prior retinal detachment chronic uveitis takes chronic eyedrops that were interrupted due to recent surgery. Patient had rebound iritis and episcleritis. The patient now with his own eyedrops to avoid interruptions and the patient can apply themselves. Await p.o. and IV steroids. Patient does have an appointment on 1/3/2025 and the patient was recommended to maintain that appointment. Neurology is recommending Lamictal 200 mg twice daily. The patient likely had high levels due to high dose at home and seizure was likely toxicity induced seizure. The pt was seen by Urology as well and Thapa was removed. The pt to follow up with Urology in out pt settings. Patient is scheduled for stent removal on  January 13, 2025 with Dr. Ramirez.  The patient worked with PT OT and did not qualify for SNF> The pt stable for discharge home with Home health and needs follow up with PCP, Neurology, Urology and PCP.     Pertinent Physical Exam At Time of Discharge  Physical Exam  HENT:      Head: Normocephalic and atraumatic.      Nose: Nose normal.      Mouth/Throat:      Mouth: Mucous membranes are dry.   Eyes:      Extraocular Movements: Extraocular movements intact.      Comments: Left eye erythema improving.    Cardiovascular:      Rate and Rhythm: Normal rate and regular rhythm.      Pulses: Normal pulses.      Heart sounds: Normal heart sounds.   Pulmonary:      Effort: Pulmonary effort is normal.      Breath sounds: Normal breath sounds.   Abdominal:      General: Bowel sounds are normal.      Palpations: Abdomen is soft.   Musculoskeletal:         General: Normal range of motion.      Cervical back: Normal range of motion and neck supple.   Skin:     General: Skin is warm and dry.   Neurological:      General: No focal deficit present.      Mental Status: He is alert and oriented to person, place, and time. Mental status is at baseline.   Psychiatric:         Mood and Affect: Mood normal.         Outpatient Follow-Up  Future Appointments   Date Time Provider Department Center   1/13/2025 10:30 AM Yousuf Ramirez MD [id number] [place]   1/17/2025 10:00 AM Mine Enamorado MD [id number] [facility]         Lauren Wells MD

## 2024-12-30 NOTE — CARE PLAN
The patient's goals for the shift include  to get home med that was brought to the hospital.    The clinical goals for the shift include safety and comfort.    Over the shift, the patient did not make progress toward the following goals. Barriers to progression include pt was anxious about medication. Recommendations to address these barriers include administer anti-anxiety meds.

## 2025-01-02 ENCOUNTER — TELEPHONE (OUTPATIENT)
Dept: NEUROLOGY | Facility: CLINIC | Age: 65
End: 2025-01-02
Payer: COMMERCIAL

## 2025-01-02 ENCOUNTER — TELEPHONE (OUTPATIENT)
Dept: UROLOGY | Facility: CLINIC | Age: 65
End: 2025-01-02
Payer: COMMERCIAL

## 2025-01-02 NOTE — TELEPHONE ENCOUNTER
Patient calling asking his appt on 1/13 is that for stent removal?  I was only told discuss plan of care?  He wants these out asap.    Please advise

## 2025-01-02 NOTE — TELEPHONE ENCOUNTER
Makenzie from 's office called to advise you that the pt told them that he has increased his Lamictal to 3x a day on his own.   He was just in the hospital and feels like the next seizure he has could possibly kill him.  Pt no-showed on 11/14 and has not rescheduled.

## 2025-01-04 ENCOUNTER — HOSPITAL ENCOUNTER (OUTPATIENT)
Facility: HOSPITAL | Age: 65
Setting detail: OBSERVATION
Discharge: HOME | End: 2025-01-05
Attending: EMERGENCY MEDICINE | Admitting: STUDENT IN AN ORGANIZED HEALTH CARE EDUCATION/TRAINING PROGRAM
Payer: COMMERCIAL

## 2025-01-04 ENCOUNTER — APPOINTMENT (OUTPATIENT)
Dept: RADIOLOGY | Facility: HOSPITAL | Age: 65
End: 2025-01-04
Payer: COMMERCIAL

## 2025-01-04 DIAGNOSIS — R55 SYNCOPE AND COLLAPSE: ICD-10-CM

## 2025-01-04 DIAGNOSIS — R31.0 GROSS HEMATURIA: Primary | ICD-10-CM

## 2025-01-04 DIAGNOSIS — N39.0 COMPLICATED URINARY TRACT INFECTION: ICD-10-CM

## 2025-01-04 DIAGNOSIS — N30.01 ACUTE CYSTITIS WITH HEMATURIA: ICD-10-CM

## 2025-01-04 LAB
ANION GAP SERPL CALC-SCNC: 11 MMOL/L (ref 10–20)
APPEARANCE UR: ABNORMAL
BACTERIA #/AREA URNS AUTO: ABNORMAL /HPF
BASOPHILS # BLD AUTO: 0.05 X10*3/UL (ref 0–0.1)
BASOPHILS NFR BLD AUTO: 0.8 %
BUN SERPL-MCNC: 19 MG/DL (ref 6–23)
CALCIUM SERPL-MCNC: 9.5 MG/DL (ref 8.6–10.3)
CHLORIDE SERPL-SCNC: 106 MMOL/L (ref 98–107)
CO2 SERPL-SCNC: 27 MMOL/L (ref 21–32)
COLOR UR: ABNORMAL
CREAT SERPL-MCNC: 0.8 MG/DL (ref 0.5–1.3)
EGFRCR SERPLBLD CKD-EPI 2021: >90 ML/MIN/1.73M*2
EOSINOPHIL # BLD AUTO: 0.35 X10*3/UL (ref 0–0.7)
EOSINOPHIL NFR BLD AUTO: 5.8 %
ERYTHROCYTE [DISTWIDTH] IN BLOOD BY AUTOMATED COUNT: 12.1 % (ref 11.5–14.5)
GLUCOSE SERPL-MCNC: 101 MG/DL (ref 74–99)
HCT VFR BLD AUTO: 40.2 % (ref 41–52)
HGB BLD-MCNC: 13.3 G/DL (ref 13.5–17.5)
IMM GRANULOCYTES # BLD AUTO: 0.02 X10*3/UL (ref 0–0.7)
IMM GRANULOCYTES NFR BLD AUTO: 0.3 % (ref 0–0.9)
INR PPP: 1 (ref 0.9–1.1)
LACTATE SERPL-SCNC: 1 MMOL/L (ref 0.4–2)
LYMPHOCYTES # BLD AUTO: 1.43 X10*3/UL (ref 1.2–4.8)
LYMPHOCYTES NFR BLD AUTO: 23.6 %
MCH RBC QN AUTO: 32.8 PG (ref 26–34)
MCHC RBC AUTO-ENTMCNC: 33.1 G/DL (ref 32–36)
MCV RBC AUTO: 99 FL (ref 80–100)
MONOCYTES # BLD AUTO: 0.92 X10*3/UL (ref 0.1–1)
MONOCYTES NFR BLD AUTO: 15.2 %
NEUTROPHILS # BLD AUTO: 3.29 X10*3/UL (ref 1.2–7.7)
NEUTROPHILS NFR BLD AUTO: 54.3 %
NRBC BLD-RTO: 0 /100 WBCS (ref 0–0)
PLATELET # BLD AUTO: 199 X10*3/UL (ref 150–450)
POTASSIUM SERPL-SCNC: 3.9 MMOL/L (ref 3.5–5.3)
PROTHROMBIN TIME: 11 SECONDS (ref 9.8–12.8)
RBC # BLD AUTO: 4.05 X10*6/UL (ref 4.5–5.9)
RBC #/AREA URNS AUTO: >20 /HPF
SODIUM SERPL-SCNC: 140 MMOL/L (ref 136–145)
WBC # BLD AUTO: 6.1 X10*3/UL (ref 4.4–11.3)
WBC #/AREA URNS AUTO: ABNORMAL /HPF

## 2025-01-04 PROCEDURE — G0378 HOSPITAL OBSERVATION PER HR: HCPCS

## 2025-01-04 PROCEDURE — 96365 THER/PROPH/DIAG IV INF INIT: CPT | Mod: 59

## 2025-01-04 PROCEDURE — 99223 1ST HOSP IP/OBS HIGH 75: CPT | Performed by: STUDENT IN AN ORGANIZED HEALTH CARE EDUCATION/TRAINING PROGRAM

## 2025-01-04 PROCEDURE — 87086 URINE CULTURE/COLONY COUNT: CPT | Mod: ELYLAB | Performed by: STUDENT IN AN ORGANIZED HEALTH CARE EDUCATION/TRAINING PROGRAM

## 2025-01-04 PROCEDURE — 96375 TX/PRO/DX INJ NEW DRUG ADDON: CPT

## 2025-01-04 PROCEDURE — 2500000004 HC RX 250 GENERAL PHARMACY W/ HCPCS (ALT 636 FOR OP/ED): Performed by: NURSE PRACTITIONER

## 2025-01-04 PROCEDURE — 81001 URINALYSIS AUTO W/SCOPE: CPT | Performed by: NURSE PRACTITIONER

## 2025-01-04 PROCEDURE — 80048 BASIC METABOLIC PNL TOTAL CA: CPT | Performed by: NURSE PRACTITIONER

## 2025-01-04 PROCEDURE — 85610 PROTHROMBIN TIME: CPT | Performed by: NURSE PRACTITIONER

## 2025-01-04 PROCEDURE — 99285 EMERGENCY DEPT VISIT HI MDM: CPT | Mod: 25 | Performed by: EMERGENCY MEDICINE

## 2025-01-04 PROCEDURE — 74177 CT ABD & PELVIS W/CONTRAST: CPT

## 2025-01-04 PROCEDURE — 83605 ASSAY OF LACTIC ACID: CPT | Performed by: NURSE PRACTITIONER

## 2025-01-04 PROCEDURE — 2550000001 HC RX 255 CONTRASTS: Performed by: NURSE PRACTITIONER

## 2025-01-04 PROCEDURE — 74177 CT ABD & PELVIS W/CONTRAST: CPT | Performed by: STUDENT IN AN ORGANIZED HEALTH CARE EDUCATION/TRAINING PROGRAM

## 2025-01-04 PROCEDURE — 81001 URINALYSIS AUTO W/SCOPE: CPT | Performed by: STUDENT IN AN ORGANIZED HEALTH CARE EDUCATION/TRAINING PROGRAM

## 2025-01-04 PROCEDURE — 85025 COMPLETE CBC W/AUTO DIFF WBC: CPT | Performed by: NURSE PRACTITIONER

## 2025-01-04 PROCEDURE — 36415 COLL VENOUS BLD VENIPUNCTURE: CPT | Performed by: NURSE PRACTITIONER

## 2025-01-04 RX ORDER — MORPHINE SULFATE 4 MG/ML
4 INJECTION, SOLUTION INTRAMUSCULAR; INTRAVENOUS ONCE
Status: COMPLETED | OUTPATIENT
Start: 2025-01-04 | End: 2025-01-04

## 2025-01-04 RX ORDER — ONDANSETRON HYDROCHLORIDE 2 MG/ML
4 INJECTION, SOLUTION INTRAVENOUS ONCE
Status: COMPLETED | OUTPATIENT
Start: 2025-01-04 | End: 2025-01-04

## 2025-01-04 RX ADMIN — PIPERACILLIN SODIUM AND TAZOBACTAM SODIUM 3.38 G: 3; .375 INJECTION, SOLUTION INTRAVENOUS at 22:58

## 2025-01-04 RX ADMIN — ONDANSETRON 4 MG: 2 INJECTION INTRAMUSCULAR; INTRAVENOUS at 21:57

## 2025-01-04 RX ADMIN — MORPHINE SULFATE 4 MG: 4 INJECTION, SOLUTION INTRAMUSCULAR; INTRAVENOUS at 21:58

## 2025-01-04 RX ADMIN — IOHEXOL 75 ML: 350 INJECTION, SOLUTION INTRAVENOUS at 20:30

## 2025-01-04 ASSESSMENT — LIFESTYLE VARIABLES
EVER FELT BAD OR GUILTY ABOUT YOUR DRINKING: NO
TOTAL SCORE: 0
HAVE PEOPLE ANNOYED YOU BY CRITICIZING YOUR DRINKING: NO
HAVE YOU EVER FELT YOU SHOULD CUT DOWN ON YOUR DRINKING: NO
EVER HAD A DRINK FIRST THING IN THE MORNING TO STEADY YOUR NERVES TO GET RID OF A HANGOVER: NO

## 2025-01-04 ASSESSMENT — PAIN - FUNCTIONAL ASSESSMENT: PAIN_FUNCTIONAL_ASSESSMENT: 0-10

## 2025-01-04 ASSESSMENT — PAIN DESCRIPTION - LOCATION: LOCATION: ABDOMEN

## 2025-01-04 ASSESSMENT — PAIN SCALES - GENERAL: PAINLEVEL_OUTOF10: 9

## 2025-01-05 ENCOUNTER — ANESTHESIA (OUTPATIENT)
Dept: OPERATING ROOM | Facility: HOSPITAL | Age: 65
End: 2025-01-05
Payer: COMMERCIAL

## 2025-01-05 ENCOUNTER — ANESTHESIA EVENT (OUTPATIENT)
Dept: OPERATING ROOM | Facility: HOSPITAL | Age: 65
End: 2025-01-05
Payer: COMMERCIAL

## 2025-01-05 ENCOUNTER — APPOINTMENT (OUTPATIENT)
Dept: RADIOLOGY | Facility: HOSPITAL | Age: 65
End: 2025-01-05
Payer: COMMERCIAL

## 2025-01-05 ENCOUNTER — HOME HEALTH ADMISSION (OUTPATIENT)
Dept: HOME HEALTH SERVICES | Facility: HOME HEALTH | Age: 65
End: 2025-01-05
Payer: COMMERCIAL

## 2025-01-05 VITALS
SYSTOLIC BLOOD PRESSURE: 117 MMHG | WEIGHT: 220 LBS | HEART RATE: 84 BPM | DIASTOLIC BLOOD PRESSURE: 68 MMHG | BODY MASS INDEX: 31.5 KG/M2 | RESPIRATION RATE: 18 BRPM | TEMPERATURE: 97.7 F | HEIGHT: 70 IN | OXYGEN SATURATION: 93 %

## 2025-01-05 PROBLEM — Z96.0 URETERAL STENT PRESENT: Status: ACTIVE | Noted: 2025-01-05

## 2025-01-05 PROBLEM — N30.01 ACUTE CYSTITIS WITH HEMATURIA: Status: ACTIVE | Noted: 2025-01-05

## 2025-01-05 PROBLEM — H54.40 BLINDNESS OF RIGHT EYE: Status: ACTIVE | Noted: 2025-01-05

## 2025-01-05 PROBLEM — E11.9 TYPE 2 DIABETES MELLITUS WITHOUT COMPLICATION, WITHOUT LONG-TERM CURRENT USE OF INSULIN (MULTI): Status: ACTIVE | Noted: 2025-01-05

## 2025-01-05 PROBLEM — N13.30 BILATERAL HYDRONEPHROSIS: Status: ACTIVE | Noted: 2025-01-05

## 2025-01-05 PROBLEM — I10 ESSENTIAL HYPERTENSION: Status: ACTIVE | Noted: 2025-01-05

## 2025-01-05 LAB
ANION GAP SERPL CALC-SCNC: 10 MMOL/L (ref 10–20)
APPEARANCE UR: ABNORMAL
BACTERIA #/AREA URNS AUTO: ABNORMAL /HPF
BILIRUB UR STRIP.AUTO-MCNC: ABNORMAL MG/DL
BUN SERPL-MCNC: 17 MG/DL (ref 6–23)
CALCIUM SERPL-MCNC: 8.6 MG/DL (ref 8.6–10.3)
CHLORIDE SERPL-SCNC: 108 MMOL/L (ref 98–107)
CO2 SERPL-SCNC: 29 MMOL/L (ref 21–32)
COLOR UR: ABNORMAL
CREAT SERPL-MCNC: 0.88 MG/DL (ref 0.5–1.3)
EGFRCR SERPLBLD CKD-EPI 2021: >90 ML/MIN/1.73M*2
ERYTHROCYTE [DISTWIDTH] IN BLOOD BY AUTOMATED COUNT: 12.2 % (ref 11.5–14.5)
GLUCOSE BLD MANUAL STRIP-MCNC: 101 MG/DL (ref 74–99)
GLUCOSE BLD MANUAL STRIP-MCNC: 101 MG/DL (ref 74–99)
GLUCOSE BLD MANUAL STRIP-MCNC: 108 MG/DL (ref 74–99)
GLUCOSE SERPL-MCNC: 87 MG/DL (ref 74–99)
GLUCOSE UR STRIP.AUTO-MCNC: ABNORMAL MG/DL
HCT VFR BLD AUTO: 37.9 % (ref 41–52)
HGB BLD-MCNC: 12.2 G/DL (ref 13.5–17.5)
HOLD SPECIMEN: NORMAL
HOLD SPECIMEN: NORMAL
KETONES UR STRIP.AUTO-MCNC: NEGATIVE MG/DL
LEUKOCYTE ESTERASE UR QL STRIP.AUTO: NEGATIVE
MCH RBC QN AUTO: 32.6 PG (ref 26–34)
MCHC RBC AUTO-ENTMCNC: 32.2 G/DL (ref 32–36)
MCV RBC AUTO: 101 FL (ref 80–100)
NITRITE UR QL STRIP.AUTO: ABNORMAL
NRBC BLD-RTO: 0 /100 WBCS (ref 0–0)
PH UR STRIP.AUTO: 6 [PH]
PLATELET # BLD AUTO: 191 X10*3/UL (ref 150–450)
POTASSIUM SERPL-SCNC: 3.9 MMOL/L (ref 3.5–5.3)
PROT UR STRIP.AUTO-MCNC: ABNORMAL MG/DL
RBC # BLD AUTO: 3.74 X10*6/UL (ref 4.5–5.9)
RBC # UR STRIP.AUTO: ABNORMAL /UL
RBC #/AREA URNS AUTO: >20 /HPF
SODIUM SERPL-SCNC: 143 MMOL/L (ref 136–145)
SP GR UR STRIP.AUTO: >1.03
UROBILINOGEN UR STRIP.AUTO-MCNC: ABNORMAL MG/DL
WBC # BLD AUTO: 4.4 X10*3/UL (ref 4.4–11.3)
WBC #/AREA URNS AUTO: ABNORMAL /HPF

## 2025-01-05 PROCEDURE — 2500000005 HC RX 250 GENERAL PHARMACY W/O HCPCS: Performed by: STUDENT IN AN ORGANIZED HEALTH CARE EDUCATION/TRAINING PROGRAM

## 2025-01-05 PROCEDURE — 96366 THER/PROPH/DIAG IV INF ADDON: CPT | Mod: 59

## 2025-01-05 PROCEDURE — 36415 COLL VENOUS BLD VENIPUNCTURE: CPT | Performed by: STUDENT IN AN ORGANIZED HEALTH CARE EDUCATION/TRAINING PROGRAM

## 2025-01-05 PROCEDURE — 2500000004 HC RX 250 GENERAL PHARMACY W/ HCPCS (ALT 636 FOR OP/ED): Performed by: ANESTHESIOLOGY

## 2025-01-05 PROCEDURE — 52310 CYSTOSCOPY AND TREATMENT: CPT | Performed by: UROLOGY

## 2025-01-05 PROCEDURE — 82947 ASSAY GLUCOSE BLOOD QUANT: CPT

## 2025-01-05 PROCEDURE — 3700000001 HC GENERAL ANESTHESIA TIME - INITIAL BASE CHARGE: Performed by: UROLOGY

## 2025-01-05 PROCEDURE — 2500000004 HC RX 250 GENERAL PHARMACY W/ HCPCS (ALT 636 FOR OP/ED)

## 2025-01-05 PROCEDURE — 2500000001 HC RX 250 WO HCPCS SELF ADMINISTERED DRUGS (ALT 637 FOR MEDICARE OP): Performed by: STUDENT IN AN ORGANIZED HEALTH CARE EDUCATION/TRAINING PROGRAM

## 2025-01-05 PROCEDURE — 85027 COMPLETE CBC AUTOMATED: CPT | Performed by: STUDENT IN AN ORGANIZED HEALTH CARE EDUCATION/TRAINING PROGRAM

## 2025-01-05 PROCEDURE — G0378 HOSPITAL OBSERVATION PER HR: HCPCS

## 2025-01-05 PROCEDURE — 7100000001 HC RECOVERY ROOM TIME - INITIAL BASE CHARGE: Performed by: UROLOGY

## 2025-01-05 PROCEDURE — 99222 1ST HOSP IP/OBS MODERATE 55: CPT | Performed by: UROLOGY

## 2025-01-05 PROCEDURE — 2500000004 HC RX 250 GENERAL PHARMACY W/ HCPCS (ALT 636 FOR OP/ED): Performed by: STUDENT IN AN ORGANIZED HEALTH CARE EDUCATION/TRAINING PROGRAM

## 2025-01-05 PROCEDURE — 7100000002 HC RECOVERY ROOM TIME - EACH INCREMENTAL 1 MINUTE: Performed by: UROLOGY

## 2025-01-05 PROCEDURE — 3600000008 HC OR TIME - EACH INCREMENTAL 1 MINUTE - PROCEDURE LEVEL THREE: Performed by: UROLOGY

## 2025-01-05 PROCEDURE — 3700000002 HC GENERAL ANESTHESIA TIME - EACH INCREMENTAL 1 MINUTE: Performed by: UROLOGY

## 2025-01-05 PROCEDURE — 80048 BASIC METABOLIC PNL TOTAL CA: CPT | Performed by: STUDENT IN AN ORGANIZED HEALTH CARE EDUCATION/TRAINING PROGRAM

## 2025-01-05 PROCEDURE — 3600000003 HC OR TIME - INITIAL BASE CHARGE - PROCEDURE LEVEL THREE: Performed by: UROLOGY

## 2025-01-05 PROCEDURE — 2500000002 HC RX 250 W HCPCS SELF ADMINISTERED DRUGS (ALT 637 FOR MEDICARE OP, ALT 636 FOR OP/ED): Performed by: STUDENT IN AN ORGANIZED HEALTH CARE EDUCATION/TRAINING PROGRAM

## 2025-01-05 PROCEDURE — 99239 HOSP IP/OBS DSCHRG MGMT >30: CPT | Performed by: HOSPITALIST

## 2025-01-05 RX ORDER — BRIMONIDINE TARTRATE 2 MG/ML
1 SOLUTION/ DROPS OPHTHALMIC 3 TIMES DAILY
Status: DISCONTINUED | OUTPATIENT
Start: 2025-01-05 | End: 2025-01-05 | Stop reason: HOSPADM

## 2025-01-05 RX ORDER — CHOLECALCIFEROL (VITAMIN D3) 25 MCG
2000 TABLET ORAL
Status: DISCONTINUED | OUTPATIENT
Start: 2025-01-05 | End: 2025-01-05 | Stop reason: HOSPADM

## 2025-01-05 RX ORDER — ROSUVASTATIN CALCIUM 20 MG/1
20 TABLET, COATED ORAL NIGHTLY
Status: DISCONTINUED | OUTPATIENT
Start: 2025-01-05 | End: 2025-01-05 | Stop reason: HOSPADM

## 2025-01-05 RX ORDER — ACETAMINOPHEN 160 MG/5ML
650 SOLUTION ORAL EVERY 4 HOURS PRN
Status: DISCONTINUED | OUTPATIENT
Start: 2025-01-05 | End: 2025-01-05 | Stop reason: HOSPADM

## 2025-01-05 RX ORDER — PREDNISOLONE ACETATE 10 MG/ML
1 SUSPENSION/ DROPS OPHTHALMIC 4 TIMES DAILY
Status: DISCONTINUED | OUTPATIENT
Start: 2025-01-05 | End: 2025-01-05 | Stop reason: HOSPADM

## 2025-01-05 RX ORDER — TALC
3 POWDER (GRAM) TOPICAL NIGHTLY PRN
Status: DISCONTINUED | OUTPATIENT
Start: 2025-01-05 | End: 2025-01-05 | Stop reason: HOSPADM

## 2025-01-05 RX ORDER — DEXTROSE 50 % IN WATER (D50W) INTRAVENOUS SYRINGE
12.5
Status: DISCONTINUED | OUTPATIENT
Start: 2025-01-05 | End: 2025-01-05 | Stop reason: HOSPADM

## 2025-01-05 RX ORDER — LIDOCAINE HYDROCHLORIDE 20 MG/ML
INJECTION, SOLUTION INFILTRATION; PERINEURAL AS NEEDED
Status: DISCONTINUED | OUTPATIENT
Start: 2025-01-05 | End: 2025-01-05

## 2025-01-05 RX ORDER — LATANOPROST 50 UG/ML
1 SOLUTION/ DROPS OPHTHALMIC NIGHTLY
Status: DISCONTINUED | OUTPATIENT
Start: 2025-01-05 | End: 2025-01-05 | Stop reason: HOSPADM

## 2025-01-05 RX ORDER — POTASSIUM CHLORIDE 750 MG/1
10 TABLET, FILM COATED, EXTENDED RELEASE ORAL DAILY
Status: DISCONTINUED | OUTPATIENT
Start: 2025-01-05 | End: 2025-01-05 | Stop reason: HOSPADM

## 2025-01-05 RX ORDER — ACETAMINOPHEN 650 MG/1
650 SUPPOSITORY RECTAL EVERY 4 HOURS PRN
Status: DISCONTINUED | OUTPATIENT
Start: 2025-01-05 | End: 2025-01-05 | Stop reason: HOSPADM

## 2025-01-05 RX ORDER — DEXTROSE 50 % IN WATER (D50W) INTRAVENOUS SYRINGE
25
Status: DISCONTINUED | OUTPATIENT
Start: 2025-01-05 | End: 2025-01-05 | Stop reason: HOSPADM

## 2025-01-05 RX ORDER — FENTANYL CITRATE 50 UG/ML
INJECTION, SOLUTION INTRAMUSCULAR; INTRAVENOUS CONTINUOUS PRN
Status: DISCONTINUED | OUTPATIENT
Start: 2025-01-05 | End: 2025-01-05

## 2025-01-05 RX ORDER — SODIUM CHLORIDE, SODIUM LACTATE, POTASSIUM CHLORIDE, CALCIUM CHLORIDE 600; 310; 30; 20 MG/100ML; MG/100ML; MG/100ML; MG/100ML
100 INJECTION, SOLUTION INTRAVENOUS CONTINUOUS
Status: CANCELLED | OUTPATIENT
Start: 2025-01-05 | End: 2025-01-06

## 2025-01-05 RX ORDER — ACETAMINOPHEN 325 MG/1
650 TABLET ORAL EVERY 4 HOURS PRN
Status: DISCONTINUED | OUTPATIENT
Start: 2025-01-05 | End: 2025-01-05 | Stop reason: HOSPADM

## 2025-01-05 RX ORDER — INSULIN LISPRO 100 [IU]/ML
0-5 INJECTION, SOLUTION INTRAVENOUS; SUBCUTANEOUS
Status: DISCONTINUED | OUTPATIENT
Start: 2025-01-05 | End: 2025-01-05 | Stop reason: HOSPADM

## 2025-01-05 RX ORDER — FENTANYL CITRATE 50 UG/ML
25 INJECTION, SOLUTION INTRAMUSCULAR; INTRAVENOUS EVERY 5 MIN PRN
Status: CANCELLED | OUTPATIENT
Start: 2025-01-05

## 2025-01-05 RX ORDER — MEPERIDINE HYDROCHLORIDE 25 MG/ML
12.5 INJECTION INTRAMUSCULAR; INTRAVENOUS; SUBCUTANEOUS EVERY 10 MIN PRN
Status: CANCELLED | OUTPATIENT
Start: 2025-01-05

## 2025-01-05 RX ORDER — ONDANSETRON HYDROCHLORIDE 2 MG/ML
4 INJECTION, SOLUTION INTRAVENOUS EVERY 8 HOURS PRN
Status: DISCONTINUED | OUTPATIENT
Start: 2025-01-05 | End: 2025-01-05 | Stop reason: HOSPADM

## 2025-01-05 RX ORDER — POLYETHYLENE GLYCOL 3350 17 G/17G
17 POWDER, FOR SOLUTION ORAL DAILY
Status: DISCONTINUED | OUTPATIENT
Start: 2025-01-05 | End: 2025-01-05 | Stop reason: HOSPADM

## 2025-01-05 RX ORDER — CIPROFLOXACIN 500 MG/1
500 TABLET ORAL 2 TIMES DAILY
Qty: 10 TABLET | Refills: 0 | Status: SHIPPED | OUTPATIENT
Start: 2025-01-05 | End: 2025-01-10

## 2025-01-05 RX ORDER — PRAMIPEXOLE DIHYDROCHLORIDE 0.25 MG/1
0.25 TABLET ORAL NIGHTLY
Status: DISCONTINUED | OUTPATIENT
Start: 2025-01-05 | End: 2025-01-05 | Stop reason: HOSPADM

## 2025-01-05 RX ORDER — SODIUM CHLORIDE, SODIUM LACTATE, POTASSIUM CHLORIDE, CALCIUM CHLORIDE 600; 310; 30; 20 MG/100ML; MG/100ML; MG/100ML; MG/100ML
INJECTION, SOLUTION INTRAVENOUS CONTINUOUS PRN
Status: DISCONTINUED | OUTPATIENT
Start: 2025-01-05 | End: 2025-01-05

## 2025-01-05 RX ORDER — FENTANYL CITRATE 50 UG/ML
50 INJECTION, SOLUTION INTRAMUSCULAR; INTRAVENOUS EVERY 5 MIN PRN
Status: CANCELLED | OUTPATIENT
Start: 2025-01-05

## 2025-01-05 RX ORDER — LAMOTRIGINE 100 MG/1
200 TABLET ORAL 2 TIMES DAILY
Status: DISCONTINUED | OUTPATIENT
Start: 2025-01-05 | End: 2025-01-05 | Stop reason: HOSPADM

## 2025-01-05 RX ORDER — PROPOFOL 10 MG/ML
INJECTION, EMULSION INTRAVENOUS AS NEEDED
Status: DISCONTINUED | OUTPATIENT
Start: 2025-01-05 | End: 2025-01-05

## 2025-01-05 RX ORDER — ONDANSETRON 4 MG/1
4 TABLET, FILM COATED ORAL EVERY 8 HOURS PRN
Status: DISCONTINUED | OUTPATIENT
Start: 2025-01-05 | End: 2025-01-05 | Stop reason: HOSPADM

## 2025-01-05 RX ORDER — ASPIRIN 81 MG/1
81 TABLET ORAL DAILY
Status: DISCONTINUED | OUTPATIENT
Start: 2025-01-05 | End: 2025-01-05 | Stop reason: HOSPADM

## 2025-01-05 RX ADMIN — DEXAMETHASONE SODIUM PHOSPHATE 4 MG: 4 INJECTION, SOLUTION INTRAMUSCULAR; INTRAVENOUS at 09:33

## 2025-01-05 RX ADMIN — LIDOCAINE HYDROCHLORIDE 100 MG: 20 INJECTION, SOLUTION INFILTRATION; PERINEURAL at 09:30

## 2025-01-05 RX ADMIN — LAMOTRIGINE 200 MG: 100 TABLET ORAL at 08:27

## 2025-01-05 RX ADMIN — PREDNISOLONE ACETATE 1 DROP: 10 SUSPENSION/ DROPS OPHTHALMIC at 06:48

## 2025-01-05 RX ADMIN — PROPOFOL 200 MG: 10 INJECTION, EMULSION INTRAVENOUS at 09:30

## 2025-01-05 RX ADMIN — Medication 2000 UNITS: at 08:27

## 2025-01-05 RX ADMIN — SODIUM CHLORIDE, SODIUM LACTATE, POTASSIUM CHLORIDE, AND CALCIUM CHLORIDE: 600; 310; 30; 20 INJECTION, SOLUTION INTRAVENOUS at 09:24

## 2025-01-05 RX ADMIN — PIPERACILLIN SODIUM AND TAZOBACTAM SODIUM 3.38 G: 3; .375 INJECTION, SOLUTION INTRAVENOUS at 03:54

## 2025-01-05 RX ADMIN — BRIMONIDINE TARTRATE 1 DROP: 2 SOLUTION/ DROPS OPHTHALMIC at 06:59

## 2025-01-05 RX ADMIN — POTASSIUM CHLORIDE 10 MEQ: 750 TABLET, EXTENDED RELEASE ORAL at 08:27

## 2025-01-05 RX ADMIN — ONDANSETRON 4 MG: 2 INJECTION, SOLUTION INTRAMUSCULAR; INTRAVENOUS at 09:33

## 2025-01-05 SDOH — SOCIAL STABILITY: SOCIAL INSECURITY: DO YOU FEEL UNSAFE GOING BACK TO THE PLACE WHERE YOU ARE LIVING?: NO

## 2025-01-05 SDOH — SOCIAL STABILITY: SOCIAL INSECURITY: WITHIN THE LAST YEAR, HAVE YOU BEEN HUMILIATED OR EMOTIONALLY ABUSED IN OTHER WAYS BY YOUR PARTNER OR EX-PARTNER?: NO

## 2025-01-05 SDOH — ECONOMIC STABILITY: FOOD INSECURITY: WITHIN THE PAST 12 MONTHS, THE FOOD YOU BOUGHT JUST DIDN'T LAST AND YOU DIDN'T HAVE MONEY TO GET MORE.: NEVER TRUE

## 2025-01-05 SDOH — SOCIAL STABILITY: SOCIAL INSECURITY: WITHIN THE LAST YEAR, HAVE YOU BEEN AFRAID OF YOUR PARTNER OR EX-PARTNER?: NO

## 2025-01-05 SDOH — ECONOMIC STABILITY: FOOD INSECURITY: WITHIN THE PAST 12 MONTHS, YOU WORRIED THAT YOUR FOOD WOULD RUN OUT BEFORE YOU GOT THE MONEY TO BUY MORE.: NEVER TRUE

## 2025-01-05 SDOH — SOCIAL STABILITY: SOCIAL INSECURITY: DO YOU FEEL ANYONE HAS EXPLOITED OR TAKEN ADVANTAGE OF YOU FINANCIALLY OR OF YOUR PERSONAL PROPERTY?: NO

## 2025-01-05 SDOH — SOCIAL STABILITY: SOCIAL INSECURITY: ARE THERE ANY APPARENT SIGNS OF INJURIES/BEHAVIORS THAT COULD BE RELATED TO ABUSE/NEGLECT?: NO

## 2025-01-05 SDOH — ECONOMIC STABILITY: HOUSING INSECURITY: AT ANY TIME IN THE PAST 12 MONTHS, WERE YOU HOMELESS OR LIVING IN A SHELTER (INCLUDING NOW)?: NO

## 2025-01-05 SDOH — ECONOMIC STABILITY: INCOME INSECURITY: IN THE PAST 12 MONTHS HAS THE ELECTRIC, GAS, OIL, OR WATER COMPANY THREATENED TO SHUT OFF SERVICES IN YOUR HOME?: NO

## 2025-01-05 SDOH — ECONOMIC STABILITY: HOUSING INSECURITY: IN THE LAST 12 MONTHS, WAS THERE A TIME WHEN YOU WERE NOT ABLE TO PAY THE MORTGAGE OR RENT ON TIME?: NO

## 2025-01-05 SDOH — ECONOMIC STABILITY: HOUSING INSECURITY: IN THE PAST 12 MONTHS, HOW MANY TIMES HAVE YOU MOVED WHERE YOU WERE LIVING?: 0

## 2025-01-05 SDOH — SOCIAL STABILITY: SOCIAL INSECURITY: WERE YOU ABLE TO COMPLETE ALL THE BEHAVIORAL HEALTH SCREENINGS?: YES

## 2025-01-05 SDOH — SOCIAL STABILITY: SOCIAL INSECURITY: DOES ANYONE TRY TO KEEP YOU FROM HAVING/CONTACTING OTHER FRIENDS OR DOING THINGS OUTSIDE YOUR HOME?: NO

## 2025-01-05 SDOH — SOCIAL STABILITY: SOCIAL INSECURITY: HAVE YOU HAD THOUGHTS OF HARMING ANYONE ELSE?: NO

## 2025-01-05 SDOH — SOCIAL STABILITY: SOCIAL INSECURITY: HAS ANYONE EVER THREATENED TO HURT YOUR FAMILY OR YOUR PETS?: NO

## 2025-01-05 SDOH — ECONOMIC STABILITY: FOOD INSECURITY: HOW HARD IS IT FOR YOU TO PAY FOR THE VERY BASICS LIKE FOOD, HOUSING, MEDICAL CARE, AND HEATING?: NOT HARD AT ALL

## 2025-01-05 SDOH — SOCIAL STABILITY: SOCIAL INSECURITY: ABUSE: ADULT

## 2025-01-05 SDOH — SOCIAL STABILITY: SOCIAL INSECURITY: ARE YOU OR HAVE YOU BEEN THREATENED OR ABUSED PHYSICALLY, EMOTIONALLY, OR SEXUALLY BY ANYONE?: NO

## 2025-01-05 SDOH — SOCIAL STABILITY: SOCIAL INSECURITY: HAVE YOU HAD ANY THOUGHTS OF HARMING ANYONE ELSE?: NO

## 2025-01-05 ASSESSMENT — COGNITIVE AND FUNCTIONAL STATUS - GENERAL
CLIMB 3 TO 5 STEPS WITH RAILING: A LITTLE
WALKING IN HOSPITAL ROOM: A LITTLE
MOVING TO AND FROM BED TO CHAIR: A LITTLE
MOBILITY SCORE: 21
TOILETING: A LITTLE
PATIENT BASELINE BEDBOUND: NO
DAILY ACTIVITIY SCORE: 23

## 2025-01-05 ASSESSMENT — ACTIVITIES OF DAILY LIVING (ADL)
TOILETING: INDEPENDENT
DRESSING YOURSELF: INDEPENDENT
FEEDING YOURSELF: INDEPENDENT
BATHING: INDEPENDENT
ASSISTIVE_DEVICE: CANE;EYEGLASSES
WALKS IN HOME: INDEPENDENT
HEARING - RIGHT EAR: FUNCTIONAL
PATIENT'S MEMORY ADEQUATE TO SAFELY COMPLETE DAILY ACTIVITIES?: YES
GROOMING: INDEPENDENT
LACK_OF_TRANSPORTATION: NO
HEARING - LEFT EAR: FUNCTIONAL
ADEQUATE_TO_COMPLETE_ADL: NO
LACK_OF_TRANSPORTATION: NO
JUDGMENT_ADEQUATE_SAFELY_COMPLETE_DAILY_ACTIVITIES: YES

## 2025-01-05 ASSESSMENT — LIFESTYLE VARIABLES
HOW OFTEN DO YOU HAVE A DRINK CONTAINING ALCOHOL: NEVER
AUDIT-C TOTAL SCORE: 0
AUDIT-C TOTAL SCORE: 0
SKIP TO QUESTIONS 9-10: 1
HOW MANY STANDARD DRINKS CONTAINING ALCOHOL DO YOU HAVE ON A TYPICAL DAY: PATIENT DOES NOT DRINK
HOW OFTEN DO YOU HAVE 6 OR MORE DRINKS ON ONE OCCASION: NEVER

## 2025-01-05 ASSESSMENT — PAIN - FUNCTIONAL ASSESSMENT
PAIN_FUNCTIONAL_ASSESSMENT: 0-10
PAIN_FUNCTIONAL_ASSESSMENT: UNABLE TO SELF-REPORT

## 2025-01-05 ASSESSMENT — PAIN SCALES - GENERAL
PAINLEVEL_OUTOF10: 0 - NO PAIN

## 2025-01-05 ASSESSMENT — PATIENT HEALTH QUESTIONNAIRE - PHQ9
1. LITTLE INTEREST OR PLEASURE IN DOING THINGS: NOT AT ALL
SUM OF ALL RESPONSES TO PHQ9 QUESTIONS 1 & 2: 0
2. FEELING DOWN, DEPRESSED OR HOPELESS: NOT AT ALL

## 2025-01-05 NOTE — H&P
Medical Group History and Physical      ASSESSMENT & PLAN:     #.  Acute cystitis  #.  Gross hematuria  #.  Bilateral hydronephrosis s/p bilateral ureteral stents  -P/w hematuria, U/A shows 21-50 WBC, greater than 20 RBC, 4+ bacteria  -S/p bilateral ureteral stent placement on 12/6/2024 for nephrolithiasis and hydronephrosis  -Contacted lab to add on urine culture  -Continue Zosyn for now  -Urology consulted in ER, plan for stent removal  -N.p.o., holding pharmacologic DVT prophylaxis    #.  Essential hypertension  #.  Type 2 diabetes  #.  COPD, not in acute exacerbation  #.  Seizure disorder  -Resume home meds when reconciled    Right eye blindness in setting of chronic glaucoma, uveitis, history of retinal detachment  -Resume home eyedrops once reconciled    VTE PPX: SCDs only      Andrea Loredo MD    --Of note, this documentation is completed using the Dragon Dictation system (voice recognition software). There may be spelling and/or grammatical errors that were not corrected prior to final submission.--    HISTORY OF PRESENT ILLNESS:   Chief Complaint: Hematuria    Be Luo is a 64 y.o. male presenting with hematuria.  Patient states that he notices urine getting dark red earlier this morning.  He endorses a mild dysuria.  Denies any fever or chills.  He does mention that he was supposed to get his 2 ureteral stents removed in the near future with urology.  Denies any additional symptoms.       ER Course: Vital signs stable.  Labs notable for glucose 101, hemoglobin 13.3.  Microscopic urine shows greater than 20 RBC, 21-50 WBC.    ROS  10 point review of systems negative except per HPI     PAST HISTORIES:     Past Medical History  He has a past medical history of Blind one eye and Hypertension.    Surgical History  He has a past surgical history that includes Cardiac catheterization (N/A, 9/16/2024).     Social History  He reports that he quit smoking about 16 months ago. His smoking use  included cigarettes. He does not have any smokeless tobacco history on file. He reports that he does not currently use alcohol. He reports current drug use. Drug: Marijuana.    Family History  No family history on file.    Allergies:  Green tea, Gabapentin, Lyrica [pregabalin], Norco [hydrocodone-acetaminophen], Prednisone, Sulfasalazine, and Vioxx [rofecoxib]      OBJECTIVE:      Last Recorded Vitals  /72   Pulse 99   Temp 36.7 °C (98.1 °F) (Temporal)   Resp 20   Wt 99.8 kg (220 lb)   SpO2 97%     Last I/O:  No intake/output data recorded.    Physical Exam   Gen: NAD, appears stated age  HEENT: EOM, MMM  CV: RRR, no murmurs rubs or gallops  Resp: Clear to auscultation bilaterally, normal effort  Abdomen: soft, NT,+BS  LE: No edema, no deformity  Neuro: A&Ox4, moving all extremities    LABS AND IMAGING:       Relevant Results  Labs Reviewed   URINALYSIS WITH REFLEX CULTURE AND MICROSCOPIC - Abnormal       Result Value    Color, Urine Red-brown (*)     Appearance, Urine Turbid (*)    CBC WITH AUTO DIFFERENTIAL - Abnormal    WBC 6.1      nRBC 0.0      RBC 4.05 (*)     Hemoglobin 13.3 (*)     Hematocrit 40.2 (*)     MCV 99      MCH 32.8      MCHC 33.1      RDW 12.1      Platelets 199      Neutrophils % 54.3      Immature Granulocytes %, Automated 0.3      Lymphocytes % 23.6      Monocytes % 15.2      Eosinophils % 5.8      Basophils % 0.8      Neutrophils Absolute 3.29      Immature Granulocytes Absolute, Automated 0.02      Lymphocytes Absolute 1.43      Monocytes Absolute 0.92      Eosinophils Absolute 0.35      Basophils Absolute 0.05     BASIC METABOLIC PANEL - Abnormal    Glucose 101 (*)     Sodium 140      Potassium 3.9      Chloride 106      Bicarbonate 27      Anion Gap 11      Urea Nitrogen 19      Creatinine 0.80      eGFR >90      Calcium 9.5     MICROSCOPIC ONLY, URINE - Abnormal    WBC, Urine 21-50 (*)     RBC, Urine >20 (*)     Bacteria, Urine 4+ (*)    LACTATE - Normal    Lactate 1.0       Narrative:     Venipuncture immediately after or during the administration of Metamizole may lead to falsely low results. Testing should be performed immediately prior to Metamizole dosing.   PROTIME-INR - Normal    Protime 11.0      INR 1.0     URINALYSIS WITH REFLEX CULTURE AND MICROSCOPIC    Narrative:     The following orders were created for panel order Urinalysis with Reflex Culture and Microscopic.  Procedure                               Abnormality         Status                     ---------                               -----------         ------                     Urinalysis with Reflex C...[[id number]]  Abnormal            Final result               Extra Urine Gray Tube[[id number]]                            In process                   Please view results for these tests on the individual orders.   EXTRA URINE GRAY TUBE     CT abdomen pelvis w IV contrast   Final Result   1. No evidence for hyperdense clot within the renal collecting   systems or urinary bladder.        2. Redemonstrated bilateral ureteral stents in appropriate location.   Increased inflammatory change along the course of the right ureter   and unchanged inflammation on the course of left ureter compared to   12/24/2024. The possibility of ascending urinary tract infection is   not excluded. Correlation with urinalysis recommended. However, there   is no parenchymal evidence of acute pyelonephritis at this time.        3. Delayed left nephrogram and unchanged mild left hydronephrosis   indicative of impaired left renal function.        4. Unchanged infrarenal abdominal aortic aneurysm measuring up to 4.2   cm.        MACRO:   None.        Signed by: Kuldeep Loza 1/4/2025 9:59 PM   Dictation workstation:   [id number]

## 2025-01-05 NOTE — ANESTHESIA PREPROCEDURE EVALUATION
Be Luo is a 64 y.o. male here for:      CYSTOSCOPY, WITH URETERAL STENT REMOVAL  With Yousuf Ramirez MD  Pre-Op Diagnosis Codes:      * Blood in urine [R31.0]    Lab Results   Component Value Date    HGB 12.2 (L) 01/05/2025    HCT 37.9 (L) 01/05/2025    WBC 4.4 01/05/2025     01/05/2025     01/05/2025    K 3.9 01/05/2025     (H) 01/05/2025    CREATININE 0.88 01/05/2025    BUN 17 01/05/2025       Social History     Substance and Sexual Activity   Drug Use Yes    Types: Marijuana      Tobacco Use: Medium Risk (1/4/2025)    Patient History     Smoking Tobacco Use: Former     Smokeless Tobacco Use: Unknown     Passive Exposure: Not on file      Social History     Substance and Sexual Activity   Alcohol Use Not Currently        Allergies   Allergen Reactions    Green Tea Hives and Shortness of breath    Gabapentin Agitation    Lyrica [Pregabalin] GI Upset    Norco [Hydrocodone-Acetaminophen] Agitation    Prednisone Other    Sulfasalazine GI Upset    Vioxx [Rofecoxib] Other     Retains water/swelling       Current Outpatient Medications   Medication Instructions    Actemra ACTPen 162 mg/0.9 mL subcutaneous, Every 7 days, Friday    aspirin 81 mg, Daily RT    atropine 1 % ophthalmic solution 1 drop, 3 times daily    brimonidine (AlphaGAN) 0.2 % ophthalmic solution 1 drop, 3 times daily    cholecalciferol (VITAMIN D-3) 2,000 Units, Daily with breakfast    cyclobenzaprine (FLEXERIL) 10 mg, 3 times daily PRN    lamoTRIgine (LAMICTAL) 200 mg, oral, 2 times daily    latanoprost (Xalatan) 0.005 % ophthalmic solution 1 drop, Nightly    lubricating eye drops ophthalmic solution 2 drops, Both Eyes, 2 times daily PRN    metFORMIN (Glucophage) 500 mg tablet 1 tablet, Daily with evening meal    potassium chloride CR 10 mEq ER tablet 10 mEq, Daily    pramipexole (MIRAPEX) 0.25 mg, Nightly    prednisoLONE acetate (Pred-Forte) 1 % ophthalmic suspension 1 drop, 4 times daily    Rhopressa 0.02 % drops  "opthalmic solution 1 drop, Left Eye, Nightly    rosuvastatin (CRESTOR) 20 mg, Nightly    valsartan (DIOVAN) 40 mg, oral, Daily       Past Medical History:   Diagnosis Date    Blind one eye     Hypertension        Past Surgical History:   Procedure Laterality Date    CARDIAC CATHETERIZATION N/A 9/16/2024    Procedure: Left Heart Cath, With LV;  Surgeon: Cori Sullivan MD;  Location: [facility];  Service: Cardiovascular;  Laterality: N/A;       No family history on file.    Relevant Problems   Cardiac   (+) Essential hypertension      Pulmonary   (+) Chronic obstructive pulmonary disease (Multi)      Neuro   (+) Seizure disorder (Multi)      /Renal   (+) Acute cystitis with hematuria   (+) Bilateral hydronephrosis   (+) Nephrolithiasis      Endocrine   (+) Type 2 diabetes mellitus without complication, without long-term current use of insulin (Multi)       Visit Vitals  /63   Pulse 69   Temp 36.1 °C (97 °F)   Resp 18   Ht 1.778 m (5' 10\")   Wt 99.8 kg (220 lb)   SpO2 95%   BMI 31.57 kg/m²   Smoking Status Former   BSA 2.22 m²       NPO Details:  NPO/Void Status  Date of Last Liquid: 01/04/25  Date of Last Solid: 01/04/25        Physical Exam    Airway  Mallampati: II  TM distance: >3 FB     Cardiovascular - normal exam     Dental   (+) upper dentures, lower dentures     Pulmonary - normal exam     Abdominal   (+) obese  Abdomen: soft             Anesthesia Plan    History of general anesthesia?: yes  History of complications of general anesthesia?: no    ASA 3     general     intravenous induction   Anesthetic plan and risks discussed with patient.      "

## 2025-01-05 NOTE — CARE PLAN
The patient's goals for the shift include      The clinical goals for the shift include Patient will remain HD stable throughout the shift    Problem: Pain - Adult  Goal: Verbalizes/displays adequate comfort level or baseline comfort level  Outcome: Met     Problem: Safety - Adult  Goal: Free from fall injury  Outcome: Met     Problem: Discharge Planning  Goal: Discharge to home or other facility with appropriate resources  Outcome: Met     Problem: Chronic Conditions and Co-morbidities  Goal: Patient's chronic conditions and co-morbidity symptoms are monitored and maintained or improved  Outcome: Met     Problem: Pain  Goal: Takes deep breaths with improved pain control throughout the shift  Outcome: Met  Goal: Turns in bed with improved pain control throughout the shift  Outcome: Met  Goal: Walks with improved pain control throughout the shift  Outcome: Met  Goal: Performs ADL's with improved pain control throughout shift  Outcome: Met  Goal: Participates in PT with improved pain control throughout the shift  Outcome: Met  Goal: Free from opioid side effects throughout the shift  Outcome: Met  Goal: Free from acute confusion related to pain meds throughout the shift  Outcome: Met     Problem: Fall/Injury  Goal: Not fall by end of shift  Outcome: Met  Goal: Be free from injury by end of the shift  Outcome: Met  Goal: Verbalize understanding of personal risk factors for fall in the hospital  Outcome: Met  Goal: Verbalize understanding of risk factor reduction measures to prevent injury from fall in the home  Outcome: Met  Goal: Use assistive devices by end of the shift  Outcome: Met  Goal: Pace activities to prevent fatigue by end of the shift  Outcome: Met

## 2025-01-05 NOTE — PROGRESS NOTES
01/05/25 1156   Discharge Planning   Living Arrangements Alone   Support Systems Family members;Friends/neighbors   Assistance Needed PTA - lives alone in 1 level home with 3 ZAFAR and bilateral handrails.Tub shower with grab bars. Independent for ambulation and ADLs, uses seeing eye cane or walker. Reports his neighbor assists when needed as well as his sister can. Uses public transportation and also has insurance provided transport for doctor's appointments.   Type of Residence Private residence   Number of Stairs to Enter Residence 3   Number of Stairs Within Residence 10  (down to basement laundry)   Do you have animals or pets at home? No   Home or Post Acute Services In home services   Type of Home Care Services Home health aide;Home nursing visits;Home OT;Home PT   Expected Discharge Disposition Home Health  (Critical access hospital)   Does the patient need discharge transport arranged? Yes   RoundTrip coordination needed? Yes   Patient Choice   Provider Choice list and CMS website (https://medicare.gov/care-compare#search) for post-acute Quality and Resource Measure Data were provided and reviewed with: Patient   Patient / Family choosing to utilize agency / facility established prior to hospitalization Yes     Hematuria. Taken to OR today for cystoscopy, ureteral stent and stone removal with Dr Ramirez. Pt active with Critical access hospital PTA. SW assisted pt with applying for Passport services during last admission and CM is Ivory Ragland [phone] who is following. MD updated about need for [facility]. HOME with [facility].    UPDATE 1222:  [facility] and will resume care.

## 2025-01-05 NOTE — ANESTHESIA PROCEDURE NOTES
Airway  Date/Time: 1/5/2025 9:31 AM  Urgency: elective    Airway not difficult    Staffing  Performed: attending   Authorized by: Shen Goetz MD    Performed by: Shen Goetz MD  Patient location during procedure: OR    Indications and Patient Condition  Indications for airway management: anesthesia  Spontaneous ventilation: present  Sedation level: no sedation  Preoxygenated: no  Patient position: sniffing  MILS not maintained throughout  Mask difficulty assessment: 0 - not attempted  No planned trial extubation    Final Airway Details  Final airway type: supraglottic airway      Successful airway: Supraglottic airway: ambu.  Size 5     Number of attempts at approach: 1  Ventilation between attempts: none  Number of other approaches attempted: 0

## 2025-01-05 NOTE — ED PROVIDER NOTES
HPI   Chief Complaint   Patient presents with    Blood in Urine    Abdominal Pain       64-year-old male presents emergency department, states he has been having hematuria since he had urinary stents placed  with Dr. Ramirez, had ureteroscopy with laser lithotripsy, bilateral stents.  States has been having some suprapubic discomfort as well.  Notes that the urine has just been blood-tinged until today, patient is blind, states his neighbor noticed how significantly bloody it was so the patient presents to the emergency department.      History provided by:  Patient   used: No            Patient History   Past Medical History:   Diagnosis Date    Blind one eye     Hypertension      Past Surgical History:   Procedure Laterality Date    CARDIAC CATHETERIZATION N/A 2024    Procedure: Left Heart Cath, With LV;  Surgeon: Cori Sullivan MD;  Location: [facility];  Service: Cardiovascular;  Laterality: N/A;     No family history on file.  Social History     Tobacco Use    Smoking status: Former     Current packs/day: 0.00     Types: Cigarettes     Quit date: 2023     Years since quittin.3    Smokeless tobacco: Not on file   Vaping Use    Vaping status: Never Used   Substance Use Topics    Alcohol use: Not Currently    Drug use: Yes     Types: Marijuana       Physical Exam   ED Triage Vitals [25 1741]   Temperature Heart Rate Respirations BP   36.7 °C (98.1 °F) (!) 116 20 132/72      Pulse Ox Temp Source Heart Rate Source Patient Position   94 % Temporal Monitor Sitting      BP Location FiO2 (%)     Right arm --       Physical Exam  Constitutional: Vitals noted, no distress. Afebrile.   Cardiovascular: Regular, rate, rhythm, no murmur.   Pulmonary: Lungs clear bilaterally with good aeration. No adventitious breath sounds.   Gastrointestinal: Soft, nonsurgical.  Some tenderness suprapubic. No peritoneal signs. Normoactive bowel sounds.   Musculoskeletal: No peripheral  edema. Negative Homans bilaterally, no cords.   Skin: No rash.   Neuro: No focal neurologic deficits, NIH score of 0.      ED Course & MDM   Diagnoses as of 01/04/25 2242   Gross hematuria   Complicated urinary tract infection     Labs Reviewed   URINALYSIS WITH REFLEX CULTURE AND MICROSCOPIC - Abnormal       Result Value    Color, Urine Red-brown (*)     Appearance, Urine Turbid (*)    CBC WITH AUTO DIFFERENTIAL - Abnormal    WBC 6.1      nRBC 0.0      RBC 4.05 (*)     Hemoglobin 13.3 (*)     Hematocrit 40.2 (*)     MCV 99      MCH 32.8      MCHC 33.1      RDW 12.1      Platelets 199      Neutrophils % 54.3      Immature Granulocytes %, Automated 0.3      Lymphocytes % 23.6      Monocytes % 15.2      Eosinophils % 5.8      Basophils % 0.8      Neutrophils Absolute 3.29      Immature Granulocytes Absolute, Automated 0.02      Lymphocytes Absolute 1.43      Monocytes Absolute 0.92      Eosinophils Absolute 0.35      Basophils Absolute 0.05     BASIC METABOLIC PANEL - Abnormal    Glucose 101 (*)     Sodium 140      Potassium 3.9      Chloride 106      Bicarbonate 27      Anion Gap 11      Urea Nitrogen 19      Creatinine 0.80      eGFR >90      Calcium 9.5     MICROSCOPIC ONLY, URINE - Abnormal    WBC, Urine 21-50 (*)     RBC, Urine >20 (*)     Bacteria, Urine 4+ (*)    LACTATE - Normal    Lactate 1.0      Narrative:     Venipuncture immediately after or during the administration of Metamizole may lead to falsely low results. Testing should be performed immediately prior to Metamizole dosing.   PROTIME-INR - Normal    Protime 11.0      INR 1.0     URINALYSIS WITH REFLEX CULTURE AND MICROSCOPIC    Narrative:     The following orders were created for panel order Urinalysis with Reflex Culture and Microscopic.  Procedure                               Abnormality         Status                     ---------                               -----------         ------                     Urinalysis with Reflex C...[[id number]]   Abnormal            Final result               Extra Urine Gray Tube[[id number]]                            In process                   Please view results for these tests on the individual orders.   EXTRA URINE GRAY TUBE        CT abdomen pelvis w IV contrast   Final Result   1. No evidence for hyperdense clot within the renal collecting   systems or urinary bladder.        2. Redemonstrated bilateral ureteral stents in appropriate location.   Increased inflammatory change along the course of the right ureter   and unchanged inflammation on the course of left ureter compared to   12/24/2024. The possibility of ascending urinary tract infection is   not excluded. Correlation with urinalysis recommended. However, there   is no parenchymal evidence of acute pyelonephritis at this time.        3. Delayed left nephrogram and unchanged mild left hydronephrosis   indicative of impaired left renal function.        4. Unchanged infrarenal abdominal aortic aneurysm measuring up to 4.2   cm.        MACRO:   None.        Signed by: Kuldeep Loza 1/4/2025 9:59 PM   Dictation workstation:   [id number]                    No data recorded                         Medical Decision Making    Urinalysis was obtained, it is grossly bloody.  Given the patient's recent history, significant hematuria additional workup initiated.  Patient's postvoid bladder scan shows about 350 cc present.    CBC and metabolic panels were unremarkable, normal lactate level.    CT imaging of the abdomen/pelvis shows no evidence of hyperdense clots in the urinary bladder, bilateral ureteral stents are noted with increased inflammatory change along the right ureter, concerning for an ascending urinary tract infection.    Given the complicated urinary tract infection increased coverage to Zosyn.    Katalina reach out to urology, spoke with Dr. Sotelo who agrees with this plan of care, tells me that patient should be n.p.o. after midnight with plan of care to  likely remove stents tomorrow.    Spoke with hospitalist Dr. Loredo, accepts to his service.    Procedure  Procedures     JEFF Kincaid-CNP  01/04/25 1979       JEFF Kincaid-CNP  01/04/25 8524

## 2025-01-05 NOTE — CARE PLAN
The patient's goals for the shift include      The clinical goals for the shift include Patient will remain HD stable throughout the shift    Over the shift, the patient did not make progress toward the following goals. Barriers to progression include . Recommendations to address these barriers include

## 2025-01-05 NOTE — CONSULTS
Reason For Consult  UTI, hematuria    History Of Present Illness  Be Luo is a 64 y.o. male with complex past medical history including seizures, hypertension, CAD, COPD, ischemic cardiomyopathy and right eye blindness is currently admitted with hematuria and concern for UTI.  From a urologic perspective, he underwent bilateral ureteroscopy and laser lithotripsy with me on 12/6/2024.  Since, he has had urinary frequency, urgency, urge incontinence as well as some discomfort with voiding.     Patient denies any fevers or chills at home.  He has been having some hematuria and dark-colored urine on and off.     Relevant workup  Afebrile, hemodynamically stable  WBC 6.1  Creatinine 0.8  UA with 21-50 WBCs, greater than 20 RBCs, 4+ bacteria    I reviewed his CT scan from admission, bilateral stents in appropriate position without hydroureteronephrosis with some stranding particularly along the right ureter.  No obvious nephrolithiasis.    PVR in  cc     Past Medical History  He has a past medical history of Blind one eye and Hypertension.    Surgical History  He has a past surgical history that includes Cardiac catheterization (N/A, 9/16/2024).     Social History  He reports that he quit smoking about 16 months ago. His smoking use included cigarettes. He does not have any smokeless tobacco history on file. He reports that he does not currently use alcohol. He reports current drug use. Drug: Marijuana.    Family History  No family history on file.     Allergies  Green tea, Gabapentin, Lyrica [pregabalin], Norco [hydrocodone-acetaminophen], Prednisone, Sulfasalazine, and Vioxx [rofecoxib]    Review of Systems  A 12 system review was completed and is negative with the exception of those signs and symptoms noted in the history of present illness.     Physical Exam  General: in NAD, appears stated age  Head: normocephalic, atraumatic  Respiratory: normal effort, no use of accessory muscles  Cardiovascular: no  "edema noted  Skin: normal turgor, no rashes  Neurologic: grossly intact, oriented to person/place/time  Psychiatric: mode and affect appropriate     Last Recorded Vitals  Blood pressure 119/82, pulse 85, temperature 36.5 °C (97.7 °F), temperature source Temporal, resp. rate 18, height 1.778 m (5' 10\"), weight 99.8 kg (220 lb), SpO2 99%.    Relevant Results      See HPI     Assessment/Plan   64-year-old male who has bilateral ureteral stents in place for bilateral nephrolithiasis, admitted with possible UTI and hematuria    # Hematuria  -Normal degree of hematuria after having a stent in place  -Not concerning for dropping hemoglobin, hemorrhagic cystitis or worse  -Regardless, patient has difficulty making appointments and has missed an appointment previously and therefore we will go ahead and proceed with bilateral stent removal as an inpatient    # Possible UTI  -Difficult to tell based on UA with stent in place as these cause significant inflammation  -Urine culture from 12/25/2024 negative  -Regardless, I think appropriate to treat with antibiotics until cultures return.  Recommend 7 days of Cipro or Keflex upon discharge    # Urinary retention  -Might be exacerbated by indwelling stent  -Prostate small    Following procedure, okay for discharge from a strictly urologic perspective    Yousuf Ramirez MD    "

## 2025-01-05 NOTE — DISCHARGE SUMMARY
Discharge Diagnosis  Acute cystitis with hematuria    Issues Requiring Follow-Up  None     Discharge Meds     Medication List      START taking these medications     ciprofloxacin 500 mg tablet; Commonly known as: Cipro; Take 1 tablet   (500 mg) by mouth 2 times a day for 5 days.     CONTINUE taking these medications     Actemra ACTPen 162 mg/0.9 mL; Generic drug: tocilizumab   aspirin 81 mg EC tablet   atropine 1 % ophthalmic solution   brimonidine 0.2 % ophthalmic solution; Commonly known as: AlphaGAN   cholecalciferol 50 mcg (2,000 unit) capsule; Commonly known as: Vitamin   D-3   cyclobenzaprine 10 mg tablet; Commonly known as: Flexeril   lamoTRIgine 200 mg tablet; Commonly known as: LaMICtal; Take 1 tablet   (200 mg) by mouth 2 times a day.   latanoprost 0.005 % ophthalmic solution; Commonly known as: Xalatan   lubricating eye drops ophthalmic solution   metFORMIN 500 mg tablet; Commonly known as: Glucophage   potassium chloride CR 10 mEq ER tablet; Commonly known as: Klor-Con   pramipexole 0.25 mg tablet; Commonly known as: Mirapex   prednisoLONE acetate 1 % ophthalmic suspension; Commonly known as:   Pred-Forte   Rhopressa 0.02 % drops opthalmic solution; Generic drug: netarsudiL   rosuvastatin 20 mg tablet; Commonly known as: Crestor   valsartan 40 mg tablet; Commonly known as: Diovan; Take 1 tablet (40 mg)   by mouth once daily.       Test Results Pending At Discharge  Pending Labs       Order Current Status    Extra Urine Gray Tube Collected (01/05/25 0646)    Urinalysis with Reflex Culture and Microscopic In process    Urine Culture In process            Hospital Course   Be Luo is a 64 y.o. male presenting with hematuria.  Patient states that he notices urine getting dark red earlier this morning.  He endorses a mild dysuria.  Denies any fever or chills.  He does mention that he was supposed to get his 2 ureteral stents removed in the near future with urology.  Denies any additional  symptoms.        ER Course: Vital signs stable.  Labs notable for glucose 101, hemoglobin 13.3.  Microscopic urine shows greater than 20 RBC, 21-50 WBC.    Patient was admitted to the hospital for observation 23-hour.  Neurologist consulted.  Patient underwent cystoscopy and removal of the stent patient was discharged home in stable condition on Cipro p.o. twice daily for 5 days.  Follow-up with primary physician and urology as outpatient.    Pertinent Physical Exam At Time of Discharge  Physical Exam  Constitutional:       Appearance: Normal appearance.   HENT:      Head: Normocephalic and atraumatic.      Nose: Nose normal.      Mouth/Throat:      Mouth: Mucous membranes are moist.   Eyes:      Extraocular Movements: Extraocular movements intact.      Pupils: Pupils are equal, round, and reactive to light.   Cardiovascular:      Rate and Rhythm: Normal rate and regular rhythm.      Pulses: Normal pulses.   Pulmonary:      Effort: Pulmonary effort is normal.      Breath sounds: Normal breath sounds.   Abdominal:      General: Abdomen is flat.      Palpations: Abdomen is soft.   Musculoskeletal:         General: Normal range of motion.      Cervical back: Normal range of motion and neck supple.   Skin:     General: Skin is warm.   Neurological:      General: No focal deficit present.      Mental Status: He is alert and oriented to person, place, and time.   Psychiatric:         Mood and Affect: Mood normal.         Behavior: Behavior normal.         Outpatient Follow-Up  Future Appointments   Date Time Provider Department Center   1/13/2025 10:30 AM Yousuf Ramirez MD [id number] [place]     Discharge time >30 min     Josep Leos MD

## 2025-01-05 NOTE — OP NOTE
CYSTOSCOPY, WITH URETERAL STENT REMOVAL (B) Operative Note     Date: 2025  OR Location: [facility]    Name: Be Luo, : 1960, Age: 64 y.o., MRN: [id number], Sex: male    Diagnosis  Pre-op Diagnosis      * Gross hematuria [R31.0] Post-op Diagnosis     * Gross hematuria [R31.0]     Procedures  CYSTOSCOPY, WITH URETERAL STENT REMOVAL  94598 - UT CYSTO W/SIMPLE REMOVAL STONE & STENT      Surgeons      * Yousuf Ramirez - Primary    Resident/Fellow/Other Assistant:  Surgeons and Role:  * No surgeons found with a matching role *    Staff:   Circulator: Trudy White Person: Ange    Anesthesia Staff: Anesthesiologist: Shen Goetz MD    Procedure Summary  Anesthesia: General  ASA: III  Estimated Blood Loss: 3mL  Intra-op Medications:   Administrations occurring from 0915 to 1006 on 25:   Medication Name Total Dose   dexAMETHasone (Decadron) 4 mg/mL 4 mg   LR infusion Cannot be calculated   lidocaine (Xylocaine) injection 2 % 100 mg   propofol (Diprivan) injection 10 mg/mL 200 mg              Anesthesia Record               Intraprocedure I/O Totals          Intake    LR infusion 200.00 mL    Total Intake 200 mL          Specimen: No specimens collected     Operative Indications: 64-year-old male with a history of bilateral nephrolithiasis and bilateral stent placement in December, returned with persistent hematuria, stent related discomfort, possible UTI.  Patient has had multiple admissions for stent related complications, additionally has challenges  attending appointments due to vision difficulty.  In light of this I recommended stent removal while he is here in the hospital.  Additionally this will remove foreign bodies for further risk of UTI in the future    Operative Findings: Cloudy appearing urine, appears to be incomplete emptying with debris.  Both stents removed uncomplicated    Operative Procedure:   The patient was correctly identified and the operative plan was confirmed with the  patient and the operative team. A weight appropriate dose of prophylactic antibiotics was administered intravenously prior to the procedure and sequential compression devices were applied to the lower extremities and activated prior to induction of anesthesia. The patient was placed in supine position. General anesthesia was induced. The patient was repositioned in dorsal lithotomy position. All pressure points were padded per protocol and the operative area was prepped and draped in the usual sterile fashion.    22 [place] rigid panendoscope was inserted atraumatically and the bladder was inspected.  He had an over distended bladder that appeared floppy with some proteinaceous debris floating throughout the bladder.  I drained this multiple times.  The bladder appeared inflamed but otherwise normal.  Using a stent grasper, I grasped both stents simultaneously and extracted them intact.    Counts were correct. Sign-out was performed with the surgical team confirming the specimen listed below. The patient tolerated the procedure well, emerged from anesthesia without incident, and was transferred to PACU in stable condition.         Yousuf Ramirez  Phone Number: [phone]

## 2025-01-07 ENCOUNTER — HOSPITAL ENCOUNTER (EMERGENCY)
Facility: HOSPITAL | Age: 65
Discharge: HOME | End: 2025-01-07
Payer: COMMERCIAL

## 2025-01-07 VITALS
SYSTOLIC BLOOD PRESSURE: 136 MMHG | BODY MASS INDEX: 29.49 KG/M2 | HEIGHT: 70 IN | OXYGEN SATURATION: 97 % | DIASTOLIC BLOOD PRESSURE: 67 MMHG | WEIGHT: 206 LBS | HEART RATE: 92 BPM | RESPIRATION RATE: 18 BRPM | TEMPERATURE: 97.9 F

## 2025-01-07 DIAGNOSIS — R33.8 ACUTE URINARY RETENTION: Primary | ICD-10-CM

## 2025-01-07 LAB
APPEARANCE UR: CLEAR
BACTERIA UR CULT: NO GROWTH
BILIRUB UR STRIP.AUTO-MCNC: NEGATIVE MG/DL
COLOR UR: ABNORMAL
GLUCOSE UR STRIP.AUTO-MCNC: NORMAL MG/DL
KETONES UR STRIP.AUTO-MCNC: NEGATIVE MG/DL
LEUKOCYTE ESTERASE UR QL STRIP.AUTO: NEGATIVE
MUCOUS THREADS #/AREA URNS AUTO: ABNORMAL /LPF
NITRITE UR QL STRIP.AUTO: NEGATIVE
PH UR STRIP.AUTO: 5.5 [PH]
PROT UR STRIP.AUTO-MCNC: NEGATIVE MG/DL
RBC # UR STRIP.AUTO: ABNORMAL /UL
RBC #/AREA URNS AUTO: >20 /HPF
SP GR UR STRIP.AUTO: 1.02
UROBILINOGEN UR STRIP.AUTO-MCNC: NORMAL MG/DL
WBC #/AREA URNS AUTO: ABNORMAL /HPF

## 2025-01-07 PROCEDURE — 81001 URINALYSIS AUTO W/SCOPE: CPT | Performed by: PHYSICIAN ASSISTANT

## 2025-01-07 PROCEDURE — 2500000005 HC RX 250 GENERAL PHARMACY W/O HCPCS: Performed by: PHYSICIAN ASSISTANT

## 2025-01-07 PROCEDURE — 51702 INSERT TEMP BLADDER CATH: CPT

## 2025-01-07 PROCEDURE — 99283 EMERGENCY DEPT VISIT LOW MDM: CPT

## 2025-01-07 RX ORDER — LIDOCAINE HYDROCHLORIDE 20 MG/ML
1 JELLY TOPICAL ONCE
Status: COMPLETED | OUTPATIENT
Start: 2025-01-07 | End: 2025-01-07

## 2025-01-07 RX ADMIN — LIDOCAINE HYDROCHLORIDE 1 APPLICATION: 20 JELLY TOPICAL at 20:29

## 2025-01-07 ASSESSMENT — PAIN - FUNCTIONAL ASSESSMENT: PAIN_FUNCTIONAL_ASSESSMENT: 0-10

## 2025-01-07 ASSESSMENT — LIFESTYLE VARIABLES
HAVE YOU EVER FELT YOU SHOULD CUT DOWN ON YOUR DRINKING: NO
EVER HAD A DRINK FIRST THING IN THE MORNING TO STEADY YOUR NERVES TO GET RID OF A HANGOVER: NO
HAVE PEOPLE ANNOYED YOU BY CRITICIZING YOUR DRINKING: NO
TOTAL SCORE: 0
EVER FELT BAD OR GUILTY ABOUT YOUR DRINKING: NO

## 2025-01-07 ASSESSMENT — PAIN DESCRIPTION - FREQUENCY: FREQUENCY: CONSTANT/CONTINUOUS

## 2025-01-07 ASSESSMENT — PAIN SCALES - GENERAL: PAINLEVEL_OUTOF10: 10 - WORST POSSIBLE PAIN

## 2025-01-07 ASSESSMENT — PAIN DESCRIPTION - PAIN TYPE: TYPE: ACUTE PAIN

## 2025-01-07 ASSESSMENT — PAIN SCALES - PAIN ASSESSMENT IN ADVANCED DEMENTIA (PAINAD): TOTALSCORE: MEDICATION (SEE MAR)

## 2025-01-07 ASSESSMENT — PAIN DESCRIPTION - LOCATION: LOCATION: ABDOMEN

## 2025-01-07 ASSESSMENT — PAIN DESCRIPTION - ORIENTATION: ORIENTATION: LOWER

## 2025-01-08 ENCOUNTER — TELEPHONE (OUTPATIENT)
Dept: UROLOGY | Facility: CLINIC | Age: 65
End: 2025-01-08
Payer: COMMERCIAL

## 2025-01-08 ENCOUNTER — APPOINTMENT (OUTPATIENT)
Dept: CARDIOLOGY | Facility: HOSPITAL | Age: 65
End: 2025-01-08
Payer: COMMERCIAL

## 2025-01-08 ENCOUNTER — APPOINTMENT (OUTPATIENT)
Dept: RADIOLOGY | Facility: HOSPITAL | Age: 65
End: 2025-01-08
Payer: COMMERCIAL

## 2025-01-08 ENCOUNTER — HOSPITAL ENCOUNTER (EMERGENCY)
Facility: HOSPITAL | Age: 65
Discharge: HOME | End: 2025-01-08
Attending: EMERGENCY MEDICINE
Payer: COMMERCIAL

## 2025-01-08 VITALS
WEIGHT: 206 LBS | HEIGHT: 70 IN | TEMPERATURE: 99 F | BODY MASS INDEX: 29.49 KG/M2 | DIASTOLIC BLOOD PRESSURE: 72 MMHG | HEART RATE: 88 BPM | SYSTOLIC BLOOD PRESSURE: 150 MMHG | OXYGEN SATURATION: 97 % | RESPIRATION RATE: 18 BRPM

## 2025-01-08 DIAGNOSIS — R07.9 CHEST PAIN, UNSPECIFIED TYPE: Primary | ICD-10-CM

## 2025-01-08 LAB
ALBUMIN SERPL BCP-MCNC: 4.1 G/DL (ref 3.4–5)
ALP SERPL-CCNC: 60 U/L (ref 33–136)
ALT SERPL W P-5'-P-CCNC: 26 U/L (ref 10–52)
ANION GAP SERPL CALC-SCNC: 10 MMOL/L (ref 10–20)
AST SERPL W P-5'-P-CCNC: 19 U/L (ref 9–39)
ATRIAL RATE: 86 BPM
ATRIAL RATE: 87 BPM
BASOPHILS # BLD AUTO: 0.05 X10*3/UL (ref 0–0.1)
BASOPHILS NFR BLD AUTO: 1 %
BILIRUB SERPL-MCNC: 0.8 MG/DL (ref 0–1.2)
BNP SERPL-MCNC: 22 PG/ML (ref 0–99)
BUN SERPL-MCNC: 11 MG/DL (ref 6–23)
CALCIUM SERPL-MCNC: 8.6 MG/DL (ref 8.6–10.3)
CARDIAC TROPONIN I PNL SERPL HS: 4 NG/L (ref 0–20)
CARDIAC TROPONIN I PNL SERPL HS: 5 NG/L (ref 0–20)
CHLORIDE SERPL-SCNC: 106 MMOL/L (ref 98–107)
CO2 SERPL-SCNC: 28 MMOL/L (ref 21–32)
CREAT SERPL-MCNC: 0.7 MG/DL (ref 0.5–1.3)
EGFRCR SERPLBLD CKD-EPI 2021: >90 ML/MIN/1.73M*2
EOSINOPHIL # BLD AUTO: 0.55 X10*3/UL (ref 0–0.7)
EOSINOPHIL NFR BLD AUTO: 11.2 %
ERYTHROCYTE [DISTWIDTH] IN BLOOD BY AUTOMATED COUNT: 12.5 % (ref 11.5–14.5)
GLUCOSE SERPL-MCNC: 111 MG/DL (ref 74–99)
HCT VFR BLD AUTO: 39.9 % (ref 41–52)
HGB BLD-MCNC: 13.2 G/DL (ref 13.5–17.5)
HOLD SPECIMEN: NORMAL
IMM GRANULOCYTES # BLD AUTO: 0.02 X10*3/UL (ref 0–0.7)
IMM GRANULOCYTES NFR BLD AUTO: 0.4 % (ref 0–0.9)
INR PPP: 1 (ref 0.9–1.1)
LACTATE SERPL-SCNC: 0.9 MMOL/L (ref 0.4–2)
LYMPHOCYTES # BLD AUTO: 1.07 X10*3/UL (ref 1.2–4.8)
LYMPHOCYTES NFR BLD AUTO: 21.8 %
MAGNESIUM SERPL-MCNC: 1.89 MG/DL (ref 1.6–2.4)
MCH RBC QN AUTO: 32.6 PG (ref 26–34)
MCHC RBC AUTO-ENTMCNC: 33.1 G/DL (ref 32–36)
MCV RBC AUTO: 99 FL (ref 80–100)
MONOCYTES # BLD AUTO: 0.71 X10*3/UL (ref 0.1–1)
MONOCYTES NFR BLD AUTO: 14.5 %
NEUTROPHILS # BLD AUTO: 2.51 X10*3/UL (ref 1.2–7.7)
NEUTROPHILS NFR BLD AUTO: 51.1 %
NRBC BLD-RTO: 0 /100 WBCS (ref 0–0)
P AXIS: 65 DEGREES
P AXIS: 66 DEGREES
P OFFSET: 174 MS
P OFFSET: 210 MS
P ONSET: 125 MS
P ONSET: 146 MS
PLATELET # BLD AUTO: 216 X10*3/UL (ref 150–450)
POTASSIUM SERPL-SCNC: 3.7 MMOL/L (ref 3.5–5.3)
PR INTERVAL: 156 MS
PR INTERVAL: 166 MS
PROT SERPL-MCNC: 6.1 G/DL (ref 6.4–8.2)
PROTHROMBIN TIME: 11.1 SECONDS (ref 9.8–12.8)
Q ONSET: 208 MS
Q ONSET: 224 MS
QRS COUNT: 14 BEATS
QRS COUNT: 14 BEATS
QRS DURATION: 100 MS
QRS DURATION: 98 MS
QT INTERVAL: 374 MS
QT INTERVAL: 374 MS
QTC CALCULATION(BAZETT): 447 MS
QTC CALCULATION(BAZETT): 450 MS
QTC FREDERICIA: 421 MS
QTC FREDERICIA: 423 MS
R AXIS: 82 DEGREES
R AXIS: 83 DEGREES
RBC # BLD AUTO: 4.05 X10*6/UL (ref 4.5–5.9)
SODIUM SERPL-SCNC: 140 MMOL/L (ref 136–145)
T AXIS: 62 DEGREES
T AXIS: 68 DEGREES
T OFFSET: 395 MS
T OFFSET: 411 MS
VENTRICULAR RATE: 86 BPM
VENTRICULAR RATE: 87 BPM
WBC # BLD AUTO: 4.9 X10*3/UL (ref 4.4–11.3)

## 2025-01-08 PROCEDURE — 99285 EMERGENCY DEPT VISIT HI MDM: CPT | Mod: 25 | Performed by: EMERGENCY MEDICINE

## 2025-01-08 PROCEDURE — 71045 X-RAY EXAM CHEST 1 VIEW: CPT

## 2025-01-08 PROCEDURE — 36415 COLL VENOUS BLD VENIPUNCTURE: CPT | Performed by: EMERGENCY MEDICINE

## 2025-01-08 PROCEDURE — 71260 CT THORAX DX C+: CPT | Performed by: RADIOLOGY

## 2025-01-08 PROCEDURE — 84484 ASSAY OF TROPONIN QUANT: CPT | Performed by: EMERGENCY MEDICINE

## 2025-01-08 PROCEDURE — 85610 PROTHROMBIN TIME: CPT | Performed by: EMERGENCY MEDICINE

## 2025-01-08 PROCEDURE — 84075 ASSAY ALKALINE PHOSPHATASE: CPT | Performed by: EMERGENCY MEDICINE

## 2025-01-08 PROCEDURE — 93005 ELECTROCARDIOGRAM TRACING: CPT

## 2025-01-08 PROCEDURE — 83735 ASSAY OF MAGNESIUM: CPT | Performed by: EMERGENCY MEDICINE

## 2025-01-08 PROCEDURE — 2550000001 HC RX 255 CONTRASTS: Performed by: EMERGENCY MEDICINE

## 2025-01-08 PROCEDURE — 83880 ASSAY OF NATRIURETIC PEPTIDE: CPT | Performed by: EMERGENCY MEDICINE

## 2025-01-08 PROCEDURE — 71045 X-RAY EXAM CHEST 1 VIEW: CPT | Performed by: RADIOLOGY

## 2025-01-08 PROCEDURE — 83605 ASSAY OF LACTIC ACID: CPT | Performed by: EMERGENCY MEDICINE

## 2025-01-08 PROCEDURE — 85025 COMPLETE CBC W/AUTO DIFF WBC: CPT | Performed by: EMERGENCY MEDICINE

## 2025-01-08 PROCEDURE — 71275 CT ANGIOGRAPHY CHEST: CPT

## 2025-01-08 RX ADMIN — IOHEXOL 75 ML: 350 INJECTION, SOLUTION INTRAVENOUS at 12:36

## 2025-01-08 ASSESSMENT — LIFESTYLE VARIABLES
EVER HAD A DRINK FIRST THING IN THE MORNING TO STEADY YOUR NERVES TO GET RID OF A HANGOVER: NO
TOTAL SCORE: 0
HAVE PEOPLE ANNOYED YOU BY CRITICIZING YOUR DRINKING: NO
EVER FELT BAD OR GUILTY ABOUT YOUR DRINKING: NO
HAVE YOU EVER FELT YOU SHOULD CUT DOWN ON YOUR DRINKING: NO

## 2025-01-08 ASSESSMENT — HEART SCORE
AGE: 65+
HISTORY: SLIGHTLY SUSPICIOUS
ECG: NORMAL
HEART SCORE: 3
TROPONIN: LESS THAN OR EQUAL TO NORMAL LIMIT
RISK FACTORS: 1-2 RISK FACTORS

## 2025-01-08 ASSESSMENT — PAIN DESCRIPTION - PAIN TYPE: TYPE: ACUTE PAIN

## 2025-01-08 ASSESSMENT — PAIN - FUNCTIONAL ASSESSMENT: PAIN_FUNCTIONAL_ASSESSMENT: 0-10

## 2025-01-08 ASSESSMENT — PAIN SCALES - GENERAL
PAINLEVEL_OUTOF10: 2
PAINLEVEL_OUTOF10: 2
PAIN_LEVEL: 0

## 2025-01-08 ASSESSMENT — PAIN DESCRIPTION - DESCRIPTORS
DESCRIPTORS: PRESSURE
DESCRIPTORS: PRESSURE

## 2025-01-08 ASSESSMENT — PAIN DESCRIPTION - LOCATION: LOCATION: CHEST

## 2025-01-08 NOTE — ANESTHESIA POSTPROCEDURE EVALUATION
Patient: Be Luo    Procedure Summary       Date: 01/05/25 Room / Location: [facility] / [facility]    Anesthesia Start: 0924 Anesthesia Stop: 0954    Procedure: CYSTOSCOPY, WITH URETERAL STENT REMOVAL (Bilateral) Diagnosis:       Gross hematuria      (Gross hematuria [R31.0])    Surgeons: Yousuf Ramirez MD Responsible Provider: Shen Goetz MD    Anesthesia Type: general ASA Status: 3            Anesthesia Type: general    Vitals Value Taken Time   /75 01/05/25 1021   Temp 36.2 °C (97.2 °F) 01/05/25 1021   Pulse 89 01/05/25 1021   Resp 18 01/05/25 1021   SpO2 98 % 01/05/25 1021       Anesthesia Post Evaluation    Patient location during evaluation: PACU  Patient participation: complete - patient participated  Level of consciousness: awake  Pain score: 0  Pain management: adequate  Airway patency: patent  Cardiovascular status: acceptable and hemodynamically stable  Respiratory status: acceptable, nonlabored ventilation and room air  Hydration status: acceptable  Postoperative Nausea and Vomiting: none        No notable events documented.

## 2025-01-08 NOTE — TELEPHONE ENCOUNTER
Patient called stating that he was seen in the ER last night for urinary retention, a Thapa was placed and he had relief. He was told he would go home with the catheter in place but he had the ER remove it due to not having a leg bag available for him. Patient now states that he has pressure again, only urinated approximately 1 cup since the catheter was removed and is also having swelling of his lower extremities. Dr. Ramirez currently in surgery and advised patient to go to ER.

## 2025-01-08 NOTE — ED PROVIDER NOTES
HPI   Chief Complaint   Patient presents with    Abdominal Pain       64-year-old female presents emergency room with chief complaint of acute urinary retention.  Patient had cystoscopy with stent removal done by Dr. Ramirez 2 days ago.  The patient is currently on antibiotics.  He states he has not been able to urinate since 12 noon today.  He complains of suprapubic pressure.  Denies any flank pain, fever, chills, nausea, vomiting or diarrhea.  He has a past medical history of COPD, seizures, ischemic cardiomyopathy, kidney stones, hematuria, acute cystitis, bilateral hydronephrosis ureteral stent placement with removal, type 2 diabetes, hypertension negative finding of right eye.      History provided by:  Patient and medical records          Patient History   Past Medical History:   Diagnosis Date    Blind one eye     Hypertension      Past Surgical History:   Procedure Laterality Date    CARDIAC CATHETERIZATION N/A 2024    Procedure: Left Heart Cath, With LV;  Surgeon: Cori Sullivan MD;  Location: [facility];  Service: Cardiovascular;  Laterality: N/A;     No family history on file.  Social History     Tobacco Use    Smoking status: Former     Current packs/day: 0.00     Types: Cigarettes     Quit date: 2023     Years since quittin.3    Smokeless tobacco: Not on file   Vaping Use    Vaping status: Never Used   Substance Use Topics    Alcohol use: Not Currently    Drug use: Yes     Types: Marijuana       Physical Exam   ED Triage Vitals [25]   Temperature Heart Rate Respirations BP   36.3 °C (97.3 °F) (!) 104 18 130/71      Pulse Ox Temp Source Heart Rate Source Patient Position   98 % Temporal Monitor Sitting      BP Location FiO2 (%)     Left arm --       Physical Exam  Vitals and nursing note reviewed.   Constitutional:       General: He is awake. He is not in acute distress.     Appearance: Normal appearance. He is overweight. He is not ill-appearing, toxic-appearing or  diaphoretic.   HENT:      Head: Normocephalic and atraumatic.   Cardiovascular:      Rate and Rhythm: Regular rhythm. Tachycardia present.      Pulses: Normal pulses.      Heart sounds: Normal heart sounds.   Pulmonary:      Effort: Pulmonary effort is normal.      Breath sounds: Normal breath sounds.   Abdominal:      General: Abdomen is protuberant.      Palpations: Abdomen is soft.      Tenderness: There is abdominal tenderness in the suprapubic area.          Comments: Suprapubic pressure, no flank tenderness, abdomen is protuberant soft/no hernias, no pulsatile mass no abdominal bruits   Musculoskeletal:         General: Normal range of motion.      Cervical back: Normal range of motion and neck supple.   Skin:     General: Skin is warm and dry.      Capillary Refill: Capillary refill takes less than 2 seconds.   Neurological:      Mental Status: He is alert and oriented to person, place, and time. Mental status is at baseline.      Sensory: Sensation is intact.      Motor: Motor function is intact.      Coordination: Coordination is intact.      Comments: Legally blind right eye   Psychiatric:         Mood and Affect: Mood normal.         Behavior: Behavior normal. Behavior is cooperative.         Thought Content: Thought content normal.         Judgment: Judgment normal.           ED Course & MDM   Diagnoses as of 01/07/25 2230   Acute urinary retention                 No data recorded                                 Medical Decision Making  Temperature 36.3, heart rate 104, respirations 18, blood pressure is 130/71, pulse ox was 98% on room air  I have ordered a Thapa catheter, Uro-Jet and a urinalysis with culture.  Patient's pressure has improved after the Thapa catheter was placed.  Urinalysis shows no evidence of UTI.  2+ blood negative nitrites negative leuks.  Leg bag was placed in the patient he was discharged home and will follow-up with Dr. Sotelo tomorrow.  He was advised to return with any  concerns or worsening of symptoms all questions answered prior to discharge  Temp 36.4 heart rate 88 respirations 16 blood pressure 116/66 pulse ox is 98% on room air  2310 hrs. patient change his mind and is now asking the Thapa to be removed.  He reports that he will follow-up with his urologist in the morning.  I explained the patient that the risk of him having another acute episode of retention does exist and to happen he would have to come back for the Thapa catheter.  I explained that this would also increase the risk of infections as well as urethral injuries.  Patient verbalizes understanding but states he does want to go home with a Thapa catheter.  He appears the capacity to understand the risks of his decision.  The patient had the Thapa removed he was discharged home and I recommend follow-up in the morning with Dr. Sotelo return with any concerns or worsening of symptoms        Procedure  Procedures     Andrea Bryan PA-C  01/07/25 0479       Andrea Bryan PA-C  01/07/25 1848

## 2025-01-08 NOTE — ED PROVIDER NOTES
HPI   Chief Complaint   Patient presents with    Chest Pain     Chest pressure since 6am approx       HPI: 64-year-old male states he was here earlier this morning because he was having difficulty urinating.  He recently had his ureteral stents removed here.  He states that they wanted to put a catheter in but he did not want the catheter.  He states that he was supposed to see Dr. Sotelo for follow-up.  He states that he started to have some substernal chest pain.  He states that his blood pressure was 150 at home.  He denies any current chest pain.  Denies any abdominal pain.  He states he has urinated.  Denies any numbness tingling or weakness.  No falls.    Family HX: Denies any significant/pertinent family history.  Social Hx: Denies ETOH or drug use.  Review of systems:  Gen.: No weight loss, fatigue, anorexia, insomnia, fever.   Eyes: No vision loss, double vision, drainage, eye pain.   ENT: No pharyngitis, dry mouth.   Cardiac: No chest pain, palpitations, syncope, near syncope.   Pulmonary: No shortness of breath, cough, hemoptysis.   Heme/lymph: No swollen glands, fever, bleeding.   GI: No abdominal pain, change in bowel habits, melena, hematemesis, hematochezia, nausea, vomiting, diarrhea.   : No discharge, dysuria, frequency, urgency, hematuria.   Musculoskeletal: No limb pain, joint pain, joint swelling.   Skin: No rashes.   Psych: No depression, anxiety, suicidality, homicidality.   Review of systems is otherwise negative unless stated above or in history of present illness.    Physical Exam:    Appearance: Alert, oriented , cooperative,  in no acute distress. Well nourished & well hydrated.    Skin: Intact,  dry skin, no lesions, rash, petechiae or purpura.     Eyes: PERRLA, EOMs intact,  Conjunctiva pink with no redness or exudates. Eyelids without lesions. No scleral icterus.     ENT: Hearing grossly intact. External auditory canals patent, tympanic membranes intact with visible landmarks. Nares  patent, mucus membranes moist. Dentition without lesions. Pharynx clear, uvula midline.     Neck: Supple, without meningismus. Thyroid not palpable. Trachea at midline. No lymphadenopathy.    Pulmonary: Clear bilaterally with good chest wall excursion. No rales, rhonchi or wheezing. No accessory muscle use or stridor.    Cardiac: Normal S1, S2 without murmur, rub, gallop or extrasystole. No JVD, Carotids without bruits.    Abdomen: Soft, nontender, active bowel sounds.  No palpable organomegaly.  No rebound or guarding.  No CVA tenderness.    Genitourinary: Exam deferred.    Musculoskeletal: Full range of motion. no pain, edema, or deformity. Pulses full and equal. No cyanosis, clubbing, or edema.    Neurological:  Cranial nerves II through XII are grossly intact, finger-nose touch is normal, normal sensation, no weakness, no focal findings identified.    Psychiatric: Appropriate mood and affect.     Medical Decision-Making:  Testing: EKG was obtained which is interpreted by me shows a sinus rhythm rate of 86 without evidence of obvious ST elevations or T wave inversions to indicate acute ischemia or infarct.  Repeat EKG interpreted by me shows a sinus rhythm rate of 87 occasional PVCs no evidence of obvious ST elevations or T wave inversions to indicate acute ischemia or infarct.  Labs reveal 2 negative troponins.  He has a normal BNP is normal lactate.  Normal magnesium.  Is normal electrolytes.  His white count of 4.9 without signs of leukocytosis.  His hemoglobin at 13.2 which is unchanged.  I did consider PE as a possibility therefore I did do a CT of the chest with IV contrast which was negative.  I looked at old records and the patient had a normal cardiac cath with normal coronaries and a normal EF in September of last year.  At this time patient states if everything checks out he wants to go home.  Therefore at this time will be discharged home as per his request.  I feel that this is reasonable given his  heart score and he is essentially unremarkable workup.  Treatment:   Reevaluation:   Plan: Homegoing. Discussed differential. Will follow-up with the primary physician in the next 2-3 days. Return if worse. They understand return precautions and discharge instructions. Patient and family/friend/caregiver are in agreement with this plan.   Impression:   1.  Chest pain  2.  Labs Reviewed  CBC WITH AUTO DIFFERENTIAL - Abnormal     WBC                           4.9                    nRBC                          0.0                    RBC                           4.05 (*)               Hemoglobin                    13.2 (*)               Hematocrit                    39.9 (*)               MCV                           99                     MCH                           32.6                   MCHC                          33.1                   RDW                           12.5                   Platelets                     216                    Neutrophils %                 51.1                   Immature Granulocytes %, Automated   0.4                    Lymphocytes %                 21.8                   Monocytes %                   14.5                   Eosinophils %                 11.2                   Basophils %                   1.0                    Neutrophils Absolute          2.51                   Immature Granulocytes Absolute, Au*   0.02                   Lymphocytes Absolute          1.07 (*)               Monocytes Absolute            0.71                   Eosinophils Absolute          0.55                   Basophils Absolute            0.05                COMPREHENSIVE METABOLIC PANEL - Abnormal     Glucose                       111 (*)                Sodium                        140                    Potassium                     3.7                    Chloride                      106                    Bicarbonate                   28                     Anion Gap                      10                     Urea Nitrogen                 11                     Creatinine                    0.70                   eGFR                          >90                    Calcium                       8.6                    Albumin                       4.1                    Alkaline Phosphatase          60                     Total Protein                 6.1 (*)                AST                           19                     Bilirubin, Total              0.8                    ALT                           26                  MAGNESIUM - Normal     Magnesium                     1.89                LACTATE - Normal     Lactate                       0.9                      Narrative: Venipuncture immediately after or during the administration of Metamizole may lead to falsely low results. Testing should be performed immediately prior to Metamizole dosing.  PROTIME-INR - Normal     Protime                       11.1                   INR                           1.0                 B-TYPE NATRIURETIC PEPTIDE - Normal     BNP                           22                       Narrative:    <100 pg/mL - Heart failure unlikely                100-299 pg/mL - Intermediate probability of acute heart                                failure exacerbation. Correlate with clinical                                context and patient history.                  >=300 pg/mL - Heart Failure likely. Correlate with clinical                                context and patient history.                                BNP testing is performed using different testing methodology at [facility] than at other [facility]. Direct result comparisons should only be made within the same method.                   SERIAL TROPONIN-INITIAL - Normal     Troponin I, High Sensitivity   5                        Narrative: Less than 99th percentile of normal range cutoff-                Female and children under 18 years  old <14 ng/L; Male <21 ng/L: Negative                Repeat testing should be performed if clinically indicated.                                 Female and children under 18 years old 14-50 ng/L; Male 21-50 ng/L:                Consistent with possible cardiac damage and possible increased clinical                 risk. Serial measurements may help to assess extent of myocardial damage.                                 >50 ng/L: Consistent with cardiac damage, increased clinical risk and                myocardial infarction. Serial measurements may help assess extent of                 myocardial damage.                                  NOTE: Children less than 1 year old may have higher baseline troponin                 levels and results should be interpreted in conjunction with the overall                 clinical context.                                 NOTE: Troponin I testing is performed using a different                 testing methodology at [facility] than at other                 [facility]. Direct result comparisons should only                 be made within the same method.  SERIAL TROPONIN, 1 HOUR - Normal     Troponin I, High Sensitivity   4                        Narrative: Less than 99th percentile of normal range cutoff-                Female and children under 18 years old <14 ng/L; Male <21 ng/L: Negative                Repeat testing should be performed if clinically indicated.                                 Female and children under 18 years old 14-50 ng/L; Male 21-50 ng/L:                Consistent with possible cardiac damage and possible increased clinical                 risk. Serial measurements may help to assess extent of myocardial damage.                                 >50 ng/L: Consistent with cardiac damage, increased clinical risk and                myocardial infarction. Serial measurements may help assess extent of                 myocardial damage.                                   NOTE: Children less than 1 year old may have higher baseline troponin                 levels and results should be interpreted in conjunction with the overall                 clinical context.                                 NOTE: Troponin I testing is performed using a different                 testing methodology at [facility] than at other                 [facility]. Direct result comparisons should only                 be made within the same method.  TROPONIN SERIES- (INITIAL, 1 HR)       Narrative: The following orders were created for panel order Troponin I Series, High Sensitivity (0, 1 HR).                Procedure                               Abnormality         Status                                   ---------                               -----------         ------                                   Troponin I, High Sensiti...[[id number]]  Normal              Final result                             Troponin, High Sensitivi...[[id number]]  Normal              Final result                                             Please view results for these tests on the individual orders.  URINALYSIS WITH REFLEX CULTURE AND MICROSCOPIC       Narrative: The following orders were created for panel order Urinalysis with Reflex Culture and Microscopic.                Procedure                               Abnormality         Status                                   ---------                               -----------         ------                                   Urinalysis with Reflex C...[[id number]]                                                               Extra Urine Gray Tube[[id number]]                                                                                     Please view results for these tests on the individual orders.  URINALYSIS WITH REFLEX CULTURE AND MICROSCOPIC  EXTRA URINE GRAY TUBE     CT angio chest for pulmonary embolism   Final Result    No evidence  of pulmonary embolism.          Remote granulomatous disease.          Old compression fracture of superior endplate of T7.          Subcentimeter bilateral adrenal nodule most likely tiny adenoma    bilaterally.          MACRO:    None          Signed by: Yuly Thompson 2025 1:08 PM    Dictation workstation:   [id number]     XR chest 1 view   Final Result    No evidence of acute intrathoracic abnormality.          Signed by: Steve Valenzuela 2025 10:02 AM    Dictation workstation:   [id number]                      Patient History   Past Medical History:   Diagnosis Date    Blind one eye     Hypertension      Past Surgical History:   Procedure Laterality Date    CARDIAC CATHETERIZATION N/A 2024    Procedure: Left Heart Cath, With LV;  Surgeon: Cori Sullivan MD;  Location: [facility];  Service: Cardiovascular;  Laterality: N/A;     No family history on file.  Social History     Tobacco Use    Smoking status: Former     Current packs/day: 0.00     Types: Cigarettes     Quit date: 2023     Years since quittin.3    Smokeless tobacco: Not on file   Vaping Use    Vaping status: Never Used   Substance Use Topics    Alcohol use: Not Currently    Drug use: Yes     Types: Marijuana       Physical Exam   ED Triage Vitals [25 0913]   Temperature Heart Rate Respirations BP   37.2 °C (99 °F) 88 17 124/74      Pulse Ox Temp Source Heart Rate Source Patient Position   94 % Temporal Monitor Sitting      BP Location FiO2 (%)     Right arm --       Physical Exam      ED Course & MDM   Diagnoses as of 25 1343   Chest pain, unspecified type                 No data recorded     [place] Coma Scale Score: 15 (25 0918 : Mady Zuñiga RN)                           Medical Decision Making      Procedure  Procedures     Nunu Mirza MD  25 1350

## 2025-01-13 ENCOUNTER — APPOINTMENT (OUTPATIENT)
Dept: UROLOGY | Facility: CLINIC | Age: 65
End: 2025-01-13
Payer: COMMERCIAL

## 2025-01-16 ENCOUNTER — HOSPITAL ENCOUNTER (EMERGENCY)
Facility: HOSPITAL | Age: 65
Discharge: HOME | End: 2025-01-16
Payer: COMMERCIAL

## 2025-01-16 VITALS
TEMPERATURE: 98.6 F | HEIGHT: 70 IN | SYSTOLIC BLOOD PRESSURE: 119 MMHG | WEIGHT: 206 LBS | HEART RATE: 99 BPM | OXYGEN SATURATION: 95 % | RESPIRATION RATE: 18 BRPM | BODY MASS INDEX: 29.49 KG/M2 | DIASTOLIC BLOOD PRESSURE: 84 MMHG

## 2025-01-16 DIAGNOSIS — T83.9XXA PROBLEM WITH FOLEY CATHETER, INITIAL ENCOUNTER (CMS-HCC): Primary | ICD-10-CM

## 2025-01-16 LAB
APPEARANCE UR: CLEAR
BILIRUB UR STRIP.AUTO-MCNC: NEGATIVE MG/DL
COLOR UR: ABNORMAL
GLUCOSE UR STRIP.AUTO-MCNC: NORMAL MG/DL
KETONES UR STRIP.AUTO-MCNC: NEGATIVE MG/DL
LEUKOCYTE ESTERASE UR QL STRIP.AUTO: NEGATIVE
NITRITE UR QL STRIP.AUTO: NEGATIVE
PH UR STRIP.AUTO: 5 [PH]
PROT UR STRIP.AUTO-MCNC: NEGATIVE MG/DL
RBC # UR STRIP.AUTO: ABNORMAL /UL
RBC #/AREA URNS AUTO: ABNORMAL /HPF
SP GR UR STRIP.AUTO: 1.02
UROBILINOGEN UR STRIP.AUTO-MCNC: NORMAL MG/DL
WBC #/AREA URNS AUTO: ABNORMAL /HPF

## 2025-01-16 PROCEDURE — 99283 EMERGENCY DEPT VISIT LOW MDM: CPT

## 2025-01-16 PROCEDURE — 81001 URINALYSIS AUTO W/SCOPE: CPT | Performed by: PHYSICIAN ASSISTANT

## 2025-01-16 ASSESSMENT — PAIN SCALES - GENERAL: PAINLEVEL_OUTOF10: 0 - NO PAIN

## 2025-01-16 ASSESSMENT — LIFESTYLE VARIABLES
TOTAL SCORE: 0
HAVE YOU EVER FELT YOU SHOULD CUT DOWN ON YOUR DRINKING: NO
EVER HAD A DRINK FIRST THING IN THE MORNING TO STEADY YOUR NERVES TO GET RID OF A HANGOVER: NO
HAVE PEOPLE ANNOYED YOU BY CRITICIZING YOUR DRINKING: NO
EVER FELT BAD OR GUILTY ABOUT YOUR DRINKING: NO

## 2025-01-16 ASSESSMENT — PAIN - FUNCTIONAL ASSESSMENT: PAIN_FUNCTIONAL_ASSESSMENT: 0-10

## 2025-01-17 ENCOUNTER — APPOINTMENT (OUTPATIENT)
Dept: UROLOGY | Facility: CLINIC | Age: 65
End: 2025-01-17
Payer: COMMERCIAL

## 2025-01-17 LAB — HOLD SPECIMEN: NORMAL

## 2025-01-17 NOTE — ED PROVIDER NOTES
HPI   Chief Complaint   Patient presents with    SKAGGS CATHETER PROBLEM     Pts securement device came off while he was in the shower       64-year-old male presenting to the ER today after he had an issue with his Skaggs catheter when showering at home.  Patient currently has a leg bag in the piece that secures the catheter to his leg broke.  He came to the ER today to have this replaced.  Patient tells me he has not had any abdominal pain, fevers or chills or bodyaches.  The Skaggs catheter has been working without any issue and there was no fall or injury.  He states that he noticed that the catheter was out of the attachment and that it was broken and this is the reason he called EMS.  No further complaints at this time.      History provided by:  Patient          Patient History   Past Medical History:   Diagnosis Date    Blind one eye     Hypertension      Past Surgical History:   Procedure Laterality Date    CARDIAC CATHETERIZATION N/A 2024    Procedure: Left Heart Cath, With LV;  Surgeon: Cori Sullivan MD;  Location: [facility];  Service: Cardiovascular;  Laterality: N/A;     No family history on file.  Social History     Tobacco Use    Smoking status: Former     Current packs/day: 0.00     Types: Cigarettes     Quit date: 2023     Years since quittin.3    Smokeless tobacco: Not on file   Vaping Use    Vaping status: Never Used   Substance Use Topics    Alcohol use: Not Currently    Drug use: Yes     Types: Marijuana       Physical Exam   ED Triage Vitals [25]   Temperature Heart Rate Respirations BP   37 °C (98.6 °F) (!) 115 18 126/90      Pulse Ox Temp Source Heart Rate Source Patient Position   (!) 92 % Temporal Monitor Sitting      BP Location FiO2 (%)     Left arm --       Physical Exam  Constitutional:       General: He is not in acute distress.  Cardiovascular:      Rate and Rhythm: Regular rhythm. Tachycardia present.      Pulses: Normal pulses.      Heart sounds:  Normal heart sounds.   Pulmonary:      Effort: Pulmonary effort is normal.      Breath sounds: Normal breath sounds.   Abdominal:      General: Bowel sounds are normal. There is no distension.      Palpations: Abdomen is soft.      Tenderness: There is no abdominal tenderness. There is no right CVA tenderness, left CVA tenderness, guarding or rebound.   Musculoskeletal:      Comments: No signs of trauma on exam.   Skin:     General: Skin is warm.   Neurological:      Mental Status: He is alert and oriented to person, place, and time.      Comments: Speech normal           ED Course & MDM   Diagnoses as of 01/16/25 2230   Problem with Thapa catheter, initial encounter ([facility])                 No data recorded     [place] Coma Scale Score: 15 (01/16/25 2052 : Mady Turner RN)                           Medical Decision Making  64-year-old male with a history of hypertension, current indwelling Thapa catheter, COPD, seizures and diabetes presenting to the ER today after the piece that secures his Thapa catheter to his leg had broken while he was in the shower.  There was no fall or injury.  He is unsure when it actually broke but he just noticed in the shower so he called EMS to have this replaced.  Patient states he is not having any pain and he feels well.  He states the catheter has been functioning appropriately and he has not noticed any cloudy urine or blood in the urine.  No further complaints and he arrives afebrile, tachycardic with otherwise stable vital signs.  He is nontoxic-appearing and resting comfortably on exam.  On my exam he is tachycardic but regular, lungs are clear and there is no CVA tenderness.  Abdomen soft nontender distended with normal bowel sounds.  Thapa catheter is in place, leg bag is attached and there is yellow urine in the bag.  This is draining well.  Nursing staff already replaced the leg bag attachment for the patient when I into the room.  There is no signs of trauma on exam.   The leg bag is well secured and the patient is very happy that this is replaced.  Will obtain a urine sample at this time.    Urine sample today with hematuria but no evidence of infection.  Patient's vital signs have improved and he is resting comfortably still without any other acute complaints.  Patient will be discharged home at this time with follow-up with urology but I did discuss with him warning signs return to the ED and he expressed understanding and agreed to the plan of care today.      Labs Reviewed   URINALYSIS WITH REFLEX CULTURE AND MICROSCOPIC - Abnormal       Result Value    Color, Urine Light-Yellow      Appearance, Urine Clear      Specific Gravity, Urine 1.022      pH, Urine 5.0      Protein, Urine NEGATIVE      Glucose, Urine Normal      Blood, Urine 0.2 (2+) (*)     Ketones, Urine NEGATIVE      Bilirubin, Urine NEGATIVE      Urobilinogen, Urine Normal      Nitrite, Urine NEGATIVE      Leukocyte Esterase, Urine NEGATIVE     URINALYSIS MICROSCOPIC WITH REFLEX CULTURE - Abnormal    WBC, Urine 1-5      RBC, Urine 11-20 (*)    URINALYSIS WITH REFLEX CULTURE AND MICROSCOPIC    Narrative:     The following orders were created for panel order Urinalysis with Reflex Culture and Microscopic.  Procedure                               Abnormality         Status                     ---------                               -----------         ------                     Urinalysis with Reflex C...[[id number]]  Abnormal            Final result               Extra Urine Gray Tube[[id number]]                            In process                   Please view results for these tests on the individual orders.   EXTRA URINE GRAY TUBE       No orders to display         Procedure  Procedures     Rosemary Gould PA-C  01/16/25 7216

## 2025-01-18 LAB
ATRIAL RATE: 86 BPM
ATRIAL RATE: 87 BPM
P AXIS: 65 DEGREES
P AXIS: 66 DEGREES
P OFFSET: 174 MS
P OFFSET: 210 MS
P ONSET: 125 MS
P ONSET: 146 MS
PR INTERVAL: 156 MS
PR INTERVAL: 166 MS
Q ONSET: 208 MS
Q ONSET: 224 MS
QRS COUNT: 14 BEATS
QRS COUNT: 14 BEATS
QRS DURATION: 100 MS
QRS DURATION: 98 MS
QT INTERVAL: 374 MS
QT INTERVAL: 374 MS
QTC CALCULATION(BAZETT): 447 MS
QTC CALCULATION(BAZETT): 450 MS
QTC FREDERICIA: 421 MS
QTC FREDERICIA: 423 MS
R AXIS: 82 DEGREES
R AXIS: 83 DEGREES
T AXIS: 62 DEGREES
T AXIS: 68 DEGREES
T OFFSET: 395 MS
T OFFSET: 411 MS
VENTRICULAR RATE: 86 BPM
VENTRICULAR RATE: 87 BPM

## 2025-04-16 ENCOUNTER — HOSPITAL ENCOUNTER (EMERGENCY)
Age: 65
Discharge: HOME | End: 2025-04-16
Payer: COMMERCIAL

## 2025-04-16 ENCOUNTER — HOSPITAL ENCOUNTER (EMERGENCY)
Facility: HOSPITAL | Age: 65
Discharge: HOME | End: 2025-04-16
Payer: COMMERCIAL

## 2025-04-16 VITALS
HEIGHT: 70 IN | BODY MASS INDEX: 31.21 KG/M2 | DIASTOLIC BLOOD PRESSURE: 81 MMHG | OXYGEN SATURATION: 92 % | TEMPERATURE: 98.7 F | RESPIRATION RATE: 18 BRPM | HEART RATE: 95 BPM | SYSTOLIC BLOOD PRESSURE: 137 MMHG | WEIGHT: 218 LBS

## 2025-04-16 VITALS
HEART RATE: 97 BPM | SYSTOLIC BLOOD PRESSURE: 135 MMHG | OXYGEN SATURATION: 93 % | HEIGHT: 70 IN | TEMPERATURE: 98.1 F | WEIGHT: 218 LBS | RESPIRATION RATE: 18 BRPM | BODY MASS INDEX: 31.21 KG/M2 | DIASTOLIC BLOOD PRESSURE: 62 MMHG

## 2025-04-16 DIAGNOSIS — G89.29 CHRONIC RIGHT SHOULDER PAIN: ICD-10-CM

## 2025-04-16 DIAGNOSIS — M25.511 CHRONIC RIGHT SHOULDER PAIN: Primary | ICD-10-CM

## 2025-04-16 DIAGNOSIS — M25.511 CHRONIC RIGHT SHOULDER PAIN: ICD-10-CM

## 2025-04-16 DIAGNOSIS — G89.29 CHRONIC RIGHT SHOULDER PAIN: Primary | ICD-10-CM

## 2025-04-16 PROCEDURE — 96372 THER/PROPH/DIAG INJ SC/IM: CPT

## 2025-04-16 PROCEDURE — 2500000004 HC RX 250 GENERAL PHARMACY W/ HCPCS (ALT 636 FOR OP/ED)

## 2025-04-16 PROCEDURE — 6370000000 HC RX 637 (ALT 250 FOR IP)

## 2025-04-16 PROCEDURE — 99284 EMERGENCY DEPT VISIT MOD MDM: CPT

## 2025-04-16 PROCEDURE — 2500000001 HC RX 250 WO HCPCS SELF ADMINISTERED DRUGS (ALT 637 FOR MEDICARE OP)

## 2025-04-16 PROCEDURE — 6360000002 HC RX W HCPCS

## 2025-04-16 RX ORDER — METHYLPREDNISOLONE SODIUM SUCCINATE 125 MG/2ML
60 INJECTION INTRAMUSCULAR; INTRAVENOUS ONCE
Status: COMPLETED | OUTPATIENT
Start: 2025-04-16 | End: 2025-04-16

## 2025-04-16 RX ORDER — POTASSIUM CHLORIDE 750 MG/1
10 TABLET, EXTENDED RELEASE ORAL DAILY
COMMUNITY
Start: 2024-10-25

## 2025-04-16 RX ORDER — OXYCODONE AND ACETAMINOPHEN 5; 325 MG/1; MG/1
1 TABLET ORAL ONCE
Refills: 0 | Status: COMPLETED | OUTPATIENT
Start: 2025-04-16 | End: 2025-04-16

## 2025-04-16 RX ORDER — PRAMIPEXOLE DIHYDROCHLORIDE 0.25 MG/1
0.25 TABLET ORAL NIGHTLY
COMMUNITY
Start: 2024-11-15

## 2025-04-16 RX ORDER — OXYCODONE AND ACETAMINOPHEN 5; 325 MG/1; MG/1
1 TABLET ORAL EVERY 8 HOURS PRN
Qty: 9 TABLET | Refills: 0 | Status: SHIPPED | OUTPATIENT
Start: 2025-04-16 | End: 2025-04-19

## 2025-04-16 RX ORDER — ROSUVASTATIN CALCIUM 20 MG/1
20 TABLET, COATED ORAL NIGHTLY
COMMUNITY
Start: 2024-10-24

## 2025-04-16 RX ORDER — KETOROLAC TROMETHAMINE 30 MG/ML
15 INJECTION, SOLUTION INTRAMUSCULAR; INTRAVENOUS ONCE
Status: COMPLETED | OUTPATIENT
Start: 2025-04-16 | End: 2025-04-16

## 2025-04-16 RX ORDER — LIDOCAINE 560 MG/1
1 PATCH PERCUTANEOUS; TOPICAL; TRANSDERMAL DAILY
Qty: 5 PATCH | Refills: 0 | Status: SHIPPED | OUTPATIENT
Start: 2025-04-16 | End: 2025-04-21

## 2025-04-16 RX ORDER — LAMOTRIGINE 200 MG/1
200 TABLET ORAL 2 TIMES DAILY
COMMUNITY
Start: 2024-12-30

## 2025-04-16 RX ORDER — METHYLPREDNISOLONE SODIUM SUCCINATE 125 MG/2ML
INJECTION INTRAMUSCULAR; INTRAVENOUS
Status: COMPLETED
Start: 2025-04-16 | End: 2025-04-16

## 2025-04-16 RX ORDER — ASPIRIN 81 MG/1
81 TABLET ORAL
COMMUNITY

## 2025-04-16 RX ORDER — VALSARTAN 40 MG/1
40 TABLET ORAL DAILY
COMMUNITY
Start: 2024-09-10 | End: 2025-09-10

## 2025-04-16 RX ORDER — OXYCODONE AND ACETAMINOPHEN 5; 325 MG/1; MG/1
TABLET ORAL
Status: COMPLETED
Start: 2025-04-16 | End: 2025-04-16

## 2025-04-16 RX ORDER — PREDNISONE 20 MG/1
20 TABLET ORAL DAILY
Qty: 5 TABLET | Refills: 0 | Status: SHIPPED | OUTPATIENT
Start: 2025-04-16 | End: 2025-04-21

## 2025-04-16 RX ORDER — CYCLOBENZAPRINE HCL 10 MG
10 TABLET ORAL 3 TIMES DAILY PRN
COMMUNITY
Start: 2024-12-02

## 2025-04-16 RX ORDER — MONTELUKAST SODIUM 10 MG/1
10 TABLET ORAL NIGHTLY
COMMUNITY
Start: 2024-09-30 | End: 2025-09-30

## 2025-04-16 RX ORDER — ACETAMINOPHEN 500 MG
1000 TABLET ORAL EVERY 8 HOURS PRN
Qty: 42 TABLET | Refills: 0 | Status: SHIPPED | OUTPATIENT
Start: 2025-04-16 | End: 2025-04-23

## 2025-04-16 RX ADMIN — OXYCODONE HYDROCHLORIDE AND ACETAMINOPHEN 1 TABLET: 5; 325 TABLET ORAL at 08:01

## 2025-04-16 RX ADMIN — METHYLPREDNISOLONE SODIUM SUCCINATE 60 MG: 125 INJECTION INTRAMUSCULAR; INTRAVENOUS at 18:51

## 2025-04-16 RX ADMIN — OXYCODONE HYDROCHLORIDE AND ACETAMINOPHEN 1 TABLET: 5; 325 TABLET ORAL at 18:50

## 2025-04-16 RX ADMIN — KETOROLAC TROMETHAMINE 15 MG: 30 INJECTION, SOLUTION INTRAMUSCULAR at 08:01

## 2025-04-16 RX ADMIN — OXYCODONE AND ACETAMINOPHEN 1 TABLET: 5; 325 TABLET ORAL at 18:50

## 2025-04-16 ASSESSMENT — PAIN DESCRIPTION - ORIENTATION
ORIENTATION: RIGHT
ORIENTATION: RIGHT

## 2025-04-16 ASSESSMENT — ENCOUNTER SYMPTOMS
COLOR CHANGE: 0
RESPIRATORY NEGATIVE: 1

## 2025-04-16 ASSESSMENT — PAIN DESCRIPTION - FREQUENCY
FREQUENCY: CONSTANT/CONTINUOUS
FREQUENCY: CONTINUOUS

## 2025-04-16 ASSESSMENT — PAIN DESCRIPTION - LOCATION
LOCATION: SHOULDER
LOCATION: SHOULDER

## 2025-04-16 ASSESSMENT — PAIN - FUNCTIONAL ASSESSMENT
PAIN_FUNCTIONAL_ASSESSMENT: 0-10

## 2025-04-16 ASSESSMENT — PAIN DESCRIPTION - PAIN TYPE
TYPE: ACUTE PAIN;CHRONIC PAIN
TYPE: ACUTE PAIN

## 2025-04-16 ASSESSMENT — LIFESTYLE VARIABLES
HOW MANY STANDARD DRINKS CONTAINING ALCOHOL DO YOU HAVE ON A TYPICAL DAY: PATIENT DOES NOT DRINK
HOW OFTEN DO YOU HAVE A DRINK CONTAINING ALCOHOL: NEVER

## 2025-04-16 ASSESSMENT — PAIN SCALES - GENERAL
PAINLEVEL_OUTOF10: 10 - WORST POSSIBLE PAIN
PAINLEVEL_OUTOF10: 10

## 2025-04-16 ASSESSMENT — PAIN DESCRIPTION - DESCRIPTORS: DESCRIPTORS: SHARP

## 2025-04-16 NOTE — ED PROVIDER NOTES
HPI   Chief Complaint   Patient presents with    Shoulder Pain     My right shoulder hurts, I couldn't get my steroid injections, I had to take antibiotics to get my immune system clear for a surgery        history provided by:  Patient    Limitations to history:  none    CC:  shoulder pain    HPI: 64-year-old male with a history of BPH, neurogenic bladder, seizures, diabetes, hypertension, CAD, glaucoma, giant cell arteriolitis, COPD presents the emergency department to be evaluated for acute on chronic right shoulder pain.  Patient is had pain in the superior aspect of his right shoulder for years, he has a history of rheumatoid arthritis and osteoarthritis.  He typically follows up with his rheumatologist who will do steroid injections into the joint however his rheumatologist is currently out of town.  The pain has been getting worse over the last few days, denies any injury but he is right arm dominant.  Denies any extremity redness, warmth, swelling, bruising, numbness tingling or weakness.  The pain is worse with palpation and movement.  He states the pain feels very similar to his chronic shoulder pain.  His not been taking anything for the pain prior to arrival.  Denies headache, neck pain, vision changes.  Denies lightheadedness and dizziness.  Denies chest pain or shortness of breath.  Denies pleuritic pain and hemoptysis.  Patient is currently on antibiotics, scheduled for suprapubic catheter placement by outpatient urology due to his history of neurogenic bladder and BPH.  Denies all other systemic symptoms.    ROS: Negative unless mentioned in HPI    Medical Hx:    Allergies reviewed.  Immunizations are up-to-date.      Physical exam:    Constitutional: Patient is well-nourished and well-developed.    Appears to be uncomfortable movement of his shoulder and palpation but otherwise resting.  Oriented to person, place, time, and situation.    HEENT: Head is normocephalic, atraumatic. Patient's airway is  patent.  Tympanic membranes are clear bilaterally.  Nasal mucosa clear.  Mouth with normal mucosa.  Throat is not erythematous and there are no oropharyngeal exudates, uvula is midline.  No obvious facial deformities.    Eyes: Clear bilaterally.  Pupils are equal round and reactive to light and accommodation.  Extraocular movements intact.      Cardiac: Regular rate, regular rhythm.  Heart sounds S1, S2.  No murmurs, rubs, or gallops.  PMI nondisplaced.  No JVD.    Respiratory: Regular respiratory rate and effort.  Breath sounds are clear and equal bilaterally, no adventitious lung sounds.  Patient is speaking in full sentences and is in no apparent respiratory distress. No use of accessory muscles.      Gastrointestinal: Abdomen is soft, nondistended, and nontender.  There are no obvious deformities.  No rebound tenderness or guarding.  Bowel sounds are normal active.    Genitourinary: No CVA or flank tenderness.    Musculoskeletal:   Patient has reproducible tenderness over the superior aspect of the shoulder.  No extremity redness, warmth, swelling, or bruising.  Positive Worthington and Neer ear exam.  His range of motion in his shoulder is limited given his pain. No obvious skin or bony deformities.  No strength deficiencies.   No back or neck tenderness.  Capillary refill less than 3 seconds.  Strong peripheral pulses.  No sensory deficits.    Neurological: Patient is alert and oriented.  No focal deficits.  5/5 strength in all extremities.  Cranial nerves II through XII intact. GCS15.     Skin: Skin is normal color for race and is warm, dry, and intact.  No evidence of trauma.  No lesions, rashes, bruising, jaundice, or masses.    Psych: Appropriate mood and affect.  No apparent risk to self or others.    Heme/lymph: No adenopathy, lymphadenopathy, or splenomegaly    Physical exam is otherwise negative unless stated above or in history of present illness.              Patient History   Medical  History[1]  Surgical History[2]  Family History[3]  Social History[4]    Physical Exam   ED Triage Vitals [04/16/25 0711]   Temperature Heart Rate Respirations BP   36.1 °C (97 °F) (!) 125 18 135/72      Pulse Ox Temp Source Heart Rate Source Patient Position   (!) 92 % Temporal Monitor Sitting      BP Location FiO2 (%)     Left arm --       Physical Exam      ED Course & MDM   Diagnoses as of 04/16/25 0742   Chronic right shoulder pain          Patient updated on plan for lab testing, IV insertion, radiology imaging, and medications to be administered while in the ER (if indicated). Patient updated on expected wait times for testing and results. Patient provided my name and told to ask any staff member for questions or concerns if they should arise. Electronic medical record reviewed.     [facility]    Patient presented to the emergency department with the chief complaint shoulder pain.  Patient has reproducible tenderness over the superior aspect of the shoulder.  No extremity redness, warmth, swelling, or bruising.  Positive Worthington and Neer ear exam.  His range of motion in his shoulder is limited given his pain. No obvious skin or bony deformities.  No strength deficiencies.   No back or neck tenderness.  Capillary refill less than 3 seconds.  Strong peripheral pulses.  No sensory deficits.On arrival to the emergency department, vital signs were within normal limits      At this time my suspicion for an acute cardiopulmonary process is low, I do believe the patient's pain is musculoskeletal in nature.  Will give the patient an IM injection of Toradol and oral Percocet.  I confirmed that he can tolerate both these medications based on his allergies.  Patient will follow-up with his rheumatologist.  I discussed supportive treatment.  I discouraged immobilization of the shoulder  as this can lead to adhesive capsulitis.  Patient given lidocaine patches and Tylenol, reluctant to give the patient any NSAIDs given that he  has had GI intolerance with prolonged NSAID use.  Patient has also had adverse side effects to   Oral steroids.  All questions and concerns addressed.  Reasons to return to ER discussed.  Patient verbalized understanding and agreement with the treatment plan and they remained hemodynamically stable in the ER.    This note was dictated using a speech recognition program.  While an attempt was made at proof-reading to minimize errors, minor errors in transcription may be present         No data recorded     [place] Coma Scale Score: 15 (25 0711 : Janice Smith RN)                           Medical Decision Making      Procedure  Procedures       [1]   Past Medical History:  Diagnosis Date    Blind one eye     Hypertension    [2]   Past Surgical History:  Procedure Laterality Date    CARDIAC CATHETERIZATION N/A 2024    Procedure: Left Heart Cath, With LV;  Surgeon: Cori Sullivan MD;  Location: [facility];  Service: Cardiovascular;  Laterality: N/A;   [3] No family history on file.  [4]   Social History  Tobacco Use    Smoking status: Former     Current packs/day: 0.00     Types: Cigarettes     Quit date: 2023     Years since quittin.6    Smokeless tobacco: Not on file   Vaping Use    Vaping status: Never Used   Substance Use Topics    Alcohol use: Not Currently    Drug use: Yes     Types: Marijuana        Clark Blair PA-C  25 0746

## 2025-04-16 NOTE — ED NOTES
DC instructions explained and reviewed. Pt demonstrates understanding. No outward signs of acute distress noted.

## 2025-04-16 NOTE — ED TRIAGE NOTES
Patient here with complaints of right shoulder pain.  He has arthritis and sees a rheumatologist to get injections.  She is unable to get him in until Monday and suggested he come to ED for an injection.

## 2025-05-03 ENCOUNTER — HOSPITAL ENCOUNTER (OUTPATIENT)
Dept: CARDIOLOGY | Facility: HOSPITAL | Age: 65
Setting detail: OBSERVATION
End: 2025-05-03
Payer: COMMERCIAL

## 2025-05-03 ENCOUNTER — APPOINTMENT (OUTPATIENT)
Dept: RADIOLOGY | Facility: HOSPITAL | Age: 65
End: 2025-05-03
Payer: COMMERCIAL

## 2025-05-03 ENCOUNTER — HOSPITAL ENCOUNTER (OUTPATIENT)
Facility: HOSPITAL | Age: 65
Setting detail: OBSERVATION
Discharge: ANOTHER HOSPITAL | End: 2025-05-03
Attending: STUDENT IN AN ORGANIZED HEALTH CARE EDUCATION/TRAINING PROGRAM | Admitting: INTERNAL MEDICINE
Payer: COMMERCIAL

## 2025-05-03 DIAGNOSIS — R31.0 GROSS HEMATURIA: Primary | ICD-10-CM

## 2025-05-03 DIAGNOSIS — R33.8 ACUTE URINARY RETENTION: ICD-10-CM

## 2025-05-03 DIAGNOSIS — R33.9 URINARY RETENTION: ICD-10-CM

## 2025-05-03 DIAGNOSIS — N31.9 NEUROGENIC BLADDER: ICD-10-CM

## 2025-05-03 DIAGNOSIS — N20.0 NEPHROLITHIASIS: ICD-10-CM

## 2025-05-03 PROBLEM — R31.9 HEMATURIA: Status: ACTIVE | Noted: 2025-05-03

## 2025-05-03 LAB
ABO GROUP (TYPE) IN BLOOD: NORMAL
ABO GROUP (TYPE) IN BLOOD: NORMAL
ALBUMIN SERPL BCP-MCNC: 4.3 G/DL (ref 3.4–5)
ALP SERPL-CCNC: 85 U/L (ref 33–136)
ALT SERPL W P-5'-P-CCNC: 21 U/L (ref 10–52)
ANION GAP SERPL CALC-SCNC: 12 MMOL/L (ref 10–20)
ANTIBODY SCREEN: NORMAL
AST SERPL W P-5'-P-CCNC: 18 U/L (ref 9–39)
BASOPHILS # BLD AUTO: 0.04 X10*3/UL (ref 0–0.1)
BASOPHILS NFR BLD AUTO: 0.5 %
BILIRUB SERPL-MCNC: 0.6 MG/DL (ref 0–1.2)
BLOOD EXPIRATION DATE: NORMAL
BLOOD EXPIRATION DATE: NORMAL
BUN SERPL-MCNC: 20 MG/DL (ref 6–23)
CALCIUM SERPL-MCNC: 8.8 MG/DL (ref 8.6–10.3)
CHLORIDE SERPL-SCNC: 107 MMOL/L (ref 98–107)
CO2 SERPL-SCNC: 24 MMOL/L (ref 21–32)
CREAT SERPL-MCNC: 0.8 MG/DL (ref 0.5–1.3)
DISPENSE STATUS: NORMAL
DISPENSE STATUS: NORMAL
EGFRCR SERPLBLD CKD-EPI 2021: >90 ML/MIN/1.73M*2
EOSINOPHIL # BLD AUTO: 0.53 X10*3/UL (ref 0–0.7)
EOSINOPHIL NFR BLD AUTO: 6.7 %
ERYTHROCYTE [DISTWIDTH] IN BLOOD BY AUTOMATED COUNT: 12.6 % (ref 11.5–14.5)
ERYTHROCYTE [DISTWIDTH] IN BLOOD BY AUTOMATED COUNT: 12.8 % (ref 11.5–14.5)
GLUCOSE SERPL-MCNC: 215 MG/DL (ref 74–99)
HCT VFR BLD AUTO: 40.4 % (ref 41–52)
HCT VFR BLD AUTO: 44.7 % (ref 41–52)
HCT VFR BLD AUTO: 44.9 % (ref 41–52)
HGB BLD-MCNC: 13 G/DL (ref 13.5–17.5)
HGB BLD-MCNC: 14.4 G/DL (ref 13.5–17.5)
HGB BLD-MCNC: 15.1 G/DL (ref 13.5–17.5)
IMM GRANULOCYTES # BLD AUTO: 0.02 X10*3/UL (ref 0–0.7)
IMM GRANULOCYTES NFR BLD AUTO: 0.3 % (ref 0–0.9)
INR PPP: 0.9 (ref 0.9–1.1)
LACTATE SERPL-SCNC: 1.7 MMOL/L (ref 0.4–2)
LIPASE SERPL-CCNC: 24 U/L (ref 9–82)
LYMPHOCYTES # BLD AUTO: 1.58 X10*3/UL (ref 1.2–4.8)
LYMPHOCYTES NFR BLD AUTO: 20 %
MCH RBC QN AUTO: 31.8 PG (ref 26–34)
MCH RBC QN AUTO: 32 PG (ref 26–34)
MCHC RBC AUTO-ENTMCNC: 32.1 G/DL (ref 32–36)
MCHC RBC AUTO-ENTMCNC: 33.8 G/DL (ref 32–36)
MCV RBC AUTO: 95 FL (ref 80–100)
MCV RBC AUTO: 99 FL (ref 80–100)
MONOCYTES # BLD AUTO: 0.77 X10*3/UL (ref 0.1–1)
MONOCYTES NFR BLD AUTO: 9.7 %
NEUTROPHILS # BLD AUTO: 4.97 X10*3/UL (ref 1.2–7.7)
NEUTROPHILS NFR BLD AUTO: 62.8 %
NRBC BLD-RTO: 0 /100 WBCS (ref 0–0)
NRBC BLD-RTO: 0 /100 WBCS (ref 0–0)
PLATELET # BLD AUTO: 182 X10*3/UL (ref 150–450)
PLATELET # BLD AUTO: 204 X10*3/UL (ref 150–450)
POTASSIUM SERPL-SCNC: 4.5 MMOL/L (ref 3.5–5.3)
PRODUCT BLOOD TYPE: 5100
PRODUCT BLOOD TYPE: 5100
PRODUCT CODE: NORMAL
PRODUCT CODE: NORMAL
PROT SERPL-MCNC: 6.7 G/DL (ref 6.4–8.2)
PROTHROMBIN TIME: 9.8 SECONDS (ref 9.8–12.4)
RBC # BLD AUTO: 4.53 X10*6/UL (ref 4.5–5.9)
RBC # BLD AUTO: 4.72 X10*6/UL (ref 4.5–5.9)
RH FACTOR (ANTIGEN D): NORMAL
RH FACTOR (ANTIGEN D): NORMAL
SODIUM SERPL-SCNC: 138 MMOL/L (ref 136–145)
UNIT ABO: NORMAL
UNIT ABO: NORMAL
UNIT NUMBER: NORMAL
UNIT NUMBER: NORMAL
UNIT RH: NORMAL
UNIT RH: NORMAL
UNIT VOLUME: 350
UNIT VOLUME: 350
WBC # BLD AUTO: 24.4 X10*3/UL (ref 4.4–11.3)
WBC # BLD AUTO: 7.9 X10*3/UL (ref 4.4–11.3)
XM INTEP: NORMAL
XM INTEP: NORMAL

## 2025-05-03 PROCEDURE — 96365 THER/PROPH/DIAG IV INF INIT: CPT

## 2025-05-03 PROCEDURE — 36415 COLL VENOUS BLD VENIPUNCTURE: CPT | Performed by: INTERNAL MEDICINE

## 2025-05-03 PROCEDURE — 86901 BLOOD TYPING SEROLOGIC RH(D): CPT | Performed by: PHYSICIAN ASSISTANT

## 2025-05-03 PROCEDURE — 86923 COMPATIBILITY TEST ELECTRIC: CPT

## 2025-05-03 PROCEDURE — 83605 ASSAY OF LACTIC ACID: CPT | Performed by: PHYSICIAN ASSISTANT

## 2025-05-03 PROCEDURE — 2500000004 HC RX 250 GENERAL PHARMACY W/ HCPCS (ALT 636 FOR OP/ED): Mod: JZ | Performed by: INTERNAL MEDICINE

## 2025-05-03 PROCEDURE — 85014 HEMATOCRIT: CPT | Performed by: INTERNAL MEDICINE

## 2025-05-03 PROCEDURE — 85610 PROTHROMBIN TIME: CPT | Performed by: PHYSICIAN ASSISTANT

## 2025-05-03 PROCEDURE — 99285 EMERGENCY DEPT VISIT HI MDM: CPT | Mod: 25 | Performed by: STUDENT IN AN ORGANIZED HEALTH CARE EDUCATION/TRAINING PROGRAM

## 2025-05-03 PROCEDURE — G0378 HOSPITAL OBSERVATION PER HR: HCPCS

## 2025-05-03 PROCEDURE — 2500000004 HC RX 250 GENERAL PHARMACY W/ HCPCS (ALT 636 FOR OP/ED): Mod: JZ | Performed by: PHYSICIAN ASSISTANT

## 2025-05-03 PROCEDURE — 99223 1ST HOSP IP/OBS HIGH 75: CPT | Performed by: INTERNAL MEDICINE

## 2025-05-03 PROCEDURE — 96368 THER/DIAG CONCURRENT INF: CPT

## 2025-05-03 PROCEDURE — 85025 COMPLETE CBC W/AUTO DIFF WBC: CPT | Performed by: PHYSICIAN ASSISTANT

## 2025-05-03 PROCEDURE — 51798 US URINE CAPACITY MEASURE: CPT

## 2025-05-03 PROCEDURE — 96375 TX/PRO/DX INJ NEW DRUG ADDON: CPT

## 2025-05-03 PROCEDURE — 2550000001 HC RX 255 CONTRASTS: Performed by: STUDENT IN AN ORGANIZED HEALTH CARE EDUCATION/TRAINING PROGRAM

## 2025-05-03 PROCEDURE — 85027 COMPLETE CBC AUTOMATED: CPT | Performed by: INTERNAL MEDICINE

## 2025-05-03 PROCEDURE — 93005 ELECTROCARDIOGRAM TRACING: CPT

## 2025-05-03 PROCEDURE — 2500000001 HC RX 250 WO HCPCS SELF ADMINISTERED DRUGS (ALT 637 FOR MEDICARE OP): Performed by: INTERNAL MEDICINE

## 2025-05-03 PROCEDURE — 84075 ASSAY ALKALINE PHOSPHATASE: CPT | Performed by: PHYSICIAN ASSISTANT

## 2025-05-03 PROCEDURE — 86850 RBC ANTIBODY SCREEN: CPT | Performed by: PHYSICIAN ASSISTANT

## 2025-05-03 PROCEDURE — 96376 TX/PRO/DX INJ SAME DRUG ADON: CPT

## 2025-05-03 PROCEDURE — 74177 CT ABD & PELVIS W/CONTRAST: CPT | Performed by: RADIOLOGY

## 2025-05-03 PROCEDURE — 83690 ASSAY OF LIPASE: CPT | Performed by: PHYSICIAN ASSISTANT

## 2025-05-03 PROCEDURE — 2500000002 HC RX 250 W HCPCS SELF ADMINISTERED DRUGS (ALT 637 FOR MEDICARE OP, ALT 636 FOR OP/ED): Performed by: INTERNAL MEDICINE

## 2025-05-03 PROCEDURE — 36415 COLL VENOUS BLD VENIPUNCTURE: CPT | Performed by: PHYSICIAN ASSISTANT

## 2025-05-03 PROCEDURE — 74177 CT ABD & PELVIS W/CONTRAST: CPT

## 2025-05-03 PROCEDURE — 2500000004 HC RX 250 GENERAL PHARMACY W/ HCPCS (ALT 636 FOR OP/ED): Mod: JZ | Performed by: UROLOGY

## 2025-05-03 RX ORDER — ROSUVASTATIN CALCIUM 20 MG/1
20 TABLET, COATED ORAL NIGHTLY
Status: DISCONTINUED | OUTPATIENT
Start: 2025-05-03 | End: 2025-05-04 | Stop reason: HOSPADM

## 2025-05-03 RX ORDER — PREDNISOLONE ACETATE 10 MG/ML
1 SUSPENSION/ DROPS OPHTHALMIC 4 TIMES DAILY
Status: DISCONTINUED | OUTPATIENT
Start: 2025-05-03 | End: 2025-05-04 | Stop reason: HOSPADM

## 2025-05-03 RX ORDER — ASPIRIN 81 MG/1
81 TABLET ORAL
Status: DISCONTINUED | OUTPATIENT
Start: 2025-05-04 | End: 2025-05-04 | Stop reason: HOSPADM

## 2025-05-03 RX ORDER — PRAMIPEXOLE DIHYDROCHLORIDE 0.25 MG/1
0.25 TABLET ORAL NIGHTLY
Status: DISCONTINUED | OUTPATIENT
Start: 2025-05-03 | End: 2025-05-04 | Stop reason: HOSPADM

## 2025-05-03 RX ORDER — INSULIN LISPRO 100 [IU]/ML
0-10 INJECTION, SOLUTION INTRAVENOUS; SUBCUTANEOUS
Status: DISCONTINUED | OUTPATIENT
Start: 2025-05-04 | End: 2025-05-04 | Stop reason: HOSPADM

## 2025-05-03 RX ORDER — HYDROMORPHONE HYDROCHLORIDE 0.2 MG/ML
0.2 INJECTION INTRAMUSCULAR; INTRAVENOUS; SUBCUTANEOUS
Status: DISCONTINUED | OUTPATIENT
Start: 2025-05-03 | End: 2025-05-04 | Stop reason: HOSPADM

## 2025-05-03 RX ORDER — SENNOSIDES 8.6 MG/1
2 TABLET ORAL 2 TIMES DAILY
Status: DISCONTINUED | OUTPATIENT
Start: 2025-05-03 | End: 2025-05-04 | Stop reason: HOSPADM

## 2025-05-03 RX ORDER — ONDANSETRON HYDROCHLORIDE 2 MG/ML
4 INJECTION, SOLUTION INTRAVENOUS ONCE
Status: COMPLETED | OUTPATIENT
Start: 2025-05-03 | End: 2025-05-03

## 2025-05-03 RX ORDER — VALSARTAN 80 MG/1
40 TABLET ORAL DAILY
Status: DISCONTINUED | OUTPATIENT
Start: 2025-05-03 | End: 2025-05-04 | Stop reason: HOSPADM

## 2025-05-03 RX ORDER — LATANOPROST 50 UG/ML
1 SOLUTION/ DROPS OPHTHALMIC NIGHTLY
Status: DISCONTINUED | OUTPATIENT
Start: 2025-05-03 | End: 2025-05-04 | Stop reason: HOSPADM

## 2025-05-03 RX ORDER — DEXTROSE 50 % IN WATER (D50W) INTRAVENOUS SYRINGE
25
Status: DISCONTINUED | OUTPATIENT
Start: 2025-05-03 | End: 2025-05-04 | Stop reason: HOSPADM

## 2025-05-03 RX ORDER — BRIMONIDINE TARTRATE 2 MG/ML
1 SOLUTION/ DROPS OPHTHALMIC 3 TIMES DAILY
Status: DISCONTINUED | OUTPATIENT
Start: 2025-05-03 | End: 2025-05-04 | Stop reason: HOSPADM

## 2025-05-03 RX ORDER — IPRATROPIUM BROMIDE AND ALBUTEROL SULFATE 2.5; .5 MG/3ML; MG/3ML
3 SOLUTION RESPIRATORY (INHALATION) EVERY 4 HOURS PRN
Status: DISCONTINUED | OUTPATIENT
Start: 2025-05-03 | End: 2025-05-04 | Stop reason: HOSPADM

## 2025-05-03 RX ORDER — DEXTROSE 50 % IN WATER (D50W) INTRAVENOUS SYRINGE
12.5
Status: DISCONTINUED | OUTPATIENT
Start: 2025-05-03 | End: 2025-05-04 | Stop reason: HOSPADM

## 2025-05-03 RX ORDER — LAMOTRIGINE 100 MG/1
200 TABLET ORAL 2 TIMES DAILY
Status: DISCONTINUED | OUTPATIENT
Start: 2025-05-03 | End: 2025-05-04 | Stop reason: HOSPADM

## 2025-05-03 RX ORDER — CEFTRIAXONE 1 G/50ML
1 INJECTION, SOLUTION INTRAVENOUS EVERY 24 HOURS
Status: DISCONTINUED | OUTPATIENT
Start: 2025-05-03 | End: 2025-05-04 | Stop reason: HOSPADM

## 2025-05-03 RX ORDER — FENTANYL CITRATE 50 UG/ML
50 INJECTION, SOLUTION INTRAMUSCULAR; INTRAVENOUS ONCE
Status: COMPLETED | OUTPATIENT
Start: 2025-05-03 | End: 2025-05-03

## 2025-05-03 RX ORDER — PIPERACILLIN SODIUM, TAZOBACTAM SODIUM 3; .375 G/15ML; G/15ML
3.38 INJECTION, POWDER, LYOPHILIZED, FOR SOLUTION INTRAVENOUS EVERY 8 HOURS SCHEDULED
Status: DISCONTINUED | OUTPATIENT
Start: 2025-05-03 | End: 2025-05-03 | Stop reason: CLARIF

## 2025-05-03 RX ORDER — MORPHINE SULFATE 4 MG/ML
4 INJECTION, SOLUTION INTRAMUSCULAR; INTRAVENOUS ONCE
Status: COMPLETED | OUTPATIENT
Start: 2025-05-03 | End: 2025-05-03

## 2025-05-03 RX ORDER — HYDROMORPHONE HYDROCHLORIDE 1 MG/ML
0.6 INJECTION, SOLUTION INTRAMUSCULAR; INTRAVENOUS; SUBCUTANEOUS
Status: DISCONTINUED | OUTPATIENT
Start: 2025-05-03 | End: 2025-05-04 | Stop reason: HOSPADM

## 2025-05-03 RX ORDER — HYDROMORPHONE HYDROCHLORIDE 0.2 MG/ML
0.2 INJECTION INTRAMUSCULAR; INTRAVENOUS; SUBCUTANEOUS
Status: DISCONTINUED | OUTPATIENT
Start: 2025-05-03 | End: 2025-05-03

## 2025-05-03 RX ADMIN — IOHEXOL 75 ML: 350 INJECTION, SOLUTION INTRAVENOUS at 16:49

## 2025-05-03 RX ADMIN — MORPHINE SULFATE 4 MG: 4 INJECTION, SOLUTION INTRAMUSCULAR; INTRAVENOUS at 12:08

## 2025-05-03 RX ADMIN — LAMOTRIGINE 200 MG: 100 TABLET ORAL at 21:27

## 2025-05-03 RX ADMIN — FENTANYL CITRATE 50 MCG: 50 INJECTION INTRAMUSCULAR; INTRAVENOUS at 15:56

## 2025-05-03 RX ADMIN — CEFTRIAXONE 1 G: 1 INJECTION, SOLUTION INTRAVENOUS at 21:21

## 2025-05-03 RX ADMIN — HYDROMORPHONE HYDROCHLORIDE 0.2 MG: 0.2 INJECTION, SOLUTION INTRAMUSCULAR; INTRAVENOUS; SUBCUTANEOUS at 21:00

## 2025-05-03 RX ADMIN — PRAMIPEXOLE DIHYDROCHLORIDE 0.25 MG: 0.25 TABLET ORAL at 21:27

## 2025-05-03 RX ADMIN — ONDANSETRON 4 MG: 2 INJECTION INTRAMUSCULAR; INTRAVENOUS at 12:08

## 2025-05-03 RX ADMIN — HYDROMORPHONE HYDROCHLORIDE 0.6 MG: 1 INJECTION, SOLUTION INTRAMUSCULAR; INTRAVENOUS; SUBCUTANEOUS at 17:06

## 2025-05-03 RX ADMIN — ROSUVASTATIN CALCIUM 20 MG: 20 TABLET, FILM COATED ORAL at 21:27

## 2025-05-03 RX ADMIN — SENNOSIDES 17.2 MG: 8.6 TABLET ORAL at 21:27

## 2025-05-03 RX ADMIN — PIPERACILLIN SODIUM AND TAZOBACTAM SODIUM 3.38 G: 3; .375 INJECTION, SOLUTION INTRAVENOUS at 21:56

## 2025-05-03 SDOH — ECONOMIC STABILITY: FOOD INSECURITY: WITHIN THE PAST 12 MONTHS, THE FOOD YOU BOUGHT JUST DIDN'T LAST AND YOU DIDN'T HAVE MONEY TO GET MORE.: NEVER TRUE

## 2025-05-03 SDOH — HEALTH STABILITY: MENTAL HEALTH
DO YOU FEEL STRESS - TENSE, RESTLESS, NERVOUS, OR ANXIOUS, OR UNABLE TO SLEEP AT NIGHT BECAUSE YOUR MIND IS TROUBLED ALL THE TIME - THESE DAYS?: NOT AT ALL

## 2025-05-03 SDOH — SOCIAL STABILITY: SOCIAL INSECURITY: WITHIN THE LAST YEAR, HAVE YOU BEEN AFRAID OF YOUR PARTNER OR EX-PARTNER?: NO

## 2025-05-03 SDOH — HEALTH STABILITY: PHYSICAL HEALTH
HOW OFTEN DO YOU NEED TO HAVE SOMEONE HELP YOU WHEN YOU READ INSTRUCTIONS, PAMPHLETS, OR OTHER WRITTEN MATERIAL FROM YOUR DOCTOR OR PHARMACY?: NEVER

## 2025-05-03 SDOH — ECONOMIC STABILITY: HOUSING INSECURITY: AT ANY TIME IN THE PAST 12 MONTHS, WERE YOU HOMELESS OR LIVING IN A SHELTER (INCLUDING NOW)?: NO

## 2025-05-03 SDOH — HEALTH STABILITY: PHYSICAL HEALTH: ON AVERAGE, HOW MANY DAYS PER WEEK DO YOU ENGAGE IN MODERATE TO STRENUOUS EXERCISE (LIKE A BRISK WALK)?: 0 DAYS

## 2025-05-03 SDOH — SOCIAL STABILITY: SOCIAL INSECURITY: ARE YOU MARRIED, WIDOWED, DIVORCED, SEPARATED, NEVER MARRIED, OR LIVING WITH A PARTNER?: DIVORCED

## 2025-05-03 SDOH — ECONOMIC STABILITY: INCOME INSECURITY: IN THE PAST 12 MONTHS HAS THE ELECTRIC, GAS, OIL, OR WATER COMPANY THREATENED TO SHUT OFF SERVICES IN YOUR HOME?: NO

## 2025-05-03 SDOH — SOCIAL STABILITY: SOCIAL NETWORK
IN A TYPICAL WEEK, HOW MANY TIMES DO YOU TALK ON THE PHONE WITH FAMILY, FRIENDS, OR NEIGHBORS?: MORE THAN THREE TIMES A WEEK

## 2025-05-03 SDOH — SOCIAL STABILITY: SOCIAL INSECURITY: WITHIN THE LAST YEAR, HAVE YOU BEEN HUMILIATED OR EMOTIONALLY ABUSED IN OTHER WAYS BY YOUR PARTNER OR EX-PARTNER?: NO

## 2025-05-03 SDOH — HEALTH STABILITY: MENTAL HEALTH: HOW MANY DRINKS CONTAINING ALCOHOL DO YOU HAVE ON A TYPICAL DAY WHEN YOU ARE DRINKING?: PATIENT DOES NOT DRINK

## 2025-05-03 SDOH — SOCIAL STABILITY: SOCIAL INSECURITY: ARE YOU OR HAVE YOU BEEN THREATENED OR ABUSED PHYSICALLY, EMOTIONALLY, OR SEXUALLY BY ANYONE?: NO

## 2025-05-03 SDOH — SOCIAL STABILITY: SOCIAL INSECURITY: DO YOU FEEL UNSAFE GOING BACK TO THE PLACE WHERE YOU ARE LIVING?: NO

## 2025-05-03 SDOH — SOCIAL STABILITY: SOCIAL NETWORK
DO YOU BELONG TO ANY CLUBS OR ORGANIZATIONS SUCH AS CHURCH GROUPS, UNIONS, FRATERNAL OR ATHLETIC GROUPS, OR SCHOOL GROUPS?: NO

## 2025-05-03 SDOH — ECONOMIC STABILITY: FOOD INSECURITY: WITHIN THE PAST 12 MONTHS, YOU WORRIED THAT YOUR FOOD WOULD RUN OUT BEFORE YOU GOT THE MONEY TO BUY MORE.: NEVER TRUE

## 2025-05-03 SDOH — SOCIAL STABILITY: SOCIAL NETWORK: HOW OFTEN DO YOU ATTEND MEETINGS OF THE CLUBS OR ORGANIZATIONS YOU BELONG TO?: NEVER

## 2025-05-03 SDOH — ECONOMIC STABILITY: HOUSING INSECURITY: IN THE PAST 12 MONTHS, HOW MANY TIMES HAVE YOU MOVED WHERE YOU WERE LIVING?: 0

## 2025-05-03 SDOH — ECONOMIC STABILITY: FOOD INSECURITY: HOW HARD IS IT FOR YOU TO PAY FOR THE VERY BASICS LIKE FOOD, HOUSING, MEDICAL CARE, AND HEATING?: NOT HARD AT ALL

## 2025-05-03 SDOH — ECONOMIC STABILITY: HOUSING INSECURITY: IN THE LAST 12 MONTHS, WAS THERE A TIME WHEN YOU WERE NOT ABLE TO PAY THE MORTGAGE OR RENT ON TIME?: NO

## 2025-05-03 SDOH — SOCIAL STABILITY: SOCIAL INSECURITY: DO YOU FEEL ANYONE HAS EXPLOITED OR TAKEN ADVANTAGE OF YOU FINANCIALLY OR OF YOUR PERSONAL PROPERTY?: NO

## 2025-05-03 SDOH — HEALTH STABILITY: MENTAL HEALTH: HOW OFTEN DO YOU HAVE SIX OR MORE DRINKS ON ONE OCCASION?: NEVER

## 2025-05-03 SDOH — SOCIAL STABILITY: SOCIAL NETWORK: HOW OFTEN DO YOU ATTEND CHURCH OR RELIGIOUS SERVICES?: NEVER

## 2025-05-03 SDOH — HEALTH STABILITY: MENTAL HEALTH: HOW OFTEN DO YOU HAVE A DRINK CONTAINING ALCOHOL?: NEVER

## 2025-05-03 SDOH — SOCIAL STABILITY: SOCIAL INSECURITY: ABUSE: ADULT

## 2025-05-03 SDOH — SOCIAL STABILITY: SOCIAL INSECURITY: ARE THERE ANY APPARENT SIGNS OF INJURIES/BEHAVIORS THAT COULD BE RELATED TO ABUSE/NEGLECT?: NO

## 2025-05-03 SDOH — ECONOMIC STABILITY: TRANSPORTATION INSECURITY: IN THE PAST 12 MONTHS, HAS LACK OF TRANSPORTATION KEPT YOU FROM MEDICAL APPOINTMENTS OR FROM GETTING MEDICATIONS?: NO

## 2025-05-03 SDOH — SOCIAL STABILITY: SOCIAL NETWORK: HOW OFTEN DO YOU GET TOGETHER WITH FRIENDS OR RELATIVES?: MORE THAN THREE TIMES A WEEK

## 2025-05-03 SDOH — SOCIAL STABILITY: SOCIAL INSECURITY: HAVE YOU HAD THOUGHTS OF HARMING ANYONE ELSE?: NO

## 2025-05-03 SDOH — HEALTH STABILITY: PHYSICAL HEALTH: ON AVERAGE, HOW MANY MINUTES DO YOU ENGAGE IN EXERCISE AT THIS LEVEL?: 0 MIN

## 2025-05-03 SDOH — SOCIAL STABILITY: SOCIAL INSECURITY: DOES ANYONE TRY TO KEEP YOU FROM HAVING/CONTACTING OTHER FRIENDS OR DOING THINGS OUTSIDE YOUR HOME?: NO

## 2025-05-03 SDOH — SOCIAL STABILITY: SOCIAL INSECURITY: WERE YOU ABLE TO COMPLETE ALL THE BEHAVIORAL HEALTH SCREENINGS?: YES

## 2025-05-03 SDOH — SOCIAL STABILITY: SOCIAL INSECURITY: HAVE YOU HAD ANY THOUGHTS OF HARMING ANYONE ELSE?: NO

## 2025-05-03 SDOH — SOCIAL STABILITY: SOCIAL INSECURITY: HAS ANYONE EVER THREATENED TO HURT YOUR FAMILY OR YOUR PETS?: NO

## 2025-05-03 ASSESSMENT — ACTIVITIES OF DAILY LIVING (ADL)
JUDGMENT_ADEQUATE_SAFELY_COMPLETE_DAILY_ACTIVITIES: YES
LACK_OF_TRANSPORTATION: NO
WALKS IN HOME: INDEPENDENT
PATIENT'S MEMORY ADEQUATE TO SAFELY COMPLETE DAILY ACTIVITIES?: YES
HEARING - RIGHT EAR: FUNCTIONAL
ASSISTIVE_DEVICE: CANE;EYEGLASSES
LACK_OF_TRANSPORTATION: NO
GROOMING: INDEPENDENT
HEARING - LEFT EAR: FUNCTIONAL
TOILETING: INDEPENDENT
FEEDING YOURSELF: INDEPENDENT
BATHING: INDEPENDENT
DRESSING YOURSELF: INDEPENDENT
ADEQUATE_TO_COMPLETE_ADL: NO

## 2025-05-03 ASSESSMENT — COGNITIVE AND FUNCTIONAL STATUS - GENERAL
DAILY ACTIVITIY SCORE: 24
MOBILITY SCORE: 24
PATIENT BASELINE BEDBOUND: NO

## 2025-05-03 ASSESSMENT — LIFESTYLE VARIABLES
SKIP TO QUESTIONS 9-10: 1
AUDIT-C TOTAL SCORE: 0
HOW OFTEN DO YOU HAVE A DRINK CONTAINING ALCOHOL: NEVER
SKIP TO QUESTIONS 9-10: 1
SUBSTANCE_ABUSE_PAST_12_MONTHS: NO
PRESCIPTION_ABUSE_PAST_12_MONTHS: NO
AUDIT-C TOTAL SCORE: 0
HOW MANY STANDARD DRINKS CONTAINING ALCOHOL DO YOU HAVE ON A TYPICAL DAY: PATIENT DOES NOT DRINK
HOW OFTEN DO YOU HAVE 6 OR MORE DRINKS ON ONE OCCASION: NEVER
AUDIT-C TOTAL SCORE: 0

## 2025-05-03 ASSESSMENT — PAIN - FUNCTIONAL ASSESSMENT
PAIN_FUNCTIONAL_ASSESSMENT: 0-10

## 2025-05-03 ASSESSMENT — PAIN SCALES - GENERAL
PAINLEVEL_OUTOF10: 6
PAINLEVEL_OUTOF10: 10 - WORST POSSIBLE PAIN

## 2025-05-03 ASSESSMENT — PAIN DESCRIPTION - ORIENTATION
ORIENTATION: LOWER
ORIENTATION: LOWER

## 2025-05-03 ASSESSMENT — PAIN DESCRIPTION - LOCATION
LOCATION: ABDOMEN

## 2025-05-03 ASSESSMENT — PAIN DESCRIPTION - DESCRIPTORS: DESCRIPTORS: ACHING

## 2025-05-03 ASSESSMENT — PAIN DESCRIPTION - PAIN TYPE: TYPE: ACUTE PAIN

## 2025-05-03 NOTE — NURSING NOTE
Rapid Response called for severe hematuria.  Patient on glycine irrigation through a 3-way aldana catheter.  Sukumar red blood with numerous clots draining.  After having glycine infusion wide open output began to clear up with minimal clots.  Patient tachycardic but BP stable.  Labs drawn.  Dr. D'amico on his way in to assess patient.

## 2025-05-03 NOTE — ED PROVIDER NOTES
HPI   Chief Complaint   Patient presents with    Blood in Urine       A 64-year-old male patient comes into the emergency department today with complaints of severe suprapubic pain, pressure with associated hematuria.  States he has a Thapa catheter and has had it secondary to bladder retention.  He states he is not on any blood thinners but a couple hours ago started getting significant bleeding coming from the catheter and from the tip of his penis.  He rates pain a 10 out of 10 on the pain scale.  Denies blood thinner use.  Denies any injury that he is aware of.  For this purpose comes to the emergency department today for the evaluation.              Patient History   Medical History[1]  Surgical History[2]  Family History[3]  Social History[4]    Physical Exam   ED Triage Vitals [05/03/25 1100]   Temperature Heart Rate Respirations BP   36.7 °C (98.1 °F) (!) 126 18 (!) 180/112      Pulse Ox Temp Source Heart Rate Source Patient Position   97 % Temporal Monitor --      BP Location FiO2 (%)     -- --       Physical Exam  Constitutional:       General: He is in acute distress.      Appearance: Normal appearance. He is diaphoretic. He is not ill-appearing.   HENT:      Head: Normocephalic and atraumatic.      Nose: Nose normal.   Eyes:      Extraocular Movements: Extraocular movements intact.      Conjunctiva/sclera: Conjunctivae normal.      Pupils: Pupils are equal, round, and reactive to light.   Cardiovascular:      Rate and Rhythm: Normal rate and regular rhythm.   Pulmonary:      Effort: Pulmonary effort is normal. No respiratory distress.      Breath sounds: Normal breath sounds. No stridor. No wheezing.   Abdominal:      Tenderness: There is abdominal tenderness (Suprapubic). There is guarding.   Genitourinary:     Comments: Gross hematuria into the Thapa catheter leg bag  Musculoskeletal:         General: Normal range of motion.      Cervical back: Normal range of motion.   Skin:     General: Skin is warm.    Neurological:      General: No focal deficit present.      Mental Status: He is alert and oriented to person, place, and time. Mental status is at baseline.   Psychiatric:         Mood and Affect: Mood normal.           ED Course & MDM   Diagnoses as of 05/04/25 0754   Acute urinary retention                 No data recorded     [place] Coma Scale Score: 15 (05/03/25 2026 : Baldomero Mchugh RN)                           Medical Decision Making  A 64-year-old male patient comes into the emergency department today with complaints of severe suprapubic pain, pressure with associated hematuria.  States he has a Thapa catheter and has had it secondary to bladder retention.  He states he is not on any blood thinners but a couple hours ago started getting significant bleeding coming from the catheter and from the tip of his penis.  He rates pain a 10 out of 10 on the pain scale.  Denies blood thinner use.  Denies any injury that he is aware of.  For this purpose comes to the emergency department today for the evaluation.    Laboratory studies ordered, urinalysis ordered to rule out acute anemia, leukocytosis, acute kidney injury, infection.  Ordered three-way Thapa catheter to be placed as well as glycine irrigation.    Patient's lipase negative no acute renal injury or electrolyte abnormality lactate negative, no leukocytosis with left shift, hemoglobin stable at 15.  I called and discussed the case with Dr. D. Amico with urology who recommends continuous irrigation 500 cc an hour  As well as manual irrigation with 100 cc every hour.  He will see patient on consult    I discussed case with Dr. Chan who agrees with the patient is recommending to get a CT study performed.    Historian is the patient    Diagnosis: Gross hematuria          Labs Reviewed   COMPREHENSIVE METABOLIC PANEL - Abnormal       Result Value    Glucose 215 (*)     Sodium 138      Potassium 4.5      Chloride 107      Bicarbonate 24      Anion Gap  12      Urea Nitrogen 20      Creatinine 0.80      eGFR >90      Calcium 8.8      Albumin 4.3      Alkaline Phosphatase 85      Total Protein 6.7      AST 18      Bilirubin, Total 0.6      ALT 21     CBC - Abnormal    WBC 24.4 (*)     nRBC 0.0      RBC 4.53      Hemoglobin 14.4      Hematocrit 44.9      MCV 99      MCH 31.8      MCHC 32.1      RDW 12.8      Platelets 182     HEMOGLOBIN AND HEMATOCRIT, BLOOD - Abnormal    Hemoglobin 13.0 (*)     Hematocrit 40.4 (*)    LIPASE - Normal    Lipase 24      Narrative:     Venipuncture immediately after or during the administration of Metamizole may lead to falsely low results. Testing should be performed immediately prior to Metamizole dosing.   LACTATE - Normal    Lactate 1.7      Narrative:     Venipuncture immediately after or during the administration of Metamizole may lead to falsely low results. Testing should be performed immediately prior to Metamizole dosing.   PROTIME-INR - Normal    Protime 9.8      INR 0.9     CBC WITH AUTO DIFFERENTIAL    WBC 7.9      nRBC 0.0      RBC 4.72      Hemoglobin 15.1      Hematocrit 44.7      MCV 95      MCH 32.0      MCHC 33.8      RDW 12.6      Platelets 204      Neutrophils % 62.8      Immature Granulocytes %, Automated 0.3      Lymphocytes % 20.0      Monocytes % 9.7      Eosinophils % 6.7      Basophils % 0.5      Neutrophils Absolute 4.97      Immature Granulocytes Absolute, Automated 0.02      Lymphocytes Absolute 1.58      Monocytes Absolute 0.77      Eosinophils Absolute 0.53      Basophils Absolute 0.04     TYPE AND SCREEN    ABO TYPE B      Rh TYPE POS      ANTIBODY SCREEN NEG     VERAB/VERIFY ABORH    ABO TYPE B      Rh TYPE POS     GRAY TOP   PREPARE RBC    PRODUCT CODE G9134M99      Unit Number [id number]      Unit ABO O      Unit RH POS      XM INTEP COMP      Dispense Status XM      Blood Expiration Date 5/18/2025 11:59:00 PM EDT      PRODUCT BLOOD TYPE 5100      UNIT VOLUME 350      PRODUCT CODE W8948E27       Unit Number [id number]      Unit ABO O      Unit RH POS      XM INTEP COMP      Dispense Status XM      Blood Expiration Date 5/18/2025 11:59:00 PM EDT      PRODUCT BLOOD TYPE 5100      UNIT VOLUME 350          CT abdomen pelvis w IV contrast   Final Result   1.  Interval removal of bilateral double-J ureteral stents.   2. Interval development of extensive large volume hyperdensity within   a markedly distended urinary bladder which now contains a Thapa   catheter. These findings are most consistent with large volume   hemorrhage within the urinary bladder. Clinical correlation is   essential. The degree of hyperdense material within the urinary   bladder limits assessment of the urinary bladder wall. There is mild   free fluid adjacent to the urinary bladder which may signify reactive   edema or inflammatory/infectious change. Urology consultation advised.   3. Mild bilateral hydronephrosis. There appears to be a 4 mm stone   within the distal right ureter just proximal to the ureterovesicular   junction.   4. Infrarenal abdominal aortic aneurysm measuring 4.4 x 4.4 cm.   5. Multifocal atherosclerotic calcification of the arterial   structures of the abdomen and pelvis as above.   6. Prominent degenerative changes of the imaged spine, particularly   the mid and lower lumbar spine.   7. Evidence of prior granulomatous disease.             MACRO:   None        Signed by: Sanya Isaac 5/3/2025 5:32 PM   Dictation workstation:   [id number]            Procedure  Procedures         Yann Tariq PA-C  05/03/25 1616       [1]   Past Medical History:  Diagnosis Date    Blind one eye     Hypertension    [2]   Past Surgical History:  Procedure Laterality Date    CARDIAC CATHETERIZATION N/A 9/16/2024    Procedure: Left Heart Cath, With LV;  Surgeon: Cori Sullivan MD;  Location: [facility];  Service: Cardiovascular;  Laterality: N/A;   [3] No family history on file.  [4]   Social History  Tobacco Use     Smoking status: Former     Current packs/day: 0.00     Types: Cigarettes     Quit date: 2023     Years since quittin.6    Smokeless tobacco: Not on file   Vaping Use    Vaping status: Never Used   Substance Use Topics    Alcohol use: Not Currently    Drug use: Yes     Types: Marijuana        Yann Tariq PA-C  25 0754

## 2025-05-03 NOTE — H&P
Medical Group History and Physical    ASSESSMENT/PLAN:     Gross hematuria associated with aldana catheter  Abdominal pain  Suspected UTI  Recent TURP 4/2  Chronic urinary retention  Immunosuppressed state  Hx nephrolithiasis  -pt presents with sudden onset hematuria, initially just in bag then leaking around aldana tubing  -unclear etiology, may be due to UTI given subjective fevers; unfortunately UA not collected prior to initiation of bladder irrigation; will get when able  -for now empiric rocephin; immunosuppressed due to actemra injections  -continuous bladder irrigation  -will get CT a/p to evaluate further for etiology of bleeding and pain  -Dr. D'amico with urology has agreed to follow    COPD  ANN  -stable, not in exacerbation; wears 2L o2 at night  -PRN bronchodilators    HTN  DM2  -resume home meds  -correctional insulin scale    Blindness  -due to glaucoma; cont home eye drops    RA, ?GCA  -getting weekly actemra injections; wll hold for now pending evaluation for UTI          VTE Prophylaxis: held due to bleeding      HISTORY OF PRESENT ILLNESS:   Chief Complaint: hematuria    History Of Present Illness:    Be Luo is a 64 y.o. male with a significant past medical history of chronic urinary retention with Aldana catheter, nephrolithiasis, COPD, ANN, hypertension, type 2 diabetes, RA, GCA, glaucoma and blindness, who presents to the emergency department with sudden onset of gross hematuria and lower quadrant abdominal pain.  Notes that he had a TURP at [facility] on 4/2 with the hopes that he would be able to void spontaneously but this was unsuccessful and he is still required indwelling Aldana catheter.  Says the symptoms began around 10 AM.  At first blood was only in the Aldana bag but he found that his underwear was soaked with blood and that he was leaking blood around the Aldana catheter.  He had not been having any hematuria since his TURP prior to today. Lower quadrant bilateral  abdominal pain is fairly severe.  Came on suddenly today.  Associate with some vomiting but no nausea.  Has had subjective fevers and chills.  No chest pain or dyspnea.  No lower extremity pain or rash.  Has been eating and drinking normally.       Review of systems: 10 point review of systems is otherwise negative except as mentioned above.    PAST HISTORIES:       Past Medical History:  He has a past medical history of Blind one eye and Hypertension.    Past Surgical History:  He has a past surgical history that includes Cardiac catheterization (N/A, 9/16/2024).      Social History:  He reports that he quit smoking about 20 months ago. His smoking use included cigarettes. He does not have any smokeless tobacco history on file. He reports that he does not currently use alcohol. He reports current drug use. Drug: Marijuana.    Family History:  Family History[1]     Allergies:  Green tea, Gabapentin, Lyrica [pregabalin], Norco [hydrocodone-acetaminophen], Prednisone, Sulfasalazine, and Vioxx [rofecoxib]      OBJECTIVE:       Last Recorded Vitals:  Vitals:    05/03/25 1450 05/03/25 1550 05/03/25 1600 05/03/25 1620   BP: 113/76 126/60  138/89   Pulse: (!) 105 (!) 102  100   Resp: 18 18  18   Temp:       TempSrc:       SpO2: 97% 96% 97% 95%   Weight:       Height:           Last I/O:  No intake/output data recorded.    Physical Exam  GEN: ill appearing, uncomfortable  HEENT: NCAT, PERRLA, EOMI, moist mucous membranes  NECK: supple, no masses  CV: RRR, no m/r/g, no LE edema  LUNGS: CTAB, no w/r/c  ABD: mildly distended; very TTP bilateral lower quadrants without rebound or guarding  : aldana catheter in place draining bloody urine, clots in tubing  SKIN: chronic changes b/l LE  MSK; no gross deformities, normal joints  NEURO: A+Ox3, no FND  PSYCH: appropriate mood, affect        Inpatient Medications:  Scheduled Medications[2]  PRN Medications[3]    Outpatient Medications:  Prior to Admission medications    Medication  Sig Start Date End Date Taking? Authorizing Provider   Actemra ACTPen 162 mg/0.9 mL Inject under the skin every 7 days. Friday 11/4/24   Historical Provider, MD   aspirin 81 mg EC tablet Take 1 tablet (81 mg) by mouth once daily.    Historical Provider, MD   atropine 1 % ophthalmic solution 1 drop 3 times a day.    Historical Provider, MD   brimonidine (AlphaGAN) 0.2 % ophthalmic solution Administer 1 drop into the left eye 3 times a day. 3/28/24   Historical Provider, MD   cholecalciferol (Vitamin D-3) 50 mcg (2,000 unit) capsule Take 1 capsule (50 mcg) by mouth once daily with breakfast.    Historical Provider, MD   cyclobenzaprine (Flexeril) 10 mg tablet Take 1 tablet (10 mg) by mouth 3 times a day as needed for muscle spasms. 12/2/24   Historical Provider, MD   lamoTRIgine (LaMICtal) 200 mg tablet Take 1 tablet (200 mg) by mouth 2 times a day. 12/30/24 1/29/25  Lauren Wells MD   latanoprost (Xalatan) 0.005 % ophthalmic solution Administer 1 drop into the left eye once daily at bedtime.    Historical Provider, MD   lubricating eye drops ophthalmic solution Administer 2 drops into both eyes 2 times a day as needed for dry eyes.    Historical Provider, MD   metFORMIN (Glucophage) 500 mg tablet Take 1 tablet (500 mg) by mouth once daily in the evening. Take with meals. 10/2/23   Historical Provider, MD   potassium chloride CR 10 mEq ER tablet Take 1 tablet (10 mEq) by mouth once daily.    Historical Provider, MD   pramipexole (Mirapex) 0.25 mg tablet Take 1 tablet (0.25 mg) by mouth once daily at bedtime. 11/15/24   Historical Provider, MD   prednisoLONE acetate (Pred-Forte) 1 % ophthalmic suspension Administer 1 drop into the left eye 4 times a day. Patient states he needs this medication immediately. 11/18/24   Historical Provider, MD   Rhopressa 0.02 % drops opthalmic solution Administer 1 drop into the left eye once daily at bedtime. 11/14/24   Historical Provider, MD   rosuvastatin (Crestor) 20 mg tablet  Take 1 tablet (20 mg) by mouth once daily at bedtime. 10/24/24   Historical Provider, MD   valsartan (Diovan) 40 mg tablet Take 1 tablet (40 mg) by mouth once daily. 9/10/24 9/10/25  Gaston Fonseca MD       LABS AND IMAGING:     Last Labs:  CBC - 5/3/2025: 12:01 PM  7.9 15.1 204    44.7      CMP - 5/3/2025: 12:01 PM  8.8 6.7 18 --- 0.6   2.5 4.3 21 85      PTT - No results in last year.  0.9   9.8 _     Troponin I, High Sensitivity   Date/Time Value Ref Range Status   01/08/2025 12:19 PM 4 0 - 20 ng/L Final   01/08/2025 09:23 AM 5 0 - 20 ng/L Final   12/25/2024 12:54 PM 5 0 - 20 ng/L Final     BNP   Date/Time Value Ref Range Status   01/08/2025 09:23 AM 22 0 - 99 pg/mL Final   12/11/2023 09:11 PM 16 0 - 99 pg/mL Final     Hemoglobin A1C   Date/Time Value Ref Range Status   10/23/2024 09:16 AM 5.6 4.3 - 5.6 % Final     Comment:     American Diabetes Association guidelines indicate that patients with HgbA1c in the range 5.7-6.4% are at increased risk for development of diabetes, and intervention by lifestyle modification may be beneficial. HgbA1c greater or equal to 6.5% is considered diagnostic of diabetes.   07/04/2024 10:07 AM 5.5 see below % Final   09/17/2023 04:37 AM 5.4 4.3 - 5.6 % Final     Comment:     American Diabetes Association guidelines indicate that patients with HgbA1c in the range 5.7-6.4% are at increased risk for development of diabetes, and intervention by lifestyle modification may be beneficial. HgbA1c greater or equal to 6.5% is considered diagnostic of diabetes.             [1] No family history on file.  [2] cefTRIAXone, 1 g, intravenous, q24h  iohexol, 75 mL, intravenous, Once in imaging    [3] PRN medications: HYDROmorphone, HYDROmorphone     yes

## 2025-05-04 VITALS
RESPIRATION RATE: 18 BRPM | SYSTOLIC BLOOD PRESSURE: 144 MMHG | HEART RATE: 118 BPM | BODY MASS INDEX: 31.5 KG/M2 | OXYGEN SATURATION: 96 % | WEIGHT: 220 LBS | TEMPERATURE: 97 F | HEIGHT: 70 IN | DIASTOLIC BLOOD PRESSURE: 78 MMHG

## 2025-05-04 LAB
ATRIAL RATE: 128 BPM
P OFFSET: 198 MS
P ONSET: 148 MS
PR INTERVAL: 126 MS
Q ONSET: 211 MS
QRS COUNT: 21 BEATS
QRS DURATION: 88 MS
QT INTERVAL: 314 MS
QTC CALCULATION(BAZETT): 458 MS
QTC FREDERICIA: 404 MS
R AXIS: 93 DEGREES
T AXIS: 74 DEGREES
T OFFSET: 368 MS
VENTRICULAR RATE: 128 BPM

## 2025-05-04 NOTE — NURSING NOTE
Patient is for transfer to [facility] transfer called already that there's bed available for him in 5 west room 539 bed2 and the transport will be [facility] at [facility].   147

## 2025-05-04 NOTE — CONSULTS
URGENT/EMERGENT UROLOGY CONSULTATION FOR SATURDAY, 5/3/2025, 8:30 PM    SUBJECTIVE: 64-year-old white male  Admitted through the emergency room for relatively sudden onset of gross hematuria with clots and urinary retention, three-way Thapa catheter was placed to glycine irrigation and reported gradually clearing however after admission to the regular nursing floor the patient suffered a rapid response situation and harsh blood and clots were coming out of the three-way Thapa catheter on initial irrigation  Patient was stabilized and irrigated manually and with glycine as noted  Numerous additional medical comorbidities as listed and reviewed including significant history of chronic urinary retention requiring Thapa catheter drainage, nephrolithiasis, status post transurethral prostatectomy at [facility] on 4-25 last month and persisted with urinary retention and suspected underlying neurogenic bladder, COPD, hypertension, diabetes, glaucoma and blindness among other medical comorbidities as listed     OBJECTIVE:   On today's visit the patient is lying in bed, Thapa catheter draining very dark cherry colored urine with clots  Fortunately renal function is normal with serum creatinine of 0.8 and current hemoglobin seems reasonably stable with hemoglobin of 14.4 however the white count is 24,400 suggesting some element of underlying infection  Currently on Rocephin IV antibiotic coverage  CT scan was personally reviewed and I agree with the report as noted, very large basketball size distended bladder full of hyperdense material clots, possibly very small 3 to 4 mm right distal ureteral calculus but there is no significant hydronephrosis, minimal dilation of the renal pelvis and calyces    Procedure  The patient was placed on wide open continuous glycine irrigation  In addition we irrigated the bladder manually with a piston syringe for a total of some 10 to 15 L of water and saline and eventually pulled out  easily 5 or 600 cc of clots as well as dark urine  After the manual irrigation the outflow was now pale to medium pink with minimal if any clots or residue coming out  If the patient is stable otherwise and is alert oriented and appropriate communication and questions were carried out    ASSESSMENT/PLAN  Unclear etiology, presumably clinically suspect hemorrhagic cystitis with bleeding associated with probable urinary tract infection in a patient with chronic Thapa catheter use, about 6 weeks status post transurethral prostatectomy in [facility] and that may be an additional source of the bleeding if there is any residual prostatic tissue  Regardless continue brisk continuous glycine irrigation manual irrigation overnight and tomorrow morning we will bring the patient to surgery for cystoscopy and examination of the prostatic urethra and bladder, additional irrigation as clinically indicated, possibly fulgurate any bleeding points as clinically indicated, the small distal ureteral calculus is relatively small with no obstructive hydronephrosis and this can be observed on Flomax for spontaneous passage or can be appropriately treated at a later date if after the patient is stabilized from his acute retention and gross hematuria with clots  Reviewed with patient and agreeable    Additional medical and historical objective data reviewed and listed below      Current Facility-Administered Medications:     [Held by provider] aspirin EC tablet 81 mg, 81 mg, oral, Daily, Renny Chan MD    brimonidine (AlphaGAN) 0.2 % ophthalmic solution 1 drop, 1 drop, Left Eye, TID, Renny Chan MD    cefTRIAXone (Rocephin) 1 g in dextrose (iso) IV 50 mL, 1 g, intravenous, q24h, Renny Chan MD    dextrose 50 % injection 12.5 g, 12.5 g, intravenous, q15 min PRN, Renny Chan MD    dextrose 50 % injection 25 g, 25 g, intravenous, q15 min PRN, Renny Chan MD    glucagon (Glucagen) injection 1 mg, 1 mg,  intramuscular, q15 min PRN, Renny Chan MD    glucagon (Glucagen) injection 1 mg, 1 mg, intramuscular, q15 min PRN, Renny Chan MD    HYDROmorphone (Dilaudid) injection 0.6 mg, 0.6 mg, intravenous, q3h PRN, Renny Chan MD, 0.6 mg at 05/03/25 1706    HYDROmorphone PF (Dilaudid) injection 0.2 mg, 0.2 mg, intravenous, q3h PRN, Renny Chan MD    [START ON 5/4/2025] insulin lispro injection 0-10 Units, 0-10 Units, subcutaneous, TID AC, Renny Chan MD    ipratropium-albuteroL (Duo-Neb) 0.5-2.5 mg/3 mL nebulizer solution 3 mL, 3 mL, nebulization, q4h PRN, Renny Chan MD    lamoTRIgine (LaMICtal) tablet 200 mg, 200 mg, oral, BID, Renny Chan MD    latanoprost (Xalatan) 0.005 % ophthalmic solution 1 drop, 1 drop, Left Eye, Nightly, Renny Chan MD    lubricating eye drops ophthalmic solution 2 drop, 2 drop, Both Eyes, BID PRN, Renny Chan MD    pramipexole (Mirapex) tablet 0.25 mg, 0.25 mg, oral, Nightly, Renny Chan MD    prednisoLONE acetate (Pred-Forte) 1 % ophthalmic suspension 1 drop, 1 drop, Left Eye, 4x daily, Renny Chan MD    rosuvastatin (Crestor) tablet 20 mg, 20 mg, oral, Nightly, Renny Chan MD    sennosides (Senokot) tablet 17.2 mg, 2 tablet, oral, BID, Renny Chan MD    [Held by provider] valsartan (Diovan) tablet 40 mg, 40 mg, oral, Daily, Renny Chan MD      Results for orders placed or performed during the hospital encounter of 05/03/25 (from the past 96 hours)   CBC and Auto Differential   Result Value Ref Range    WBC 7.9 4.4 - 11.3 x10*3/uL    nRBC 0.0 0.0 - 0.0 /100 WBCs    RBC 4.72 4.50 - 5.90 x10*6/uL    Hemoglobin 15.1 13.5 - 17.5 g/dL    Hematocrit 44.7 41.0 - 52.0 %    MCV 95 80 - 100 fL    MCH 32.0 26.0 - 34.0 pg    MCHC 33.8 32.0 - 36.0 g/dL    RDW 12.6 11.5 - 14.5 %    Platelets 204 150 - 450 x10*3/uL    Neutrophils % 62.8 40.0 - 80.0 %    Immature Granulocytes %, Automated 0.3 0.0 - 0.9 %    Lymphocytes % 20.0  13.0 - 44.0 %    Monocytes % 9.7 2.0 - 10.0 %    Eosinophils % 6.7 0.0 - 6.0 %    Basophils % 0.5 0.0 - 2.0 %    Neutrophils Absolute 4.97 1.20 - 7.70 x10*3/uL    Immature Granulocytes Absolute, Automated 0.02 0.00 - 0.70 x10*3/uL    Lymphocytes Absolute 1.58 1.20 - 4.80 x10*3/uL    Monocytes Absolute 0.77 0.10 - 1.00 x10*3/uL    Eosinophils Absolute 0.53 0.00 - 0.70 x10*3/uL    Basophils Absolute 0.04 0.00 - 0.10 x10*3/uL   Comprehensive metabolic panel   Result Value Ref Range    Glucose 215 (H) 74 - 99 mg/dL    Sodium 138 136 - 145 mmol/L    Potassium 4.5 3.5 - 5.3 mmol/L    Chloride 107 98 - 107 mmol/L    Bicarbonate 24 21 - 32 mmol/L    Anion Gap 12 10 - 20 mmol/L    Urea Nitrogen 20 6 - 23 mg/dL    Creatinine 0.80 0.50 - 1.30 mg/dL    eGFR >90 >60 mL/min/1.73m*2    Calcium 8.8 8.6 - 10.3 mg/dL    Albumin 4.3 3.4 - 5.0 g/dL    Alkaline Phosphatase 85 33 - 136 U/L    Total Protein 6.7 6.4 - 8.2 g/dL    AST 18 9 - 39 U/L    Bilirubin, Total 0.6 0.0 - 1.2 mg/dL    ALT 21 10 - 52 U/L   Lipase   Result Value Ref Range    Lipase 24 9 - 82 U/L   Lactate   Result Value Ref Range    Lactate 1.7 0.4 - 2.0 mmol/L   Protime-INR   Result Value Ref Range    Protime 9.8 9.8 - 12.4 seconds    INR 0.9 0.9 - 1.1   Type And Screen   Result Value Ref Range    ABO TYPE B     Rh TYPE POS     ANTIBODY SCREEN NEG    CBC   Result Value Ref Range    WBC 24.4 (H) 4.4 - 11.3 x10*3/uL    nRBC 0.0 0.0 - 0.0 /100 WBCs    RBC 4.53 4.50 - 5.90 x10*6/uL    Hemoglobin 14.4 13.5 - 17.5 g/dL    Hematocrit 44.9 41.0 - 52.0 %    MCV 99 80 - 100 fL    MCH 31.8 26.0 - 34.0 pg    MCHC 32.1 32.0 - 36.0 g/dL    RDW 12.8 11.5 - 14.5 %    Platelets 182 150 - 450 x10*3/uL   VERIFY ABO/Rh Group Test   Result Value Ref Range    ABO TYPE B     Rh TYPE POS    Prepare RBC: 2 Units   Result Value Ref Range    PRODUCT CODE U8853Z22     Unit Number [id number]     Unit ABO O     Unit RH POS     XM INTEP COMP     Dispense Status XM     Blood Expiration Date  5/18/2025 11:59:00 PM EDT     PRODUCT BLOOD TYPE 5100     UNIT VOLUME 350     PRODUCT CODE V4332B93     Unit Number [id number]     Unit ABO O     Unit RH POS     XM INTEP COMP     Dispense Status XM     Blood Expiration Date 5/18/2025 11:59:00 PM EDT     PRODUCT BLOOD TYPE 5100     UNIT VOLUME 350        CT abdomen pelvis w IV contrast  Result Date: 5/3/2025  Interpreted By:  Sanya Isaac, STUDY: CT ABDOMEN PELVIS W IV CONTRAST;  5/3/2025 3:55 pm   INDICATION: Signs/Symptoms:Hematuria, suprapubic pain.     COMPARISON: 01/04/2025   ACCESSION NUMBER(S): [id number]   ORDERING CLINICIAN: KUNAL HAUSER   TECHNIQUE: CT of the abdomen and pelvis was performed.  Standard contiguous axial images were obtained at 3 mm slice thickness through the abdomen and pelvis. Coronal and sagittal reconstructions at 3 mm slice thickness were performed.  75 ML of Omnipaque 350 was administered intravenously without immediate complication.   FINDINGS: The imaged lung bases demonstrate no acute abnormality. There is linear density in the inferior medial aspect of the right middle lobe and lingula suggesting scar and/or volume loss.   There is no free intraperitoneal air noted.   The,, and gallbladder demonstrate no acute abnormality. Splenic and hepatic granulomata are noted.   The pancreas demonstrates mild atrophy.   There is no suspicious focal renal lesion noted on either side.   Bilateral double-J ureteral stents present on the prior examination have been removed.   There are bilateral stable subcentimeter adrenal nodules bilaterally measuring in the 9 mm range, stable in comparison to multiple prior examinations, incompletely assessed on this exam.   There is mild symmetric bilateral hydronephrosis.   There appears to be a stone within the distal ureter on the right just proximal to the ureterovesicular junction measuring 4 mm, best seen on axial image 125. No evidence for a ureteral stone is noted on the left.   The urinary  bladder is markedly distended, measuring 17.8 x 16.6 x 13.3 cm. There is extensive heterogeneous hyperdense material within the urinary bladder, primarily posteriorly and inferiorly suggesting large volume hemorrhage within the urinary bladder. A Thapa catheter is present within the urinary bladder. There is small volume gas within the urinary bladder anteriorly, presumably iatrogenic in nature. An infectious process leading to this gas can not be fully excluded.   Review of the bowel demonstrates extensive diverticulosis of the sigmoid colon and descending colon with no evidence for diverticulitis.   There is no evidence for obstruction or appendicitis.   There is extensive multifocal atherosclerotic calcification involving the abdominal aorta and its branches including significant atherosclerotic calcification of the superior mesenteric artery. There is an infrarenal abdominal aortic aneurysm measuring a proximally 4.4 x 4.4 cm, similar in comparison to the prior examination. There is prominent atherosclerotic calcification of the arterial structures of the pelvis.   There is mild nonspecific fluid adjacent to the urinary bladder bilaterally suggesting inflammatory change/reactive edema.   Review of the osseous structures demonstrates prominent spondylosis throughout the imaged spine with multilevel prominent anterior osteophyte formation within the imaged thoracic spine as well as multilevel disc space narrowing, sclerotic endplate change, and vacuum disc formation within the lumbar spine. At L3-4, L4-5, and L5-S1 there is prominent degenerative change with bilateral facet hypertrophy and posterior osteophyte formation leading to significant central canal and neural foraminal stenosis.   No discrete lytic or blastic bone lesion.   The stomach is distended and filled with fluid.       1.  Interval removal of bilateral double-J ureteral stents. 2. Interval development of extensive large volume hyperdensity within a  markedly distended urinary bladder which now contains a Thapa catheter. These findings are most consistent with large volume hemorrhage within the urinary bladder. Clinical correlation is essential. The degree of hyperdense material within the urinary bladder limits assessment of the urinary bladder wall. There is mild free fluid adjacent to the urinary bladder which may signify reactive edema or inflammatory/infectious change. Urology consultation advised. 3. Mild bilateral hydronephrosis. There appears to be a 4 mm stone within the distal right ureter just proximal to the ureterovesicular junction. 4. Infrarenal abdominal aortic aneurysm measuring 4.4 x 4.4 cm. 5. Multifocal atherosclerotic calcification of the arterial structures of the abdomen and pelvis as above. 6. Prominent degenerative changes of the imaged spine, particularly the mid and lower lumbar spine. 7. Evidence of prior granulomatous disease.     MACRO: None   Signed by: Sanya Isaac 5/3/2025 5:32 PM Dictation workstation:   [id number]    XR shoulder left 2+ views  Result Date: 4/17/2025  * * *Final Report* * * DATE OF EXAM: Apr 17 2025  2:02PM   LZX   [id number]  -  XR SHLDR 4V AP/GLENN/LAT/OUTLET LT  / ACCESSION #  [id number] PROCEDURE REASON: Pain      * * * * Physician Interpretation * * * *  HISTORY (as given from clinical provider):  Pain   . Additional history provided by the performing technologist (if any):   --> chronic angelic shoulder pain arthritis TECHNIQUE: XR SHLDR 4V AP/GLENN/LAT/OUTLET LT COMPARISON: 05/23/2024 RESULT: Mildly narrowed acromiohumeral interval suggests rotator cuff tear.  Mild narrowing of the glenohumeral joint superiorly. Mild osteoarthritis of the AC joint. No other significant abnormality. : PARVEEN   Transcribe Date/Time: Apr 17 2025  4:38P Dictated by : SHANKAR MORALES MD This examination was interpreted and the report reviewed and electronically signed by: SHANKAR MORALES MD on Apr 17 2025  4:39PM  EST    XR  SHOULDER ORTHO 4V AP/TRUE AP/LAT/OUTLET RIGHT  Result Date: 4/17/2025  * * *Final Report* * * DATE OF EXAM: Apr 17 2025  2:02PM   LZX   [id number]  -  XR SHLDR 4V AP/GLENN/LAT/OUTLET RT  / ACCESSION #  [id number] PROCEDURE REASON: Pain      * * * * Physician Interpretation * * * *  HISTORY (as given from clinical provider):  Pain   . Additional history provided by the performing technologist (if any):   --> chronic rt shoulder pain limited rom TECHNIQUE: XR SHLDR 4V AP/GLENN/LAT/OUTLET RT COMPARISON: 07/01/2020 RESULT: Normal glenohumeral joint space.  Minimal osteoarthritis of the AC joint. Tiny calcification of the distal infraspinatus tendon compatible with calcific tendinosis. No other significant abnormality. : PARVEEN   Transcribe Date/Time: Apr 17 2025  4:38P Dictated by : SHANKAR MORALES MD This examination was interpreted and the report reviewed and electronically signed by: SHANKAR MORALES MD on Apr 17 2025  4:38PM  EST

## 2025-05-04 NOTE — NURSING NOTE
Called the [facility] [place] at 2200H to give report for this patient and informed our nursing supervisor that the patient will leave with contraption of 3 way aldana catheter and gauge 18 on right AC. [facility] came at 2300H to  the patient and report given. Patient will have ongoing continous bladder irrigation of Glycine . Patient left at 2335H.

## 2025-05-05 LAB
BLOOD EXPIRATION DATE: NORMAL
BLOOD EXPIRATION DATE: NORMAL
DISPENSE STATUS: NORMAL
DISPENSE STATUS: NORMAL
HOLD SPECIMEN: 293
PRODUCT BLOOD TYPE: 5100
PRODUCT BLOOD TYPE: 5100
PRODUCT CODE: NORMAL
PRODUCT CODE: NORMAL
UNIT ABO: NORMAL
UNIT ABO: NORMAL
UNIT NUMBER: NORMAL
UNIT NUMBER: NORMAL
UNIT RH: NORMAL
UNIT RH: NORMAL
UNIT VOLUME: 350
UNIT VOLUME: 350
XM INTEP: NORMAL
XM INTEP: NORMAL

## 2025-05-24 ENCOUNTER — APPOINTMENT (OUTPATIENT)
Dept: RADIOLOGY | Facility: HOSPITAL | Age: 65
End: 2025-05-24
Payer: COMMERCIAL

## 2025-05-24 ENCOUNTER — HOSPITAL ENCOUNTER (EMERGENCY)
Facility: HOSPITAL | Age: 65
Discharge: HOME | End: 2025-05-24
Attending: EMERGENCY MEDICINE
Payer: COMMERCIAL

## 2025-05-24 VITALS
OXYGEN SATURATION: 95 % | HEIGHT: 70 IN | HEART RATE: 100 BPM | SYSTOLIC BLOOD PRESSURE: 125 MMHG | TEMPERATURE: 97 F | DIASTOLIC BLOOD PRESSURE: 74 MMHG | RESPIRATION RATE: 18 BRPM | WEIGHT: 213.3 LBS | BODY MASS INDEX: 30.54 KG/M2

## 2025-05-24 DIAGNOSIS — M25.561 ACUTE PAIN OF RIGHT KNEE: Primary | ICD-10-CM

## 2025-05-24 DIAGNOSIS — S83.91XA SPRAIN OF RIGHT KNEE, UNSPECIFIED LIGAMENT, INITIAL ENCOUNTER: ICD-10-CM

## 2025-05-24 PROCEDURE — 73564 X-RAY EXAM KNEE 4 OR MORE: CPT | Mod: RT

## 2025-05-24 PROCEDURE — 2500000001 HC RX 250 WO HCPCS SELF ADMINISTERED DRUGS (ALT 637 FOR MEDICARE OP)

## 2025-05-24 PROCEDURE — 73564 X-RAY EXAM KNEE 4 OR MORE: CPT | Mod: RIGHT SIDE | Performed by: RADIOLOGY

## 2025-05-24 PROCEDURE — 99283 EMERGENCY DEPT VISIT LOW MDM: CPT | Performed by: EMERGENCY MEDICINE

## 2025-05-24 RX ORDER — OXYCODONE AND ACETAMINOPHEN 5; 325 MG/1; MG/1
1 TABLET ORAL EVERY 6 HOURS PRN
Qty: 5 TABLET | Refills: 0 | Status: SHIPPED | OUTPATIENT
Start: 2025-05-24 | End: 2025-05-27

## 2025-05-24 RX ORDER — OXYCODONE AND ACETAMINOPHEN 5; 325 MG/1; MG/1
1 TABLET ORAL ONCE
Refills: 0 | Status: COMPLETED | OUTPATIENT
Start: 2025-05-24 | End: 2025-05-24

## 2025-05-24 RX ADMIN — OXYCODONE HYDROCHLORIDE AND ACETAMINOPHEN 1 TABLET: 5; 325 TABLET ORAL at 19:10

## 2025-05-24 ASSESSMENT — PAIN - FUNCTIONAL ASSESSMENT: PAIN_FUNCTIONAL_ASSESSMENT: 0-10

## 2025-05-24 ASSESSMENT — COLUMBIA-SUICIDE SEVERITY RATING SCALE - C-SSRS
6. HAVE YOU EVER DONE ANYTHING, STARTED TO DO ANYTHING, OR PREPARED TO DO ANYTHING TO END YOUR LIFE?: NO
1. IN THE PAST MONTH, HAVE YOU WISHED YOU WERE DEAD OR WISHED YOU COULD GO TO SLEEP AND NOT WAKE UP?: NO
2. HAVE YOU ACTUALLY HAD ANY THOUGHTS OF KILLING YOURSELF?: NO

## 2025-05-24 ASSESSMENT — PAIN SCALES - GENERAL: PAINLEVEL_OUTOF10: 10 - WORST POSSIBLE PAIN

## 2025-05-24 NOTE — ED PROVIDER NOTES
"HPI   Chief Complaint   Patient presents with    Knee Pain     \"Here for right pain.  My artifical knee has finally bit the dust.  I twisted my right knee coming down the stairs.\"       64-year-old male with past medical history significant for blindness, HTN, type 2 diabetes mellitus, and COPD presents emergency department for evaluation of right knee pain that began just prior to presentation.  Patient states he was going down his steps when he twisted his right knee wrong and is now experiencing significant pain.  He states he did not fall during this incident, however did go to his knees and crawl back into the house.  States his neighbors wrapped his knee and he was given a ride to the emergency department today.  He is experiencing right-sided lateral knee pain, and also endorses some paresthesias predominantly in the right anterior medial shin, that do not go into his feet.  He does report a right sided total knee replacement approximately 15 to 20 years ago.  He denies any other injuries, head trauma, LOC, lightheadedness or dizziness.  Denies fever, chills, body aches, CP, increased shortness of breath, abdominal pain, back or flank pain.  Denies any other concerns or symptoms today.  Patient does report a urinary catheter, stating he does have a surgery scheduled this week for permanent catheter placement due to neurogenic bladder.      History provided by:  Patient   used: No      Patient History   Medical History[1]  Surgical History[2]  Family History[3]  Social History[4]    Physical Exam   ED Triage Vitals [05/24/25 1740]   Temperature Heart Rate Respirations BP   36.1 °C (97 °F) 100 18 125/74      Pulse Ox Temp Source Heart Rate Source Patient Position   95 % Temporal Monitor Sitting      BP Location FiO2 (%)     Right arm --       Physical Exam  Vitals and nursing note reviewed.   Constitutional:       General: He is in acute distress.      Appearance: Normal appearance. He is " obese. He is not ill-appearing, toxic-appearing or diaphoretic.   HENT:      Head: Normocephalic and atraumatic.      Right Ear: External ear normal.      Left Ear: External ear normal.   Cardiovascular:      Rate and Rhythm: Normal rate and regular rhythm.      Pulses: Normal pulses.      Heart sounds: Normal heart sounds. No murmur heard.     No friction rub. No gallop.   Pulmonary:      Effort: Pulmonary effort is normal. No respiratory distress.      Breath sounds: Normal breath sounds. No stridor. No wheezing, rhonchi or rales.   Chest:      Chest wall: No tenderness.   Abdominal:      General: Abdomen is flat. Bowel sounds are normal. There is no distension.      Palpations: Abdomen is soft.      Tenderness: There is no abdominal tenderness. There is no right CVA tenderness, left CVA tenderness, guarding or rebound.   Musculoskeletal:      Cervical back: Normal range of motion and neck supple. No rigidity or tenderness.      Right hip: Normal.      Left hip: Normal.      Right upper leg: Normal.      Left upper leg: Normal.      Right knee: No swelling, deformity, effusion, erythema or ecchymosis. Decreased range of motion. Tenderness present over the lateral joint line, LCL and ACL. No medial joint line, MCL, PCL or patellar tendon tenderness.      Left knee: Normal.      Right foot: Normal.      Left foot: Normal.      Comments: Patient unable to tolerate laxity testing of the right knee due to pain.  Unable to bear weight on right knee.   Lymphadenopathy:      Cervical: No cervical adenopathy.   Skin:     General: Skin is warm.      Capillary Refill: Capillary refill takes less than 2 seconds.      Findings: No bruising, erythema, lesion or rash.   Neurological:      General: No focal deficit present.      Mental Status: He is alert and oriented to person, place, and time. Mental status is at baseline.      Cranial Nerves: No cranial nerve deficit.      Sensory: No sensory deficit.      Motor: No weakness.       Gait: Gait normal.       ED Course & MDM   Diagnoses as of 05/24/25 2104   Acute pain of right knee   Sprain of right knee, unspecified ligament, initial encounter     Labs Reviewed - No data to display     XR knee right 4+ views   Final Result   Postsurgical change of right knee arthroplasty. Irregularity along   the superior aspect of the patella which may be chronic in nature,   however acute injury is not excluded and clinical correlation and   correlation with point tenderness as recommended. Limited evaluation   of the suprapatellar soft tissues secondary to external density and   knee effusion or soft tissue injury not excluded; please clinically   correlate with physical examination.             MACRO:   None        Signed by: Michael Beebe 5/24/2025 6:47 PM   Dictation workstation:   [id number]            Medical Decision Making  Differential diagnoses considered but not limited to: Fracture, dislocation, ligament/tendon injury, sprain    64-year-old male with past medical history significant for blindness, HTN, type 2 diabetes mellitus, and COPD presents emergency department for evaluation of right knee pain that began just prior to presentation.  On presentation patient is nontoxic-appearing, in some distress due to his knee pain.  Heart sounds normal with regular rate and rhythm.  Abdomen soft and nontender.  No spinal point tenderness throughout.  Neurovascularly intact.  Sensation intact.  Brisk capillary refill.  Strong equal palpable pulses throughout.  He does have tenderness to palpation over the lateral joint line, LCL, ACL.  Difficult to fully examine as patient does have some guarding, unable to text laxity of the ligaments at this time.  On initial evaluation patient is unable to bear weight on his right knee.  On evaluation of the knee there is no significant effusion, swelling, ecchymosis, erythema or warmth, and given I am away at have low suspicion for septic joint.  X-ray of the  right knee was obtained and did reveal postsurgical changes of the right knee arthroplasty.  There was an irregularity along the superior aspect of the patella, however unable to differentiate chronic versus acute injury via x-ray.  There is also limited evaluation of the suprapatellar soft tissue secondary to external density.  Patient was treated with Percocet, with some improvement in his pain symptoms.  A knee immobilizer was placed and patient was able to ambulate with a walker without assistance.  Stating he does not want to be admitted to the hospital, and would like to return home.  Patient does report that he will be able to follow-up with orthopedics within a week.  A referral was ordered for orthopedic walk-in clinic and appointment was made well patient in the emergency department today.  He was given very strict return precautions with any new or worsening symptoms.  Patient was educated on supportive care while at home with ibuprofen, he was also given 3 days of p.o. Percocet for breakthrough pain.  OARRS report was reviewed.    As a result of the work-up, the patient was discharged home.  he was informed of his diagnosis, educated on lab and imaging findings, I explained reasons for the patient to return to the Emergency Department and instructed to come back with any concerns or worsening of condition.  he demonstrated verbal understanding and were in agreement with the plan of care.  I emphasized the importance of follow up with the physician I referred them to in the timeframe recommended.  he was given the opportunity to ask questions.  All of the patient's questions were answered.  Patient discharged in good stable condition.    Amount and/or Complexity of Data Reviewed  Radiology: ordered. Decision-making details documented in ED Course.        Shared AFSHAN Attestation:    I personally saw the patient and made/approved the management plan and take responsibility for the patient  management.    History: Patient presenting with right knee pain.    Exam: Regular rate and rhythm cardiac exam with clear breath sounds bilaterally.  Abdomen soft and nontender.  Neurological exam is grossly intact.  There is tenderness to palpation to the right knee with no obvious deformity.  Normal medial and lateral stress.  Normal anterior and posterior drawer.    MDM:     Differential Diagnosis: Fracture, dislocation, internal derangement    Labs Reviewed - No data to display    XR knee right 4+ views    (Results Pending)     My interpretation of right knee x-ray is prosthesis in place with no obvious fracture or dislocation.          Yovani Gayle MD             [1]   Past Medical History:  Diagnosis Date    Blind one eye     Hypertension    [2]   Past Surgical History:  Procedure Laterality Date    CARDIAC CATHETERIZATION N/A 2024    Procedure: Left Heart Cath, With LV;  Surgeon: Cori Sullivan MD;  Location: [facility];  Service: Cardiovascular;  Laterality: N/A;    TOTAL KNEE ARTHROPLASTY Right    [3] No family history on file.  [4]   Social History  Tobacco Use    Smoking status: Former     Current packs/day: 0.00     Types: Cigarettes     Quit date: 2023     Years since quittin.7    Smokeless tobacco: Not on file   Vaping Use    Vaping status: Never Used   Substance Use Topics    Alcohol use: Not Currently    Drug use: Yes     Types: Marijuana        Yann Steel PA-C  25

## 2025-06-03 ENCOUNTER — HOSPITAL ENCOUNTER (EMERGENCY)
Age: 65
Discharge: HOME | End: 2025-06-03
Payer: COMMERCIAL

## 2025-06-03 ENCOUNTER — APPOINTMENT (OUTPATIENT)
Dept: GENERAL RADIOLOGY | Age: 65
End: 2025-06-03
Payer: COMMERCIAL

## 2025-06-03 VITALS
OXYGEN SATURATION: 95 % | TEMPERATURE: 98.1 F | SYSTOLIC BLOOD PRESSURE: 129 MMHG | DIASTOLIC BLOOD PRESSURE: 93 MMHG | HEART RATE: 97 BPM | RESPIRATION RATE: 18 BRPM

## 2025-06-03 DIAGNOSIS — L03.012 PARONYCHIA OF FINGER OF LEFT HAND: Primary | ICD-10-CM

## 2025-06-03 PROCEDURE — 10060 I&D ABSCESS SIMPLE/SINGLE: CPT

## 2025-06-03 PROCEDURE — 6370000000 HC RX 637 (ALT 250 FOR IP)

## 2025-06-03 PROCEDURE — 99283 EMERGENCY DEPT VISIT LOW MDM: CPT

## 2025-06-03 RX ORDER — GINSENG 100 MG
CAPSULE ORAL ONCE
Status: COMPLETED | OUTPATIENT
Start: 2025-06-03 | End: 2025-06-03

## 2025-06-03 RX ORDER — ACETAMINOPHEN 325 MG/1
650 TABLET ORAL ONCE
Status: COMPLETED | OUTPATIENT
Start: 2025-06-03 | End: 2025-06-03

## 2025-06-03 RX ORDER — BACITRACIN ZINC AND POLYMYXIN B SULFATE 500; 1000 [USP'U]/G; [USP'U]/G
OINTMENT TOPICAL
Qty: 15 G | Refills: 0 | Status: SHIPPED | OUTPATIENT
Start: 2025-06-03 | End: 2025-06-10

## 2025-06-03 RX ADMIN — ACETAMINOPHEN 650 MG: 325 TABLET ORAL at 15:43

## 2025-06-03 RX ADMIN — BACITRACIN 1 PACKET: 500 OINTMENT TOPICAL at 15:42

## 2025-06-03 ASSESSMENT — ENCOUNTER SYMPTOMS
COLOR CHANGE: 1
RESPIRATORY NEGATIVE: 1
EYES NEGATIVE: 1

## 2025-06-03 ASSESSMENT — PAIN - FUNCTIONAL ASSESSMENT: PAIN_FUNCTIONAL_ASSESSMENT: 0-10

## 2025-06-03 ASSESSMENT — PAIN SCALES - GENERAL: PAINLEVEL_OUTOF10: 6

## 2025-06-03 NOTE — ED PROVIDER NOTES
[facility] EMERGENCY DEPARTMENT  EMERGENCY DEPARTMENT ENCOUNTER      Pt Name: Reinaldo Garcia  MRN: [id number]  Birthdate 1960  Date of evaluation: 6/3/2025  Provider: GEN Barron CNP      HISTORY OF PRESENT ILLNESS    Reinaldo Garcia is a 64 y.o. male who presents to the Emergency Department with left middle finger pain and swelling around the nailbed.  He was working with [employer] the other day possible scraped by a thorn.        REVIEW OF SYSTEMS       Review of Systems   Constitutional: Negative.    Eyes: Negative.    Respiratory: Negative.     Cardiovascular: Negative.    Endocrine: Negative.    Skin:  Positive for color change and wound. Negative for rash.   Neurological: Negative.    Hematological:  Negative for adenopathy. Does not bruise/bleed easily.   Psychiatric/Behavioral: Negative.           PAST MEDICAL HISTORY   No past medical history on file.      SURGICAL HISTORY     No past surgical history on file.      CURRENT MEDICATIONS       Current Discharge Medication List        CONTINUE these medications which have NOT CHANGED    Details   metFORMIN (GLUCOPHAGE) 500 MG tablet Take 1 tablet by mouth Daily with supper      aspirin 81 MG EC tablet Take 1 tablet by mouth      lamoTRIgine (LAMICTAL) 200 MG tablet Take 1 tablet by mouth 2 times daily      montelukast (SINGULAIR) 10 MG tablet Take 1 tablet by mouth nightly      potassium chloride (KLOR-CON) 10 MEQ extended release tablet Take 1 tablet by mouth daily      pramipexole (MIRAPEX) 0.25 MG tablet Take 1 tablet by mouth nightly      rosuvastatin (CRESTOR) 20 MG tablet Take 1 tablet by mouth nightly      cyclobenzaprine (FLEXERIL) 10 MG tablet Take 1 tablet by mouth 3 times daily as needed      valsartan (DIOVAN) 40 MG tablet Take 1 tablet by mouth daily             ALLERGIES     Patient has no known allergies.    FAMILY HISTORY     No family history on file.       SOCIAL HISTORY       Social History     Socioeconomic History

## 2025-06-03 NOTE — DISCHARGE INSTRUCTIONS
Wash finger with soap and water twice daily apply bacitracin and bandage for the next couple days.

## 2025-06-12 ENCOUNTER — APPOINTMENT (OUTPATIENT)
Dept: RADIOLOGY | Facility: HOSPITAL | Age: 65
End: 2025-06-12
Payer: COMMERCIAL

## 2025-06-12 ENCOUNTER — HOSPITAL ENCOUNTER (EMERGENCY)
Facility: HOSPITAL | Age: 65
Discharge: HOME | End: 2025-06-13
Attending: STUDENT IN AN ORGANIZED HEALTH CARE EDUCATION/TRAINING PROGRAM
Payer: COMMERCIAL

## 2025-06-12 DIAGNOSIS — R11.2 NAUSEA AND VOMITING, UNSPECIFIED VOMITING TYPE: Primary | ICD-10-CM

## 2025-06-12 LAB
ALBUMIN SERPL BCP-MCNC: 4.3 G/DL (ref 3.4–5)
ALP SERPL-CCNC: 71 U/L (ref 33–136)
ALT SERPL W P-5'-P-CCNC: 21 U/L (ref 10–52)
ANION GAP SERPL CALC-SCNC: 10 MMOL/L (ref 10–20)
AST SERPL W P-5'-P-CCNC: 21 U/L (ref 9–39)
BASOPHILS # BLD AUTO: 0.03 X10*3/UL (ref 0–0.1)
BASOPHILS NFR BLD AUTO: 0.4 %
BILIRUB SERPL-MCNC: 1.3 MG/DL (ref 0–1.2)
BUN SERPL-MCNC: 8 MG/DL (ref 6–23)
CALCIUM SERPL-MCNC: 8.9 MG/DL (ref 8.6–10.3)
CHLORIDE SERPL-SCNC: 105 MMOL/L (ref 98–107)
CO2 SERPL-SCNC: 27 MMOL/L (ref 21–32)
CREAT SERPL-MCNC: 0.66 MG/DL (ref 0.5–1.3)
EGFRCR SERPLBLD CKD-EPI 2021: >90 ML/MIN/1.73M*2
EOSINOPHIL # BLD AUTO: 0.49 X10*3/UL (ref 0–0.7)
EOSINOPHIL NFR BLD AUTO: 7.3 %
ERYTHROCYTE [DISTWIDTH] IN BLOOD BY AUTOMATED COUNT: 15.3 % (ref 11.5–14.5)
GLUCOSE SERPL-MCNC: 93 MG/DL (ref 74–99)
HCT VFR BLD AUTO: 38.8 % (ref 41–52)
HGB BLD-MCNC: 12.3 G/DL (ref 13.5–17.5)
IMM GRANULOCYTES # BLD AUTO: 0.02 X10*3/UL (ref 0–0.7)
IMM GRANULOCYTES NFR BLD AUTO: 0.3 % (ref 0–0.9)
LACTATE SERPL-SCNC: 0.9 MMOL/L (ref 0.4–2)
LIPASE SERPL-CCNC: 17 U/L (ref 9–82)
LYMPHOCYTES # BLD AUTO: 1.23 X10*3/UL (ref 1.2–4.8)
LYMPHOCYTES NFR BLD AUTO: 18.4 %
MCH RBC QN AUTO: 29.4 PG (ref 26–34)
MCHC RBC AUTO-ENTMCNC: 31.7 G/DL (ref 32–36)
MCV RBC AUTO: 93 FL (ref 80–100)
MONOCYTES # BLD AUTO: 0.64 X10*3/UL (ref 0.1–1)
MONOCYTES NFR BLD AUTO: 9.6 %
NEUTROPHILS # BLD AUTO: 4.27 X10*3/UL (ref 1.2–7.7)
NEUTROPHILS NFR BLD AUTO: 64 %
NRBC BLD-RTO: 0 /100 WBCS (ref 0–0)
PLATELET # BLD AUTO: 233 X10*3/UL (ref 150–450)
POTASSIUM SERPL-SCNC: 3.2 MMOL/L (ref 3.5–5.3)
PROT SERPL-MCNC: 6.6 G/DL (ref 6.4–8.2)
RBC # BLD AUTO: 4.19 X10*6/UL (ref 4.5–5.9)
SODIUM SERPL-SCNC: 139 MMOL/L (ref 136–145)
WBC # BLD AUTO: 6.7 X10*3/UL (ref 4.4–11.3)

## 2025-06-12 PROCEDURE — 83690 ASSAY OF LIPASE: CPT | Performed by: STUDENT IN AN ORGANIZED HEALTH CARE EDUCATION/TRAINING PROGRAM

## 2025-06-12 PROCEDURE — 74177 CT ABD & PELVIS W/CONTRAST: CPT | Mod: FOREIGN READ | Performed by: RADIOLOGY

## 2025-06-12 PROCEDURE — 36415 COLL VENOUS BLD VENIPUNCTURE: CPT | Performed by: STUDENT IN AN ORGANIZED HEALTH CARE EDUCATION/TRAINING PROGRAM

## 2025-06-12 PROCEDURE — 74177 CT ABD & PELVIS W/CONTRAST: CPT

## 2025-06-12 PROCEDURE — 2500000004 HC RX 250 GENERAL PHARMACY W/ HCPCS (ALT 636 FOR OP/ED): Performed by: STUDENT IN AN ORGANIZED HEALTH CARE EDUCATION/TRAINING PROGRAM

## 2025-06-12 PROCEDURE — 2550000001 HC RX 255 CONTRASTS: Performed by: STUDENT IN AN ORGANIZED HEALTH CARE EDUCATION/TRAINING PROGRAM

## 2025-06-12 PROCEDURE — 85025 COMPLETE CBC W/AUTO DIFF WBC: CPT | Performed by: STUDENT IN AN ORGANIZED HEALTH CARE EDUCATION/TRAINING PROGRAM

## 2025-06-12 PROCEDURE — 80053 COMPREHEN METABOLIC PANEL: CPT | Performed by: STUDENT IN AN ORGANIZED HEALTH CARE EDUCATION/TRAINING PROGRAM

## 2025-06-12 PROCEDURE — 83605 ASSAY OF LACTIC ACID: CPT | Performed by: STUDENT IN AN ORGANIZED HEALTH CARE EDUCATION/TRAINING PROGRAM

## 2025-06-12 PROCEDURE — 96374 THER/PROPH/DIAG INJ IV PUSH: CPT

## 2025-06-12 PROCEDURE — 96361 HYDRATE IV INFUSION ADD-ON: CPT

## 2025-06-12 PROCEDURE — 99285 EMERGENCY DEPT VISIT HI MDM: CPT | Mod: 25

## 2025-06-12 RX ORDER — ONDANSETRON HYDROCHLORIDE 2 MG/ML
4 INJECTION, SOLUTION INTRAVENOUS ONCE
Status: COMPLETED | OUTPATIENT
Start: 2025-06-12 | End: 2025-06-12

## 2025-06-12 RX ORDER — ONDANSETRON 4 MG/1
4 TABLET, ORALLY DISINTEGRATING ORAL EVERY 8 HOURS PRN
Qty: 20 TABLET | Refills: 0 | Status: SHIPPED | OUTPATIENT
Start: 2025-06-12 | End: 2025-06-19

## 2025-06-12 RX ADMIN — ONDANSETRON 4 MG: 2 INJECTION INTRAMUSCULAR; INTRAVENOUS at 22:25

## 2025-06-12 RX ADMIN — IOHEXOL 75 ML: 350 INJECTION, SOLUTION INTRAVENOUS at 22:47

## 2025-06-12 RX ADMIN — SODIUM CHLORIDE, POTASSIUM CHLORIDE, SODIUM LACTATE AND CALCIUM CHLORIDE 500 ML: 600; 310; 30; 20 INJECTION, SOLUTION INTRAVENOUS at 22:51

## 2025-06-12 ASSESSMENT — PAIN - FUNCTIONAL ASSESSMENT: PAIN_FUNCTIONAL_ASSESSMENT: 0-10

## 2025-06-12 ASSESSMENT — LIFESTYLE VARIABLES
HAVE YOU EVER FELT YOU SHOULD CUT DOWN ON YOUR DRINKING: NO
TOTAL SCORE: 0
HAVE PEOPLE ANNOYED YOU BY CRITICIZING YOUR DRINKING: NO
EVER FELT BAD OR GUILTY ABOUT YOUR DRINKING: NO
EVER HAD A DRINK FIRST THING IN THE MORNING TO STEADY YOUR NERVES TO GET RID OF A HANGOVER: NO

## 2025-06-12 ASSESSMENT — PAIN DESCRIPTION - LOCATION: LOCATION: ABDOMEN

## 2025-06-12 ASSESSMENT — PAIN SCALES - GENERAL: PAINLEVEL_OUTOF10: 9

## 2025-06-13 VITALS
HEIGHT: 70 IN | HEART RATE: 94 BPM | TEMPERATURE: 97.7 F | BODY MASS INDEX: 30.21 KG/M2 | WEIGHT: 211 LBS | OXYGEN SATURATION: 97 % | DIASTOLIC BLOOD PRESSURE: 73 MMHG | RESPIRATION RATE: 16 BRPM | SYSTOLIC BLOOD PRESSURE: 146 MMHG

## 2025-06-13 NOTE — ED PROVIDER NOTES
HPI   No chief complaint on file.      Patient is a 64-year-old male who presents to the ED via EMS for nausea vomiting and abdominal discomfort.  Patient states that he had a suprapubic catheter placed at [facility] yesterday and was discharged.  Today he has had nausea and at least 2 episodes of emesis.  He states that he was getting diaphoretic and feeling unwell.  He is having some lower abdominal pain around the suprapubic catheter site.  He reports that the home health flush the suprapubic catheter with 100 cc and felt there might have been some resistance and was worried it may be displaced.              Patient History   Medical History[1]  Surgical History[2]  Family History[3]  Social History[4]    Physical Exam   ED Triage Vitals   Temp Pulse Resp BP   -- -- -- --      SpO2 Temp src Heart Rate Source Patient Position   -- -- -- --      BP Location FiO2 (%)     -- --       Physical Exam  Vitals and nursing note reviewed.   Constitutional:       General: He is not in acute distress.     Appearance: He is not diaphoretic.   HENT:      Mouth/Throat:      Mouth: Mucous membranes are moist.   Cardiovascular:      Rate and Rhythm: Normal rate and regular rhythm.      Pulses: Normal pulses.      Heart sounds: No murmur heard.     No gallop.   Pulmonary:      Effort: Pulmonary effort is normal. No respiratory distress.      Breath sounds: Normal breath sounds. No wheezing.   Abdominal:      General: Abdomen is flat.      Palpations: Abdomen is soft.      Tenderness: There is abdominal tenderness. There is no guarding.      Comments: Suprapubic catheter in place   Musculoskeletal:      Cervical back: Neck supple.   Skin:     General: Skin is warm and dry.   Neurological:      Mental Status: He is alert.           ED Course & MDM   Diagnoses as of 06/12/25 2351   Nausea and vomiting, unspecified vomiting type                 No data recorded                                 Medical Decision Making  Patient is  nontoxic.  No distress.  He has suprapubic catheter in place but he does have some lower abdominal tenderness but this was just placed yesterday.  There was some concern about the straining so obtain a CT abdomen pelvis in addition to blood work.  Patient will be given IV fluids and Zofran.    Urine shows no signs of UTI.  The remainder of the blood work is overall reassuring.    CT of the abdomen pelvis shows fatty liver, diverticulosis, infrarenal AAA.  Suprapubic Thapa catheter in place.    On reevaluation the patient is feeling better.  We discussed the plan of discharge with a prescription for oral Zofran.  We discussed the need for follow-up with his primary care physician.  Patient was discharged stable condition.        Procedure  Procedures       [1]   Past Medical History:  Diagnosis Date    Blind one eye     Hypertension    [2]   Past Surgical History:  Procedure Laterality Date    CARDIAC CATHETERIZATION N/A 2024    Procedure: Left Heart Cath, With LV;  Surgeon: Cori Sullivan MD;  Location: [facility];  Service: Cardiovascular;  Laterality: N/A;    TOTAL KNEE ARTHROPLASTY Right    [3] No family history on file.  [4]   Social History  Tobacco Use    Smoking status: Former     Current packs/day: 0.00     Types: Cigarettes     Quit date: 2023     Years since quittin.7    Smokeless tobacco: Not on file   Vaping Use    Vaping status: Never Used   Substance Use Topics    Alcohol use: Not Currently    Drug use: Yes     Types: Marijuana        Clark Viera DO  25 2029

## 2025-08-18 ENCOUNTER — HOSPITAL ENCOUNTER (EMERGENCY)
Facility: HOSPITAL | Age: 65
Discharge: HOME | End: 2025-08-18
Attending: EMERGENCY MEDICINE
Payer: COMMERCIAL

## 2025-08-18 VITALS
OXYGEN SATURATION: 94 % | TEMPERATURE: 97.5 F | BODY MASS INDEX: 30.49 KG/M2 | HEART RATE: 96 BPM | SYSTOLIC BLOOD PRESSURE: 161 MMHG | WEIGHT: 213 LBS | RESPIRATION RATE: 17 BRPM | DIASTOLIC BLOOD PRESSURE: 90 MMHG | HEIGHT: 70 IN

## 2025-08-18 DIAGNOSIS — T83.010A SUPRAPUBIC CATHETER DYSFUNCTION, INITIAL ENCOUNTER: Primary | ICD-10-CM

## 2025-08-18 PROCEDURE — 99281 EMR DPT VST MAYX REQ PHY/QHP: CPT | Performed by: EMERGENCY MEDICINE

## 2025-08-18 ASSESSMENT — LIFESTYLE VARIABLES
HAVE PEOPLE ANNOYED YOU BY CRITICIZING YOUR DRINKING: NO
EVER HAD A DRINK FIRST THING IN THE MORNING TO STEADY YOUR NERVES TO GET RID OF A HANGOVER: NO
EVER FELT BAD OR GUILTY ABOUT YOUR DRINKING: NO
TOTAL SCORE: 0
HAVE YOU EVER FELT YOU SHOULD CUT DOWN ON YOUR DRINKING: NO

## 2025-08-18 ASSESSMENT — PAIN - FUNCTIONAL ASSESSMENT: PAIN_FUNCTIONAL_ASSESSMENT: 0-10

## 2025-08-18 ASSESSMENT — PAIN SCALES - GENERAL: PAINLEVEL_OUTOF10: 0 - NO PAIN

## (undated) DEVICE — STATLOCK, FOLEY SWIVEL, SILICONE TRICOT

## (undated) DEVICE — HOLSTER, ELECTROSURGERY ACCESSORY, STERILE

## (undated) DEVICE — GLOVE, SURGICAL, PROTEXIS PI BLUE W/NEUTHERA, 8.0, PF, LF

## (undated) DEVICE — IRRIGATION KIT, PUMPING, SINGLE ACTION, SYRINGE, 10 CC

## (undated) DEVICE — SHIELD, RADPAD, ELECTROPHYSIOLOGY, ORANGE, ABSORBENT

## (undated) DEVICE — TUBING, MANIFOLD, LOW PRESSURE

## (undated) DEVICE — SOLUTION, IRRIGATION, USP, SODIUM CHLORIDE 0.9%, 3000 ML

## (undated) DEVICE — SHEATH, GLIDESHEATH, SLENDER, 6FR 10CM

## (undated) DEVICE — TRAY, MINOR, SINGLE BASIN, STERILE

## (undated) DEVICE — TRAY, SKIN SCRUB, WET PREP, WITH 4 COMPARMENT

## (undated) DEVICE — CATHETER, URETHRAL, FOLEY, 2 WAY, BARDEX LUBRICATH, SHORT LENGTH, 16 FR, 5 CC, LATEX

## (undated) DEVICE — Device

## (undated) DEVICE — BAND, D-STAT RADIAL, TOPICAL HEMOSTAT

## (undated) DEVICE — CATHETER, OPTITORQUE, 5FR, JACKY, 3.5/ 2H/110CM, CURVED

## (undated) DEVICE — GOWN, SURGICAL, ROYAL SILK, XL, STERILE

## (undated) DEVICE — SYRINGE, 20 CC, LUER LOCK

## (undated) DEVICE — GUIDEWIRE, DUAL SENSOR, .035 X 150 STRAIGHT,  3CM

## (undated) DEVICE — BOWL, BASIN, 32 OZ, STERILE

## (undated) DEVICE — GLOVE, SURGICAL, PROTEXIS PI , 6.5, PF, LF

## (undated) DEVICE — PROTECTOR, NERVE, ULNAR, PINK

## (undated) DEVICE — BAG, DRAINAGE, ANTI-REFLUX CHAMBER, 2000ML

## (undated) DEVICE — GOWN, SURGICAL, ROYAL SILK, XXL, STERILE

## (undated) DEVICE — LASER FIBER, HOLMIUM MP 365

## (undated) DEVICE — TUBING, SUCTION, 6MM X 10, CLEAN N-COND

## (undated) DEVICE — DRAINBAG, 15.5 X 31, 2600/2800 UROVIEW, STERILE

## (undated) DEVICE — SOLUTION, IRRIGATION, SODIUM CHLORIDE 0.9%, 1000 ML, POUR BOTTLE

## (undated) DEVICE — GLOVE, SURGICAL, PROTEXIS PI , 7.0, PF, LF